# Patient Record
Sex: FEMALE | Race: WHITE | NOT HISPANIC OR LATINO | URBAN - METROPOLITAN AREA
[De-identification: names, ages, dates, MRNs, and addresses within clinical notes are randomized per-mention and may not be internally consistent; named-entity substitution may affect disease eponyms.]

---

## 2018-03-23 ENCOUNTER — INPATIENT (INPATIENT)
Facility: HOSPITAL | Age: 78
LOS: 1 days | Discharge: LEFT BEFORE DISCHARGE | End: 2018-03-25
Attending: STUDENT IN AN ORGANIZED HEALTH CARE EDUCATION/TRAINING PROGRAM | Admitting: STUDENT IN AN ORGANIZED HEALTH CARE EDUCATION/TRAINING PROGRAM
Payer: MEDICARE

## 2018-03-23 VITALS
HEART RATE: 86 BPM | RESPIRATION RATE: 16 BRPM | TEMPERATURE: 100 F | DIASTOLIC BLOOD PRESSURE: 60 MMHG | OXYGEN SATURATION: 98 % | SYSTOLIC BLOOD PRESSURE: 136 MMHG

## 2018-03-23 LAB
ALBUMIN SERPL ELPH-MCNC: 3.7 G/DL — SIGNIFICANT CHANGE UP (ref 3.3–5)
ALP SERPL-CCNC: 76 U/L — SIGNIFICANT CHANGE UP (ref 40–120)
ALT FLD-CCNC: 21 U/L — SIGNIFICANT CHANGE UP (ref 4–33)
ANISOCYTOSIS BLD QL: SLIGHT — SIGNIFICANT CHANGE UP
APPEARANCE UR: SIGNIFICANT CHANGE UP
APTT BLD: 29.2 SEC — SIGNIFICANT CHANGE UP (ref 27.5–37.4)
AST SERPL-CCNC: 19 U/L — SIGNIFICANT CHANGE UP (ref 4–32)
B PERT DNA SPEC QL NAA+PROBE: SIGNIFICANT CHANGE UP
BACTERIA # UR AUTO: SIGNIFICANT CHANGE UP
BASE EXCESS BLDV CALC-SCNC: -7.6 MMOL/L — SIGNIFICANT CHANGE UP
BASOPHILS # BLD AUTO: 0.01 K/UL — SIGNIFICANT CHANGE UP (ref 0–0.2)
BASOPHILS NFR BLD AUTO: 0.1 % — SIGNIFICANT CHANGE UP (ref 0–2)
BASOPHILS NFR SPEC: 0 % — SIGNIFICANT CHANGE UP (ref 0–2)
BILIRUB SERPL-MCNC: 0.3 MG/DL — SIGNIFICANT CHANGE UP (ref 0.2–1.2)
BILIRUB UR-MCNC: NEGATIVE — SIGNIFICANT CHANGE UP
BLOOD GAS VENOUS - CREATININE: 1.62 MG/DL — HIGH (ref 0.5–1.3)
BLOOD UR QL VISUAL: HIGH
BUN SERPL-MCNC: 42 MG/DL — HIGH (ref 7–23)
C PNEUM DNA SPEC QL NAA+PROBE: NOT DETECTED — SIGNIFICANT CHANGE UP
CALCIUM SERPL-MCNC: 7.9 MG/DL — LOW (ref 8.4–10.5)
CHLORIDE BLDV-SCNC: 112 MMOL/L — HIGH (ref 96–108)
CHLORIDE SERPL-SCNC: 107 MMOL/L — SIGNIFICANT CHANGE UP (ref 98–107)
CO2 SERPL-SCNC: 17 MMOL/L — LOW (ref 22–31)
COLOR SPEC: SIGNIFICANT CHANGE UP
CREAT SERPL-MCNC: 1.54 MG/DL — HIGH (ref 0.5–1.3)
EOSINOPHIL # BLD AUTO: 0 K/UL — SIGNIFICANT CHANGE UP (ref 0–0.5)
EOSINOPHIL NFR BLD AUTO: 0 % — SIGNIFICANT CHANGE UP (ref 0–6)
EOSINOPHIL NFR FLD: 0 % — SIGNIFICANT CHANGE UP (ref 0–6)
FLUAV H1 2009 PAND RNA SPEC QL NAA+PROBE: NOT DETECTED — SIGNIFICANT CHANGE UP
FLUAV H1 RNA SPEC QL NAA+PROBE: NOT DETECTED — SIGNIFICANT CHANGE UP
FLUAV H3 RNA SPEC QL NAA+PROBE: NOT DETECTED — SIGNIFICANT CHANGE UP
FLUAV SUBTYP SPEC NAA+PROBE: SIGNIFICANT CHANGE UP
FLUBV RNA SPEC QL NAA+PROBE: NOT DETECTED — SIGNIFICANT CHANGE UP
GAS PNL BLDV: 131 MMOL/L — LOW (ref 136–146)
GIANT PLATELETS BLD QL SMEAR: PRESENT — SIGNIFICANT CHANGE UP
GLUCOSE BLDV-MCNC: 74 — SIGNIFICANT CHANGE UP (ref 70–99)
GLUCOSE SERPL-MCNC: 77 MG/DL — SIGNIFICANT CHANGE UP (ref 70–99)
GLUCOSE UR-MCNC: NEGATIVE — SIGNIFICANT CHANGE UP
HADV DNA SPEC QL NAA+PROBE: NOT DETECTED — SIGNIFICANT CHANGE UP
HCO3 BLDV-SCNC: 17 MMOL/L — LOW (ref 20–27)
HCOV 229E RNA SPEC QL NAA+PROBE: NOT DETECTED — SIGNIFICANT CHANGE UP
HCOV HKU1 RNA SPEC QL NAA+PROBE: NOT DETECTED — SIGNIFICANT CHANGE UP
HCOV NL63 RNA SPEC QL NAA+PROBE: NOT DETECTED — SIGNIFICANT CHANGE UP
HCOV OC43 RNA SPEC QL NAA+PROBE: NOT DETECTED — SIGNIFICANT CHANGE UP
HCT VFR BLD CALC: 35.2 % — SIGNIFICANT CHANGE UP (ref 34.5–45)
HCT VFR BLDV CALC: 32.6 % — LOW (ref 34.5–45)
HGB BLD-MCNC: 10.9 G/DL — LOW (ref 11.5–15.5)
HGB BLDV-MCNC: 10.5 G/DL — LOW (ref 11.5–15.5)
HMPV RNA SPEC QL NAA+PROBE: NOT DETECTED — SIGNIFICANT CHANGE UP
HPIV1 RNA SPEC QL NAA+PROBE: NOT DETECTED — SIGNIFICANT CHANGE UP
HPIV2 RNA SPEC QL NAA+PROBE: NOT DETECTED — SIGNIFICANT CHANGE UP
HPIV3 RNA SPEC QL NAA+PROBE: NOT DETECTED — SIGNIFICANT CHANGE UP
HPIV4 RNA SPEC QL NAA+PROBE: NOT DETECTED — SIGNIFICANT CHANGE UP
HYPOCHROMIA BLD QL: SLIGHT — SIGNIFICANT CHANGE UP
IMM GRANULOCYTES # BLD AUTO: 0.06 # — SIGNIFICANT CHANGE UP
IMM GRANULOCYTES NFR BLD AUTO: 0.5 % — SIGNIFICANT CHANGE UP (ref 0–1.5)
INR BLD: 0.97 — SIGNIFICANT CHANGE UP (ref 0.88–1.17)
KETONES UR-MCNC: NEGATIVE — SIGNIFICANT CHANGE UP
LACTATE BLDV-MCNC: 1.4 MMOL/L — SIGNIFICANT CHANGE UP (ref 0.5–2)
LEUKOCYTE ESTERASE UR-ACNC: HIGH
LYMPHOCYTES # BLD AUTO: 0.46 K/UL — LOW (ref 1–3.3)
LYMPHOCYTES # BLD AUTO: 4.2 % — LOW (ref 13–44)
LYMPHOCYTES NFR SPEC AUTO: 1.7 % — LOW (ref 13–44)
M PNEUMO DNA SPEC QL NAA+PROBE: NOT DETECTED — SIGNIFICANT CHANGE UP
MCHC RBC-ENTMCNC: 30.8 PG — SIGNIFICANT CHANGE UP (ref 27–34)
MCHC RBC-ENTMCNC: 31 % — LOW (ref 32–36)
MCV RBC AUTO: 99.4 FL — SIGNIFICANT CHANGE UP (ref 80–100)
MONOCYTES # BLD AUTO: 0.18 K/UL — SIGNIFICANT CHANGE UP (ref 0–0.9)
MONOCYTES NFR BLD AUTO: 1.6 % — LOW (ref 2–14)
MONOCYTES NFR BLD: 0 % — LOW (ref 2–9)
MUCOUS THREADS # UR AUTO: SIGNIFICANT CHANGE UP
NEUTROPHIL AB SER-ACNC: 95.6 % — HIGH (ref 43–77)
NEUTROPHILS # BLD AUTO: 10.34 K/UL — HIGH (ref 1.8–7.4)
NEUTROPHILS NFR BLD AUTO: 93.6 % — HIGH (ref 43–77)
NEUTS BAND # BLD: 1.8 % — SIGNIFICANT CHANGE UP (ref 0–6)
NITRITE UR-MCNC: POSITIVE — HIGH
NON-SQ EPI CELLS # UR AUTO: <1 — SIGNIFICANT CHANGE UP
NRBC # FLD: 0 — SIGNIFICANT CHANGE UP
PCO2 BLDV: 42 MMHG — SIGNIFICANT CHANGE UP (ref 41–51)
PH BLDV: 7.26 PH — LOW (ref 7.32–7.43)
PH UR: 7.5 — SIGNIFICANT CHANGE UP (ref 4.6–8)
PLATELET # BLD AUTO: 101 K/UL — LOW (ref 150–400)
PLATELET COUNT - ESTIMATE: SIGNIFICANT CHANGE UP
PMV BLD: 10.6 FL — SIGNIFICANT CHANGE UP (ref 7–13)
PO2 BLDV: 31 MMHG — LOW (ref 35–40)
POLYCHROMASIA BLD QL SMEAR: SLIGHT — SIGNIFICANT CHANGE UP
POTASSIUM BLDV-SCNC: 6.1 MMOL/L — HIGH (ref 3.4–4.5)
POTASSIUM SERPL-MCNC: 6.1 MMOL/L — HIGH (ref 3.5–5.3)
POTASSIUM SERPL-SCNC: 6.1 MMOL/L — HIGH (ref 3.5–5.3)
PROT SERPL-MCNC: 6 G/DL — SIGNIFICANT CHANGE UP (ref 6–8.3)
PROT UR-MCNC: 20 MG/DL — SIGNIFICANT CHANGE UP
PROTHROM AB SERPL-ACNC: 11.2 SEC — SIGNIFICANT CHANGE UP (ref 9.8–13.1)
RBC # BLD: 3.54 M/UL — LOW (ref 3.8–5.2)
RBC # FLD: 21.4 % — HIGH (ref 10.3–14.5)
RBC CASTS # UR COMP ASSIST: SIGNIFICANT CHANGE UP (ref 0–?)
RSV RNA SPEC QL NAA+PROBE: NOT DETECTED — SIGNIFICANT CHANGE UP
RV+EV RNA SPEC QL NAA+PROBE: NOT DETECTED — SIGNIFICANT CHANGE UP
SAO2 % BLDV: 48.9 % — LOW (ref 60–85)
SODIUM SERPL-SCNC: 137 MMOL/L — SIGNIFICANT CHANGE UP (ref 135–145)
SP GR SPEC: 1.01 — SIGNIFICANT CHANGE UP (ref 1–1.04)
SQUAMOUS # UR AUTO: SIGNIFICANT CHANGE UP
UROBILINOGEN FLD QL: NORMAL MG/DL — SIGNIFICANT CHANGE UP
VARIANT LYMPHS # BLD: 0.9 % — SIGNIFICANT CHANGE UP
WBC # BLD: 11.05 K/UL — HIGH (ref 3.8–10.5)
WBC # FLD AUTO: 11.05 K/UL — HIGH (ref 3.8–10.5)
WBC CLUMPS #/AREA URNS HPF: PRESENT — HIGH (ref 0–?)
WBC UR QL: SIGNIFICANT CHANGE UP (ref 0–?)

## 2018-03-23 PROCEDURE — 71045 X-RAY EXAM CHEST 1 VIEW: CPT | Mod: 26

## 2018-03-23 RX ORDER — ASPIRIN/CALCIUM CARB/MAGNESIUM 324 MG
324 TABLET ORAL ONCE
Qty: 0 | Refills: 0 | Status: DISCONTINUED | OUTPATIENT
Start: 2018-03-23 | End: 2018-03-23

## 2018-03-23 RX ORDER — MORPHINE SULFATE 50 MG/1
4 CAPSULE, EXTENDED RELEASE ORAL ONCE
Qty: 0 | Refills: 0 | Status: DISCONTINUED | OUTPATIENT
Start: 2018-03-23 | End: 2018-03-23

## 2018-03-23 RX ORDER — SODIUM CHLORIDE 9 MG/ML
1000 INJECTION INTRAMUSCULAR; INTRAVENOUS; SUBCUTANEOUS ONCE
Qty: 0 | Refills: 0 | Status: COMPLETED | OUTPATIENT
Start: 2018-03-23 | End: 2018-03-23

## 2018-03-23 RX ORDER — DEXTROSE 50 % IN WATER 50 %
50 SYRINGE (ML) INTRAVENOUS ONCE
Qty: 0 | Refills: 0 | Status: COMPLETED | OUTPATIENT
Start: 2018-03-23 | End: 2018-03-23

## 2018-03-23 RX ORDER — CALCIUM GLUCONATE 100 MG/ML
1 VIAL (ML) INTRAVENOUS ONCE
Qty: 0 | Refills: 0 | Status: COMPLETED | OUTPATIENT
Start: 2018-03-23 | End: 2018-03-23

## 2018-03-23 RX ORDER — INSULIN HUMAN 100 [IU]/ML
10 INJECTION, SOLUTION SUBCUTANEOUS ONCE
Qty: 0 | Refills: 0 | Status: COMPLETED | OUTPATIENT
Start: 2018-03-23 | End: 2018-03-23

## 2018-03-23 RX ORDER — ACETAMINOPHEN 500 MG
1000 TABLET ORAL ONCE
Qty: 0 | Refills: 0 | Status: COMPLETED | OUTPATIENT
Start: 2018-03-23 | End: 2018-03-23

## 2018-03-23 RX ORDER — CEFEPIME 1 G/1
2000 INJECTION, POWDER, FOR SOLUTION INTRAMUSCULAR; INTRAVENOUS ONCE
Qty: 0 | Refills: 0 | Status: COMPLETED | OUTPATIENT
Start: 2018-03-23 | End: 2018-03-23

## 2018-03-23 RX ORDER — SODIUM BICARBONATE 1 MEQ/ML
50 SYRINGE (ML) INTRAVENOUS ONCE
Qty: 0 | Refills: 0 | Status: COMPLETED | OUTPATIENT
Start: 2018-03-23 | End: 2018-03-23

## 2018-03-23 RX ORDER — VANCOMYCIN HCL 1 G
1000 VIAL (EA) INTRAVENOUS ONCE
Qty: 0 | Refills: 0 | Status: COMPLETED | OUTPATIENT
Start: 2018-03-23 | End: 2018-03-23

## 2018-03-23 RX ADMIN — Medication 50 MILLILITER(S): at 22:56

## 2018-03-23 RX ADMIN — MORPHINE SULFATE 4 MILLIGRAM(S): 50 CAPSULE, EXTENDED RELEASE ORAL at 21:42

## 2018-03-23 RX ADMIN — CEFEPIME 100 MILLIGRAM(S): 1 INJECTION, POWDER, FOR SOLUTION INTRAMUSCULAR; INTRAVENOUS at 22:58

## 2018-03-23 RX ADMIN — SODIUM CHLORIDE 1000 MILLILITER(S): 9 INJECTION INTRAMUSCULAR; INTRAVENOUS; SUBCUTANEOUS at 21:42

## 2018-03-23 RX ADMIN — Medication 250 MILLIGRAM(S): at 22:56

## 2018-03-23 RX ADMIN — MORPHINE SULFATE 4 MILLIGRAM(S): 50 CAPSULE, EXTENDED RELEASE ORAL at 21:57

## 2018-03-23 RX ADMIN — INSULIN HUMAN 10 UNIT(S): 100 INJECTION, SOLUTION SUBCUTANEOUS at 22:57

## 2018-03-23 RX ADMIN — Medication 400 MILLIGRAM(S): at 21:42

## 2018-03-23 NOTE — ED PROVIDER NOTE - PSH
EPS study  with no intervention 6/2010  History of Arthroscopy  1997 Right  Left 2000  History of D&C  1968  Nasal Polyp removal  1960  S/P total knee arthroplasty, left

## 2018-03-23 NOTE — ED PROVIDER NOTE - MEDICAL DECISION MAKING DETAILS
76 yo F w/ pmhx of bladder Cancer, s/p resection, has urostomy bag in place, last chemo was yesterday c/o fever, lower abdominal pain. concern for neutropenic fever, or abdominal source of infection  - labs, CT, abx, Tylenol

## 2018-03-23 NOTE — ED PROVIDER NOTE - ATTENDING CONTRIBUTION TO CARE
Attending Statement: I have personally seen and examined this patient. I have fully participated in the care of this patient. I have reviewed all pertinent clinical information, including history physical exam, plan and the Resident's note and agree except as noted  76yo F hx of bladder ca sp resection has a urostomy bag, HTN, HLD, pw  fever today, temp max 100.5  +myalgia, fever and abdominal pain. +nausea. no vomit. no diarrhea. no cloudy urine. no cough. no headache. no rash. no sick contact. Last chemo a day ago.   Vital signs noted. looks uncomfortable. aox3 supple neck. normal S1-S2 good air entry  nl. soft diffusely tender abdomen w urostomy in place. no rash. no pedal edema. no calf tenderness. normal pulses bilateral feet.  plan sepsis workup, ct abdomen, IVf, IV abx, admit

## 2018-03-23 NOTE — ED PROVIDER NOTE - PROGRESS NOTE DETAILS
pt no distress pending ct to be done and admit to tele for CP. pt now sleeping no distress. Endorsed to Dr Sarmiento Morad: ct neg for acute pathology, hyper K improving, pt accepted for admission by hospitalist

## 2018-03-23 NOTE — ED PROVIDER NOTE - FAMILY HISTORY
Family history of prostate cancer     Family history of hyperlipidemia     Father  Still living? Unknown  Family history of dementia, Age at diagnosis: Age Unknown

## 2018-03-23 NOTE — ED PROVIDER NOTE - OBJECTIVE STATEMENT
76 yo F w/ pmhx of bladder Cancer, s/p resection, has urostomy bag in place, last chemo was yesterday c/o fever since this afternoon, T.max 100.5, and lower abdominal pain. reports nausea, but no vomiting, diarrhea, constipation or any other symptoms.

## 2018-03-23 NOTE — ED ADULT TRIAGE NOTE - CHIEF COMPLAINT QUOTE
Pt comes in for c/o abd pain that began this afternoon and also spiking fever of 100.5 at home. Pt w/hx of bladder CA reports last chemo yesterday and abd pain mild but still present. Pt in no acute distress, vs as noted

## 2018-03-23 NOTE — ED PROVIDER NOTE - PMH
Acid Reflux    Arthritis    Asthmatic Bronchitis    Calcified Thoracic Aorta    Fibromyalgia    Gastric Ulcer    HTN (Hypertension)    Hypertrophic Cardiomyopathy    Migraine, Ophthalmoplegic    Nasal Polyp  1960  Obese    Paroxysmal Ventricular Tachycardia  6/2010  Radial fracture  left  Uterine Polyp

## 2018-03-24 DIAGNOSIS — M79.7 FIBROMYALGIA: ICD-10-CM

## 2018-03-24 DIAGNOSIS — D69.6 THROMBOCYTOPENIA, UNSPECIFIED: ICD-10-CM

## 2018-03-24 DIAGNOSIS — R94.31 ABNORMAL ELECTROCARDIOGRAM [ECG] [EKG]: ICD-10-CM

## 2018-03-24 DIAGNOSIS — C67.9 MALIGNANT NEOPLASM OF BLADDER, UNSPECIFIED: ICD-10-CM

## 2018-03-24 DIAGNOSIS — Z98.890 OTHER SPECIFIED POSTPROCEDURAL STATES: Chronic | ICD-10-CM

## 2018-03-24 DIAGNOSIS — A41.9 SEPSIS, UNSPECIFIED ORGANISM: ICD-10-CM

## 2018-03-24 DIAGNOSIS — N39.0 URINARY TRACT INFECTION, SITE NOT SPECIFIED: ICD-10-CM

## 2018-03-24 DIAGNOSIS — N17.9 ACUTE KIDNEY FAILURE, UNSPECIFIED: ICD-10-CM

## 2018-03-24 DIAGNOSIS — E87.5 HYPERKALEMIA: ICD-10-CM

## 2018-03-24 DIAGNOSIS — D64.9 ANEMIA, UNSPECIFIED: ICD-10-CM

## 2018-03-24 DIAGNOSIS — N20.0 CALCULUS OF KIDNEY: ICD-10-CM

## 2018-03-24 DIAGNOSIS — I42.2 OTHER HYPERTROPHIC CARDIOMYOPATHY: ICD-10-CM

## 2018-03-24 LAB
ALBUMIN SERPL ELPH-MCNC: 2.9 G/DL — LOW (ref 3.3–5)
ALP SERPL-CCNC: 71 U/L — SIGNIFICANT CHANGE UP (ref 40–120)
ALT FLD-CCNC: 25 U/L — SIGNIFICANT CHANGE UP (ref 4–33)
AST SERPL-CCNC: 26 U/L — SIGNIFICANT CHANGE UP (ref 4–32)
BASOPHILS # BLD AUTO: 0.09 K/UL — SIGNIFICANT CHANGE UP (ref 0–0.2)
BASOPHILS NFR BLD AUTO: 0.2 % — SIGNIFICANT CHANGE UP (ref 0–2)
BASOPHILS NFR SPEC: 0 % — SIGNIFICANT CHANGE UP (ref 0–2)
BILIRUB SERPL-MCNC: 0.4 MG/DL — SIGNIFICANT CHANGE UP (ref 0.2–1.2)
BLASTS # FLD: 0 % — SIGNIFICANT CHANGE UP (ref 0–0)
BUN SERPL-MCNC: 37 MG/DL — HIGH (ref 7–23)
BUN SERPL-MCNC: 41 MG/DL — HIGH (ref 7–23)
BUN SERPL-MCNC: 43 MG/DL — HIGH (ref 7–23)
BURR CELLS BLD QL SMEAR: PRESENT — SIGNIFICANT CHANGE UP
CALCIUM SERPL-MCNC: 6.5 MG/DL — CRITICAL LOW (ref 8.4–10.5)
CALCIUM SERPL-MCNC: 7.4 MG/DL — LOW (ref 8.4–10.5)
CALCIUM SERPL-MCNC: 7.6 MG/DL — LOW (ref 8.4–10.5)
CHLORIDE SERPL-SCNC: 108 MMOL/L — HIGH (ref 98–107)
CHLORIDE SERPL-SCNC: 110 MMOL/L — HIGH (ref 98–107)
CHLORIDE SERPL-SCNC: 112 MMOL/L — HIGH (ref 98–107)
CK MB BLD-MCNC: 1.29 NG/ML — SIGNIFICANT CHANGE UP (ref 1–4.7)
CK MB BLD-MCNC: 1.49 NG/ML — SIGNIFICANT CHANGE UP (ref 1–4.7)
CK MB BLD-MCNC: SIGNIFICANT CHANGE UP (ref 0–2.5)
CK SERPL-CCNC: 15 U/L — LOW (ref 25–170)
CK SERPL-CCNC: 17 U/L — LOW (ref 25–170)
CO2 SERPL-SCNC: 14 MMOL/L — LOW (ref 22–31)
CO2 SERPL-SCNC: 16 MMOL/L — LOW (ref 22–31)
CO2 SERPL-SCNC: 17 MMOL/L — LOW (ref 22–31)
CREAT SERPL-MCNC: 1.43 MG/DL — HIGH (ref 0.5–1.3)
CREAT SERPL-MCNC: 1.49 MG/DL — HIGH (ref 0.5–1.3)
CREAT SERPL-MCNC: 1.76 MG/DL — HIGH (ref 0.5–1.3)
DACRYOCYTES BLD QL SMEAR: SIGNIFICANT CHANGE UP
EOSINOPHIL # BLD AUTO: 0 K/UL — SIGNIFICANT CHANGE UP (ref 0–0.5)
EOSINOPHIL NFR BLD AUTO: 0 % — SIGNIFICANT CHANGE UP (ref 0–6)
EOSINOPHIL NFR FLD: 0 % — SIGNIFICANT CHANGE UP (ref 0–6)
FERRITIN SERPL-MCNC: 417.1 NG/ML — HIGH (ref 15–150)
GIANT PLATELETS BLD QL SMEAR: PRESENT — SIGNIFICANT CHANGE UP
GLUCOSE SERPL-MCNC: 130 MG/DL — HIGH (ref 70–99)
GLUCOSE SERPL-MCNC: 72 MG/DL — SIGNIFICANT CHANGE UP (ref 70–99)
GLUCOSE SERPL-MCNC: 90 MG/DL — SIGNIFICANT CHANGE UP (ref 70–99)
HAPTOGLOB SERPL-MCNC: 114 MG/DL — SIGNIFICANT CHANGE UP (ref 34–200)
HCT VFR BLD CALC: 29.7 % — LOW (ref 34.5–45)
HGB BLD-MCNC: 9.4 G/DL — LOW (ref 11.5–15.5)
IMM GRANULOCYTES # BLD AUTO: 2 # — SIGNIFICANT CHANGE UP
IMM GRANULOCYTES NFR BLD AUTO: 5.1 % — HIGH (ref 0–1.5)
IRON SATN MFR SERPL: 176 UG/DL — SIGNIFICANT CHANGE UP (ref 140–530)
IRON SATN MFR SERPL: 84 UG/DL — SIGNIFICANT CHANGE UP (ref 30–160)
LDH SERPL L TO P-CCNC: 172 U/L — SIGNIFICANT CHANGE UP (ref 135–225)
LIDOCAIN IGE QN: 38.7 U/L — SIGNIFICANT CHANGE UP (ref 7–60)
LYMPHOCYTES # BLD AUTO: 0.51 K/UL — LOW (ref 1–3.3)
LYMPHOCYTES # BLD AUTO: 1.3 % — LOW (ref 13–44)
LYMPHOCYTES NFR SPEC AUTO: 3.5 % — LOW (ref 13–44)
MAGNESIUM SERPL-MCNC: 1.4 MG/DL — LOW (ref 1.6–2.6)
MAGNESIUM SERPL-MCNC: 2.3 MG/DL — SIGNIFICANT CHANGE UP (ref 1.6–2.6)
MCHC RBC-ENTMCNC: 31 PG — SIGNIFICANT CHANGE UP (ref 27–34)
MCHC RBC-ENTMCNC: 31.6 % — LOW (ref 32–36)
MCV RBC AUTO: 98 FL — SIGNIFICANT CHANGE UP (ref 80–100)
METAMYELOCYTES # FLD: 0 % — SIGNIFICANT CHANGE UP (ref 0–1)
MONOCYTES # BLD AUTO: 0.2 K/UL — SIGNIFICANT CHANGE UP (ref 0–0.9)
MONOCYTES NFR BLD AUTO: 0.5 % — LOW (ref 2–14)
MONOCYTES NFR BLD: 0.9 % — LOW (ref 2–9)
MYELOCYTES NFR BLD: 0 % — SIGNIFICANT CHANGE UP (ref 0–0)
NEUTROPHIL AB SER-ACNC: 92.1 % — HIGH (ref 43–77)
NEUTROPHILS # BLD AUTO: 36.72 K/UL — HIGH (ref 1.8–7.4)
NEUTROPHILS NFR BLD AUTO: 92.9 % — HIGH (ref 43–77)
NEUTS BAND # BLD: 2.6 % — SIGNIFICANT CHANGE UP (ref 0–6)
NRBC # FLD: 0 — SIGNIFICANT CHANGE UP
OTHER - HEMATOLOGY %: 0 — SIGNIFICANT CHANGE UP
PHOSPHATE SERPL-MCNC: 2.8 MG/DL — SIGNIFICANT CHANGE UP (ref 2.5–4.5)
PHOSPHATE SERPL-MCNC: 3.9 MG/DL — SIGNIFICANT CHANGE UP (ref 2.5–4.5)
PLATELET # BLD AUTO: 97 K/UL — LOW (ref 150–400)
PLATELET COUNT - ESTIMATE: SIGNIFICANT CHANGE UP
PMV BLD: 11.8 FL — SIGNIFICANT CHANGE UP (ref 7–13)
POIKILOCYTOSIS BLD QL AUTO: SIGNIFICANT CHANGE UP
POTASSIUM SERPL-MCNC: 4.5 MMOL/L — SIGNIFICANT CHANGE UP (ref 3.5–5.3)
POTASSIUM SERPL-MCNC: 4.7 MMOL/L — SIGNIFICANT CHANGE UP (ref 3.5–5.3)
POTASSIUM SERPL-MCNC: 5.7 MMOL/L — HIGH (ref 3.5–5.3)
POTASSIUM SERPL-SCNC: 4.5 MMOL/L — SIGNIFICANT CHANGE UP (ref 3.5–5.3)
POTASSIUM SERPL-SCNC: 4.7 MMOL/L — SIGNIFICANT CHANGE UP (ref 3.5–5.3)
POTASSIUM SERPL-SCNC: 5.7 MMOL/L — HIGH (ref 3.5–5.3)
PROMYELOCYTES # FLD: 0 % — SIGNIFICANT CHANGE UP (ref 0–0)
PROT SERPL-MCNC: 4.7 G/DL — LOW (ref 6–8.3)
RBC # BLD: 3.03 M/UL — LOW (ref 3.8–5.2)
RBC # FLD: 21.2 % — HIGH (ref 10.3–14.5)
RETICS #: 70 K/UL — SIGNIFICANT CHANGE UP (ref 25–125)
RETICS/RBC NFR: 2.3 % — SIGNIFICANT CHANGE UP (ref 0.5–2.5)
REVIEW TO FOLLOW: YES — SIGNIFICANT CHANGE UP
SCHISTOCYTES BLD QL AUTO: SLIGHT — SIGNIFICANT CHANGE UP
SODIUM SERPL-SCNC: 136 MMOL/L — SIGNIFICANT CHANGE UP (ref 135–145)
SODIUM SERPL-SCNC: 138 MMOL/L — SIGNIFICANT CHANGE UP (ref 135–145)
SODIUM SERPL-SCNC: 139 MMOL/L — SIGNIFICANT CHANGE UP (ref 135–145)
SPECIMEN SOURCE: SIGNIFICANT CHANGE UP
TROPONIN T SERPL-MCNC: < 0.06 NG/ML — SIGNIFICANT CHANGE UP (ref 0–0.06)
TROPONIN T SERPL-MCNC: < 0.06 NG/ML — SIGNIFICANT CHANGE UP (ref 0–0.06)
UIBC SERPL-MCNC: 92 UG/DL — LOW (ref 110–370)
URATE SERPL-MCNC: 6.7 MG/DL — SIGNIFICANT CHANGE UP (ref 2.5–7)
VARIANT LYMPHS # BLD: 0.9 % — SIGNIFICANT CHANGE UP
WBC # BLD: 39.52 K/UL — HIGH (ref 3.8–10.5)
WBC # FLD AUTO: 39.52 K/UL — HIGH (ref 3.8–10.5)

## 2018-03-24 PROCEDURE — 99223 1ST HOSP IP/OBS HIGH 75: CPT | Mod: GC

## 2018-03-24 PROCEDURE — 74177 CT ABD & PELVIS W/CONTRAST: CPT | Mod: 26

## 2018-03-24 PROCEDURE — 12345: CPT | Mod: GC,NC

## 2018-03-24 RX ORDER — DOCUSATE SODIUM 100 MG
1 CAPSULE ORAL
Qty: 0 | Refills: 0 | COMMUNITY

## 2018-03-24 RX ORDER — CALCIUM CARBONATE 500(1250)
1 TABLET ORAL EVERY 6 HOURS
Qty: 0 | Refills: 0 | Status: DISCONTINUED | OUTPATIENT
Start: 2018-03-24 | End: 2018-03-25

## 2018-03-24 RX ORDER — MORPHINE SULFATE 50 MG/1
4 CAPSULE, EXTENDED RELEASE ORAL ONCE
Qty: 0 | Refills: 0 | Status: DISCONTINUED | OUTPATIENT
Start: 2018-03-24 | End: 2018-03-24

## 2018-03-24 RX ORDER — WHEAT DEXTRIN 3 G/4 G
1 POWDER IN PACKET (EA) ORAL
Qty: 0 | Refills: 0 | COMMUNITY

## 2018-03-24 RX ORDER — ALBUTEROL 90 UG/1
2.5 AEROSOL, METERED ORAL ONCE
Qty: 0 | Refills: 0 | Status: COMPLETED | OUTPATIENT
Start: 2018-03-24 | End: 2018-03-24

## 2018-03-24 RX ORDER — PANTOPRAZOLE SODIUM 20 MG/1
40 TABLET, DELAYED RELEASE ORAL
Qty: 0 | Refills: 0 | Status: DISCONTINUED | OUTPATIENT
Start: 2018-03-24 | End: 2018-03-25

## 2018-03-24 RX ORDER — DOCUSATE SODIUM 100 MG
100 CAPSULE ORAL
Qty: 0 | Refills: 0 | Status: DISCONTINUED | OUTPATIENT
Start: 2018-03-24 | End: 2018-03-25

## 2018-03-24 RX ORDER — METOPROLOL TARTRATE 50 MG
50 TABLET ORAL ONCE
Qty: 0 | Refills: 0 | Status: COMPLETED | OUTPATIENT
Start: 2018-03-24 | End: 2018-03-24

## 2018-03-24 RX ORDER — HEPARIN SODIUM 5000 [USP'U]/ML
5000 INJECTION INTRAVENOUS; SUBCUTANEOUS EVERY 8 HOURS
Qty: 0 | Refills: 0 | Status: DISCONTINUED | OUTPATIENT
Start: 2018-03-24 | End: 2018-03-25

## 2018-03-24 RX ORDER — MAGNESIUM SULFATE 500 MG/ML
2 VIAL (ML) INJECTION ONCE
Qty: 0 | Refills: 0 | Status: COMPLETED | OUTPATIENT
Start: 2018-03-24 | End: 2018-03-24

## 2018-03-24 RX ORDER — CEFEPIME 1 G/1
INJECTION, POWDER, FOR SOLUTION INTRAMUSCULAR; INTRAVENOUS
Qty: 0 | Refills: 0 | Status: DISCONTINUED | OUTPATIENT
Start: 2018-03-24 | End: 2018-03-25

## 2018-03-24 RX ORDER — CALCIUM CARBONATE 500(1250)
200 TABLET ORAL EVERY 6 HOURS
Qty: 0 | Refills: 0 | Status: DISCONTINUED | OUTPATIENT
Start: 2018-03-24 | End: 2018-03-24

## 2018-03-24 RX ORDER — ACETAMINOPHEN 500 MG
975 TABLET ORAL ONCE
Qty: 0 | Refills: 0 | Status: COMPLETED | OUTPATIENT
Start: 2018-03-24 | End: 2018-03-24

## 2018-03-24 RX ORDER — SODIUM CHLORIDE 9 MG/ML
1000 INJECTION INTRAMUSCULAR; INTRAVENOUS; SUBCUTANEOUS
Qty: 0 | Refills: 0 | Status: DISCONTINUED | OUTPATIENT
Start: 2018-03-24 | End: 2018-03-25

## 2018-03-24 RX ORDER — SIMETHICONE 80 MG/1
80 TABLET, CHEWABLE ORAL EVERY 8 HOURS
Qty: 0 | Refills: 0 | Status: DISCONTINUED | OUTPATIENT
Start: 2018-03-24 | End: 2018-03-24

## 2018-03-24 RX ORDER — CEFEPIME 1 G/1
2000 INJECTION, POWDER, FOR SOLUTION INTRAMUSCULAR; INTRAVENOUS EVERY 8 HOURS
Qty: 0 | Refills: 0 | Status: DISCONTINUED | OUTPATIENT
Start: 2018-03-24 | End: 2018-03-25

## 2018-03-24 RX ORDER — CEFEPIME 1 G/1
2000 INJECTION, POWDER, FOR SOLUTION INTRAMUSCULAR; INTRAVENOUS ONCE
Qty: 0 | Refills: 0 | Status: COMPLETED | OUTPATIENT
Start: 2018-03-24 | End: 2018-03-24

## 2018-03-24 RX ORDER — MONTELUKAST 4 MG/1
10 TABLET, CHEWABLE ORAL DAILY
Qty: 0 | Refills: 0 | Status: DISCONTINUED | OUTPATIENT
Start: 2018-03-24 | End: 2018-03-25

## 2018-03-24 RX ORDER — SIMETHICONE 80 MG/1
80 TABLET, CHEWABLE ORAL EVERY 6 HOURS
Qty: 0 | Refills: 0 | Status: DISCONTINUED | OUTPATIENT
Start: 2018-03-24 | End: 2018-03-25

## 2018-03-24 RX ORDER — DULOXETINE HYDROCHLORIDE 30 MG/1
60 CAPSULE, DELAYED RELEASE ORAL DAILY
Qty: 0 | Refills: 0 | Status: DISCONTINUED | OUTPATIENT
Start: 2018-03-24 | End: 2018-03-25

## 2018-03-24 RX ORDER — IPRATROPIUM/ALBUTEROL SULFATE 18-103MCG
3 AEROSOL WITH ADAPTER (GRAM) INHALATION EVERY 6 HOURS
Qty: 0 | Refills: 0 | Status: DISCONTINUED | OUTPATIENT
Start: 2018-03-24 | End: 2018-03-25

## 2018-03-24 RX ORDER — ONDANSETRON 8 MG/1
4 TABLET, FILM COATED ORAL EVERY 8 HOURS
Qty: 0 | Refills: 0 | Status: COMPLETED | OUTPATIENT
Start: 2018-03-24 | End: 2018-03-24

## 2018-03-24 RX ORDER — TRAZODONE HCL 50 MG
100 TABLET ORAL AT BEDTIME
Qty: 0 | Refills: 0 | Status: DISCONTINUED | OUTPATIENT
Start: 2018-03-24 | End: 2018-03-25

## 2018-03-24 RX ORDER — METOPROLOL TARTRATE 50 MG
25 TABLET ORAL
Qty: 0 | Refills: 0 | Status: DISCONTINUED | OUTPATIENT
Start: 2018-03-24 | End: 2018-03-24

## 2018-03-24 RX ORDER — SODIUM POLYSTYRENE SULFONATE 4.1 MEQ/G
30 POWDER, FOR SUSPENSION ORAL ONCE
Qty: 0 | Refills: 0 | Status: COMPLETED | OUTPATIENT
Start: 2018-03-24 | End: 2018-03-24

## 2018-03-24 RX ADMIN — DULOXETINE HYDROCHLORIDE 60 MILLIGRAM(S): 30 CAPSULE, DELAYED RELEASE ORAL at 12:12

## 2018-03-24 RX ADMIN — Medication 1 TABLET(S): at 23:49

## 2018-03-24 RX ADMIN — SODIUM CHLORIDE 100 MILLILITER(S): 9 INJECTION INTRAMUSCULAR; INTRAVENOUS; SUBCUTANEOUS at 08:37

## 2018-03-24 RX ADMIN — Medication 975 MILLIGRAM(S): at 08:25

## 2018-03-24 RX ADMIN — Medication 100 MILLIGRAM(S): at 22:14

## 2018-03-24 RX ADMIN — MORPHINE SULFATE 4 MILLIGRAM(S): 50 CAPSULE, EXTENDED RELEASE ORAL at 05:38

## 2018-03-24 RX ADMIN — Medication 975 MILLIGRAM(S): at 10:32

## 2018-03-24 RX ADMIN — ALBUTEROL 2.5 MILLIGRAM(S): 90 AEROSOL, METERED ORAL at 03:20

## 2018-03-24 RX ADMIN — Medication 75 MILLIGRAM(S): at 22:14

## 2018-03-24 RX ADMIN — SODIUM POLYSTYRENE SULFONATE 30 GRAM(S): 4.1 POWDER, FOR SUSPENSION ORAL at 04:00

## 2018-03-24 RX ADMIN — CEFEPIME 100 MILLIGRAM(S): 1 INJECTION, POWDER, FOR SOLUTION INTRAMUSCULAR; INTRAVENOUS at 14:27

## 2018-03-24 RX ADMIN — SIMETHICONE 80 MILLIGRAM(S): 80 TABLET, CHEWABLE ORAL at 23:48

## 2018-03-24 RX ADMIN — CEFEPIME 100 MILLIGRAM(S): 1 INJECTION, POWDER, FOR SOLUTION INTRAMUSCULAR; INTRAVENOUS at 22:14

## 2018-03-24 RX ADMIN — CEFEPIME 100 MILLIGRAM(S): 1 INJECTION, POWDER, FOR SOLUTION INTRAMUSCULAR; INTRAVENOUS at 07:45

## 2018-03-24 RX ADMIN — Medication 1 TABLET(S): at 17:46

## 2018-03-24 RX ADMIN — HEPARIN SODIUM 5000 UNIT(S): 5000 INJECTION INTRAVENOUS; SUBCUTANEOUS at 15:02

## 2018-03-24 RX ADMIN — Medication 50 MILLIGRAM(S): at 14:34

## 2018-03-24 RX ADMIN — Medication 75 MILLIGRAM(S): at 14:43

## 2018-03-24 RX ADMIN — Medication 50 GRAM(S): at 12:13

## 2018-03-24 RX ADMIN — MORPHINE SULFATE 4 MILLIGRAM(S): 50 CAPSULE, EXTENDED RELEASE ORAL at 06:29

## 2018-03-24 RX ADMIN — MONTELUKAST 10 MILLIGRAM(S): 4 TABLET, CHEWABLE ORAL at 12:12

## 2018-03-24 RX ADMIN — HEPARIN SODIUM 5000 UNIT(S): 5000 INJECTION INTRAVENOUS; SUBCUTANEOUS at 22:16

## 2018-03-24 RX ADMIN — SIMETHICONE 80 MILLIGRAM(S): 80 TABLET, CHEWABLE ORAL at 17:38

## 2018-03-24 RX ADMIN — Medication 200 GRAM(S): at 00:07

## 2018-03-24 RX ADMIN — PANTOPRAZOLE SODIUM 40 MILLIGRAM(S): 20 TABLET, DELAYED RELEASE ORAL at 08:25

## 2018-03-24 NOTE — H&P ADULT - PSH
EPS study  with no intervention 6/2010  History of Arthroscopy  1997 Right  Left 2000  History of D&C  1968  History of ileal conduit    Nasal Polyp removal  1960  S/P total knee arthroplasty, left

## 2018-03-24 NOTE — H&P ADULT - PROBLEM SELECTOR PLAN 2
UA with 10-25 WBCs, nitrites, and LE but UA often positive with ileal conduit. Pt cannot experience dysuria due to ileal conduit, no flank pain.  - Coverage for UTI with cefepime  - Follow up urine culture

## 2018-03-24 NOTE — H&P ADULT - ASSESSMENT
77F with HTN, hypertrophic cardiomyopathy, former smoker with 50 py history, bladder cancer dx 9/2017 s/p ileal conduit on chemotherapy presents with fever and rigors x1 day following chemotherapy the day prior admitted for sepsis, source possibly , possibly chemoport if bacteremic.

## 2018-03-24 NOTE — H&P ADULT - PMH
Acid Reflux    Arthritis    Asthmatic Bronchitis    Bladder cancer metastasized to bone    Calcified Thoracic Aorta    Fibromyalgia    Gastric Ulcer    HTN (Hypertension)    Hypertrophic Cardiomyopathy    Migraine, Ophthalmoplegic    Nasal Polyp  1960  Obese    Paroxysmal Ventricular Tachycardia  6/2010  Radial fracture  left  Uterine Polyp

## 2018-03-24 NOTE — ED ADULT NURSE REASSESSMENT NOTE - NS ED NURSE REASSESS COMMENT FT1
Pt vitals as noted.  Pt complaining of elevated abd pain.  MAR called and made aware of pain and elevated BP.  Pt medicated as per EMAR.

## 2018-03-24 NOTE — PROGRESS NOTE ADULT - PROBLEM SELECTOR PLAN 6
New ST depression in V3-V6. CE negative x1.  - Trend CE x3, pt could have demand ischemia in setting of sepsis  - Repeat EKG if patient with worsening symptoms New ST depression in V3-V6. CE negative x1.  - Cardiac enzymes negative x 2   - Will Repeat EKG as above.

## 2018-03-24 NOTE — PROGRESS NOTE ADULT - PROBLEM SELECTOR PLAN 1
In setting of recent chemotherapy. Source could be UTI vs. Cdiff vs. port.   - Cefepime and vancomycin, dose based on weight as soon as can be obtained. S/p initial doses in the ED. Pt had rash with penicillin, tolerated cefepime  - Given 1L bolus in ED, c/w NS at 100 cc/hr x 10 hours  - f/u Blood Cx & Urine Cx

## 2018-03-24 NOTE — H&P ADULT - PROBLEM SELECTOR PLAN 5
K now 5.7 s/p Ca gluconate, dextrose, insulin, albuterol, kayexelate. Repeat BMP and treat accordingly.

## 2018-03-24 NOTE — ED ADULT NURSE NOTE - OBJECTIVE STATEMENT
Patient presents today with complaints of fever that started today, history of bladder cancer with ileal conduit and PICC on right chest. Patient is alert and oriented x 4, respirations are even and unlabored, normal sinus rhythm on cardiac monitor , abdomen is war to touch , soft and nontender, 20 gauge saline lock placed on right AC, 20 gauge saline lock placed on left metacarpal. Sepsis workup done. Will monitor

## 2018-03-24 NOTE — H&P ADULT - PROBLEM SELECTOR PLAN 1
In setting of recent chemotherapy. Source could be UTI, possibly port.  - Cefepime and vancomycin, dose based on weight as soon as can be obtained. S/p initial doses in the ED. Pt had rash with penicillin, tolerated cefepime  - Given 1L bolus in ED, started in NS at 100 cc/hr x 10 hours  - Follow up blood and urine cultures  - May eventually need ID consult

## 2018-03-24 NOTE — H&P ADULT - NSHPPHYSICALEXAM_GEN_ALL_CORE
VS now T 98.7, HR 85, /57, RR 16, 97% on RA  General: NAD, no rigors at present time  Skin: Warm and dry  Neuro: AAOx3, nonfocal  HEENT: PERRL, EOMI, no oral lesions  Neck: Full range of motion, no JVD  Chest/Lungs: R chemoport nontender and nonerythematous, clear to ascultation bilaterally, no wheezes, rales, or rhonchi   Heart: Regular rate and rhythm, +systolic murmur at sternal margin worse with valsalva  Abdomen: +Ileal conduit with slightly cloudy yellow urine, +mild suprapubic tenderness, normoactive bowl sounds, soft,  nondistended, no guarding or rebound  Extremities: No lower extremity tenderness, erythema, or edema   Vascular: 2+ radial and pedal pulses  Lymph: no cervical lymphadenopathy  Psych: +depressed mood and affect

## 2018-03-24 NOTE — H&P ADULT - PROBLEM SELECTOR PLAN 6
New ST depression in V3-V6. CE negative x1.  - Trend CE x3, pt could have demand ischemia in setting of sepsis  - Repeat EKG if patient with worsening symptoms

## 2018-03-24 NOTE — H&P ADULT - FAMILY HISTORY
Family history of hyperlipidemia     Family history of dementia     Father  Still living? Unknown  Family history of prostate cancer, Age at diagnosis: Age Unknown

## 2018-03-24 NOTE — H&P ADULT - ATTENDING COMMENTS
Patient was seen and evaluated, agree with above with the following additions    77 y.o. woman with multiple medical co-morbidities now with sepsis likely secondary to UTI   - broad spectrum abx coverage with vanc/cefepime   - F/u blood c/s   - Urine c/s   - Hold all antihypertensive treatments at this time   - Continue with IVF     # abnormal ECG   - Serial ECG and cardiac enzymes   - Continue with telemetry monitoring.     # Bladder Ca   - Oncology follow up   - Liver mass maybe new metastatic lesion    # DVT prophylaxis   - Heparin s/c

## 2018-03-24 NOTE — PATIENT PROFILE ADULT. - TRANSFUSION PREMEDICATION REQUIRED
The Medication Reconciliation has been completed per patient  Allergies have been reviewed  Antibiotic use in 30 days - NONE    Pharmacy:  [facility] [address]         before PRBC/diphenhydramine/none

## 2018-03-24 NOTE — PROGRESS NOTE ADULT - PROBLEM SELECTOR PLAN 3
Cr 1.49, (baseline 0.9-1.0) possible prerenal secondary to sepsis   - Trend SCr  - Avoid nephrotoxic agents and dose meds based on clearance

## 2018-03-24 NOTE — H&P ADULT - NSHPLABSRESULTS_GEN_ALL_CORE
Labs notable for WBC 11.05, Hb 10.4, Plt 101, K 6.1, Cr 1.49. UA positive for 10-25 WBC, nitrites, and LE, RVP negative, CE negative x1.    CXR: no consolidations    EKG:       EXAM:  CT ABDOMEN AND PELVIS IC      PROCEDURE DATE:  Mar 24 2018     INTERPRETATION:  CLINICAL INFORMATION: Lower abdominal pain and fever,   history of bladder cancer status post resection and urostomy.    COMPARISON: CT chest 4/1/2015.    PROCEDURE:   CT of the Abdomen and Pelvis was performed with intravenous contrast.   Intravenous contrast: 90 ml Omnipaque 350. 10 ml discarded.  Oral contrast: None.  Sagittal and coronal reformats were performed.    FINDINGS:    LOWER CHEST: Bilateral pleural nodularities. Trace left pleural effusion.   Left atrial enlargement and mitral annular calcifications.    LIVER: A 1.4 by 1.3 cm indeterminate enhancing lesion in the right liver   (2:59). Enlarged, measuring 21.1 cm in span.  BILE DUCTS: Normal caliber.  GALLBLADDER: Within normal limits.  SPLEEN: Enlarged, measuring 15 x 9.4 cm  PANCREAS: Within normal limits.  ADRENALS: Within normal limits.  KIDNEYS/URETERS: No hydronephrosis. A 1.0 cm nonobstructing right renal   calculus.    BLADDER: Partial cystectomy with a right lower quadrant ileal conduit.  REPRODUCTIVE ORGANS: Hysterectomy.    BOWEL: Small bowel anastomotic sutures are noted in the mid abdomen. No   bowel obstruction.  The appendix is unremarkable.  PERITONEUM: Trace pelvic free fluid. No fluid collections.  VESSELS:  Atheromatous disease of the aorta.  RETROPERITONEUM: Multiple surgical clips in the retropharyngeal space No   lymphadenopathy.    ABDOMINAL WALL: Small fat-containing supraumbilical hernia. Small   umbilical hernia containing nonobstructed loops of small bowel. Small   fat-containing right spigelian hernia.  BONES: Degenerative changes of the spine. There is severe disc space   narrowing between L3/L4. There is posterior fixation hardware at L4 and   L5, where there is mild grade 1 anterolisthesis.    IMPRESSION:    No bowel obstruction. No abdominal fluid collections.  Indeterminate 1.4 cm lesion in the right liver.  Considerations include   vascular shunt and hemangioma. This may be further characterized with MRI. Labs notable for WBC 11.05, Hb 10.4, Plt 101, K 6.1, Cr 1.49. UA positive for 10-25 WBC, nitrites, and LE, RVP negative, CE negative x1.    CXR: no consolidations    EKG: Sinus rhythm, HR 90, QTc 445 with otherwise normal interval, R axis, RBBB, ST depressions V3-V6    EXAM:  CT ABDOMEN AND PELVIS IC      PROCEDURE DATE:  Mar 24 2018     INTERPRETATION:  CLINICAL INFORMATION: Lower abdominal pain and fever,   history of bladder cancer status post resection and urostomy.    COMPARISON: CT chest 4/1/2015.    PROCEDURE:   CT of the Abdomen and Pelvis was performed with intravenous contrast.   Intravenous contrast: 90 ml Omnipaque 350. 10 ml discarded.  Oral contrast: None.  Sagittal and coronal reformats were performed.    FINDINGS:    LOWER CHEST: Bilateral pleural nodularities. Trace left pleural effusion.   Left atrial enlargement and mitral annular calcifications.    LIVER: A 1.4 by 1.3 cm indeterminate enhancing lesion in the right liver   (2:59). Enlarged, measuring 21.1 cm in span.  BILE DUCTS: Normal caliber.  GALLBLADDER: Within normal limits.  SPLEEN: Enlarged, measuring 15 x 9.4 cm  PANCREAS: Within normal limits.  ADRENALS: Within normal limits.  KIDNEYS/URETERS: No hydronephrosis. A 1.0 cm nonobstructing right renal   calculus.    BLADDER: Partial cystectomy with a right lower quadrant ileal conduit.  REPRODUCTIVE ORGANS: Hysterectomy.    BOWEL: Small bowel anastomotic sutures are noted in the mid abdomen. No   bowel obstruction.  The appendix is unremarkable.  PERITONEUM: Trace pelvic free fluid. No fluid collections.  VESSELS:  Atheromatous disease of the aorta.  RETROPERITONEUM: Multiple surgical clips in the retropharyngeal space No   lymphadenopathy.    ABDOMINAL WALL: Small fat-containing supraumbilical hernia. Small   umbilical hernia containing nonobstructed loops of small bowel. Small   fat-containing right spigelian hernia.  BONES: Degenerative changes of the spine. There is severe disc space   narrowing between L3/L4. There is posterior fixation hardware at L4 and   L5, where there is mild grade 1 anterolisthesis.    IMPRESSION:    No bowel obstruction. No abdominal fluid collections.  Indeterminate 1.4 cm lesion in the right liver.  Considerations include   vascular shunt and hemangioma. This may be further characterized with MRI.

## 2018-03-24 NOTE — H&P ADULT - HISTORY OF PRESENT ILLNESS
77F with HTN, hypertrophic cardiomyopathy, former smoker with 50 py history, bladder cancer dx 9/2017 s/p ileal conduit on chemotherapy presents with fever and rigors x1 day following chemotherapy the day prior. Pt was in her usual state of health visiting a friend and was about to drive home when she began feeling ill with fever and rigors, temperature at home was 104. Pt had nausea but no vomiting, had been experiencing episodes of 3/10 lower abdominal bloating pain on a daily basis last minutes but no other symptoms. Her urine from the ileal conduit reportedly looked usual, no hematuria or flank pain. No pain at her chemo port site. No diarrhea. Pt experiences dyspnea on exertion unchanged from baseline but no chest pain or palpitations. No cough or URI symptoms. Pt has received several cycles of chemotherapy and has always tolerated it well without fevers. 77F with HTN, hypertrophic cardiomyopathy, former smoker with 50 py history, bladder cancer dx 9/2017 s/p ileal conduit on chemotherapy presents with fever and rigors x1 day following chemotherapy the day prior. Pt was in her usual state of health visiting a friend and was about to drive home when she began feeling ill with fever and rigors, temperature at home was 104. Pt had nausea but no vomiting, had been experiencing episodes of 3/10 lower abdominal bloating pain on a daily basis last minutes but no other symptoms. Her urine from the ileal conduit reportedly looked usual, no hematuria or flank pain. No pain at her chemo port site. No diarrhea. Pt experiences dyspnea on exertion unchanged from baseline but no chest pain or palpitations. No cough or URI symptoms. Pt has received several cycles of chemotherapy and has always tolerated it well without fevers.     In the ED, pt was febrile to 103.7, hypertensive to 173/62, HR max 95 but on BB, never tachypneic or hypoxic. VS now T 98.7, HR 85, /57, RR 16, 97% on RA. Labs notable for WBC 11.05, Hb 10.4, Plt 101, K 6.1, Cr 1.49. UA positive for 10-25 WBC, nitrites, and LE, RVP negative. Pt received Ca gluconate, dextrose, IV insulin and albuterol with improvement of K to 5.7, then given kayexelate, no BM yet. 77F with HTN, hypertrophic cardiomyopathy, former smoker with 50 py history, bladder cancer dx 9/2017 s/p ileal conduit on chemotherapy presents with fever and rigors x1 day following chemotherapy the day prior. Pt was in her usual state of health visiting a friend and was about to drive home when she began feeling ill with fever and rigors, temperature at home was 104. Pt had nausea but no vomiting, had been experiencing episodes of 3/10 lower abdominal bloating pain on a daily basis last minutes but no other symptoms. Her urine from the ileal conduit reportedly looked usual, no hematuria or flank pain. No pain at her chemo port site. No diarrhea. Pt experiences dyspnea on exertion unchanged from baseline but no chest pain or palpitations. No cough or URI symptoms. Pt has received several cycles of chemotherapy and has always tolerated it well without fevers.     In the ED, pt was febrile to 103.7, hypertensive to 173/62, HR max 95 but on BB, never tachypneic or hypoxic. VS now T 98.7, HR 85, /57, RR 16, 97% on RA. Labs notable for WBC 11.05, Hb 10.4, Plt 101, K 6.1, Cr 1.49. UA positive for 10-25 WBC, nitrites, and LE, RVP negative, CE negative x1. Pt received Ca gluconate, dextrose, IV insulin and albuterol with improvement of K to 5.7, then given kayexelate, no BM yet.

## 2018-03-24 NOTE — H&P ADULT - PROBLEM SELECTOR PLAN 8
EF previously normal in 2014, moderate LV hypertrophy noted. Pt on toprol  mg daily and diltiazem 180 mg.   - Staring metoprolol succinate 25 mg BID in setting up sepsis, titrate dose back upward as tolerated  - Hold diltiazem in setting of sepsis, restart as tolerated

## 2018-03-25 ENCOUNTER — TRANSCRIPTION ENCOUNTER (OUTPATIENT)
Age: 78
End: 2018-03-25

## 2018-03-25 VITALS — WEIGHT: 162.26 LBS

## 2018-03-25 DIAGNOSIS — Z00.00 ENCOUNTER FOR GENERAL ADULT MEDICAL EXAMINATION WITHOUT ABNORMAL FINDINGS: ICD-10-CM

## 2018-03-25 DIAGNOSIS — D72.829 ELEVATED WHITE BLOOD CELL COUNT, UNSPECIFIED: ICD-10-CM

## 2018-03-25 DIAGNOSIS — R19.7 DIARRHEA, UNSPECIFIED: ICD-10-CM

## 2018-03-25 LAB
BASE EXCESS BLDV CALC-SCNC: -7.6 MMOL/L — SIGNIFICANT CHANGE UP
BASOPHILS # BLD AUTO: 0.11 K/UL — SIGNIFICANT CHANGE UP (ref 0–0.2)
BASOPHILS NFR BLD AUTO: 0.4 % — SIGNIFICANT CHANGE UP (ref 0–2)
BLOOD GAS VENOUS - CREATININE: 1.67 MG/DL — HIGH (ref 0.5–1.3)
BUN SERPL-MCNC: 37 MG/DL — HIGH (ref 7–23)
C DIFF TOX GENS STL QL NAA+PROBE: SIGNIFICANT CHANGE UP
CALCIUM SERPL-MCNC: 7.6 MG/DL — LOW (ref 8.4–10.5)
CHLORIDE BLDV-SCNC: 115 MMOL/L — HIGH (ref 96–108)
CHLORIDE SERPL-SCNC: 109 MMOL/L — HIGH (ref 98–107)
CO2 SERPL-SCNC: 18 MMOL/L — LOW (ref 22–31)
CREAT SERPL-MCNC: 1.59 MG/DL — HIGH (ref 0.5–1.3)
EOSINOPHIL # BLD AUTO: 0.04 K/UL — SIGNIFICANT CHANGE UP (ref 0–0.5)
EOSINOPHIL NFR BLD AUTO: 0.1 % — SIGNIFICANT CHANGE UP (ref 0–6)
GAS PNL BLDV: 137 MMOL/L — SIGNIFICANT CHANGE UP (ref 136–146)
GI PCR PANEL, STOOL: SIGNIFICANT CHANGE UP
GLUCOSE BLDV-MCNC: 85 — SIGNIFICANT CHANGE UP (ref 70–99)
GLUCOSE SERPL-MCNC: 85 MG/DL — SIGNIFICANT CHANGE UP (ref 70–99)
HCO3 BLDV-SCNC: 18 MMOL/L — LOW (ref 20–27)
HCT VFR BLD CALC: 27.1 % — LOW (ref 34.5–45)
HCT VFR BLDV CALC: 28.1 % — LOW (ref 34.5–45)
HGB BLD-MCNC: 8.5 G/DL — LOW (ref 11.5–15.5)
HGB BLDV-MCNC: 9.1 G/DL — LOW (ref 11.5–15.5)
IMM GRANULOCYTES # BLD AUTO: 3 # — SIGNIFICANT CHANGE UP
IMM GRANULOCYTES NFR BLD AUTO: 9.7 % — HIGH (ref 0–1.5)
LACTATE BLDV-MCNC: 0.8 MMOL/L — SIGNIFICANT CHANGE UP (ref 0.5–2)
LYMPHOCYTES # BLD AUTO: 1.09 K/UL — SIGNIFICANT CHANGE UP (ref 1–3.3)
LYMPHOCYTES # BLD AUTO: 3.5 % — LOW (ref 13–44)
MAGNESIUM SERPL-MCNC: 2.2 MG/DL — SIGNIFICANT CHANGE UP (ref 1.6–2.6)
MANUAL SMEAR VERIFICATION: SIGNIFICANT CHANGE UP
MCHC RBC-ENTMCNC: 31 PG — SIGNIFICANT CHANGE UP (ref 27–34)
MCHC RBC-ENTMCNC: 31.4 % — LOW (ref 32–36)
MCV RBC AUTO: 98.9 FL — SIGNIFICANT CHANGE UP (ref 80–100)
MONOCYTES # BLD AUTO: 0.27 K/UL — SIGNIFICANT CHANGE UP (ref 0–0.9)
MONOCYTES NFR BLD AUTO: 0.9 % — LOW (ref 2–14)
NEUTROPHILS # BLD AUTO: 26.39 K/UL — HIGH (ref 1.8–7.4)
NEUTROPHILS NFR BLD AUTO: 85.4 % — HIGH (ref 43–77)
NRBC # FLD: 0 — SIGNIFICANT CHANGE UP
PCO2 BLDV: 46 MMHG — SIGNIFICANT CHANGE UP (ref 41–51)
PH BLDV: 7.24 PH — LOW (ref 7.32–7.43)
PHOSPHATE SERPL-MCNC: 3.8 MG/DL — SIGNIFICANT CHANGE UP (ref 2.5–4.5)
PLATELET # BLD AUTO: 89 K/UL — LOW (ref 150–400)
PMV BLD: 11.2 FL — SIGNIFICANT CHANGE UP (ref 7–13)
PO2 BLDV: 38 MMHG — SIGNIFICANT CHANGE UP (ref 35–40)
POTASSIUM BLDV-SCNC: 4.5 MMOL/L — SIGNIFICANT CHANGE UP (ref 3.4–4.5)
POTASSIUM SERPL-MCNC: 4.6 MMOL/L — SIGNIFICANT CHANGE UP (ref 3.5–5.3)
POTASSIUM SERPL-SCNC: 4.6 MMOL/L — SIGNIFICANT CHANGE UP (ref 3.5–5.3)
RBC # BLD: 2.74 M/UL — LOW (ref 3.8–5.2)
RBC # FLD: 21 % — HIGH (ref 10.3–14.5)
SAO2 % BLDV: 62.8 % — SIGNIFICANT CHANGE UP (ref 60–85)
SODIUM SERPL-SCNC: 139 MMOL/L — SIGNIFICANT CHANGE UP (ref 135–145)
SPECIMEN SOURCE: SIGNIFICANT CHANGE UP
WBC # BLD: 30.9 K/UL — HIGH (ref 3.8–10.5)
WBC # FLD AUTO: 30.9 K/UL — HIGH (ref 3.8–10.5)

## 2018-03-25 PROCEDURE — 99239 HOSP IP/OBS DSCHRG MGMT >30: CPT

## 2018-03-25 RX ORDER — PROCHLORPERAZINE MALEATE 5 MG
1 TABLET ORAL
Qty: 0 | Refills: 0 | COMMUNITY

## 2018-03-25 RX ORDER — SIMETHICONE 80 MG/1
1 TABLET, CHEWABLE ORAL
Qty: 0 | Refills: 0 | COMMUNITY
Start: 2018-03-25

## 2018-03-25 RX ORDER — SODIUM CHLORIDE 9 MG/ML
1000 INJECTION INTRAMUSCULAR; INTRAVENOUS; SUBCUTANEOUS
Qty: 0 | Refills: 0 | Status: DISCONTINUED | OUTPATIENT
Start: 2018-03-25 | End: 2018-03-25

## 2018-03-25 RX ORDER — METOPROLOL TARTRATE 50 MG
50 TABLET ORAL DAILY
Qty: 0 | Refills: 0 | Status: DISCONTINUED | OUTPATIENT
Start: 2018-03-25 | End: 2018-03-25

## 2018-03-25 RX ORDER — DILTIAZEM HCL 120 MG
180 CAPSULE, EXT RELEASE 24 HR ORAL DAILY
Qty: 0 | Refills: 0 | Status: DISCONTINUED | OUTPATIENT
Start: 2018-03-25 | End: 2018-03-25

## 2018-03-25 RX ORDER — CALCIUM CARBONATE 500(1250)
1 TABLET ORAL
Qty: 0 | Refills: 0 | COMMUNITY
Start: 2018-03-25

## 2018-03-25 RX ORDER — POLYETHYLENE GLYCOL 3350 17 G/17G
17 POWDER, FOR SOLUTION ORAL DAILY
Qty: 0 | Refills: 0 | Status: DISCONTINUED | OUTPATIENT
Start: 2018-03-25 | End: 2018-03-25

## 2018-03-25 RX ADMIN — PANTOPRAZOLE SODIUM 40 MILLIGRAM(S): 20 TABLET, DELAYED RELEASE ORAL at 06:27

## 2018-03-25 RX ADMIN — HEPARIN SODIUM 5000 UNIT(S): 5000 INJECTION INTRAVENOUS; SUBCUTANEOUS at 06:28

## 2018-03-25 RX ADMIN — Medication 75 MILLIGRAM(S): at 06:27

## 2018-03-25 RX ADMIN — DULOXETINE HYDROCHLORIDE 60 MILLIGRAM(S): 30 CAPSULE, DELAYED RELEASE ORAL at 09:13

## 2018-03-25 RX ADMIN — SODIUM CHLORIDE 100 MILLILITER(S): 9 INJECTION INTRAMUSCULAR; INTRAVENOUS; SUBCUTANEOUS at 08:48

## 2018-03-25 RX ADMIN — Medication 50 MILLIGRAM(S): at 09:13

## 2018-03-25 RX ADMIN — CEFEPIME 100 MILLIGRAM(S): 1 INJECTION, POWDER, FOR SOLUTION INTRAMUSCULAR; INTRAVENOUS at 06:28

## 2018-03-25 RX ADMIN — MONTELUKAST 10 MILLIGRAM(S): 4 TABLET, CHEWABLE ORAL at 09:13

## 2018-03-25 RX ADMIN — Medication 180 MILLIGRAM(S): at 09:13

## 2018-03-25 NOTE — DISCHARGE NOTE ADULT - CARE PROVIDER_API CALL
Tulio Guerra), Urology  [address]  [address]  [place] [address]  Phone: [phone]  Fax: [phone]    Manjinder Garcia), Hematology; Internal Medicine; Medical Oncology  [address]  [address]  [place] [address]  Phone: [phone]  Fax: [phone]

## 2018-03-25 NOTE — PROGRESS NOTE ADULT - PROBLEM SELECTOR PLAN 2
-concern for C. diff vs. infectious diarrhea vs. side effect of chemotherapy  -resolving  -f/u GI PCR, Stool Cx & C diff PCR
UA with 10-25 WBCs, nitrites, and LE but UA often positive with ileal conduit. Pt cannot experience dysuria due to ileal conduit, no flank pain.  - c/w Cefepime   - Follow up urine culture

## 2018-03-25 NOTE — PROGRESS NOTE ADULT - PROBLEM SELECTOR PLAN 9
Hgb 10.9, decreased from 14 in 2016.  - Iron studies c/w AOCD; no s/s of active blood loss   - Trend CBC daily
-H/H stable no signs of active bleeding  -Iron studies c/w AOCD possibly secondary to myelosuppression from chemotherapy.

## 2018-03-25 NOTE — PROGRESS NOTE ADULT - PROBLEM SELECTOR PLAN 5
-K now 5.7 s/p Ca gluconate, dextrose, insulin, albuterol, kayexelate.   -repeat BMP, potassium wnl; c/w IVF   -repeat EKG as widening of QRS to 142 ms on initial.
-resolved potassium wnl; c/w IVF   -repeat EKG

## 2018-03-25 NOTE — DISCHARGE NOTE ADULT - HOSPITAL COURSE
77F with HTN, hypertrophic cardiomyopathy, former smoker with 50 py history, bladder cancer dx 9/2017 s/p ileal conduit on chemotherapy presents with fever and rigors x1 day following chemotherapy the day prior. Pt was in her usual state of health visiting a friend and was about to drive home when she began feeling ill with fever and rigors, temperature at home was 104.    In the ED, pt was febrile to 103.7, hypertensive to 173/62, HR max 95 but on BB, never tachypneic or hypoxic. VS now T 98.7, HR 85, /57, RR 16, 97% on RA. Labs notable for WBC 11.05, Hb 10.4, Plt 101, K 6.1, Cr 1.49. UA positive for 10-25 WBC, nitrites, and LE, RVP negative, CE negative x1. Pt received Ca gluconate, dextrose, IV insulin and albuterol with improvement of K to 5.7, then given kayexelate.     Patient was started on cefepime with no further fevers. Having diarrhea for which stool studies & C diff studies were sent. Patient noted to have a dramatic increase in WBC count though to be secondary to Neulasta vs. C diff. Her blood & port cultures resulted negative. 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     Patient underwent CT a/p in ED showing 1.0 non-obstructing UPJ stone. SCr increased to 1.5-1.7 from baseline two years prior. Patient started on IVF with little improvement. Outpatient urologist contacted & unable to provide SCr, but agreed to follow up with patient upon discharge.     Patient wishing to leave AMA. Discharged AMA with return precautions & urology/oncology follow up.

## 2018-03-25 NOTE — DISCHARGE NOTE ADULT - CARE PROVIDERS DIRECT ADDRESSES
,[email or web address]

## 2018-03-25 NOTE — PROGRESS NOTE ADULT - PROBLEM SELECTOR PLAN 3
Cr 1.49, (baseline 0.9-1.0 in 2016; unknown since then) possible prerenal secondary to sepsis vs. CKD due to UPJ stone vs. carboplatin therapy.   - Trend SCr  - Of note, patient receiving carboplatin chemotherapy   - Avoid nephrotoxic agents and dose meds based on clearance Cr 1.49, (baseline 0.9-1.0 in 2016; unknown since then) possible prerenal secondary to diarrhea vs. CKD due to UPJ stone vs. carboplatin therapy.   - Trend SCr  - Of note, patient receiving carboplatin chemotherapy   - Avoid nephrotoxic agents and dose meds based on clearance

## 2018-03-25 NOTE — DISCHARGE NOTE ADULT - PATIENT PORTAL LINK FT
You can access the [facility] Patient Portal, offered by [facility], by registering with the following website: [email or web address]

## 2018-03-25 NOTE — DISCHARGE NOTE ADULT - PLAN OF CARE
Close Follow up. You came to the emergency department with fevers that have since resolved but your white blood cell count has remained elevated. This may be due to your Neulasta injection or a diarrheal infection known as C difficile. Please follow up with Dr. Cruz within 1 week to assure that you are having no further signs of infection. Your blood cultures are negative so far, but there is bacteria growing in the ileal conduit which can be normal colonization of your bowel milton. Please return to the emergency room for any high fevers, chills or rigors. Close Follow up You have had some episodes of diarrhea for which stool studies were sent. You will be notified if any of these cultures result positive. Please notify your doctor if the episodes of diarrhea persist. Close Follow up with Dr. Sierra. You have a 1 cm renal stone on the right. This may account for your decreased renal function. Please see Dr. Guerra tomorrow or Wednesday 03/28 for follow up. He has agreed to see you to check that your kidney function remains stable. Your kidney function is slightly decreased from two years ago. This may be the result of chemotherapy or a kidney stone on the right. Please follow up with Dr. Guerra in 1-3 to establish your baseline kidney function and assess the need for any interventions.

## 2018-03-25 NOTE — PROGRESS NOTE ADULT - PROBLEM SELECTOR PLAN 7
Metastatic to bone S/P ileal conduit.  - Obtain collateral from pt's oncologist
EF previously normal in 2014, moderate LV hypertrophy noted w/CARMEN. Pt on toprol XL 50 mg daily and diltiazem 180 mg.   -Restart Diltiazem & metoprolol. Patient states her systolic BP usually run low-normal and tolerates these medications well.

## 2018-03-25 NOTE — CHART NOTE - NSCHARTNOTEFT_GEN_A_CORE
P  atient wishes to leave AMA. Patient seen at bedside to further discuss plan of care. Discussed risks of leaving against medical advice, which includes worsening of current medical condition, up to and including death. Patient understands risks and still wishes to leave. AMA paperwork signed and placed in chart. Dr. Thompson made aware. Patient has capacity to leave AMA.     Have arranged for follow up with week with Urologist, Dr. Mari Reis D.O.  PGY-1 EM/IM  Pager #[phone] Patient wishes to leave AMA. Patient seen at bedside to further discuss plan of care. Discussed risks of leaving against medical advice, which includes worsening of current medical condition, up to and including death. Patient understands risks and still wishes to leave. AMA paperwork signed and placed in chart. Dr. Thompson made aware. Patient has capacity to leave AMA.     Have arranged for follow up with week with Urologist, Dr. Mari Reis D.O.  PGY-1 EM/IM  Pager #[phone]

## 2018-03-25 NOTE — PROGRESS NOTE ADULT - PROBLEM SELECTOR PLAN 8
EF previously normal in 2014, moderate LV hypertrophy noted w/CARMEN. Pt on toprol  mg daily and diltiazem 180 mg.   -Hold Diltiazem & metoprolol in the setting of sepsis; will restart as tolerated.
Plt 101. Possibly in setting of chemotherapy.   - Trend CBC

## 2018-03-25 NOTE — PROGRESS NOTE ADULT - ATTENDING COMMENTS
Pt seen and examined.  D/w Dr. Reis and agree with above.  Pt seen by my colleague this morning.  A/w sepsis 2/2  vs. GI (having diarrhea this morning) vs. port.  Leukocytosis significantly elevated today so concern for C.diff, but pt did also receive neulasta recently.  F/u cultures, including c.diff pcr.  C/w IV abx.  Also with decreasing bicarb, possibly due to GI losses.  Repeat labs with [facility].  Will consider bicarb tabs if needed.
Pt seen and examined by me this morning.  Case D/w Dr. Reis and agree with above.      77F with HTN, hypertrophic cardiomyopathy, former smoker with 50 py history, bladder cancer dx 9/2017 s/p ileal conduit on chemotherapy A/w fever, rigors, leukocytosis, possilby sepsis 2/2  vs. GI (having diarrhea this morning) vs. port infection.  -preliminary blood culture negative and patient wishes to be discharged, despite medical advise to await cultures  -diarrhea improving  -Leukocytosis elevation possibly 2/2 neulasta   -c.diff negative  -patient was discharged against medical advise this morning  -advised patient to f/u with PCP within 1 week  -time spent 35 mins

## 2018-03-25 NOTE — DISCHARGE NOTE ADULT - MEDICATION SUMMARY - MEDICATIONS TO STOP TAKING
I will STOP taking the medications listed below when I get home from the hospital:    Compazine 10 mg oral tablet  -- 1 tab(s) by mouth 4 times a day, As Needed

## 2018-03-25 NOTE — PROGRESS NOTE ADULT - PROBLEM SELECTOR PLAN 4
Pt with 1 cm non-obstructing R renal stone, no CVA tenderness  - Will notify patient's urologist Dr. Guerra  - Coverage of urinary tract infection as above
Pt with 1 cm non-obstructing R renal stone, no CVA tenderness  - Discussed with Dr. Guerra, patient's urologist. Not aware off-hand of any nephrolithiasis or patient's baseline SCr. States that he will be able to see patient tomorrow or Wednesday 03/28 for follow up if patient decides to sign out AMA.

## 2018-03-25 NOTE — PROGRESS NOTE ADULT - PROBLEM SELECTOR PLAN 10
Plt 101. Possibly in setting of chemotherapy.   - Trend CBC daily    DVT PPx: HSQ    Diet: DASH/Low Potassium    PT evaluation     Eddie Reis D.O.  PGY-1 EM/IM  Pager #[phone]
DVT ppx: HSQ    Diet: Regular    Disposition: Patient wishes to leave AMA. Strongly urged patient to stay for further IVF, to obtain further collateral information regarding baseline SCr, to follow up official culture results and to obtain C. diff studies, however, refused.    Eddie Reis D.O.  PGY-1 EM/IM  Pager #[phone]

## 2018-03-25 NOTE — PROGRESS NOTE ADULT - ASSESSMENT
77F with HTN, hypertrophic cardiomyopathy, former smoker with 50 py history, bladder cancer dx 9/2017 s/p ileal conduit on chemotherapy presents with fever and rigors x1 day following chemotherapy the day prior admitted for sepsis, source possibly  vs. Cdiff vs. bacteremia from chemoport.
77F with HTN, hypertrophic cardiomyopathy, former smoker with 50 py history, bladder cancer dx 9/2017 s/p ileal conduit on chemotherapy presents with fever and rigors x1 day following chemotherapy the day prior admitted for sepsis, source possibly  vs. Cdiff vs. bacteremia from chemoport.

## 2018-03-25 NOTE — PROGRESS NOTE ADULT - SUBJECTIVE AND OBJECTIVE BOX
Patient is a 77y old  Female who presents with a chief complaint of Fever and rigors (25 Mar 2018 09:38)      SUBJECTIVE / OVERNIGHT EVENTS: No acute overnight events. Patient afebrile; no diarrhea since yesterday AM. Patient upset as she wants to leave since her blood cultures has resulted negative.     MEDICATIONS  (STANDING):  cefepime  IVPB      cefepime  IVPB 2000 milliGRAM(s) IV Intermittent every 8 hours  diltiazem    milliGRAM(s) Oral daily  DULoxetine 60 milliGRAM(s) Oral daily  heparin  Injectable 5000 Unit(s) SubCutaneous every 8 hours  metoprolol succinate ER 50 milliGRAM(s) Oral daily  montelukast 10 milliGRAM(s) Oral daily  pantoprazole    Tablet 40 milliGRAM(s) Oral before breakfast  pregabalin 75 milliGRAM(s) Oral two times a day  sodium chloride 0.9%. 1000 milliLiter(s) (100 mL/Hr) IV Continuous <Continuous>  traZODone 100 milliGRAM(s) Oral at bedtime    MEDICATIONS  (PRN):  calcium carbonate 500 mG (Tums) Chewable 1 Tablet(s) Chew every 6 hours PRN Heartburn  simethicone 80 milliGRAM(s) Chew every 6 hours PRN Gas        CAPILLARY BLOOD GLUCOSE        I&O's Summary    24 Mar 2018 07:  -  25 Mar 2018 07:00  --------------------------------------------------------  IN: 250 mL / OUT: 1650 mL / NET: -1400 mL    25 Mar 2018 07:01  -  25 Mar 2018 10:09  --------------------------------------------------------  IN: 0 mL / OUT: 200 mL / NET: -200 mL        PHYSICAL EXAM:  GENERAL: no acute distress; non-toxic appearing   HEAD:  Atraumatic, Normocephalic +alopecia   EYES: EOMI, PERRLA, conjunctiva and sclera clear  NECK: Supple, No JVD  CHEST/LUNG: Clear to auscultation bilaterally; No wheeze  HEART: Regular rate and rhythm; No murmurs, rubs, or gallops  ABDOMEN: Soft, Nontender, Nondistended; Bowel sounds present  EXTREMITIES:  2+ Peripheral Pulses, No clubbing, cyanosis, or edema  PSYCH: AAOx3  NEUROLOGY: non-focal  SKIN: No rashes or lesions    LABS:  ( @ 07:30)                        8.5  30.90 )-----------( 89                 27.1    Neutrophils = 26.39 (85.4%)  Lymphocytes = 1.09 (3.5%)  Eosinophils = 0.04 (0.1%)  Basophils = 0.11 (0.4%)  Monocytes = 0.27 (0.9%)  Bands = --%    WBC Trend: 30.90<--, 39.52<--, 11.05<--  Hb Trend: 8.5<--, 9.4<--, 10.9<--  Plt Trend: 89<--, 97<--, 101<--      139  |  109<H>  |  37<H>  ----------------------------<  85  4.6   |  18<L>  |  1.59<H>    Ca    7.6<L>      25 Mar 2018 07:30  Phos  3.8     25  Mg     2.2     25    TPro  4.7<L>  /  Alb  2.9<L>  /  TBili  0.4  /  DBili  x   /  AST  26  /  ALT  25  /  AlkPhos  71  24    Creatinine Trend: 1.59<--, 1.76<--, 1.43<--, 1.49<--, 1.54<--  ( 23 Mar 2018 21:10 )   PT: 11.2 SEC;   INR: 0.97 ;       PTT:29.2 SEC  CARDIAC MARKERS ( 24 Mar 2018 09:12 )  Trop < 0.06 ng/mL / CK 15 u/L<L> / CKMB 1.49 ng/mL   CARDIAC MARKERS ( 23 Mar 2018 22:23 )  Trop < 0.06 ng/mL / CK 17 u/L<L> / CKMB 1.29 ng/mL       Urinalysis Basic - ( 23 Mar 2018 21:10 )    Color: PLYEL / Appearance: HAZY / S.010 / pH: 7.5  Gluc: NEGATIVE / Ketone: NEGATIVE  / Bili: NEGATIVE / Urobili: NORMAL mg/dL   Blood: TRACE / Protein: 20 mg/dL / Nitrite: POSITIVE   Leuk Esterase: LARGE / RBC: 10-25 / WBC 25-50   Sq Epi: OCC / Non Sq Epi: x / Bacteria: FEW        Microbiology:  Port Cx: NGTD  Blood Cx: NGTD   Ileal Conduit: +GNR
Patient is a 77y old  Female who presents with a chief complaint of Fever and rigors (24 Mar 2018 07:45)      SUBJECTIVE / OVERNIGHT EVENTS: Patient seen and examined at bedside. Started to have multiple episodes of diarrhea this AM that she occasionally has after receiving chemotherapy but noticed this AM.     MEDICATIONS  (STANDING):  cefepime  IVPB      cefepime  IVPB 2000 milliGRAM(s) IV Intermittent every 8 hours  docusate sodium 100 milliGRAM(s) Oral two times a day  DULoxetine 60 milliGRAM(s) Oral daily  heparin  Injectable 5000 Unit(s) SubCutaneous every 8 hours  montelukast 10 milliGRAM(s) Oral daily  pantoprazole    Tablet 40 milliGRAM(s) Oral before breakfast  sodium chloride 0.9%. 1000 milliLiter(s) (100 mL/Hr) IV Continuous <Continuous>  traZODone 100 milliGRAM(s) Oral at bedtime    MEDICATIONS  (PRN):  ALBUTerol/ipratropium for Nebulization 3 milliLiter(s) Nebulizer every 6 hours PRN Shortness of Breath and/or Wheezing  ondansetron Injectable 4 milliGRAM(s) IV Push every 8 hours PRN Nausea and/or Vomiting  simethicone 80 milliGRAM(s) Chew every 8 hours PRN Gas        CAPILLARY BLOOD GLUCOSE      POCT Blood Glucose.: 109 mg/dL (24 Mar 2018 06:56)    I&O's Summary      PHYSICAL EXAM:  GENERAL: non-toxic  HEAD:  Atraumatic, Normocephalic +alopecia   EYES: EOMI, PERRLA, conjunctiva and sclera clear  NECK: Supple, No JVD  CHEST/LUNG: Clear to auscultation bilaterally; No wheeze  HEART: Regular rate and rhythm; No murmurs, rubs, or gallops  ABDOMEN: Soft, Nontender, Nondistended; Bowel sounds present. Urostomy in place. Surrounding area no erythema, fluctuance or edema.   EXTREMITIES:  2+ Peripheral Pulses, No clubbing, cyanosis, or edema  PSYCH: AAOx3  NEUROLOGY: non-focal  SKIN: No erythema or fluctuance surrounding chemoport.     LABS:  ( @ 09:12)                        9.4  39.52 )-----------( 97                 29.7    Neutrophils = 36.72 (92.9%)  Lymphocytes = 0.51 (1.3%)  Eosinophils = 0.00 (0.0%)  Basophils = 0.09 (0.2%)  Monocytes = 0.20 (0.5%)  Bands = 2.6%    WBC Trend: 39.52<--, 11.05<--  Hb Trend: 9.4<--, 10.9<--  Plt Trend: 97<--, 101<--  03-24    139  |  112<H>  |  37<H>  ----------------------------<  90  4.5   |  14<L>  |  1.43<H>    Ca    6.5<LL>      24 Mar 2018 09:12  Phos  2.8     -24  Mg     1.4     -24    TPro  4.7<L>  /  Alb  2.9<L>  /  TBili  0.4  /  DBili  x   /  AST  26  /  ALT  25  /  AlkPhos  71  03-24    Creatinine Trend: 1.43<--, 1.49<--, 1.54<--  ( 23 Mar 2018 21:10 )   PT: 11.2 SEC;   INR: 0.97 ;       PTT:29.2 SEC  CARDIAC MARKERS ( 24 Mar 2018 09:12 )  Trop < 0.06 ng/mL / CK 15 u/L<L> / CKMB 1.49 ng/mL   CARDIAC MARKERS ( 23 Mar 2018 22:23 )  Trop < 0.06 ng/mL / CK 17 u/L<L> / CKMB 1.29 ng/mL       Urinalysis Basic - ( 23 Mar 2018 21:10 )    Color: PLYEL / Appearance: HAZY / S.010 / pH: 7.5  Gluc: NEGATIVE / Ketone: NEGATIVE  / Bili: NEGATIVE / Urobili: NORMAL mg/dL   Blood: TRACE / Protein: 20 mg/dL / Nitrite: POSITIVE   Leuk Esterase: LARGE / RBC: 10-25 / WBC 25-50   Sq Epi: OCC / Non Sq Epi: x / Bacteria: FEW        Microbiology:  Urine Cx: pending   Blood Cx: pending     RADIOLOGY & ADDITIONAL TESTS:    CT:    IMPRESSION:    No bowel obstruction. No abdominal fluid collections.  Indeterminate 1.4 cm lesion in the right liver.  Considerations include   vascular shunt and hemangioma. This may be further characterized with MRI.

## 2018-03-25 NOTE — DISCHARGE NOTE ADULT - CARE PLAN
Principal Discharge DX:	Leukocytosis  Goal:	Close Follow up.  Assessment and plan of treatment:	You came to the emergency department with fevers that have since resolved but your white blood cell count has remained elevated. This may be due to your Neulasta injection or a diarrheal infection known as C difficile. Please follow up with Dr. Cruz within 1 week to assure that you are having no further signs of infection. Your blood cultures are negative so far, but there is bacteria growing in the ileal conduit which can be normal colonization of your bowel milton. Please return to the emergency room for any high fevers, chills or rigors.  Secondary Diagnosis:	Diarrhea  Goal:	Close Follow up  Assessment and plan of treatment:	You have had some episodes of diarrhea for which stool studies were sent. You will be notified if any of these cultures result positive. Please notify your doctor if the episodes of diarrhea persist.  Secondary Diagnosis:	Nephrolithiasis  Goal:	Close Follow up with Dr. Sierra.  Assessment and plan of treatment:	You have a 1 cm renal stone on the right. This may account for your decreased renal function. Please see Dr. Guerra tomorrow or Wednesday 03/28 for follow up. He has agreed to see you to check that your kidney function remains stable.  Secondary Diagnosis:	DEVON (acute kidney injury)  Goal:	Close Follow up  Assessment and plan of treatment:	Your kidney function is slightly decreased from two years ago. This may be the result of chemotherapy or a kidney stone on the right. Please follow up with Dr. Guerra in 1-3 to establish your baseline kidney function and assess the need for any interventions.

## 2018-03-25 NOTE — DISCHARGE NOTE ADULT - MEDICATION SUMMARY - MEDICATIONS TO TAKE
I will START or STAY ON the medications listed below when I get home from the hospital:    calcium carbonate 500 mg (200 mg elemental calcium) oral tablet, chewable  -- 1 tab(s) by mouth every 6 hours, As needed, Heartburn  -- Indication: For Acid Reflux     DilTIAZem Hydrochloride  mg/24 hours oral capsule, extended release  -- 1 cap(s) by mouth once a day  -- Indication: For Hypertrophic cardiomyopathy    pregabalin 75 mg oral capsule  -- 1 cap(s) by mouth 2 times a day  -- Indication: For Pain     traZODone 100 mg oral tablet  --  by mouth once a day (at bedtime)  -- Indication: For Sleep Aid     DULoxetine 60 mg oral delayed release capsule  -- 1 cap(s) by mouth once a day  -- Indication: For Pain     metoprolol succinate 50 mg oral tablet, extended release  -- 1 tab(s) by mouth once a day  -- Indication: For Hypertrophic cardiomyopathy    Advair Diskus 250 mcg-50 mcg inhalation powder  -- 1 puff(s) inhaled 2 times a day  -- Indication: For Asthmatic Bronchitis     Xopenex HFA 45 mcg/inh inhalation aerosol  -- 2 puff(s) inhaled every 4 hours, As Needed  -- Indication: For Asthmatic Bronchitis     docusate sodium 100 mg oral tablet  -- 1 tab(s) by mouth once a day  -- Indication: For Bowel Regimen     Benefiber 1 g oral tablet, chewable  -- 2 tab(s) by mouth once a day  -- Indication: For Bowel Regimen     Singulair 10 mg oral tablet  -- 1 tab(s) by mouth once a day  -- Indication: For Astmatic Bronchitis     simethicone 80 mg oral tablet, chewable  -- 1 tab(s) by mouth every 6 hours, As needed, Gas  -- Indication: For Dyspepsia     Flonase 50 mcg/inh nasal spray  -- 1 spray(s) into nose once a day  -- Indication: For Allergic Rhinits     omeprazole 40 mg oral delayed release capsule  -- 1 cap(s) by mouth once a day  -- Indication: For GERD

## 2018-03-25 NOTE — PROGRESS NOTE ADULT - PROBLEM SELECTOR PLAN 1
-Patient presented with SIRS criteria with concern for sepsis. Given no source (+ileal conduit for GNR but likely endemic bowel milton) unlikely sepsis picture;   -has remained afebrile, non-toxic appearing  -possibly secondary to Neulasta patient received before most recent chemotherapy session  -C diff concern given diarrhea yesterday; however patient has had no further episodes since yesterday morning and thus unable to give another stool sample. -Patient presented with SIRS criteria with concern for sepsis. Given no source (+ileal conduit for GNR but likely endemic bowel milton) unlikely sepsis picture;   -has remained afebrile, non-toxic appearing  -possibly secondary to Neulasta patient received before most recent chemotherapy session  -C diff concern given diarrhea yesterday; repeat sample sent this AM (no episodes of diarrhea between)

## 2018-03-26 LAB
-  AMIKACIN: SIGNIFICANT CHANGE UP
-  AMPICILLIN/SULBACTAM: SIGNIFICANT CHANGE UP
-  AMPICILLIN: SIGNIFICANT CHANGE UP
-  AZTREONAM: SIGNIFICANT CHANGE UP
-  CEFAZOLIN: SIGNIFICANT CHANGE UP
-  CEFEPIME: SIGNIFICANT CHANGE UP
-  CEFOXITIN: SIGNIFICANT CHANGE UP
-  CEFTAZIDIME: SIGNIFICANT CHANGE UP
-  CEFTRIAXONE: SIGNIFICANT CHANGE UP
-  CIPROFLOXACIN: SIGNIFICANT CHANGE UP
-  ERTAPENEM: SIGNIFICANT CHANGE UP
-  GENTAMICIN: SIGNIFICANT CHANGE UP
-  IMIPENEM: SIGNIFICANT CHANGE UP
-  LEVOFLOXACIN: SIGNIFICANT CHANGE UP
-  MEROPENEM: SIGNIFICANT CHANGE UP
-  PIPERACILLIN/TAZOBACTAM: SIGNIFICANT CHANGE UP
-  TIGECYCLINE: SIGNIFICANT CHANGE UP
-  TOBRAMYCIN: SIGNIFICANT CHANGE UP
-  TRIMETHOPRIM/SULFAMETHOXAZOLE: SIGNIFICANT CHANGE UP
BACTERIA STL CULT: SIGNIFICANT CHANGE UP
BACTERIA UR CULT: SIGNIFICANT CHANGE UP
METHOD TYPE: SIGNIFICANT CHANGE UP
ORGANISM # SPEC MICROSCOPIC CNT: SIGNIFICANT CHANGE UP
ORGANISM # SPEC MICROSCOPIC CNT: SIGNIFICANT CHANGE UP

## 2018-03-27 LAB
METHOD TYPE: SIGNIFICANT CHANGE UP
ORGANISM # SPEC MICROSCOPIC CNT: SIGNIFICANT CHANGE UP
ORGANISM # SPEC MICROSCOPIC CNT: SIGNIFICANT CHANGE UP

## 2018-03-28 LAB
BACTERIA BLD CULT: SIGNIFICANT CHANGE UP
BACTERIA BLD CULT: SIGNIFICANT CHANGE UP
ORGANISM # SPEC MICROSCOPIC CNT: SIGNIFICANT CHANGE UP

## 2018-03-29 LAB
-  AMIKACIN: SIGNIFICANT CHANGE UP
-  AMPICILLIN/SULBACTAM: SIGNIFICANT CHANGE UP
-  AMPICILLIN: SIGNIFICANT CHANGE UP
-  AZTREONAM: SIGNIFICANT CHANGE UP
-  CANDIDA ALBICANS: SIGNIFICANT CHANGE UP
-  CANDIDA GLABRATA: SIGNIFICANT CHANGE UP
-  CANDIDA KRUSEI: SIGNIFICANT CHANGE UP
-  CANDIDA PARAPSILOSIS: SIGNIFICANT CHANGE UP
-  CANDIDA TROPICALIS: SIGNIFICANT CHANGE UP
-  CEFAZOLIN: SIGNIFICANT CHANGE UP
-  CEFEPIME: SIGNIFICANT CHANGE UP
-  CEFOXITIN: SIGNIFICANT CHANGE UP
-  CEFTAZIDIME: SIGNIFICANT CHANGE UP
-  CEFTRIAXONE: SIGNIFICANT CHANGE UP
-  CIPROFLOXACIN: SIGNIFICANT CHANGE UP
-  COAGULASE NEGATIVE STAPHYLOCOCCUS: SIGNIFICANT CHANGE UP
-  ERTAPENEM: SIGNIFICANT CHANGE UP
-  GENTAMICIN: SIGNIFICANT CHANGE UP
-  IMIPENEM: SIGNIFICANT CHANGE UP
-  K. PNEUMONIAE GROUP: SIGNIFICANT CHANGE UP
-  KPC RESISTANCE GENE: SIGNIFICANT CHANGE UP
-  LEVOFLOXACIN: SIGNIFICANT CHANGE UP
-  MEROPENEM: SIGNIFICANT CHANGE UP
-  PIPERACILLIN/TAZOBACTAM: SIGNIFICANT CHANGE UP
-  STREPTOCOCCUS SP. (NOT GRP A, B OR S PNEUMONIAE): SIGNIFICANT CHANGE UP
-  TIGECYCLINE: SIGNIFICANT CHANGE UP
-  TOBRAMYCIN: SIGNIFICANT CHANGE UP
-  TRIMETHOPRIM/SULFAMETHOXAZOLE: SIGNIFICANT CHANGE UP
A BAUMANNII DNA SPEC QL NAA+PROBE: SIGNIFICANT CHANGE UP
BACTERIA BLD CULT: SIGNIFICANT CHANGE UP
E CLOAC COMP DNA BLD POS QL NAA+PROBE: SIGNIFICANT CHANGE UP
E COLI DNA BLD POS QL NAA+NON-PROBE: SIGNIFICANT CHANGE UP
ENTEROCOC DNA BLD POS QL NAA+NON-PROBE: SIGNIFICANT CHANGE UP
ENTEROCOC DNA BLD POS QL NAA+NON-PROBE: SIGNIFICANT CHANGE UP
GP B STREP DNA BLD POS QL NAA+NON-PROBE: SIGNIFICANT CHANGE UP
HAEM INFLU DNA BLD POS QL NAA+NON-PROBE: SIGNIFICANT CHANGE UP
K OXYTOCA DNA BLD POS QL NAA+NON-PROBE: SIGNIFICANT CHANGE UP
L MONOCYTOG DNA BLD POS QL NAA+NON-PROBE: SIGNIFICANT CHANGE UP
METHOD TYPE: SIGNIFICANT CHANGE UP
MRSA SPEC QL CULT: SIGNIFICANT CHANGE UP
MSSA DNA SPEC QL NAA+PROBE: SIGNIFICANT CHANGE UP
N MEN ISLT CULT: SIGNIFICANT CHANGE UP
P AERUGINOSA DNA BLD POS NAA+NON-PROBE: SIGNIFICANT CHANGE UP
S MARCESCENS DNA BLD POS NAA+NON-PROBE: SIGNIFICANT CHANGE UP
S PNEUM DNA BLD POS QL NAA+NON-PROBE: SIGNIFICANT CHANGE UP
S PYO DNA BLD POS QL NAA+NON-PROBE: SIGNIFICANT CHANGE UP

## 2018-04-14 ENCOUNTER — INPATIENT (INPATIENT)
Facility: HOSPITAL | Age: 78
LOS: 3 days | Discharge: HOME | DRG: 698 | End: 2018-04-18
Attending: STUDENT IN AN ORGANIZED HEALTH CARE EDUCATION/TRAINING PROGRAM | Admitting: STUDENT IN AN ORGANIZED HEALTH CARE EDUCATION/TRAINING PROGRAM
Payer: MEDICARE

## 2018-04-14 VITALS
RESPIRATION RATE: 18 BRPM | WEIGHT: 160.06 LBS | OXYGEN SATURATION: 96 % | HEART RATE: 96 BPM | SYSTOLIC BLOOD PRESSURE: 112 MMHG | TEMPERATURE: 100 F | DIASTOLIC BLOOD PRESSURE: 63 MMHG

## 2018-04-14 DIAGNOSIS — R50.9 FEVER, UNSPECIFIED: ICD-10-CM

## 2018-04-14 DIAGNOSIS — Z98.890 OTHER SPECIFIED POSTPROCEDURAL STATES: Chronic | ICD-10-CM

## 2018-04-14 LAB
ALBUMIN SERPL ELPH-MCNC: 3.5 G/DL — SIGNIFICANT CHANGE UP (ref 3.3–5)
ALP SERPL-CCNC: 89 U/L — SIGNIFICANT CHANGE UP (ref 40–120)
ALT FLD-CCNC: 49 U/L RC — HIGH (ref 10–45)
ANION GAP SERPL CALC-SCNC: 14 MMOL/L — SIGNIFICANT CHANGE UP (ref 5–17)
APPEARANCE UR: ABNORMAL
AST SERPL-CCNC: 65 U/L — HIGH (ref 10–40)
BACTERIA # UR AUTO: ABNORMAL /HPF
BASOPHILS # BLD AUTO: 0 K/UL — SIGNIFICANT CHANGE UP (ref 0–0.2)
BILIRUB SERPL-MCNC: 0.3 MG/DL — SIGNIFICANT CHANGE UP (ref 0.2–1.2)
BILIRUB UR-MCNC: NEGATIVE — SIGNIFICANT CHANGE UP
BUN SERPL-MCNC: 40 MG/DL — HIGH (ref 7–23)
CALCIUM SERPL-MCNC: 8.2 MG/DL — LOW (ref 8.4–10.5)
CHLORIDE SERPL-SCNC: 109 MMOL/L — HIGH (ref 96–108)
CO2 SERPL-SCNC: 14 MMOL/L — LOW (ref 22–31)
COLOR SPEC: YELLOW — SIGNIFICANT CHANGE UP
COMMENT - URINE: SIGNIFICANT CHANGE UP
CREAT SERPL-MCNC: 1.59 MG/DL — HIGH (ref 0.5–1.3)
DIFF PNL FLD: ABNORMAL
EOSINOPHIL # BLD AUTO: 0.1 K/UL — SIGNIFICANT CHANGE UP (ref 0–0.5)
GAS PNL BLDV: SIGNIFICANT CHANGE UP
GLUCOSE SERPL-MCNC: 126 MG/DL — HIGH (ref 70–99)
GLUCOSE UR QL: NEGATIVE — SIGNIFICANT CHANGE UP
HCT VFR BLD CALC: 32 % — LOW (ref 34.5–45)
HGB BLD-MCNC: 10.5 G/DL — LOW (ref 11.5–15.5)
KETONES UR-MCNC: NEGATIVE — SIGNIFICANT CHANGE UP
LEUKOCYTE ESTERASE UR-ACNC: ABNORMAL
LYMPHOCYTES # BLD AUTO: 0.5 K/UL — LOW (ref 1–3.3)
LYMPHOCYTES # BLD AUTO: 2 % — LOW (ref 13–44)
MCHC RBC-ENTMCNC: 32.8 GM/DL — SIGNIFICANT CHANGE UP (ref 32–36)
MCHC RBC-ENTMCNC: 33.2 PG — SIGNIFICANT CHANGE UP (ref 27–34)
MCV RBC AUTO: 101 FL — HIGH (ref 80–100)
MONOCYTES # BLD AUTO: 0.1 K/UL — SIGNIFICANT CHANGE UP (ref 0–0.9)
MONOCYTES NFR BLD AUTO: 1 % — LOW (ref 2–14)
NEUTROPHILS # BLD AUTO: 30.7 K/UL — HIGH (ref 1.8–7.4)
NEUTROPHILS NFR BLD AUTO: 95 % — HIGH (ref 43–77)
NITRITE UR-MCNC: POSITIVE
PH UR: 7.5 — SIGNIFICANT CHANGE UP (ref 5–8)
PLATELET # BLD AUTO: 169 K/UL — SIGNIFICANT CHANGE UP (ref 150–400)
POTASSIUM SERPL-MCNC: 5.2 MMOL/L — SIGNIFICANT CHANGE UP (ref 3.5–5.3)
POTASSIUM SERPL-SCNC: 5.2 MMOL/L — SIGNIFICANT CHANGE UP (ref 3.5–5.3)
PROT SERPL-MCNC: 6.3 G/DL — SIGNIFICANT CHANGE UP (ref 6–8.3)
PROT UR-MCNC: 30 MG/DL
RAPID RVP RESULT: SIGNIFICANT CHANGE UP
RBC # BLD: 3.16 M/UL — LOW (ref 3.8–5.2)
RBC # FLD: 17.5 % — HIGH (ref 10.3–14.5)
RBC CASTS # UR COMP ASSIST: SIGNIFICANT CHANGE UP /HPF (ref 0–2)
SODIUM SERPL-SCNC: 137 MMOL/L — SIGNIFICANT CHANGE UP (ref 135–145)
SP GR SPEC: 1.01 — SIGNIFICANT CHANGE UP (ref 1.01–1.02)
UROBILINOGEN FLD QL: NEGATIVE — SIGNIFICANT CHANGE UP
WBC # BLD: 31.4 K/UL — HIGH (ref 3.8–10.5)
WBC # FLD AUTO: 31.4 K/UL — HIGH (ref 3.8–10.5)
WBC UR QL: >50 /HPF (ref 0–5)

## 2018-04-14 PROCEDURE — 71046 X-RAY EXAM CHEST 2 VIEWS: CPT | Mod: 26

## 2018-04-14 PROCEDURE — 99285 EMERGENCY DEPT VISIT HI MDM: CPT | Mod: 25,GC

## 2018-04-14 PROCEDURE — 93010 ELECTROCARDIOGRAM REPORT: CPT

## 2018-04-14 PROCEDURE — 99223 1ST HOSP IP/OBS HIGH 75: CPT | Mod: AI

## 2018-04-14 RX ORDER — CEFEPIME 1 G/1
2000 INJECTION, POWDER, FOR SOLUTION INTRAMUSCULAR; INTRAVENOUS ONCE
Qty: 0 | Refills: 0 | Status: COMPLETED | OUTPATIENT
Start: 2018-04-14 | End: 2018-04-14

## 2018-04-14 RX ORDER — CALCIUM CARBONATE 500(1250)
2 TABLET ORAL ONCE
Qty: 0 | Refills: 0 | Status: COMPLETED | OUTPATIENT
Start: 2018-04-14 | End: 2018-04-15

## 2018-04-14 RX ORDER — CEFEPIME 1 G/1
2000 INJECTION, POWDER, FOR SOLUTION INTRAMUSCULAR; INTRAVENOUS ONCE
Qty: 0 | Refills: 0 | Status: DISCONTINUED | OUTPATIENT
Start: 2018-04-14 | End: 2018-04-14

## 2018-04-14 RX ORDER — HEPARIN SODIUM 5000 [USP'U]/ML
5000 INJECTION INTRAVENOUS; SUBCUTANEOUS EVERY 8 HOURS
Qty: 0 | Refills: 0 | Status: DISCONTINUED | OUTPATIENT
Start: 2018-04-14 | End: 2018-04-18

## 2018-04-14 RX ORDER — SODIUM CHLORIDE 9 MG/ML
1000 INJECTION INTRAMUSCULAR; INTRAVENOUS; SUBCUTANEOUS ONCE
Qty: 0 | Refills: 0 | Status: COMPLETED | OUTPATIENT
Start: 2018-04-14 | End: 2018-04-14

## 2018-04-14 RX ORDER — FLUTICASONE PROPIONATE AND SALMETEROL 50; 250 UG/1; UG/1
1 POWDER ORAL; RESPIRATORY (INHALATION)
Qty: 0 | Refills: 0 | COMMUNITY

## 2018-04-14 RX ORDER — ACETAMINOPHEN 500 MG
650 TABLET ORAL ONCE
Qty: 0 | Refills: 0 | Status: COMPLETED | OUTPATIENT
Start: 2018-04-14 | End: 2018-04-14

## 2018-04-14 RX ORDER — DOCUSATE SODIUM 100 MG
1 CAPSULE ORAL
Qty: 0 | Refills: 0 | COMMUNITY

## 2018-04-14 RX ORDER — CALCIUM CARBONATE 500(1250)
1 TABLET ORAL ONCE
Qty: 0 | Refills: 0 | Status: DISCONTINUED | OUTPATIENT
Start: 2018-04-14 | End: 2018-04-14

## 2018-04-14 RX ADMIN — HEPARIN SODIUM 5000 UNIT(S): 5000 INJECTION INTRAVENOUS; SUBCUTANEOUS at 22:43

## 2018-04-14 RX ADMIN — SODIUM CHLORIDE 1000 MILLILITER(S): 9 INJECTION INTRAMUSCULAR; INTRAVENOUS; SUBCUTANEOUS at 21:36

## 2018-04-14 RX ADMIN — SODIUM CHLORIDE 2000 MILLILITER(S): 9 INJECTION INTRAMUSCULAR; INTRAVENOUS; SUBCUTANEOUS at 18:47

## 2018-04-14 RX ADMIN — Medication 650 MILLIGRAM(S): at 18:46

## 2018-04-14 RX ADMIN — CEFEPIME 100 MILLIGRAM(S): 1 INJECTION, POWDER, FOR SOLUTION INTRAMUSCULAR; INTRAVENOUS at 18:47

## 2018-04-14 NOTE — H&P ADULT - FAMILY HISTORY
Father  Still living? Unknown  Family history of prostate cancer, Age at diagnosis: Age Unknown  Family history of hyperlipidemia, Age at diagnosis: Age Unknown  Family history of dementia, Age at diagnosis: Age Unknown

## 2018-04-14 NOTE — H&P ADULT - PROBLEM SELECTOR PLAN 1
Patient presents for evaluation of fever after received chemotherapy two days ago and Neulasta one day ago, but is otherwise asymptomatic with stable hemodynamics. Presentation is similar to her prior admission where she was treate with Vanc/Cefepime before leaving [place]. During that time, UA and UCx were positive. BCx 1/2 positive for enterobacter thought to be contaminant and not treated.     Unclear source of fever, though likely related to therapeutic regimen given timing and similarity to prior presentation. Enterobacter likely contaminant given >72 hours to growth and only in 1/2 cultures, however not the most common contaminant and warrants repeat cultures. Comfortable monitoring off antibiotics for the time being unless source of infection presents itself.    - Hold further antibiotics, discontinue Cefepime  - Follow up UCx, BCx, Procalcitonin  - Restart broad spectrum if hemodynamically unstable  - Culture if spikes  - Hold antipyretics if possible  - CXR, RVP negative

## 2018-04-14 NOTE — ED PROVIDER NOTE - ATTENDING CONTRIBUTION TO CARE
78 yo F presents with fever x 1 day, tmax 102-103. She has no other associated symptoms. she has a h/o metastatic bladder CA. on chemo, last session on thursday. She was admitted with fever few weeks ago, and signed out AMA. she received neulasta treatement yesterday. she has a cystostomy bag, draining clear urine. she reports no change in appearance/odor  PE normal. lungs CTA, abd soft and nontender 78 yo F presents with fever x 1 day, tmax 102-103. She has no other associated symptoms. she has a h/o metastatic bladder CA. on chemo, last session on thursday. She was admitted with fever few weeks ago, and signed out AMA. she received neulasta treatement yesterday. she has a cystostomy bag, draining clear urine. she reports no change in appearance/odor  PE normal. lungs CTA, abd soft and nontender.   ed workup shows CXR with no infection. UA appears positive for infection with nitrites and WBCs. leukocytosis 31.4. anemia 10 and 32. plt 169. DEVON with BUN 40 and Cr 1.59. lactate normal.   patient treated with IVFs and empiric abx with cefepime. remained HD stable in the Er.     Based on patient's HPI as well as the results of today's workup, I felt that patient warranted admission to the hospital for further workup/evaluation and continued management. I discussed the findings of today's workup with the patient and addressed the patient's questions and concerns. The patient was agreeable with admission. I spoke with the hospitalist who accepted the patient for admission and subsequently took over the patient's care.

## 2018-04-14 NOTE — H&P ADULT - NSHPLABSRESULTS_GEN_ALL_CORE
10.5   31.4  )-----------( 169      ( 2018 17:35 )             32.0       -    137  |  109<H>  |  40<H>  ----------------------------<  126<H>  5.2   |  14<L>  |  1.59<H>    Ca    8.2<L>      2018 17:35    TPro  6.3  /  Alb  3.5  /  TBili  0.3  /  DBili  x   /  AST  65<H>  /  ALT  49<H>  /  AlkPhos  89                Urinalysis Basic - ( 2018 19:03 )    Color: Yellow / Appearance: SL Turbid / S.011 / pH: x  Gluc: x / Ketone: Negative  / Bili: Negative / Urobili: Negative   Blood: x / Protein: 30 mg/dL / Nitrite: Positive   Leuk Esterase: Large / RBC: 3-5 /HPF / WBC >50 /HPF   Sq Epi: x / Non Sq Epi: x / Bacteria: Few /HPF      I personally interpreted this patient's labs. They were notable for chronic leukocytosis to 31.4, Hgb 10.5, and platelets normal at 169. Metabolic panel overall unremarkable and unchanged from last admission with Cr 1.59. UA with > 50 WBCs, few bacteria, Large LE, + nitrites (similar to prior admission). RVP negative. CXR negative for infiltrate.   I personally interpreted this patient's imaging. CXR negative for infiltrate.   I personally interpreted this patient's ECG. It showed

## 2018-04-14 NOTE — ED ADULT NURSE NOTE - OBJECTIVE STATEMENT
Pt is a 76 yo female with the co fevers at home, as high as 103 as per pt since last night. 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 Her last chemo tx was thursday, she does three days of chemo and then takes a two week break. Pt denies any CP or SOB no N/V/D no cough. Pt has an ileal conduit. Pt reports her urine draining with no issues, no change in color or odor. Pt has pmh of Bladder Ca, HTN,, asthma and gastric ulcers.

## 2018-04-14 NOTE — ED PROVIDER NOTE - CARE PLAN
Principal Discharge DX:	Fever, unspecified fever cause  Secondary Diagnosis:	Bladder cancer metastasized to bone
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
## 2018-04-14 NOTE — H&P ADULT - ASSESSMENT
Tamia Moore is a 77 year old female with a history of hypertension, hypertrophic cardiomyopathy, former smoker with 50 py history, bladder cancer dx 9/2017 s/p ileal conduit on chemotherapy presents with fevers at home after receiving chemotherapy.

## 2018-04-14 NOTE — ED PROVIDER NOTE - CONSTITUTIONAL APPEARANCE HYGIENE, MLM
Radial Band Removal Note - Radial Band Removed at 5PM  VSS. Pt Asymptomatic.   Pre-Radial Band Removal: R Radial pulse 1+. No hematoma. No bleeding.  Hemostasis achieved with manual release of radial band.  Post Radial Band Removal: R Radial pulse 2+. No hematoma. No bleeding.  Cont. to monitor.  Care instructions given.
ILL APPEARING

## 2018-04-14 NOTE — ED PROVIDER NOTE - OBJECTIVE STATEMENT
78yo F with HTN, hypertrophic cardiomyopathy, former smoker, bladder cancer dxed 9/2017 s/p ileal conduit on chemotherapy (last Tues-Thur this week) presenting with fever. Pt completed her chemo session on Thursday and was in her usual state of health until yesterday evening when she developed fevers and had a temp to 103. She took Tylenol for the fever but her temp fluctuated between 100 and 103 today. Denies nausea, vomiting, abdominal pain. Had 1 loose stool in triage. Has chronic nasal congestion. Has an ileal conduit, urine in bag with no changes. 78yo F with HTN, hypertrophic cardiomyopathy, former smoker, bladder cancer dxed 9/2017 s/p ileal conduit on chemotherapy (last Tues-Thur this week) presenting with fever. Pt completed her chemo session on Thursday, received Neulasta yesterday and was in her usual state of health until yesterday evening when she developed fevers and had a temp to 103. She took Tylenol for the fever but her temp fluctuated between 100 and 103 today. Denies nausea, vomiting, abdominal pain. Had 1 loose stool in triage. Has chronic nasal congestion. Has an ileal conduit, urine in bag with no changes.

## 2018-04-14 NOTE — H&P ADULT - HISTORY OF PRESENT ILLNESS
Tamia Moore is a 77 year old female with a history of hypertension, hypertrophic cardiomyopathy, former smoker with 50 py history, bladder cancer dx 9/2017 s/p ileal conduit on chemotherapy presents with fevers at home after receiving chemotherapy.    Patient reports that she received chemotherapy on Thursday and Neulasta on Friday. On Friday evening around midnight, took her temperature as she was feeling tired and warm and was elevated to 103. Called an ambulance at that time, but by the time EMS arrived temperature was normal at 98.6. This morning, she again took her temperature and noted to it be between 101-103. She otherwise feels well and denies any headache, chest pain, shortness of breath, abdominal pain, nausea, vomiting, or urinary symptoms. She had one loose bowel movement this morning. At this time, she reports feeling well.    The patient was recently admitted for fevers after chemotherapy at the end of March and left AMA after blood cultures were negative x 48 hours. She was on Vanc/Cefepime at the time due to concern for sepsis from her initial presentation. After discharge, 1/2 cultures returned positive for Enterobacter Aerogenes. She reports being contacted as an outpatient and being told that culture was a contaminant and did not require treatment. Additionally, urine culture positive but though to be secondary to normal bowel milton given Has been feeling well since her last visit and was afebrile until last night.     In the ED, Tmax 100.4, HR 96, /63, O2 96%. Labs showed chronic leukocytosis to 31.4, Hgb 10.5, and platelets normal at 169. Metabolic panel overall unremarkable and unchanged from last admission with Cr 1.59. UA with > 50 WBCs, few bacteria, Large LE, + nitrites (similar to prior admission). RVP negative. CXR negative for infiltrate. Patient was given Cefepime. Tylenol, and 2L NS. Admitted for further workup of fever.

## 2018-04-14 NOTE — H&P ADULT - PROBLEM SELECTOR PLAN 2
- Currently receiving chemotherapy with Carboplatin/Etoposide  - Heme-onc to be made aware of admission in AM

## 2018-04-14 NOTE — H&P ADULT - NSHPREVIEWOFSYSTEMS_GEN_ALL_CORE
REVIEW OF SYSTEMS:    CONSTITUTIONAL: No weakness, +fevers, no chills  EYES/ENT: No visual changes;  No vertigo or throat pain   NECK: No pain or stiffness  RESPIRATORY: No cough, wheezing, hemoptysis; No shortness of breath  CARDIOVASCULAR: No chest pain or palpitations  GASTROINTESTINAL: No abdominal or epigastric pain. No nausea, vomiting, or hematemesis; No diarrhea or constipation. No melena or hematochezia.  GENITOURINARY: No dysuria, frequency or hematuria  NEUROLOGICAL: No numbness or weakness  SKIN: No itching, burning, rashes, or lesions   PSYCH: No depression or anxiety  HEME: No swollen lymph nodes

## 2018-04-14 NOTE — ED PROVIDER NOTE - MEDICAL DECISION MAKING DETAILS
76yo F with bladder cancer, on chemotherapy, with fevers starting yesterday. Infectious workup sent, covered with antibiotics, RVP negative, HD stable. No clear source of infection at this time.

## 2018-04-15 DIAGNOSIS — Z75.8 OTHER PROBLEMS RELATED TO MEDICAL FACILITIES AND OTHER HEALTH CARE: ICD-10-CM

## 2018-04-15 DIAGNOSIS — K21.9 GASTRO-ESOPHAGEAL REFLUX DISEASE WITHOUT ESOPHAGITIS: ICD-10-CM

## 2018-04-15 DIAGNOSIS — C67.9 MALIGNANT NEOPLASM OF BLADDER, UNSPECIFIED: ICD-10-CM

## 2018-04-15 DIAGNOSIS — N18.9 CHRONIC KIDNEY DISEASE, UNSPECIFIED: ICD-10-CM

## 2018-04-15 DIAGNOSIS — I10 ESSENTIAL (PRIMARY) HYPERTENSION: ICD-10-CM

## 2018-04-15 DIAGNOSIS — R50.9 FEVER, UNSPECIFIED: ICD-10-CM

## 2018-04-15 LAB
ALBUMIN SERPL ELPH-MCNC: 2.9 G/DL — LOW (ref 3.3–5)
ALP SERPL-CCNC: 80 U/L — SIGNIFICANT CHANGE UP (ref 40–120)
ALT FLD-CCNC: 31 U/L RC — SIGNIFICANT CHANGE UP (ref 10–45)
ANION GAP SERPL CALC-SCNC: 10 MMOL/L — SIGNIFICANT CHANGE UP (ref 5–17)
APTT BLD: 28.3 SEC — SIGNIFICANT CHANGE UP (ref 27.5–37.4)
AST SERPL-CCNC: 14 U/L — SIGNIFICANT CHANGE UP (ref 10–40)
BILIRUB SERPL-MCNC: 0.3 MG/DL — SIGNIFICANT CHANGE UP (ref 0.2–1.2)
BUN SERPL-MCNC: 36 MG/DL — HIGH (ref 7–23)
CALCIUM SERPL-MCNC: 7.5 MG/DL — LOW (ref 8.4–10.5)
CHLORIDE SERPL-SCNC: 113 MMOL/L — HIGH (ref 96–108)
CO2 SERPL-SCNC: 15 MMOL/L — LOW (ref 22–31)
CREAT SERPL-MCNC: 1.61 MG/DL — HIGH (ref 0.5–1.3)
CULTURE RESULTS: SIGNIFICANT CHANGE UP
E COLI DNA BLD POS QL NAA+NON-PROBE: SIGNIFICANT CHANGE UP
GLUCOSE SERPL-MCNC: 78 MG/DL — SIGNIFICANT CHANGE UP (ref 70–99)
GRAM STN FLD: SIGNIFICANT CHANGE UP
HCT VFR BLD CALC: 26.9 % — LOW (ref 34.5–45)
HGB BLD-MCNC: 8.7 G/DL — LOW (ref 11.5–15.5)
INR BLD: 1.07 RATIO — SIGNIFICANT CHANGE UP (ref 0.88–1.16)
MAGNESIUM SERPL-MCNC: 1.8 MG/DL — SIGNIFICANT CHANGE UP (ref 1.6–2.6)
MCHC RBC-ENTMCNC: 32.2 GM/DL — SIGNIFICANT CHANGE UP (ref 32–36)
MCHC RBC-ENTMCNC: 33.4 PG — SIGNIFICANT CHANGE UP (ref 27–34)
MCV RBC AUTO: 104 FL — HIGH (ref 80–100)
METHOD TYPE: SIGNIFICANT CHANGE UP
PLATELET # BLD AUTO: 148 K/UL — LOW (ref 150–400)
POTASSIUM SERPL-MCNC: 4.9 MMOL/L — SIGNIFICANT CHANGE UP (ref 3.5–5.3)
POTASSIUM SERPL-SCNC: 4.9 MMOL/L — SIGNIFICANT CHANGE UP (ref 3.5–5.3)
PROCALCITONIN SERPL-MCNC: 0.94 NG/ML — HIGH (ref 0–0.04)
PROT SERPL-MCNC: 4.9 G/DL — LOW (ref 6–8.3)
PROTHROM AB SERPL-ACNC: 11.7 SEC — SIGNIFICANT CHANGE UP (ref 9.8–12.7)
RBC # BLD: 2.6 M/UL — LOW (ref 3.8–5.2)
RBC # FLD: 17.4 % — HIGH (ref 10.3–14.5)
SODIUM SERPL-SCNC: 138 MMOL/L — SIGNIFICANT CHANGE UP (ref 135–145)
SPECIMEN SOURCE: SIGNIFICANT CHANGE UP
WBC # BLD: 23.2 K/UL — HIGH (ref 3.8–10.5)
WBC # FLD AUTO: 23.2 K/UL — HIGH (ref 3.8–10.5)

## 2018-04-15 RX ORDER — DULOXETINE HYDROCHLORIDE 30 MG/1
60 CAPSULE, DELAYED RELEASE ORAL DAILY
Qty: 0 | Refills: 0 | Status: DISCONTINUED | OUTPATIENT
Start: 2018-04-15 | End: 2018-04-18

## 2018-04-15 RX ORDER — RALOXIFENE HYDROCHLORIDE 60 MG/1
60 TABLET, COATED ORAL DAILY
Qty: 0 | Refills: 0 | Status: DISCONTINUED | OUTPATIENT
Start: 2018-04-15 | End: 2018-04-18

## 2018-04-15 RX ORDER — SIMETHICONE 80 MG/1
80 TABLET, CHEWABLE ORAL
Qty: 0 | Refills: 0 | Status: DISCONTINUED | OUTPATIENT
Start: 2018-04-15 | End: 2018-04-18

## 2018-04-15 RX ORDER — PANTOPRAZOLE SODIUM 20 MG/1
40 TABLET, DELAYED RELEASE ORAL
Qty: 0 | Refills: 0 | Status: DISCONTINUED | OUTPATIENT
Start: 2018-04-15 | End: 2018-04-18

## 2018-04-15 RX ORDER — CALCIUM CARBONATE 500(1250)
1 TABLET ORAL
Qty: 0 | Refills: 0 | Status: DISCONTINUED | OUTPATIENT
Start: 2018-04-15 | End: 2018-04-18

## 2018-04-15 RX ORDER — ACETAMINOPHEN 500 MG
650 TABLET ORAL ONCE
Qty: 0 | Refills: 0 | Status: COMPLETED | OUTPATIENT
Start: 2018-04-15 | End: 2018-04-15

## 2018-04-15 RX ORDER — MONTELUKAST 4 MG/1
10 TABLET, CHEWABLE ORAL DAILY
Qty: 0 | Refills: 0 | Status: DISCONTINUED | OUTPATIENT
Start: 2018-04-15 | End: 2018-04-18

## 2018-04-15 RX ORDER — FLUTICASONE PROPIONATE 50 MCG
1 SPRAY, SUSPENSION NASAL DAILY
Qty: 0 | Refills: 0 | Status: DISCONTINUED | OUTPATIENT
Start: 2018-04-15 | End: 2018-04-18

## 2018-04-15 RX ORDER — ACETAMINOPHEN 500 MG
650 TABLET ORAL EVERY 6 HOURS
Qty: 0 | Refills: 0 | Status: DISCONTINUED | OUTPATIENT
Start: 2018-04-15 | End: 2018-04-18

## 2018-04-15 RX ORDER — METOPROLOL TARTRATE 50 MG
50 TABLET ORAL DAILY
Qty: 0 | Refills: 0 | Status: DISCONTINUED | OUTPATIENT
Start: 2018-04-15 | End: 2018-04-18

## 2018-04-15 RX ORDER — LACTOBACILLUS ACIDOPHILUS 100MM CELL
1 CAPSULE ORAL
Qty: 0 | Refills: 0 | Status: DISCONTINUED | OUTPATIENT
Start: 2018-04-15 | End: 2018-04-18

## 2018-04-15 RX ORDER — DILTIAZEM HCL 120 MG
180 CAPSULE, EXT RELEASE 24 HR ORAL DAILY
Qty: 0 | Refills: 0 | Status: DISCONTINUED | OUTPATIENT
Start: 2018-04-15 | End: 2018-04-18

## 2018-04-15 RX ORDER — TRAZODONE HCL 50 MG
100 TABLET ORAL AT BEDTIME
Qty: 0 | Refills: 0 | Status: DISCONTINUED | OUTPATIENT
Start: 2018-04-15 | End: 2018-04-18

## 2018-04-15 RX ORDER — CEFEPIME 1 G/1
2000 INJECTION, POWDER, FOR SOLUTION INTRAMUSCULAR; INTRAVENOUS DAILY
Qty: 0 | Refills: 0 | Status: DISCONTINUED | OUTPATIENT
Start: 2018-04-15 | End: 2018-04-17

## 2018-04-15 RX ADMIN — HEPARIN SODIUM 5000 UNIT(S): 5000 INJECTION INTRAVENOUS; SUBCUTANEOUS at 23:26

## 2018-04-15 RX ADMIN — Medication 75 MILLIGRAM(S): at 11:50

## 2018-04-15 RX ADMIN — HEPARIN SODIUM 5000 UNIT(S): 5000 INJECTION INTRAVENOUS; SUBCUTANEOUS at 13:34

## 2018-04-15 RX ADMIN — Medication 180 MILLIGRAM(S): at 06:37

## 2018-04-15 RX ADMIN — Medication 650 MILLIGRAM(S): at 10:26

## 2018-04-15 RX ADMIN — PANTOPRAZOLE SODIUM 40 MILLIGRAM(S): 20 TABLET, DELAYED RELEASE ORAL at 06:37

## 2018-04-15 RX ADMIN — Medication 650 MILLIGRAM(S): at 23:56

## 2018-04-15 RX ADMIN — RALOXIFENE HYDROCHLORIDE 60 MILLIGRAM(S): 60 TABLET, COATED ORAL at 11:50

## 2018-04-15 RX ADMIN — Medication 100 MILLIGRAM(S): at 01:39

## 2018-04-15 RX ADMIN — HEPARIN SODIUM 5000 UNIT(S): 5000 INJECTION INTRAVENOUS; SUBCUTANEOUS at 06:37

## 2018-04-15 RX ADMIN — CEFEPIME 100 MILLIGRAM(S): 1 INJECTION, POWDER, FOR SOLUTION INTRAMUSCULAR; INTRAVENOUS at 19:42

## 2018-04-15 RX ADMIN — Medication 650 MILLIGRAM(S): at 09:38

## 2018-04-15 RX ADMIN — Medication 2 TABLET(S): at 01:39

## 2018-04-15 RX ADMIN — DULOXETINE HYDROCHLORIDE 60 MILLIGRAM(S): 30 CAPSULE, DELAYED RELEASE ORAL at 13:32

## 2018-04-15 RX ADMIN — Medication 650 MILLIGRAM(S): at 02:10

## 2018-04-15 RX ADMIN — Medication 1 TABLET(S): at 11:50

## 2018-04-15 RX ADMIN — Medication 75 MILLIGRAM(S): at 01:39

## 2018-04-15 RX ADMIN — Medication 50 MILLIGRAM(S): at 06:37

## 2018-04-15 RX ADMIN — Medication 1 TABLET(S): at 18:29

## 2018-04-15 RX ADMIN — MONTELUKAST 10 MILLIGRAM(S): 4 TABLET, CHEWABLE ORAL at 11:50

## 2018-04-15 RX ADMIN — Medication 75 MILLIGRAM(S): at 23:26

## 2018-04-15 RX ADMIN — Medication 650 MILLIGRAM(S): at 01:40

## 2018-04-15 RX ADMIN — Medication 100 MILLIGRAM(S): at 23:26

## 2018-04-15 RX ADMIN — Medication 650 MILLIGRAM(S): at 23:26

## 2018-04-15 RX ADMIN — Medication 1 SPRAY(S): at 13:31

## 2018-04-15 NOTE — PROGRESS NOTE ADULT - SUBJECTIVE AND OBJECTIVE BOX
Patient is a 77y old  Female who presents with a chief complaint of fever (14 Apr 2018 23:05)  pt seen and examined at bedside   SUBJECTIVE/OVERNIGHT events pt feels fine, wants to go home, denies any abd pain,n/v   has 3 loose bm this am    Vital Signs Last 24 Hrs  T(C): 37.6 (15 Apr 2018 13:59), Max: 37.6 (15 Apr 2018 01:10)  T(F): 99.6 (15 Apr 2018 13:59), Max: 99.6 (15 Apr 2018 01:10)  HR: 81 (15 Apr 2018 13:59) (69 - 88)  BP: 107/61 (15 Apr 2018 13:59) (91/50 - 136/73)  BP(mean): --  RR: 17 (15 Apr 2018 13:59) (16 - 18)  SpO2: 98% (15 Apr 2018 13:59) (97% - 99%)  CAPILLARY BLOOD GLUCOSE        MEDICATIONS  (STANDING):  cefepime  IVPB 2000 milliGRAM(s) IV Intermittent daily  diltiazem    milliGRAM(s) Oral daily  DULoxetine 60 milliGRAM(s) Oral daily  fluticasone propionate 50 MICROgram(s)/spray Nasal Spray 1 Spray(s) Both Nostrils daily  heparin  Injectable 5000 Unit(s) SubCutaneous every 8 hours  lactobacillus acidophilus 1 Tablet(s) Oral three times a day with meals  metoprolol succinate ER 50 milliGRAM(s) Oral daily  montelukast 10 milliGRAM(s) Oral daily  multivitamin 1 Tablet(s) Oral daily  pantoprazole    Tablet 40 milliGRAM(s) Oral before breakfast  pregabalin 75 milliGRAM(s) Oral two times a day  raloxifene 60 milliGRAM(s) Oral daily    MEDICATIONS  (PRN):  acetaminophen   Tablet. 650 milliGRAM(s) Oral every 6 hours PRN Moderate Pain (4 - 6)  calcium carbonate 500 mG (Tums) Chewable 1 Tablet(s) Chew four times a day PRN Upset Stomach  simethicone 80 milliGRAM(s) Chew four times a day PRN Dyspepsia  traZODone 100 milliGRAM(s) Oral at bedtime PRN insomnia    PHYSICAL EXAM  General : comfortable, not in any acute distress  Neck : supple, no LAD, no JVD  Eyes : EOMI, PERRLA, conjunctiva : no icterus, no pallor  MM moist, no pharyngeal erythema/exudates  Pulmonary: clear to auscultation bilateral lung fields, no crackles/rhonchi/wheezing,   CVS : S1 S2,rate normal-,rhythm-regular, no murmurs, no abnormal sounds  Gastrointestinal: soft, non tender, non distended, no organomegaly , bowel sounds audible  Extremities: no edema  Neuro: AAOx3, no focal deficits  Musculoskeletal:  FROM of all ext  Skin: no rash  LABS                        8.7    23.2  )-----------( 148      ( 15 Apr 2018 07:13 )             26.9     04-15    138  |  113<H>  |  36<H>  ----------------------------<  78  4.9   |  15<L>  |  1.61<H>    Ca    7.5<L>      15 Apr 2018 07:11  Mg     1.8     04-15    TPro  4.9<L>  /  Alb  2.9<L>  /  TBili  0.3  /  DBili  x   /  AST  14  /  ALT  31  /  AlkPhos  80  04-15      Culture - Blood (collected 14 Apr 2018 20:35)  Source: .Blood Blood-Peripheral  Gram Stain (15 Apr 2018 11:27):    Growth in aerobic bottle: Gram Negative Rods  Preliminary Report (15 Apr 2018 11:27):    Growth in aerobic bottle: Gram Negative Rods    "Due to technical problems, Proteus sp. will Not be reported as part of    the BCID panel until further notice"    ***Blood Panel PCR results on this specimen are available    approximately 3 hours after the Gram stain result.***    Gram stain, PCR, and/or culture results may not always    correspond due to difference in methodologies.    ************************************************************    This PCR assay was performed using [employer].    The following targets are tested for: Enterococcus,    vancomycin resistant enterococci, Listeria monocytogenes,    coagulase negative staphylococci, S. aureus,    methicillin resistant S. aureus, Streptococcus agalactiae    (Group B), S. pneumoniae, S. pyogenes (Group A),    Acinetobacter baumannii, Enterobacter cloacae, E. coli,    Klebsiella oxytoca, K. pneumoniae, Proteus sp.,    Serratia marcescens, Haemophilus influenzae,    Neisseria meningitidis, Pseudomonas aeruginosa, Candida    albicans, C. glabrata, C krusei, C parapsilosis,    C. tropicalis and the KPC resistance gene.  Organism: Blood Culture PCR (15 Apr 2018 13:41)  Organism: Blood Culture PCR (15 Apr 2018 13:41)      RADIOLOGY and IMAGING reviewed: yes  Consultant notes reviewed : yes  Care discussed with Consultants/other providers :

## 2018-04-16 DIAGNOSIS — D72.829 ELEVATED WHITE BLOOD CELL COUNT, UNSPECIFIED: ICD-10-CM

## 2018-04-16 DIAGNOSIS — R78.81 BACTEREMIA: ICD-10-CM

## 2018-04-16 LAB
ANION GAP SERPL CALC-SCNC: 12 MMOL/L — SIGNIFICANT CHANGE UP (ref 5–17)
BUN SERPL-MCNC: 28 MG/DL — HIGH (ref 7–23)
CALCIUM SERPL-MCNC: 7.5 MG/DL — LOW (ref 8.4–10.5)
CHLORIDE SERPL-SCNC: 114 MMOL/L — HIGH (ref 96–108)
CO2 SERPL-SCNC: 13 MMOL/L — LOW (ref 22–31)
CREAT SERPL-MCNC: 1.52 MG/DL — HIGH (ref 0.5–1.3)
GLUCOSE SERPL-MCNC: 83 MG/DL — SIGNIFICANT CHANGE UP (ref 70–99)
GRAM STN FLD: SIGNIFICANT CHANGE UP
HCT VFR BLD CALC: 26 % — LOW (ref 34.5–45)
HGB BLD-MCNC: 8.6 G/DL — LOW (ref 11.5–15.5)
MCHC RBC-ENTMCNC: 33.2 GM/DL — SIGNIFICANT CHANGE UP (ref 32–36)
MCHC RBC-ENTMCNC: 34 PG — SIGNIFICANT CHANGE UP (ref 27–34)
MCV RBC AUTO: 102 FL — HIGH (ref 80–100)
PLATELET # BLD AUTO: 127 K/UL — LOW (ref 150–400)
POTASSIUM SERPL-MCNC: 4.8 MMOL/L — SIGNIFICANT CHANGE UP (ref 3.5–5.3)
POTASSIUM SERPL-SCNC: 4.8 MMOL/L — SIGNIFICANT CHANGE UP (ref 3.5–5.3)
RBC # BLD: 2.54 M/UL — LOW (ref 3.8–5.2)
RBC # FLD: 17 % — HIGH (ref 10.3–14.5)
SODIUM SERPL-SCNC: 139 MMOL/L — SIGNIFICANT CHANGE UP (ref 135–145)
WBC # BLD: 10.8 K/UL — HIGH (ref 3.8–10.5)
WBC # FLD AUTO: 10.8 K/UL — HIGH (ref 3.8–10.5)

## 2018-04-16 RX ADMIN — Medication 50 MILLIGRAM(S): at 05:22

## 2018-04-16 RX ADMIN — Medication 100 MILLIGRAM(S): at 23:10

## 2018-04-16 RX ADMIN — Medication 1 TABLET(S): at 11:31

## 2018-04-16 RX ADMIN — Medication 1 TABLET(S): at 11:24

## 2018-04-16 RX ADMIN — Medication 1 TABLET(S): at 08:18

## 2018-04-16 RX ADMIN — DULOXETINE HYDROCHLORIDE 60 MILLIGRAM(S): 30 CAPSULE, DELAYED RELEASE ORAL at 11:23

## 2018-04-16 RX ADMIN — Medication 650 MILLIGRAM(S): at 06:01

## 2018-04-16 RX ADMIN — Medication 1 SPRAY(S): at 11:24

## 2018-04-16 RX ADMIN — Medication 75 MILLIGRAM(S): at 23:10

## 2018-04-16 RX ADMIN — Medication 180 MILLIGRAM(S): at 05:22

## 2018-04-16 RX ADMIN — MONTELUKAST 10 MILLIGRAM(S): 4 TABLET, CHEWABLE ORAL at 11:25

## 2018-04-16 RX ADMIN — Medication 650 MILLIGRAM(S): at 23:40

## 2018-04-16 RX ADMIN — HEPARIN SODIUM 5000 UNIT(S): 5000 INJECTION INTRAVENOUS; SUBCUTANEOUS at 13:43

## 2018-04-16 RX ADMIN — PANTOPRAZOLE SODIUM 40 MILLIGRAM(S): 20 TABLET, DELAYED RELEASE ORAL at 05:22

## 2018-04-16 RX ADMIN — CEFEPIME 100 MILLIGRAM(S): 1 INJECTION, POWDER, FOR SOLUTION INTRAMUSCULAR; INTRAVENOUS at 17:19

## 2018-04-16 RX ADMIN — RALOXIFENE HYDROCHLORIDE 60 MILLIGRAM(S): 60 TABLET, COATED ORAL at 11:25

## 2018-04-16 RX ADMIN — Medication 1 TABLET(S): at 17:18

## 2018-04-16 RX ADMIN — HEPARIN SODIUM 5000 UNIT(S): 5000 INJECTION INTRAVENOUS; SUBCUTANEOUS at 05:22

## 2018-04-16 RX ADMIN — Medication 650 MILLIGRAM(S): at 23:10

## 2018-04-16 RX ADMIN — Medication 650 MILLIGRAM(S): at 06:31

## 2018-04-16 RX ADMIN — Medication 75 MILLIGRAM(S): at 11:30

## 2018-04-16 RX ADMIN — HEPARIN SODIUM 5000 UNIT(S): 5000 INJECTION INTRAVENOUS; SUBCUTANEOUS at 23:10

## 2018-04-16 NOTE — CONSULT NOTE ADULT - SUBJECTIVE AND OBJECTIVE BOX
[facility] Hematology/Oncology - Obey Cruz / Onofre / Francis - [phone]  Primary Outpatient Hematologist/Oncologist:  Dr. Manjinder Cruz     Chief Complaint:  Fever    HPI:  Patient is a 77 year old woman with a history of hypertension, hypertrophic cardiomyopathy, former smoker with 50 py history, bladder cancer dx 9/2017 s/p ileal conduit on chemotherapy presents with fevers at home after receiving chemotherapy.    Patient reports that she received chemotherapy on Thursday and Neulasta on Friday. On Friday evening around midnight, took her temperature as she was feeling tired and warm and was elevated to 103. Called an ambulance at that time, but by the time EMS arrived temperature was normal at 98.6. This morning, she again took her temperature and noted to it be between 101-103. No headaches. No blurry vision. No dysuria No diarrhea.     The patient was recently admitted for fevers after chemotherapy at the end of March and left AMA after blood cultures were negative x 48 hours. She was on Vanc/Cefepime at the time due to concern for sepsis from her initial presentation. After discharge, 1/2 cultures returned positive for Enterobacter Aerogenes. She reports being contacted as an outpatient and being told that culture was a contaminant and did not require treatment.      ROS:  General:  (-)Pain, (-)Decrease appeite, (+)Fevers, (-)Chills, (-)Weight Loss  Eyes: (-)Blurry Vision, (-)Double Vision, (-)Vision Loss  ENT: (-)Sinus Congestion, (-)Decreased Hearing, (-)Nosebleeds, (-)Sore Throat  Cardiac: (-)Chest Pain, (-)Palpitations, (-)Shortness of breathe on exertion  Respiratory: (-)Cough, (-)hemoptysis, (-)Shortness of breathe  GI: (-)Nausea, (-)Vomiting, (-)Diarrhea, (-)Constipation, (-)Melena, (-)BRBPR  : (-)Hematuria, (-)Dysuria, (-)Polyuia  MSK: (-)Back pain, (-)Joint pain, (-)Stiffness  Dermatology: (-)Rash, (-)Itching  Neurology:  (-)Numbness, (-)Tingling, (-)Difficulty Walking, (-)Tremors, (-)Weakness  Psych: (-)Anxiety, (-)Depression, (-)Memory Loss  Hematology:  (-)Easy Bruising, (-)Easy Bleeding, (-)Night Sweats.     PAST MEDICAL & SURGICAL HISTORY:  Bladder cancer metastasized to bone  Radial fracture: left  Uterine Polyp  Gastric Ulcer  Migraine, Ophthalmoplegic  Fibromyalgia  Obese  Calcified Thoracic Aorta  Hypertrophic Cardiomyopathy  Paroxysmal Ventricular Tachycardia: 6/2010  Nasal Polyp: 1960  Arthritis  HTN (Hypertension)  Asthmatic Bronchitis  Acid Reflux  History of ileal conduit  S/P total knee arthroplasty, left  EPS study: with no intervention 6/2010  Nasal Polyp removal: 1960  History of Arthroscopy: 1997 Right  Left 2000  History of D&C: 1968    Social History  Tobacco: Denies current use  Alcohol: Denies current use  Drugs:  Denies current use    FAMILY HISTORY:  Family history of dementia (Father)  Family history of hyperlipidemia (Father)  Family history of prostate cancer (Father)      MEDICATIONS  (STANDING):  cefepime  IVPB 2000 milliGRAM(s) IV Intermittent daily  diltiazem    milliGRAM(s) Oral daily  DULoxetine 60 milliGRAM(s) Oral daily  fluticasone propionate 50 MICROgram(s)/spray Nasal Spray 1 Spray(s) Both Nostrils daily  heparin  Injectable 5000 Unit(s) SubCutaneous every 8 hours  lactobacillus acidophilus 1 Tablet(s) Oral three times a day with meals  metoprolol succinate ER 50 milliGRAM(s) Oral daily  montelukast 10 milliGRAM(s) Oral daily  multivitamin 1 Tablet(s) Oral daily  pantoprazole    Tablet 40 milliGRAM(s) Oral before breakfast  pregabalin 75 milliGRAM(s) Oral two times a day  raloxifene 60 milliGRAM(s) Oral daily    MEDICATIONS  (PRN):  acetaminophen   Tablet. 650 milliGRAM(s) Oral every 6 hours PRN Moderate Pain (4 - 6)  calcium carbonate 500 mG (Tums) Chewable 1 Tablet(s) Chew four times a day PRN Upset Stomach  simethicone 80 milliGRAM(s) Chew four times a day PRN Dyspepsia  traZODone 100 milliGRAM(s) Oral at bedtime PRN insomnia    Allergies  penicillin (Rash)    Vital Signs Last 24 Hrs  T(C): 36.8 (16 Apr 2018 05:19), Max: 37.6 (15 Apr 2018 13:59)  T(F): 98.3 (16 Apr 2018 05:19), Max: 99.6 (15 Apr 2018 13:59)  HR: 95 (16 Apr 2018 05:19) (70 - 95)  BP: 105/58 (16 Apr 2018 05:19) (94/58 - 116/65)  RR: 17 (16 Apr 2018 05:19) (17 - 17)  SpO2: 97% (16 Apr 2018 05:19) (96% - 98%)    Physical Exam:  General: AAO x 3. NAD. Lying in bed comfortably.  HEENT: clear oropharynx, anicteric sclera, pink conjunctivae, EOMi. PERRLA  Cardiac: S1, S2 present. No audible murmurs. RRR  Lungs: CTA B/L.   Abdomen: Soft, Non-Tender, Non-Distended. No palpable hepatosplenomegaly  Extremities: 2+ pulses. No edema  Skin: No Rashes. No petechiae  Neuro: No focal motor or sensory deficits.     Labs:  CBC Full  -  ( 16 Apr 2018 06:46 )  WBC Count : 10.8 K/uL  Hemoglobin : 8.6 g/dL  Hematocrit : 26.0 %  Platelet Count - Automated : 127 K/uL  Mean Cell Volume : 102.0 fl  Mean Cell Hemoglobin : 34.0 pg  Mean Cell Hemoglobin Concentration : 33.2 gm/dL  Auto Neutrophil # : x  Auto Lymphocyte # : x  Auto Monocyte # : x  Auto Eosinophil # : x  Auto Basophil # : x  Auto Neutrophil % : x  Auto Lymphocyte % : x  Auto Monocyte % : x  Auto Eosinophil % : x  Auto Basophil % : x    04-16    139  |  114<H>  |  28<H>  ----------------------------<  83  4.8   |  13<L>  |  1.52<H>    Ca    7.5<L>      16 Apr 2018 06:45  Mg     1.8     04-15  TPro  4.9<L>  /  Alb  2.9<L>  /  TBili  0.3  /  DBili  x   /  AST  14  /  ALT  31  /  AlkPhos  80  04-15    PT/INR - ( 15 Apr 2018 07:13 )   PT: 11.7 sec;   INR: 1.07 ratio    PTT - ( 15 Apr 2018 07:13 )  PTT:28.3 sec    Culture - Blood (04.14.18 @ 20:35)    Gram Stain:   Growth in aerobic bottle: Gram Negative Rods    Specimen Source: .Blood Blood-Venous    Culture Results:   Growth in aerobic bottle: Gram Negative Rods

## 2018-04-16 NOTE — CONSULT NOTE ADULT - ASSESSMENT
78 yo female on chemo for metastatic bladder CA with urostomy, LLQ abd pain, on chemo, adm with E coli bacteremia and recent hospitalization with patient leaving AMA

## 2018-04-16 NOTE — PROGRESS NOTE ADULT - SUBJECTIVE AND OBJECTIVE BOX
Patient is a 77y old  Female who presents with a chief complaint of fever (14 Apr 2018 23:05)  pt seen and examined at bedside   SUBJECTIVE/OVERNIGHT events noted, feels well  no more diarrhea, illeal conduit draining clear urine    Vital Signs Last 24 Hrs  T(C): 36.7 (16 Apr 2018 09:24), Max: 37.6 (15 Apr 2018 13:59)  T(F): 98.1 (16 Apr 2018 09:24), Max: 99.6 (15 Apr 2018 13:59)  HR: 81 (16 Apr 2018 09:24) (70 - 95)  BP: 100/58 (16 Apr 2018 09:24) (94/58 - 116/65)  BP(mean): --  RR: 18 (16 Apr 2018 09:24) (17 - 18)  SpO2: 98% (16 Apr 2018 09:24) (96% - 98%)  CAPILLARY BLOOD GLUCOSE        MEDICATIONS  (STANDING):  cefepime  IVPB 2000 milliGRAM(s) IV Intermittent daily  diltiazem    milliGRAM(s) Oral daily  DULoxetine 60 milliGRAM(s) Oral daily  fluticasone propionate 50 MICROgram(s)/spray Nasal Spray 1 Spray(s) Both Nostrils daily  heparin  Injectable 5000 Unit(s) SubCutaneous every 8 hours  lactobacillus acidophilus 1 Tablet(s) Oral three times a day with meals  metoprolol succinate ER 50 milliGRAM(s) Oral daily  montelukast 10 milliGRAM(s) Oral daily  multivitamin 1 Tablet(s) Oral daily  pantoprazole    Tablet 40 milliGRAM(s) Oral before breakfast  pregabalin 75 milliGRAM(s) Oral two times a day  raloxifene 60 milliGRAM(s) Oral daily    MEDICATIONS  (PRN):  acetaminophen   Tablet. 650 milliGRAM(s) Oral every 6 hours PRN Moderate Pain (4 - 6)  calcium carbonate 500 mG (Tums) Chewable 1 Tablet(s) Chew four times a day PRN Upset Stomach  simethicone 80 milliGRAM(s) Chew four times a day PRN Dyspepsia  traZODone 100 milliGRAM(s) Oral at bedtime PRN insomnia    PHYSICAL EXAM  General : comfortable, not in any acute distress  Neck : supple, no LAD, no JVD  Eyes : EOMI, PERRLA, conjunctiva : no icterus, no pallor  MM moist, no pharyngeal erythema/exudates  Pulmonary: clear to auscultation bilateral lung fields, no crackles/rhonchi/wheezing,   CVS : S1 S2,rate normal-,rhythm-regular, no murmurs, no abnormal sounds  Gastrointestinal: soft, non tender, non distended, illeal conduit clear urine  Extremities: no edema  Neuro: AAOx3, no focal deficits  Musculoskeletal:  FROM of all ext  Skin: no rash  LABS                        8.6    10.8  )-----------( 127      ( 16 Apr 2018 06:46 )             26.0     04-16    139  |  114<H>  |  28<H>  ----------------------------<  83  4.8   |  13<L>  |  1.52<H>    Ca    7.5<L>      16 Apr 2018 06:45  Mg     1.8     04-15    TPro  4.9<L>  /  Alb  2.9<L>  /  TBili  0.3  /  DBili  x   /  AST  14  /  ALT  31  /  AlkPhos  80  04-15      Culture - Blood (collected 14 Apr 2018 20:35)  Source: .Blood Blood-Venous  Gram Stain (16 Apr 2018 08:54):    Growth in aerobic bottle: Gram Negative Rods  Preliminary Report (16 Apr 2018 08:54):    Growth in aerobic bottle: Gram Negative Rods    Culture - Blood (collected 14 Apr 2018 20:35)  Source: .Blood Blood-Peripheral  Gram Stain (15 Apr 2018 11:27):    Growth in aerobic bottle: Gram Negative Rods  Preliminary Report (16 Apr 2018 08:19):    Growth in aerobic bottle: Escherichia coli Susceptibility to follow.    "Due to technical problems, Proteus sp. will Not be reported as part of    the BCID panel until further notice"    ***Blood Panel PCR results on this specimen are available    approximately 3 hours after the Gram stain result.***    Gram stain, PCR, and/or culture results may not always    correspond due to difference in methodologies.    ************************************************************    This PCR assay was performed using [employer].    The following targets are tested for: Enterococcus,    vancomycin resistant enterococci, Listeria monocytogenes,    coagulase negative staphylococci, S. aureus,    methicillin resistant S. aureus, Streptococcus agalactiae    (Group B), S. pneumoniae, S. pyogenes (Group A),    Acinetobacter baumannii, Enterobacter cloacae, E. coli,    Klebsiella oxytoca, K. pneumoniae, Proteus sp.,    Serratia marcescens, Haemophilus influenzae,    Neisseria meningitidis, Pseudomonas aeruginosa, Candida    albicans,C. glabrata, C krusei, C parapsilosis,    C. tropicalis and the KPC resistance gene.  Organism: Blood Culture PCR (15 Apr 2018 13:41)  Organism: Blood Culture PCR (15 Apr 2018 13:41)    Culture - Urine (collected 14 Apr 2018 20:29)  Source: .Urine Catheterized  Final Report (15 Apr 2018 23:25):    Culture grew 3 or more types of organisms which indicate    collection contamination; consider recollection only if clinically    indicated.      RADIOLOGY and IMAGING reviewed: yes  Consultant notes reviewed : yes  Care discussed with Consultants/other providers :

## 2018-04-16 NOTE — CONSULT NOTE ADULT - PROBLEM SELECTOR RECOMMENDATION 4
improving.    Thank you for consulting us and involving us in the management of this most interesting and challenging case.     We will follow along in the care of this patient.    On Tuesday Dr. Vikram Means will be covering for our group and assuming care of this patient going forward. If you have any questions please reach out to him at [phone].

## 2018-04-16 NOTE — CONSULT NOTE ADULT - SUBJECTIVE AND OBJECTIVE BOX
HPI:  Tamia Moore is a 77 year old female with a history of hypertension, hypertrophic cardiomyopathy, former smoker with 50 py history, bladder cancer dx 2017 s/p ileal conduit on chemotherapy presents with fevers at home after receiving chemotherapy.    Patient reports that she received chemotherapy on Thursday and Neulasta on Friday. On Friday evening around midnight, took her temperature as she was feeling tired and warm and was elevated to 103. Called an ambulance at that time, but by the time EMS arrived temperature was normal at 98.6. The morning of admission, she again took her temperature and noted to it be between 101-103. She otherwise feels well and denies any headache, chest pain, shortness of breath, abdominal pain, nausea, vomiting, or urinary symptoms. She had one loose bowel movement the morning of admission but reports less than her usual post chemo diarrhea.     The patient was recently admitted for fevers after chemotherapy at the end of March and left AMA after blood cultures were negative x 48 hours. She was on Vanc/Cefepime at the time due to concern for sepsis from her initial presentation.     In the ED, Tmax 100.4, HR 96, /63, O2 96%. Labs showed chronic leukocytosis to 31.4, Hgb 10.5, and platelets normal at 169. Metabolic panel overall unremarkable and unchanged from last admission with Cr 1.59. UA with > 50 WBCs, few bacteria, Large LE, + nitrites (similar to prior admission). RVP negative. CXR negative for infiltrate. Patient was given Cefepime. Tylenol, and 2L NS. Admitted for further workup of fever. (2018 23:05)      PAST MEDICAL & SURGICAL HISTORY:  Bladder cancer metastasized to bone  Radial fracture: left  Uterine Polyp  Gastric Ulcer  Migraine, Ophthalmoplegic  Fibromyalgia  Obese  Calcified Thoracic Aorta  Hypertrophic Cardiomyopathy  Paroxysmal Ventricular Tachycardia: 2010  Nasal Polyp:   Arthritis  HTN (Hypertension)  Asthmatic Bronchitis  Acid Reflux  History of ileal conduit  S/P total knee arthroplasty, left  EPS study: with no intervention 2010  Nasal Polyp removal:   History of Arthroscopy:  Right  Left   History of D&C: 1968      Antimicrobials  cefepime  IVPB 2000 milliGRAM(s) IV Intermittent daily      Immunological      Other  acetaminophen   Tablet. 650 milliGRAM(s) Oral every 6 hours PRN  calcium carbonate 500 mG (Tums) Chewable 1 Tablet(s) Chew four times a day PRN  diltiazem    milliGRAM(s) Oral daily  DULoxetine 60 milliGRAM(s) Oral daily  fluticasone propionate 50 MICROgram(s)/spray Nasal Spray 1 Spray(s) Both Nostrils daily  heparin  Injectable 5000 Unit(s) SubCutaneous every 8 hours  lactobacillus acidophilus 1 Tablet(s) Oral three times a day with meals  metoprolol succinate ER 50 milliGRAM(s) Oral daily  montelukast 10 milliGRAM(s) Oral daily  multivitamin 1 Tablet(s) Oral daily  pantoprazole    Tablet 40 milliGRAM(s) Oral before breakfast  pregabalin 75 milliGRAM(s) Oral two times a day  raloxifene 60 milliGRAM(s) Oral daily  simethicone 80 milliGRAM(s) Chew four times a day PRN  traZODone 100 milliGRAM(s) Oral at bedtime PRN      Allergies    penicillin (Rash), but tolerates cephalosporins    Intolerances        SOCIAL HISTORY: prior tobacco    FAMILY HISTORY:  Family history of dementia (Father)  Family history of hyperlipidemia (Father)  Family history of prostate cancer (Father)      ROS:    EYES:  Negative  blurry vision or double vision  GASTROINTESTINAL:  Negative for nausea, vomiting, no cough, no dyspnea  -otherwise negative except for subjective    Vital Signs Last 24 Hrs  T(C): 36.8 (2018 05:19), Max: 37.6 (15 Apr 2018 13:59)  T(F): 98.3 (2018 05:19), Max: 99.6 (15 Apr 2018 13:59)  HR: 95 (2018 05:19) (70 - 95)  BP: 105/58 (2018 05:19) (94/58 - 116/65)  BP(mean): --  RR: 17 (2018 05:19) (17 - 17)  SpO2: 97% (2018 05:19) (96% - 98%)    PE:  WDWN in no distress  HEENT:  NC, PERRL, sclerae anicteric, conjunctivae clear, EOMI.  Sinuses nontender, no nasal exudate.  No buccal or pharyngeal lesions, erythema or exudate  Neck:  Supple, no adenopathy  Lungs:  No accessory muscle use, bilaterally clear to auscultation  Cor:  RRR, S1, S2, no murmur appreciated  Abd:  Symmetric, normoactive BS.  Soft, tender in LLQ, ostomy right central abd with bag, no masses, guarding or rebound.  Liver and spleen not enlarged  Extrem:  No cyanosis or edema  Skin:  No rashes.  Neuro: grossly intact  Musc: moving all limbs freely, no focal deficits    LABS:                        8.6    10.8  )-----------( 127      ( 2018 06:46 )             26.0       WBC Count: 10.8 K/uL (18 @ 06:46)  WBC Count: 23.2 K/uL (04-15-18 @ 07:13)  WBC Count: 31.4 K/uL (18 @ 17:35)          139  |  114<H>  |  28<H>  ----------------------------<  83  4.8   |  13<L>  |  1.52<H>    Ca    7.5<L>      2018 06:45  Mg     1.8     04-15    TPro  4.9<L>  /  Alb  2.9<L>  /  TBili  0.3  /  DBili  x   /  AST  14  /  ALT  31  /  AlkPhos  80  04-15      Creatinine, Serum: 1.52 mg/dL (18 @ 06:45)  Creatinine, Serum: 1.61 mg/dL (04-15-18 @ 07:11)  Creatinine, Serum: 1.59 mg/dL (18 @ 17:35)      Urinalysis Basic - ( 2018 19:03 )    Color: Yellow / Appearance: SL Turbid / S.011 / pH: x  Gluc: x / Ketone: Negative  / Bili: Negative / Urobili: Negative   Blood: x / Protein: 30 mg/dL / Nitrite: Positive   Leuk Esterase: Large / RBC: 3-5 /HPF / WBC >50 /HPF   Sq Epi: x / Non Sq Epi: x / Bacteria: Few /HPF    MICROBIOLOGY:    Culture - Blood (18 @ 20:35)    Gram Stain:   Growth in aerobic bottle: Gram Negative Rods    -  Escherichia coli: Detec    Specimen Source: .Blood Blood-Peripheral    Organism: Blood Culture PCR    Culture Results:   Growth in aerobic bottle: Escherichia coli Susceptibility to follow.        RADIOLOGY & ADDITIONAL STUDIES:    --< from: Xray Chest 2 Views PA/Lat (18 @ 17:58) >    EXAM:  XR CHEST PA LAT 2V                            PROCEDURE DATE:  2018            INTERPRETATION:    CLINICAL INFORMATION: Fever.    EXAM: PA and lateral views of the chest.    COMPARISON: Chest radiograph from 3/23/2018.    FINDINGS:  Mediport on the right chest wall with its tip overlying the SVC.  The lungs are clear.  There are no pleural effusions or pneumothorax.  The cardiomediastinal silhouette is within normal limits.  Degenerative changes of spine.    IMPRESSION:   Clear lungs.

## 2018-04-16 NOTE — CONSULT NOTE ADULT - PROBLEM SELECTOR RECOMMENDATION 9
Would not consider this a contaminant in this context with fever, leukocytosis, and risk factors. Unclear source as both urinary and GI would be possible. Patient already asking about discharge today so some concerns on hwo this will impact management. Recommendations:  will order repeat urine culture but appreciate this may be an issue with ostomy bag and possibility of again getting growth of multiple organisms, recommend abd CT with oral and IV contrast as this may help to better determine source. Repeat blood cultures ordered and in this context would recommend documentation of clearance of bacteremia with unclear source. Selection and duration of abx based on presumed source and sensitivities but considering that we may be dealing with an abrupt departure with patient's prior AMA discharge recommend a minimum of 7 days of effective therapy and potentially up to 14 days post documentation of clearance of blood cultures. Duarte lunas to follow.

## 2018-04-16 NOTE — CONSULT NOTE ADULT - PROBLEM SELECTOR RECOMMENDATION 9
Fever. + Blood cultures  - Awaiting susceptibilities.   - Follow up with ID Recommends.   - Patient threatening to sign out AMA as she feels well and does not want to wait for results.  - Advised her to stay in the hospital until complete plan for treatment and complete work up has been completed.

## 2018-04-16 NOTE — CONSULT NOTE ADULT - ASSESSMENT
77 year old woman with metastatic bladder cancer. Small cell bladder Ca. Treated with carboplatin/etoposide. S/p 5 cycles. Admitted with fever. Blood cultures growing E. Coli

## 2018-04-17 LAB
-  AMIKACIN: SIGNIFICANT CHANGE UP
-  AMPICILLIN/SULBACTAM: SIGNIFICANT CHANGE UP
-  AMPICILLIN: SIGNIFICANT CHANGE UP
-  AZTREONAM: SIGNIFICANT CHANGE UP
-  CEFAZOLIN: SIGNIFICANT CHANGE UP
-  CEFEPIME: SIGNIFICANT CHANGE UP
-  CEFOXITIN: SIGNIFICANT CHANGE UP
-  CEFTRIAXONE: SIGNIFICANT CHANGE UP
-  CIPROFLOXACIN: SIGNIFICANT CHANGE UP
-  ERTAPENEM: SIGNIFICANT CHANGE UP
-  GENTAMICIN: SIGNIFICANT CHANGE UP
-  IMIPENEM: SIGNIFICANT CHANGE UP
-  LEVOFLOXACIN: SIGNIFICANT CHANGE UP
-  MEROPENEM: SIGNIFICANT CHANGE UP
-  PIPERACILLIN/TAZOBACTAM: SIGNIFICANT CHANGE UP
-  TOBRAMYCIN: SIGNIFICANT CHANGE UP
-  TRIMETHOPRIM/SULFAMETHOXAZOLE: SIGNIFICANT CHANGE UP
ANION GAP SERPL CALC-SCNC: 11 MMOL/L — SIGNIFICANT CHANGE UP (ref 5–17)
ANISOCYTOSIS BLD QL: SLIGHT — SIGNIFICANT CHANGE UP
BASOPHILS # BLD AUTO: 0 K/UL — SIGNIFICANT CHANGE UP (ref 0–0.2)
BASOPHILS NFR BLD AUTO: 0.4 % — SIGNIFICANT CHANGE UP (ref 0–2)
BUN SERPL-MCNC: 26 MG/DL — HIGH (ref 7–23)
CALCIUM SERPL-MCNC: 7.8 MG/DL — LOW (ref 8.4–10.5)
CHLORIDE SERPL-SCNC: 114 MMOL/L — HIGH (ref 96–108)
CO2 SERPL-SCNC: 12 MMOL/L — LOW (ref 22–31)
CREAT SERPL-MCNC: 1.45 MG/DL — HIGH (ref 0.5–1.3)
CULTURE RESULTS: SIGNIFICANT CHANGE UP
CULTURE RESULTS: SIGNIFICANT CHANGE UP
DACRYOCYTES BLD QL SMEAR: SLIGHT — SIGNIFICANT CHANGE UP
EOSINOPHIL # BLD AUTO: 0 K/UL — SIGNIFICANT CHANGE UP (ref 0–0.5)
EOSINOPHIL NFR BLD AUTO: 0.5 % — SIGNIFICANT CHANGE UP (ref 0–6)
GLUCOSE SERPL-MCNC: 110 MG/DL — HIGH (ref 70–99)
HCT VFR BLD CALC: 29.5 % — LOW (ref 34.5–45)
HGB BLD-MCNC: 9.8 G/DL — LOW (ref 11.5–15.5)
HYPOCHROMIA BLD QL: SLIGHT — SIGNIFICANT CHANGE UP
LYMPHOCYTES # BLD AUTO: 0.8 K/UL — LOW (ref 1–3.3)
LYMPHOCYTES # BLD AUTO: 9.3 % — LOW (ref 13–44)
MACROCYTES BLD QL: SLIGHT — SIGNIFICANT CHANGE UP
MCHC RBC-ENTMCNC: 33.2 GM/DL — SIGNIFICANT CHANGE UP (ref 32–36)
MCHC RBC-ENTMCNC: 34.5 PG — HIGH (ref 27–34)
MCV RBC AUTO: 104 FL — HIGH (ref 80–100)
METHOD TYPE: SIGNIFICANT CHANGE UP
MICROCYTES BLD QL: SLIGHT — SIGNIFICANT CHANGE UP
MONOCYTES # BLD AUTO: 0.5 K/UL — SIGNIFICANT CHANGE UP (ref 0–0.9)
MONOCYTES NFR BLD AUTO: 5.8 % — SIGNIFICANT CHANGE UP (ref 2–14)
NEUTROPHILS # BLD AUTO: 7.5 K/UL — HIGH (ref 1.8–7.4)
NEUTROPHILS NFR BLD AUTO: 83.9 % — HIGH (ref 43–77)
ORGANISM # SPEC MICROSCOPIC CNT: SIGNIFICANT CHANGE UP
OVALOCYTES BLD QL SMEAR: SLIGHT — SIGNIFICANT CHANGE UP
PLAT MORPH BLD: NORMAL — SIGNIFICANT CHANGE UP
PLATELET # BLD AUTO: 143 K/UL — LOW (ref 150–400)
POIKILOCYTOSIS BLD QL AUTO: SLIGHT — SIGNIFICANT CHANGE UP
POTASSIUM SERPL-MCNC: 5 MMOL/L — SIGNIFICANT CHANGE UP (ref 3.5–5.3)
POTASSIUM SERPL-SCNC: 5 MMOL/L — SIGNIFICANT CHANGE UP (ref 3.5–5.3)
RBC # BLD: 2.84 M/UL — LOW (ref 3.8–5.2)
RBC # FLD: 17 % — HIGH (ref 10.3–14.5)
RBC BLD AUTO: ABNORMAL
SODIUM SERPL-SCNC: 137 MMOL/L — SIGNIFICANT CHANGE UP (ref 135–145)
SPECIMEN SOURCE: SIGNIFICANT CHANGE UP
SPECIMEN SOURCE: SIGNIFICANT CHANGE UP
WBC # BLD: 9 K/UL — SIGNIFICANT CHANGE UP (ref 3.8–10.5)
WBC # FLD AUTO: 9 K/UL — SIGNIFICANT CHANGE UP (ref 3.8–10.5)

## 2018-04-17 PROCEDURE — 74176 CT ABD & PELVIS W/O CONTRAST: CPT | Mod: 26

## 2018-04-17 RX ORDER — CEFTRIAXONE 500 MG/1
1 INJECTION, POWDER, FOR SOLUTION INTRAMUSCULAR; INTRAVENOUS EVERY 24 HOURS
Qty: 0 | Refills: 0 | Status: DISCONTINUED | OUTPATIENT
Start: 2018-04-18 | End: 2018-04-18

## 2018-04-17 RX ORDER — CEFTRIAXONE 500 MG/1
1 INJECTION, POWDER, FOR SOLUTION INTRAMUSCULAR; INTRAVENOUS ONCE
Qty: 0 | Refills: 0 | Status: COMPLETED | OUTPATIENT
Start: 2018-04-17 | End: 2018-04-17

## 2018-04-17 RX ORDER — CEFTRIAXONE 500 MG/1
INJECTION, POWDER, FOR SOLUTION INTRAMUSCULAR; INTRAVENOUS
Qty: 0 | Refills: 0 | Status: DISCONTINUED | OUTPATIENT
Start: 2018-04-17 | End: 2018-04-18

## 2018-04-17 RX ADMIN — CEFTRIAXONE 100 GRAM(S): 500 INJECTION, POWDER, FOR SOLUTION INTRAMUSCULAR; INTRAVENOUS at 16:18

## 2018-04-17 RX ADMIN — Medication 180 MILLIGRAM(S): at 05:30

## 2018-04-17 RX ADMIN — Medication 100 MILLIGRAM(S): at 23:01

## 2018-04-17 RX ADMIN — Medication 1 TABLET(S): at 08:44

## 2018-04-17 RX ADMIN — DULOXETINE HYDROCHLORIDE 60 MILLIGRAM(S): 30 CAPSULE, DELAYED RELEASE ORAL at 11:30

## 2018-04-17 RX ADMIN — Medication 1 SPRAY(S): at 11:31

## 2018-04-17 RX ADMIN — HEPARIN SODIUM 5000 UNIT(S): 5000 INJECTION INTRAVENOUS; SUBCUTANEOUS at 05:30

## 2018-04-17 RX ADMIN — Medication 75 MILLIGRAM(S): at 11:30

## 2018-04-17 RX ADMIN — Medication 1 TABLET(S): at 17:51

## 2018-04-17 RX ADMIN — Medication 650 MILLIGRAM(S): at 23:31

## 2018-04-17 RX ADMIN — MONTELUKAST 10 MILLIGRAM(S): 4 TABLET, CHEWABLE ORAL at 11:30

## 2018-04-17 RX ADMIN — Medication 50 MILLIGRAM(S): at 05:30

## 2018-04-17 RX ADMIN — Medication 1 TABLET(S): at 11:30

## 2018-04-17 RX ADMIN — PANTOPRAZOLE SODIUM 40 MILLIGRAM(S): 20 TABLET, DELAYED RELEASE ORAL at 05:30

## 2018-04-17 RX ADMIN — HEPARIN SODIUM 5000 UNIT(S): 5000 INJECTION INTRAVENOUS; SUBCUTANEOUS at 22:59

## 2018-04-17 RX ADMIN — HEPARIN SODIUM 5000 UNIT(S): 5000 INJECTION INTRAVENOUS; SUBCUTANEOUS at 14:44

## 2018-04-17 RX ADMIN — RALOXIFENE HYDROCHLORIDE 60 MILLIGRAM(S): 60 TABLET, COATED ORAL at 11:30

## 2018-04-17 RX ADMIN — Medication 75 MILLIGRAM(S): at 22:59

## 2018-04-17 RX ADMIN — Medication 650 MILLIGRAM(S): at 23:01

## 2018-04-17 NOTE — PROGRESS NOTE ADULT - PROBLEM SELECTOR PLAN 1
F/U repeat Cx  CT A/P  Will recheck creatinine to see if safe to use IV contrast, suspect not  Await sensi of isolate  Hope to de-escalate antibiotcs today
Awaiting sensitivities  - Follow up with ID  - Continue Abx for now.

## 2018-04-17 NOTE — PROGRESS NOTE ADULT - SUBJECTIVE AND OBJECTIVE BOX
[facility] Hematology/Oncology - Obey Cruz / Onofre / Francis - [phone]  Primary Outpatient Hematologist/Oncologist:  Dr. Manjinder Cruz     Subjective  Seen this morning. Doing well. No fevers or chills overnight. Reports loose bowel movement in the morning. Frequency of bowel movements unchanged. No other complaints.     HPI:  Patient is a 77 year old woman with a history of hypertension, hypertrophic cardiomyopathy, former smoker with 50 py history, bladder cancer dx 9/2017 s/p ileal conduit on chemotherapy presents with fevers at home after receiving chemotherapy.    Patient reports that she received chemotherapy on Thursday and Neulasta on Friday. On Friday evening around midnight, took her temperature as she was feeling tired and warm and was elevated to 103. Called an ambulance at that time, but by the time EMS arrived temperature was normal at 98.6. This morning, she again took her temperature and noted to it be between 101-103. No headaches. No blurry vision. No dysuria No diarrhea.     The patient was recently admitted for fevers after chemotherapy at the end of March and left AMA after blood cultures were negative x 48 hours. She was on Vanc/Cefepime at the time due to concern for sepsis from her initial presentation. After discharge, 1/2 cultures returned positive for Enterobacter Aerogenes. She reports being contacted as an outpatient and being told that culture was a contaminant and did not require treatment.      ROS:  General:  (-)Pain, (-)Decrease appetite, (+)Fevers, (-)Chills, (-)Weight Loss  Eyes: (-)Blurry Vision, (-)Double Vision, (-)Vision Loss  ENT: (-)Sinus Congestion, (-)Decreased Hearing, (-)Nosebleeds, (-)Sore Throat  Cardiac: (-)Chest Pain, (-)Palpitations, (-)Shortness of breathe on exertion  Respiratory: (-)Cough, (-)hemoptysis, (-)Shortness of breathe  GI: (-)Nausea, (-)Vomiting, (-)Diarrhea, (-)Constipation, (-)Melena, (-)BRBPR  : (-)Hematuria, (-)Dysuria, (-)Polyuia  MSK: (-)Back pain, (-)Joint pain, (-)Stiffness  Dermatology: (-)Rash, (-)Itching  Neurology:  (-)Numbness, (-)Tingling, (-)Difficulty Walking, (-)Tremors, (-)Weakness  Psych: (-)Anxiety, (-)Depression, (-)Memory Loss  Hematology:  (-)Easy Bruising, (-)Easy Bleeding, (-)Night Sweats.     MEDICATIONS  (STANDING):  cefepime  IVPB 2000 milliGRAM(s) IV Intermittent daily  diltiazem    milliGRAM(s) Oral daily  DULoxetine 60 milliGRAM(s) Oral daily  fluticasone propionate 50 MICROgram(s)/spray Nasal Spray 1 Spray(s) Both Nostrils daily  heparin  Injectable 5000 Unit(s) SubCutaneous every 8 hours  lactobacillus acidophilus 1 Tablet(s) Oral three times a day with meals  metoprolol succinate ER 50 milliGRAM(s) Oral daily  montelukast 10 milliGRAM(s) Oral daily  multivitamin 1 Tablet(s) Oral daily  pantoprazole    Tablet 40 milliGRAM(s) Oral before breakfast  pregabalin 75 milliGRAM(s) Oral two times a day  raloxifene 60 milliGRAM(s) Oral daily    MEDICATIONS  (PRN):  acetaminophen   Tablet. 650 milliGRAM(s) Oral every 6 hours PRN Moderate Pain (4 - 6)  calcium carbonate 500 mG (Tums) Chewable 1 Tablet(s) Chew four times a day PRN Upset Stomach  simethicone 80 milliGRAM(s) Chew four times a day PRN Dyspepsia  traZODone 100 milliGRAM(s) Oral at bedtime PRN insomnia    Allergies  penicillin (Rash)    Vital Signs Last 24 Hrs  T(C): 36.9 (17 Apr 2018 05:24), Max: 37.1 (16 Apr 2018 13:42)  T(F): 98.5 (17 Apr 2018 05:24), Max: 98.8 (16 Apr 2018 13:42)  HR: 82 (17 Apr 2018 05:24) (75 - 86)  BP: 104/63 (17 Apr 2018 05:24) (100/58 - 109/56)  RR: 18 (17 Apr 2018 05:24) (17 - 18)  SpO2: 97% (17 Apr 2018 05:24) (97% - 98%)    Physical Exam:  General: AAO x 3. NAD. Lying in bed comfortably.  HEENT: clear oropharynx, anicteric sclera, pink conjunctivae, EOMi. PERRLA  Cardiac: S1, S2 present. No audible murmurs. RRR  Lungs: CTA B/L.   Abdomen: Soft, Non-Tender, Non-Distended. No palpable hepatosplenomegaly  Extremities: 2+ pulses. No edema  Skin: No Rashes. No petechiae  Neuro: No focal motor or sensory deficits.     Labs:                        8.6    10.8  )-----------( 127      ( 16 Apr 2018 06:46 )             26.0     04-16    139  |  114<H>  |  28<H>  ----------------------------<  83  4.8   |  13<L>  |  1.52<H>    Ca    7.5<L>      16 Apr 2018 06:45    Culture - Blood (04.14.18 @ 20:35)    Gram Stain:   Growth in aerobic bottle: Gram Negative Rods    Specimen Source: .Blood Blood-Venous    Culture Results:   Growth in aerobic bottle: Gram Negative Rods

## 2018-04-17 NOTE — PROGRESS NOTE ADULT - SUBJECTIVE AND OBJECTIVE BOX
Patient is a 77y old  Female who presents with a chief complaint of fever (14 Apr 2018 23:05)  pt seen and examined at bedside   SUBJECTIVE/OVERNIGHT events noted, feels well    Vital Signs Last 24 Hrs  T(C): 36.7 (17 Apr 2018 09:03), Max: 37.1 (16 Apr 2018 13:42)  T(F): 98.1 (17 Apr 2018 09:03), Max: 98.8 (16 Apr 2018 13:42)  HR: 79 (17 Apr 2018 09:03) (75 - 86)  BP: 94/57 (17 Apr 2018 09:03) (94/57 - 109/56)  BP(mean): --  RR: 18 (17 Apr 2018 09:03) (17 - 18)  SpO2: 99% (17 Apr 2018 09:03) (97% - 99%)  CAPILLARY BLOOD GLUCOSE        MEDICATIONS  (STANDING):  cefepime  IVPB 2000 milliGRAM(s) IV Intermittent daily  diltiazem    milliGRAM(s) Oral daily  DULoxetine 60 milliGRAM(s) Oral daily  fluticasone propionate 50 MICROgram(s)/spray Nasal Spray 1 Spray(s) Both Nostrils daily  heparin  Injectable 5000 Unit(s) SubCutaneous every 8 hours  lactobacillus acidophilus 1 Tablet(s) Oral three times a day with meals  metoprolol succinate ER 50 milliGRAM(s) Oral daily  montelukast 10 milliGRAM(s) Oral daily  multivitamin 1 Tablet(s) Oral daily  pantoprazole    Tablet 40 milliGRAM(s) Oral before breakfast  pregabalin 75 milliGRAM(s) Oral two times a day  raloxifene 60 milliGRAM(s) Oral daily    MEDICATIONS  (PRN):  acetaminophen   Tablet. 650 milliGRAM(s) Oral every 6 hours PRN Moderate Pain (4 - 6)  calcium carbonate 500 mG (Tums) Chewable 1 Tablet(s) Chew four times a day PRN Upset Stomach  simethicone 80 milliGRAM(s) Chew four times a day PRN Dyspepsia  traZODone 100 milliGRAM(s) Oral at bedtime PRN insomnia    PHYSICAL EXAM  General : comfortable, not in any acute distress  Neck : supple, no LAD, no JVD  Eyes : EOMI, PERRLA, conjunctiva : no icterus, no pallor  MM moist, no pharyngeal erythema/exudates  Pulmonary: clear to auscultation bilateral lung fields, no crackles/rhonchi/wheezing,   CVS : S1 S2,rate normal-,rhythm-regular, no murmurs, no abnormal sounds  Gastrointestinal: soft, non tender, non distended, no organomegaly , bowel sounds audible  Extremities: no edema  Neuro: AAOx3, no focal deficits  Musculoskeletal:  FROM of all ext  Skin: no rash  LABS                        8.6    10.8  )-----------( 127      ( 16 Apr 2018 06:46 )             26.0     04-16    139  |  114<H>  |  28<H>  ----------------------------<  83  4.8   |  13<L>  |  1.52<H>    Ca    7.5<L>      16 Apr 2018 06:45        Culture - Urine (collected 16 Apr 2018 13:56)  Source: .Urine Catheterized  Preliminary Report (17 Apr 2018 10:58):    >100,000 CFU/ml Enterococcus faecium    Culture - Blood (collected 14 Apr 2018 20:35)  Source: .Blood Blood-Venous  Gram Stain (16 Apr 2018 08:54):    Growth in aerobic bottle: Gram Negative Rods  Preliminary Report (16 Apr 2018 08:54):    Growth in aerobic bottle: Gram Negative Rods    Culture - Blood (collected 14 Apr 2018 20:35)  Source: .Blood Blood-Peripheral  Gram Stain (15 Apr 2018 11:27):    Growth in aerobic bottle: Gram Negative Rods  Preliminary Report (16 Apr 2018 08:19):    Growth in aerobic bottle: Escherichia coli Susceptibility to follow.    "Due to technical problems, Proteus sp. will Not be reported as part of    the BCID panel until further notice"    ***Blood Panel PCR results on this specimen are available    approximately 3 hours after the Gram stain result.***    Gram stain, PCR, and/or culture results may not always    correspond due to difference in methodologies.    ************************************************************    This PCR assay was performed using [employer].    The following targets are tested for: Enterococcus,    vancomycin resistant enterococci, Listeria monocytogenes,    coagulase negative staphylococci, S. aureus,    methicillin resistant S. aureus, Streptococcus agalactiae    (Group B), S. pneumoniae, S. pyogenes (Group A),    Acinetobacter baumannii, Enterobacter cloacae, E. coli,    Klebsiella oxytoca, K. pneumoniae, Proteus sp.,    Serratia marcescens, Haemophilus influenzae,    Neisseria meningitidis, Pseudomonas aeruginosa, Candida    albicans,C. glabrata, C krusei, C parapsilosis,    C. tropicalis and the KPC resistance gene.  Organism: Blood Culture PCR (15 Apr 2018 13:41)  Organism: Blood Culture PCR (15 Apr 2018 13:41)    Culture - Urine (collected 14 Apr 2018 20:29)  Source: .Urine Catheterized  Final Report (15 Apr 2018 23:25):    Culture grew 3 or more types of organisms which indicate    collection contamination; consider recollection only if clinically    indicated.      RADIOLOGY and IMAGING reviewed: yes  Consultant notes reviewed : yes  Care discussed with Consultants/other providers :

## 2018-04-17 NOTE — PROGRESS NOTE ADULT - SUBJECTIVE AND OBJECTIVE BOX
[facility], Division of Infectious Diseases  BERNICE [place] A. Lee  [phone]    Name: AUBREE ALONZO  Age: 77y  Gender: Female  MRN: [id number]    Interval History--  Notes reviewed. Hostile and confrontational. No medical complaints.    Past Medical History--  Bladder cancer metastasized to bone  Radial fracture  Uterine Polyp  Gastric Ulcer  Migraine, Ophthalmoplegic  Fibromyalgia  Obese  Calcified Thoracic Aorta  Hypertrophic Cardiomyopathy  Paroxysmal Ventricular Tachycardia  Sleep Apnea  Nasal Polyp  Arthritis  HTN (Hypertension)  Asthmatic Bronchitis  Acid Reflux  History of ileal conduit  S/P total knee arthroplasty, left  EPS study  Nasal Polyp removal  History of Arthroscopy  History of D&C      For details regarding the patient's social history, family history, and other miscellaneous elements, please refer the initial infectious diseases consultation and/or the admitting history and physical examination for this admission.    Allergies    penicillin (Rash)    Intolerances        Medications--  Antibiotics:  cefepime  IVPB 2000 milliGRAM(s) IV Intermittent daily    Immunologic:    Other:  acetaminophen   Tablet. PRN  calcium carbonate 500 mG (Tums) Chewable PRN  diltiazem   CD  DULoxetine  fluticasone propionate 50 MICROgram(s)/spray Nasal Spray  heparin  Injectable  lactobacillus acidophilus  metoprolol succinate ER  montelukast  multivitamin  pantoprazole    Tablet  pregabalin  raloxifene  simethicone PRN  traZODone PRN      Review of Systems--  A 10-point review of systems was obtained.   Review of systems otherwise negative except as previously noted.    Physical Examination--  Vital Signs: T(F): 98.1 (04-17-18 @ 09:03), Max: 98.8 (04-16-18 @ 13:42)  HR: 79 (04-17-18 @ 09:03)  BP: 94/57 (04-17-18 @ 09:03)  RR: 18 (04-17-18 @ 09:03)  SpO2: 99% (04-17-18 @ 09:03)  Wt(kg): --  General: Nontoxic-appearing Female in no acute distress.  HEENT: Partial alopecia. Anicteric. Conjunctiva pink and moist. Oropharynx clear.   Neck: Not rigid. No sense of mass.  Nodes: None palpable.  Lungs: Clear bilaterally without rales, wheezing or rhonchi  Heart: Regular rate and rhythm. No Murmur. No rub. No gallop. No palpable thrill.  Abdomen: Bowel sounds present and normoactive. Soft. Nondistended. Nontender. Ileal conduit-> clear yellow urine.  Extremities: No cyanosis or clubbing. No edema.   Skin: Warm. Dry. Good turgor. No rash. No vasculitic stigmata.  Psychiatric: Inappropriate demeanor for encounter, but oriented.      Laboratory Studies--  CBC                        8.6    10.8  )-----------( 127      ( 16 Apr 2018 06:46 )             26.0       Chemistries  04-16    139  |  114<H>  |  28<H>  ----------------------------<  83  4.8   |  13<L>  |  1.52<H>    Ca    7.5<L>      16 Apr 2018 06:45        Culture Data    Culture - Blood (collected 14 Apr 2018 20:35)  Source: .Blood Blood-Venous  Gram Stain (16 Apr 2018 08:54):    Growth in aerobic bottle: Gram Negative Rods  Preliminary Report (16 Apr 2018 08:54):    Growth in aerobic bottle: Gram Negative Rods    Culture - Blood (collected 14 Apr 2018 20:35)  Source: .Blood Blood-Peripheral  Gram Stain (15 Apr 2018 11:27):    Growth in aerobic bottle: Gram Negative Rods  Preliminary Report (16 Apr 2018 08:19):    Growth in aerobic bottle: Escherichia coli Susceptibility to follow.    "Due to technical problems, Proteus sp. will Not be reported as part of    the BCID panel until further notice"    ***Blood Panel PCR results on this specimen are available    approximately 3 hours after the Gram stain result.***    Gram stain, PCR, and/or culture results may not always    correspond due to difference in methodologies.    ************************************************************    This PCR assay was performed using [employer].    The following targets are tested for: Enterococcus,    vancomycin resistant enterococci, Listeria monocytogenes,    coagulase negative staphylococci, S. aureus,    methicillin resistant S. aureus, Streptococcus agalactiae    (Group B), S. pneumoniae, S. pyogenes (Group A),    Acinetobacter baumannii, Enterobacter cloacae, E. coli,    Klebsiella oxytoca, K. pneumoniae, Proteus sp.,    Serratia marcescens, Haemophilus influenzae,    Neisseria meningitidis, Pseudomonas aeruginosa, Candida    albicans,C. glabrata, C krusei, C parapsilosis,    C. tropicalis and the KPC resistance gene.  Organism: Blood Culture PCR (15 Apr 2018 13:41)  Organism: Blood Culture PCR (15 Apr 2018 13:41)    Culture - Urine (collected 14 Apr 2018 20:29)  Source: .Urine Catheterized  Final Report (15 Apr 2018 23:25):    Culture grew 3 or more types of organisms which indicate    collection contamination; consider recollection only if clinically    indicated.

## 2018-04-17 NOTE — PROGRESS NOTE ADULT - ATTENDING COMMENTS
Dede Sung MD ( [facility])  [phone]
Dede Sung MD ( [facility])  [phone]
Pt initially wanted to leave AMA, understood the risks and now willing to stay    Dede Sung MD ( [facility])  [phone]
I gave patient a copy of the positive cultures at her request.    Vikram Means MD  [phone]

## 2018-04-18 ENCOUNTER — TRANSCRIPTION ENCOUNTER (OUTPATIENT)
Age: 78
End: 2018-04-18

## 2018-04-18 VITALS
DIASTOLIC BLOOD PRESSURE: 75 MMHG | TEMPERATURE: 98 F | OXYGEN SATURATION: 98 % | SYSTOLIC BLOOD PRESSURE: 133 MMHG | RESPIRATION RATE: 18 BRPM | HEART RATE: 71 BPM

## 2018-04-18 LAB
-  AMPICILLIN: SIGNIFICANT CHANGE UP
-  CIPROFLOXACIN: SIGNIFICANT CHANGE UP
-  NITROFURANTOIN: SIGNIFICANT CHANGE UP
-  TETRACYCLINE: SIGNIFICANT CHANGE UP
-  VANCOMYCIN: SIGNIFICANT CHANGE UP
CULTURE RESULTS: SIGNIFICANT CHANGE UP
METHOD TYPE: SIGNIFICANT CHANGE UP
ORGANISM # SPEC MICROSCOPIC CNT: SIGNIFICANT CHANGE UP
ORGANISM # SPEC MICROSCOPIC CNT: SIGNIFICANT CHANGE UP
SPECIMEN SOURCE: SIGNIFICANT CHANGE UP

## 2018-04-18 PROCEDURE — 87086 URINE CULTURE/COLONY COUNT: CPT

## 2018-04-18 PROCEDURE — 93005 ELECTROCARDIOGRAM TRACING: CPT

## 2018-04-18 PROCEDURE — 81001 URINALYSIS AUTO W/SCOPE: CPT

## 2018-04-18 PROCEDURE — 82435 ASSAY OF BLOOD CHLORIDE: CPT

## 2018-04-18 PROCEDURE — 74176 CT ABD & PELVIS W/O CONTRAST: CPT

## 2018-04-18 PROCEDURE — 85014 HEMATOCRIT: CPT

## 2018-04-18 PROCEDURE — 71046 X-RAY EXAM CHEST 2 VIEWS: CPT

## 2018-04-18 PROCEDURE — 84145 PROCALCITONIN (PCT): CPT

## 2018-04-18 PROCEDURE — 80053 COMPREHEN METABOLIC PANEL: CPT

## 2018-04-18 PROCEDURE — 87581 M.PNEUMON DNA AMP PROBE: CPT

## 2018-04-18 PROCEDURE — 85027 COMPLETE CBC AUTOMATED: CPT

## 2018-04-18 PROCEDURE — 87186 SC STD MICRODIL/AGAR DIL: CPT

## 2018-04-18 PROCEDURE — 94640 AIRWAY INHALATION TREATMENT: CPT

## 2018-04-18 PROCEDURE — 85610 PROTHROMBIN TIME: CPT

## 2018-04-18 PROCEDURE — 82947 ASSAY GLUCOSE BLOOD QUANT: CPT

## 2018-04-18 PROCEDURE — 84295 ASSAY OF SERUM SODIUM: CPT

## 2018-04-18 PROCEDURE — 82330 ASSAY OF CALCIUM: CPT

## 2018-04-18 PROCEDURE — 80048 BASIC METABOLIC PNL TOTAL CA: CPT

## 2018-04-18 PROCEDURE — 83735 ASSAY OF MAGNESIUM: CPT

## 2018-04-18 PROCEDURE — 87040 BLOOD CULTURE FOR BACTERIA: CPT

## 2018-04-18 PROCEDURE — 83605 ASSAY OF LACTIC ACID: CPT

## 2018-04-18 PROCEDURE — 84132 ASSAY OF SERUM POTASSIUM: CPT

## 2018-04-18 PROCEDURE — 96374 THER/PROPH/DIAG INJ IV PUSH: CPT

## 2018-04-18 PROCEDURE — 85730 THROMBOPLASTIN TIME PARTIAL: CPT

## 2018-04-18 PROCEDURE — 87633 RESP VIRUS 12-25 TARGETS: CPT

## 2018-04-18 PROCEDURE — 82803 BLOOD GASES ANY COMBINATION: CPT

## 2018-04-18 PROCEDURE — 99285 EMERGENCY DEPT VISIT HI MDM: CPT | Mod: 25

## 2018-04-18 PROCEDURE — 87798 DETECT AGENT NOS DNA AMP: CPT

## 2018-04-18 PROCEDURE — 87486 CHLMYD PNEUM DNA AMP PROBE: CPT

## 2018-04-18 PROCEDURE — 87150 DNA/RNA AMPLIFIED PROBE: CPT

## 2018-04-18 RX ORDER — CEFUROXIME AXETIL 250 MG
1 TABLET ORAL
Qty: 14 | Refills: 0 | OUTPATIENT
Start: 2018-04-18 | End: 2018-04-24

## 2018-04-18 RX ORDER — LACTOBACILLUS ACIDOPHILUS 100MM CELL
1 CAPSULE ORAL
Qty: 45 | Refills: 0 | OUTPATIENT
Start: 2018-04-18 | End: 2018-05-02

## 2018-04-18 RX ADMIN — Medication 1 TABLET(S): at 12:44

## 2018-04-18 RX ADMIN — RALOXIFENE HYDROCHLORIDE 60 MILLIGRAM(S): 60 TABLET, COATED ORAL at 12:44

## 2018-04-18 RX ADMIN — HEPARIN SODIUM 5000 UNIT(S): 5000 INJECTION INTRAVENOUS; SUBCUTANEOUS at 06:30

## 2018-04-18 RX ADMIN — Medication 75 MILLIGRAM(S): at 12:48

## 2018-04-18 RX ADMIN — MONTELUKAST 10 MILLIGRAM(S): 4 TABLET, CHEWABLE ORAL at 12:44

## 2018-04-18 RX ADMIN — Medication 1 SPRAY(S): at 12:45

## 2018-04-18 RX ADMIN — Medication 1 TABLET(S): at 17:38

## 2018-04-18 RX ADMIN — CEFTRIAXONE 100 GRAM(S): 500 INJECTION, POWDER, FOR SOLUTION INTRAMUSCULAR; INTRAVENOUS at 17:38

## 2018-04-18 RX ADMIN — DULOXETINE HYDROCHLORIDE 60 MILLIGRAM(S): 30 CAPSULE, DELAYED RELEASE ORAL at 12:44

## 2018-04-18 RX ADMIN — PANTOPRAZOLE SODIUM 40 MILLIGRAM(S): 20 TABLET, DELAYED RELEASE ORAL at 06:30

## 2018-04-18 RX ADMIN — Medication 50 MILLIGRAM(S): at 06:30

## 2018-04-18 RX ADMIN — Medication 180 MILLIGRAM(S): at 06:30

## 2018-04-18 NOTE — DISCHARGE NOTE ADULT - HOSPITAL COURSE
77 year old female with a history of hypertension, hypertrophic cardiomyopathy, former smoker with 50 py history, bladder cancer dx 9/2017 s/p ileal conduit on chemotherapy presents with fevers at home after receiving chemotherapy. Found to have ecoli bacteremia likely source UTI /GI. CT of A/P with mildly increased left perinephric stranding, no hydronephrosis. s/p ID eval, Repeat blood culture negative. urine culture grew vanco resistant  E. faecium, which represents the selective pressure of the antecedent antibiotics as per ID- not treat it as a pathogen at this point in time.   Currently receiving chemotherapy for Bladder cancer metastasized to bone with Carboplatin/Etoposide, pt to follow up with Oncology Dr. Cruz next week  Cr stable 1.45 today. No fever, clinically stable for discharge.  Med rec and DCP discussed with Dr. Lopes  pt to follow up with PCP and oncology

## 2018-04-18 NOTE — DISCHARGE NOTE ADULT - ADDITIONAL INSTRUCTIONS
Please complete the course of antibiotic as directed  Follow up with your primary care physician in 5-7 days  Follow up with your oncologist in one week  Make appointments to follow up with your out patient physicians.  Bring all discharge paperwork including discharge medication list to your follow up appointments.

## 2018-04-18 NOTE — PROGRESS NOTE ADULT - SUBJECTIVE AND OBJECTIVE BOX
[facility] Hematology/Oncology - Obey Cruz / Onofre / Francis - [phone]  Primary Outpatient Hematologist/Oncologist:  Dr. Manjinder Cruz     Subjective  Seen this morning. Doing well. No fevers or chills overnight. Reports loose bowel movement in the morning. Frequency of bowel movements unchanged. No other complaints.     HPI:  Patient is a 77 year old woman with a history of hypertension, hypertrophic cardiomyopathy, former smoker with 50 py history, bladder cancer dx 9/2017 s/p ileal conduit on chemotherapy presents with fevers at home after receiving chemotherapy.    Patient reports that she received chemotherapy on Thursday and Neulasta on Friday. On Friday evening around midnight, took her temperature as she was feeling tired and warm and was elevated to 103. Called an ambulance at that time, but by the time EMS arrived temperature was normal at 98.6. This morning, she again took her temperature and noted to it be between 101-103. No headaches. No blurry vision. No dysuria No diarrhea.     The patient was recently admitted for fevers after chemotherapy at the end of March and left AMA after blood cultures were negative x 48 hours. She was on Vanc/Cefepime at the time due to concern for sepsis from her initial presentation. After discharge, 1/2 cultures returned positive for Enterobacter Aerogenes. She reports being contacted as an outpatient and being told that culture was a contaminant and did not require treatment.    4/18 coverage for Dr Cruz pt to go home on oral Cipro and followup next week in oncology office.  ROS:  General:  (-)Pain, (-)Decrease appetite, (+)Fevers, (-)Chills, (-)Weight Loss  Eyes: (-)Blurry Vision, (-)Double Vision, (-)Vision Loss  ENT: (-)Sinus Congestion, (-)Decreased Hearing, (-)Nosebleeds, (-)Sore Throat  Cardiac: (-)Chest Pain, (-)Palpitations, (-)Shortness of breathe on exertion  Respiratory: (-)Cough, (-)hemoptysis, (-)Shortness of breathe  GI: (-)Nausea, (-)Vomiting, (-)Diarrhea, (-)Constipation, (-)Melena, (-)BRBPR  : (-)Hematuria, (-)Dysuria, (-)Polyuia  MSK: (-)Back pain, (-)Joint pain, (-)Stiffness  Dermatology: (-)Rash, (-)Itching  Neurology:  (-)Numbness, (-)Tingling, (-)Difficulty Walking, (-)Tremors, (-)Weakness  Psych: (-)Anxiety, (-)Depression, (-)Memory Loss  Hematology:  (-)Easy Bruising, (-)Easy Bleeding, (-)Night Sweats.   Allergies  penicillin (Rash)  MEDICATIONS  (STANDING):  cefTRIAXone   IVPB      cefTRIAXone   IVPB 1 Gram(s) IV Intermittent every 24 hours  diltiazem    milliGRAM(s) Oral daily  DULoxetine 60 milliGRAM(s) Oral daily  fluticasone propionate 50 MICROgram(s)/spray Nasal Spray 1 Spray(s) Both Nostrils daily  heparin  Injectable 5000 Unit(s) SubCutaneous every 8 hours  lactobacillus acidophilus 1 Tablet(s) Oral three times a day with meals  metoprolol succinate ER 50 milliGRAM(s) Oral daily  montelukast 10 milliGRAM(s) Oral daily  multivitamin 1 Tablet(s) Oral daily  pantoprazole    Tablet 40 milliGRAM(s) Oral before breakfast  pregabalin 75 milliGRAM(s) Oral two times a day  raloxifene 60 milliGRAM(s) Oral daily    MEDICATIONS  (PRN):  acetaminophen   Tablet. 650 milliGRAM(s) Oral every 6 hours PRN Moderate Pain (4 - 6)  calcium carbonate 500 mG (Tums) Chewable 1 Tablet(s) Chew four times a day PRN Upset Stomach  simethicone 80 milliGRAM(s) Chew four times a day PRN Dyspepsia  traZODone 100 milliGRAM(s) Oral at bedtime PRN insomnia  Vital Signs Last 24 Hrs  T(C): 36.6 (18 Apr 2018 11:11), Max: 37.2 (17 Apr 2018 16:53)  T(F): 97.8 (18 Apr 2018 11:11), Max: 99 (17 Apr 2018 16:53)  HR: 67 (18 Apr 2018 11:11) (67 - 80)  BP: 128/58 (18 Apr 2018 11:11) (120/77 - 148/76)  BP(mean): --  RR: 18 (18 Apr 2018 11:11) (18 - 18)  SpO2: 98% (18 Apr 2018 11:11) (96% - 98%)  Physical Exam:  General: AAO x 3. NAD. Lying in bed comfortably.  HEENT: clear oropharynx, anicteric sclera, pink conjunctivae, EOMi. PERRLA  Cardiac: S1, S2 present. No audible murmurs. RRR  Lungs: CTA B/L.   Abdomen: Soft, Non-Tender, Non-Distended. No palpable hepatosplenomegaly  Extremities: 2+ pulses. No edema  Skin: No Rashes. No petechiae  Neuro: No focal motor or sensory deficits.     Labs:                         9.8    9.0   )-----------( 143      ( 17 Apr 2018 12:38 )             29.5   04-16    139  |  114<H>  |  28<H>  ----------------------------<  83  4.8   |  13<L>  |  1.52<H>    Ca    7.5<L>      16 Apr 2018 06:45    Culture - Blood (04.14.18 @ 20:35)    Gram Stain:   Growth in aerobic bottle: Gram Negative Rods    Specimen Source: .Blood Blood-Venous    Culture Results:   Growth in aerobic bottle: Gram Negative Rods

## 2018-04-18 NOTE — PROGRESS NOTE ADULT - ASSESSMENT
77 year old female with a history of hypertension, hypertrophic cardiomyopathy, former smoker with 50 py history, bladder cancer dx 9/2017 s/p ileal conduit on chemotherapy presents with fevers at home after receiving chemotherapy.  Patient reports that she received chemotherapy on Thursday and Neulasta on Friday. also has diarrhea which she attributes to her chemo     # Fever- ecoli bacteremia likely source UTI /GI   pt meets sepsis criteria upon admission- fever, wbc  Cont Cefepime  UA pos, f/u ucx-illeal conduit- contaminated  BCx  positive for Ecoli, f/u cx sensitivities  rpt bld cx to ensure clearence  fever pattern  [facility] ID c/s appreciated  Plan for cta/p if Cr allows, check Bmp today, (ptwith chr ckd baseline 1.5)  ? If not will arrange MRI if necessary to evaluate liver lesion    #Bladder cancer metastasized to bone.    Currently receiving chemotherapy with Carboplatin/Etoposide  onc c/s appreciated    # Chronic kidney disease, unspecified CKD stage.    creatinine 1.59 today, at baseline  - Renally dose medications.     # GERD without esophagitis.    Continue tums prn as per patient preference.     #: Essential hypertension.    Continue metoprolol, dilt.     DVT subq heparin
77 year old female with a history of hypertension, hypertrophic cardiomyopathy, former smoker with 50 py history, bladder cancer dx 9/2017 s/p ileal conduit on chemotherapy presents with fevers at home after receiving chemotherapy.  Patient reports that she received chemotherapy on Thursday and Neulasta on Friday. also has diarrhea which she attributes to her chemo     # Fever- ecoli bacteremia likely source UTI /GI   Cont Cefepime   UA pos, f/u ucx-illeal conduit  BCx  positive for Ecoli, f/u cx sensitivities  stool cx/s, cdiff  fever pattern  [facility] ID c/s f/u    #Bladder cancer metastasized to bone.    Currently receiving chemotherapy with Carboplatin/Etoposide  onc c/s in am    # Chronic kidney disease, unspecified CKD stage.    creatinine 1.59 today, at baseline  - Renally dose medications.     # GERD without esophagitis.    Continue tums prn as per patient preference.     #: Essential hypertension.    Continue metoprolol, dilt.     DVT subq heparin
77 year old female with a history of hypertension, hypertrophic cardiomyopathy, former smoker with 50 py history, bladder cancer dx 9/2017 s/p ileal conduit on chemotherapy presents with fevers at home after receiving chemotherapy.  Patient reports that she received chemotherapy on Thursday and Neulasta on Friday. also has diarrhea which she attributes to her chemo     # Fever- ecoli bacteremia likely source UTI /GI   Cont Cefepime  UA pos, f/u ucx-illeal conduit- contaminated  BCx  positive for Ecoli, f/u cx sensitivities  rpt bld cx to ensure clearence  fever pattern  [facility] ID c/s appreciated    #Bladder cancer metastasized to bone.    Currently receiving chemotherapy with Carboplatin/Etoposide  onc c/s appreciated    # Chronic kidney disease, unspecified CKD stage.    creatinine 1.59 today, at baseline  - Renally dose medications.     # GERD without esophagitis.    Continue tums prn as per patient preference.     #: Essential hypertension.    Continue metoprolol, dilt.     DVT subq heparin
Patient states she is leaving tonight or tomorrow.  Discussed with patient bluntly and in layman's terms. She has had two life-threatening infections--gram negative bacteremia-- in a month.   Patient should have a repeat CT scan to reassess for any occult focus of infection-- with attention to the liver lesion.  She initially refused but now may be amenable.  WBC declined but unclear if related to chemo, antibiotics, or both.
77 year old woman with metastatic bladder cancer. Small cell bladder Ca. Treated with carboplatin/etoposide. S/p 5 cycles. Admitted with fever. Blood cultures growing E. Coli
E. coli septicemia  Probably urinary source  Bladder Ca S/P Chemo  CT scan unrevealing to my eye.  Unremarkable physical exam.  E. faecium in the follow up urine culture represents the selective pressure of the antecedent antibiotics. I would not treat it as a pathogen at this point in time.     Suggestions--  E. coli sepsis  If CT unrevealing for actionable pathology and F/U Cx remain negative no objection to discharge home this evening on  	Ceftin 500mg PO Q12H x 7 days.    Bladder Ca  Stable. Care per primary service.    Fever  Moderated. Monitor.    Leukocytosis  Diminished. Infection +/- Neulasta after chemo.    Left message for Dr. Lopes.    Vikram Means MD  [phone]
77 year old woman with metastatic bladder cancer. Small cell bladder Ca. Treated with carboplatin/etoposide. S/p 5 cycles. Admitted with fever. Blood cultures growing E. Coli  4/18 coverage for Dr Cruz pt to go home on oral Cipro and followup next week in oncology office.  ROS:

## 2018-04-18 NOTE — PROGRESS NOTE ADULT - SUBJECTIVE AND OBJECTIVE BOX
[facility], Division of Infectious Diseases  BERNICE Sheffield A. Lee  [phone]    Name: AUBREE ALONZO  Age: 77y  Gender: Female  MRN: [id number]    Interval History--  Notes reviewed    Past Medical History--  Bladder cancer metastasized to bone  Radial fracture  Uterine Polyp  Gastric Ulcer  Migraine, Ophthalmoplegic  Fibromyalgia  Obese  Calcified Thoracic Aorta  Hypertrophic Cardiomyopathy  Paroxysmal Ventricular Tachycardia  Sleep Apnea  Nasal Polyp  Arthritis  HTN (Hypertension)  Asthmatic Bronchitis  Acid Reflux  History of ileal conduit  S/P total knee arthroplasty, left  EPS study  Nasal Polyp removal  History of Arthroscopy  History of D&C      For details regarding the patient's social history, family history, and other miscellaneous elements, please refer the initial infectious diseases consultation and/or the admitting history and physical examination for this admission.    Allergies    penicillin (Rash)    Intolerances        Medications--  Antibiotics:  cefTRIAXone   IVPB      cefTRIAXone   IVPB 1 Gram(s) IV Intermittent every 24 hours    Immunologic:    Other:  acetaminophen   Tablet. PRN  calcium carbonate 500 mG (Tums) Chewable PRN  diltiazem   CD  DULoxetine  fluticasone propionate 50 MICROgram(s)/spray Nasal Spray  heparin  Injectable  lactobacillus acidophilus  metoprolol succinate ER  montelukast  multivitamin  pantoprazole    Tablet  pregabalin  raloxifene  simethicone PRN  traZODone PRN      Review of Systems--  A 10-point review of systems was obtained.     Pertinent positives and negatives--  Constitutional: No fevers. No Chills. No Rigors.   Cardiovascular: No chest pain. No palpitations.  Respiratory: No shortness of breath. No cough.  Gastrointestinal: No nausea or vomiting. No diarrhea or constipation.   Psychiatric: Pleasant. Appropriate affect.    Review of systems otherwise negative except as previously noted.    Physical Examination--  Vital Signs: T(F): 97.3 (04-18-18 @ 06:28), Max: 99 (04-17-18 @ 16:53)  HR: 80 (04-18-18 @ 06:28)  BP: 146/72 (04-18-18 @ 06:28)  RR: 18 (04-18-18 @ 06:28)  SpO2: 98% (04-18-18 @ 06:28)  Wt(kg): --  General: Nontoxic-appearing Female in no acute distress.  HEENT: AT/NC. PERRL. EOMI. Anicteric. Conjunctiva pink and moist. Oropharynx clear. Dentition fair.  Neck: Not rigid. No sense of mass.  Nodes: None palpable.  Lungs: Clear bilaterally without rales, wheezing or rhonchi  Heart: Regular rate and rhythm. No Murmur. No rub. No gallop. No palpable thrill.  Abdomen: Bowel sounds present and normoactive. Soft. Nondistended. Nontender. No sense of mass. No organomegaly.  Back: No spinal tenderness. No costovertebral angle tenderness.   Extremities: No cyanosis or clubbing. No edema.   Skin: Warm. Dry. Good turgor. No rash. No vasculitic stigmata.  Psychiatric: Appropriate affect and mood for situation.         Laboratory Studies--  CBC                        9.8    9.0   )-----------( 143      ( 17 Apr 2018 12:38 )             29.5       Chemistries  04-17    137  |  114<H>  |  26<H>  ----------------------------<  110<H>  5.0   |  12<L>  |  1.45<H>    Ca    7.8<L>      17 Apr 2018 12:38        Culture Data    Culture - Blood (collected 16 Apr 2018 14:28)  Source: .Blood Blood-Venous  Preliminary Report (17 Apr 2018 15:01):    No growth to date.    Culture - Blood (collected 16 Apr 2018 14:28)  Source: .Blood Blood-Peripheral  Preliminary Report (17 Apr 2018 15:01):    No growth to date.    Culture - Urine (collected 16 Apr 2018 13:56)  Source: .Urine Catheterized  Preliminary Report (17 Apr 2018 10:58):    >100,000 CFU/ml Enterococcus faecium    Culture - Blood (collected 14 Apr 2018 20:35)  Source: .Blood Blood-Venous  Gram Stain (16 Apr 2018 08:54):    Growth in aerobic bottle: Gram Negative Rods  Final Report (17 Apr 2018 12:54):    Growth in aerobic bottle: Escherichia coli    See previous culture [id number]    Culture - Blood (collected 14 Apr 2018 20:35)  Source: .Blood Blood-Peripheral  Gram Stain (15 Apr 2018 11:27):    Growth in aerobic bottle: Gram Negative Rods  Final Report (17 Apr 2018 12:41):    Growth in aerobic bottle: Escherichia coli    "Due to technical problems, Proteus sp. will Not be reported as part of    the BCID panel until further notice"    ***Blood Panel PCR results on this specimen are available    approximately 3 hours after the Gram stain result.***    Gram stain, PCR, and/or culture results may not always    correspond due to difference in methodologies.    ************************************************************    This PCR assay was performed using [employer].    The following targets are tested for: Enterococcus,    vancomycin resistant enterococci, Listeria monocytogenes,    coagulase negative staphylococci, S. aureus,    methicillin resistant S. aureus, Streptococcus agalactiae    (Group B), S. pneumoniae, S. pyogenes (Group A),    Acinetobacter baumannii, Enterobacter cloacae, E. coli,    Klebsiella oxytoca, K. pneumoniae, Proteus sp.,    Serratia marcescens, Haemophilus influenzae,    Neisseria meningitidis, Pseudomonas aeruginosa, Candida    albicans, C. glabrata, C krusei, C parapsilosis,    C. tropicalis and the KPC resistance gene.  Organism: Blood Culture PCR  Escherichia coli (17 Apr 2018 12:41)  Organism: Escherichia coli (17 Apr 2018 12:41)  Organism: Blood Culture PCR (17 Apr 2018 12:41)    Culture - Urine (collected 14 Apr 2018 20:29)  Source: .Urine Catheterized  Final Report (15 Apr 2018 23:25):    Culture grew 3 or more types of organisms which indicate    collection contamination; consider recollection only if clinically    indicated.      No official CT report as of yet. [facility], Division of Infectious Diseases  BERNICE Sheffield A. Lee  [phone]    Name: AUBREE ALONZO  Age: 77y  Gender: Female  MRN: [id number]    Interval History--  Notes reviewed. Feeling OK. S/P CT, no official result. Per my review of the scan some mild bilateral perinephric stranding, minimal fullness L ureter, streak artifact from spinal hardware, but no acute findings. No fevers, chills, or rigors. Remains somewhat confrontational but less hostile.     Past Medical History--  Bladder cancer metastasized to bone  Radial fracture  Uterine Polyp  Gastric Ulcer  Migraine, Ophthalmoplegic  Fibromyalgia  Obese  Calcified Thoracic Aorta  Hypertrophic Cardiomyopathy  Paroxysmal Ventricular Tachycardia  Sleep Apnea  Nasal Polyp  Arthritis  HTN (Hypertension)  Asthmatic Bronchitis  Acid Reflux  History of ileal conduit  S/P total knee arthroplasty, left  EPS study  Nasal Polyp removal  History of Arthroscopy  History of D&C      For details regarding the patient's social history, family history, and other miscellaneous elements, please refer the initial infectious diseases consultation and/or the admitting history and physical examination for this admission.    Allergies    penicillin (Rash)    Intolerances        Medications--  Antibiotics:  cefTRIAXone   IVPB      cefTRIAXone   IVPB 1 Gram(s) IV Intermittent every 24 hours    Immunologic:    Other:  acetaminophen   Tablet. PRN  calcium carbonate 500 mG (Tums) Chewable PRN  diltiazem   CD  DULoxetine  fluticasone propionate 50 MICROgram(s)/spray Nasal Spray  heparin  Injectable  lactobacillus acidophilus  metoprolol succinate ER  montelukast  multivitamin  pantoprazole    Tablet  pregabalin  raloxifene  simethicone PRN  traZODone PRN      Review of Systems--  A 10-point review of systems was obtained.   Review of systems otherwise negative except as previously noted.    Physical Examination--  Vital Signs: T(F): 97.3 (04-18-18 @ 06:28), Max: 99 (04-17-18 @ 16:53)  HR: 80 (04-18-18 @ 06:28)  BP: 146/72 (04-18-18 @ 06:28)  RR: 18 (04-18-18 @ 06:28)  SpO2: 98% (04-18-18 @ 06:28)  Wt(kg): --  General: Nontoxic-appearing Female in no acute distress.  HEENT: Partial alopecia. Anicteric. Conjunctiva pink and moist. Oropharynx clear.   Neck: Not rigid. No sense of mass.  Nodes: None palpable.  Lungs: Clear bilaterally without rales, wheezing or rhonchi  Heart: Regular rate and rhythm. No Murmur. No rub. No gallop. No palpable thrill.  Abdomen: Bowel sounds present and normoactive. Soft. Nondistended. Minimal tenderness abdominal wall. Ileal conduit-> clear yellow urine.  Extremities: No cyanosis or clubbing. No edema.   Skin: Warm. Dry. Good turgor. No rash. No vasculitic stigmata.  Psychiatric: Inappropriate demeanor for encounter, but oriented.  Laboratory Studies--  CBC                        9.8    9.0   )-----------( 143      ( 17 Apr 2018 12:38 )             29.5       Chemistries  04-17    137  |  114<H>  |  26<H>  ----------------------------<  110<H>  5.0   |  12<L>  |  1.45<H>    Ca    7.8<L>      17 Apr 2018 12:38        Culture Data    Culture - Blood (collected 16 Apr 2018 14:28)  Source: .Blood Blood-Venous  Preliminary Report (17 Apr 2018 15:01):    No growth to date.    Culture - Blood (collected 16 Apr 2018 14:28)  Source: .Blood Blood-Peripheral  Preliminary Report (17 Apr 2018 15:01):    No growth to date.    Culture - Urine (collected 16 Apr 2018 13:56)  Source: .Urine Catheterized  Preliminary Report (17 Apr 2018 10:58):    >100,000 CFU/ml Enterococcus faecium    Culture - Blood (collected 14 Apr 2018 20:35)  Source: .Blood Blood-Venous  Gram Stain (16 Apr 2018 08:54):    Growth in aerobic bottle: Gram Negative Rods  Final Report (17 Apr 2018 12:54):    Growth in aerobic bottle: Escherichia coli    See previous culture [id number]    Culture - Blood (collected 14 Apr 2018 20:35)  Source: .Blood Blood-Peripheral  Gram Stain (15 Apr 2018 11:27):    Growth in aerobic bottle: Gram Negative Rods  Final Report (17 Apr 2018 12:41):    Growth in aerobic bottle: Escherichia coli    "Due to technical problems, Proteus sp. will Not be reported as part of    the BCID panel until further notice"    ***Blood Panel PCR results on this specimen are available    approximately 3 hours after the Gram stain result.***    Gram stain, PCR, and/or culture results may not always    correspond due to difference in methodologies.    ************************************************************    This PCR assay was performed using [employer].    The following targets are tested for: Enterococcus,    vancomycin resistant enterococci, Listeria monocytogenes,    coagulase negative staphylococci, S. aureus,    methicillin resistant S. aureus, Streptococcus agalactiae    (Group B), S. pneumoniae, S. pyogenes (Group A),    Acinetobacter baumannii, Enterobacter cloacae, E. coli,    Klebsiella oxytoca, K. pneumoniae, Proteus sp.,    Serratia marcescens, Haemophilus influenzae,    Neisseria meningitidis, Pseudomonas aeruginosa, Candida    albicans, C. glabrata, C krusei, C parapsilosis,    C. tropicalis and the KPC resistance gene.  Organism: Blood Culture PCR  Escherichia coli (17 Apr 2018 12:41)  Organism: Escherichia coli (17 Apr 2018 12:41)  Organism: Blood Culture PCR (17 Apr 2018 12:41)    Culture - Urine (collected 14 Apr 2018 20:29)  Source: .Urine Catheterized  Final Report (15 Apr 2018 23:25):    Culture grew 3 or more types of organisms which indicate    collection contamination; consider recollection only if clinically    indicated.      No official CT report as of yet.

## 2018-04-18 NOTE — PROGRESS NOTE ADULT - PROBLEM SELECTOR PROBLEM 2
Bladder cancer metastasized to bone

## 2018-04-18 NOTE — DISCHARGE NOTE ADULT - PATIENT PORTAL LINK FT
You can access the [facility], offered by [facility], by registering with the following website: [email or web address]

## 2018-04-18 NOTE — DISCHARGE NOTE ADULT - OTHER SIGNIFICANT FINDINGS
Attending DC note:  77 year old female with a history of hypertension, hypertrophic cardiomyopathy, former smoker with 50 py history, bladder cancer dx 9/2017 s/p ileal conduit on chemotherapy presents with fevers at home after receiving chemotherapy. admitted for ecoli bacteremia 2/2 UTI. Urine cx likely contaminated by ileal conduit. BCx  positive for Ecoli, repeat BC ntd. to complete ceftin 500mg BID x 7 days. CT showed liver lesion 1.5cm likely hemangioma. outpt fu with oncology.

## 2018-04-18 NOTE — PROGRESS NOTE ADULT - PROVIDER SPECIALTY LIST ADULT
Infectious Disease
Infectious Disease
Internal Medicine
Internal Medicine
Heme/Onc
Heme/Onc
Internal Medicine

## 2018-04-18 NOTE — DISCHARGE NOTE ADULT - SECONDARY DIAGNOSIS.
Bladder cancer metastasized to bone Chronic kidney disease, unspecified CKD stage GERD without esophagitis Essential hypertension

## 2018-04-18 NOTE — DISCHARGE NOTE ADULT - MEDICATION SUMMARY - MEDICATIONS TO STOP TAKING
I will STOP taking the medications listed below when I get home from the hospital:    chemo meds  -- decadron  carboplatin  Etoposide

## 2018-04-18 NOTE — PROGRESS NOTE ADULT - PROBLEM SELECTOR PLAN 2
Small Cell bladder cancer. On chemotherapy  - s/p 5th cycle  - Resume therapy after bacteremia is completely treated.   - Will follow    [facility] - [facility]   [address] [place] [id number]  (T) [phone]  (F) [phone]
Small Cell bladder cancer. On chemotherapy  - s/p 5th cycle  - Resume therapy after bacteremia is completely treated.   - Will follow    [facility]   [address] [place] [id number]  (T) [phone]  (F) [phone]
Stable. Care per primary service.

## 2018-04-18 NOTE — DISCHARGE NOTE ADULT - PLAN OF CARE
Resolution You were admitted with fever found to have Bacteremia  Please complete the course of antibiotic as directed  Follow up with your primary care physician Follow up with your oncologist renal function to be monitored  Please follow up with your primary care physcian Tums as needed continue with Cardizem and Metoprolol s directed

## 2018-04-18 NOTE — DISCHARGE NOTE ADULT - CARE PROVIDER_API CALL
Manjinder Garcia), Hematology; Internal Medicine; Medical Oncology  [address] [facility]  [address]  [place] [address]  Phone: [phone]  Fax: [phone]    Adelso Bernabe), Internal Medicine  [address]  [place] [address]  Phone: [phone]  Fax: [phone]

## 2018-04-18 NOTE — DISCHARGE NOTE ADULT - CARE PLAN
Principal Discharge DX:	Bacteremia due to Escherichia coli  Goal:	Resolution  Assessment and plan of treatment:	You were admitted with fever found to have Bacteremia  Please complete the course of antibiotic as directed  Follow up with your primary care physician  Secondary Diagnosis:	Bladder cancer metastasized to bone  Secondary Diagnosis:	Chronic kidney disease, unspecified CKD stage  Secondary Diagnosis:	GERD without esophagitis Principal Discharge DX:	Bacteremia due to Escherichia coli  Goal:	Resolution  Assessment and plan of treatment:	You were admitted with fever found to have Bacteremia  Please complete the course of antibiotic as directed  Follow up with your primary care physician  Secondary Diagnosis:	Bladder cancer metastasized to bone  Assessment and plan of treatment:	Follow up with your oncologist  Secondary Diagnosis:	Chronic kidney disease, unspecified CKD stage  Assessment and plan of treatment:	renal function to be monitored  Please follow up with your primary care physcian  Secondary Diagnosis:	GERD without esophagitis  Assessment and plan of treatment:	Tums as needed  Secondary Diagnosis:	Essential hypertension  Assessment and plan of treatment:	continue with Cardizem and Metoprolol s directed

## 2018-04-18 NOTE — PROGRESS NOTE ADULT - PROBLEM SELECTOR PROBLEM 1
Bacteremia due to Escherichia coli

## 2018-04-18 NOTE — DISCHARGE NOTE ADULT - MEDICATION SUMMARY - MEDICATIONS TO TAKE
I will START or STAY ON the medications listed below when I get home from the hospital:    Tylenol 500 mg oral tablet  -- 2 tab(s) by mouth 1 to 4 times a day, As Needed  -- Indication: For pain     calcium (as carbonate) 600 mg oral tablet  -- 1 tab(s) by mouth once a day  -- Indication: For GERD without esophagitis    DilTIAZem Hydrochloride  mg/24 hours oral capsule, extended release  -- 1 cap(s) by mouth once a day  -- Indication: For Blood pressure and heart rate control    pregabalin 75 mg oral capsule  -- 1 cap(s) by mouth 2 times a day  -- Indication: For neuropathy    traZODone 100 mg oral tablet  -- 1 tab(s) by mouth once a day (at bedtime)  -- Indication: For Depression     DULoxetine 60 mg oral delayed release capsule  -- 1 cap(s) by mouth once a day  -- Indication: For Depression     Evista 60 mg oral tablet  -- 1 tab(s) by mouth once a day  -- Indication: For Osteoporosis prevention     metoprolol succinate 50 mg oral tablet, extended release  -- 1 tab(s) by mouth once a day  -- Indication: For Heart rate and blood pressure control    Xopenex HFA 45 mcg/inh inhalation aerosol  -- 2 puff(s) inhaled every 4 hours, As Needed  -- Indication: For Shortness of breath/ wheeze    cefuroxime 500 mg oral tablet  -- 1 tab(s) by mouth every 12 hours X7 days   -- Finish all this medication unless otherwise directed by prescriber.  Medication should be taken with plenty of water.  Take with food or milk.    -- Indication: For antibiotic for bacteremia    Benefiber 1 g oral tablet, chewable  -- 2 tab(s) by mouth once a day, As Needed  -- Indication: For supplement     Singulair 10 mg oral tablet  -- 1 tab(s) by mouth once a day  -- Indication: For Asthma    Flonase 50 mcg/inh nasal spray  -- 1 spray(s) into nose once a day, As Needed  -- Indication: For nasal congestion     lactobacillus acidophilus oral capsule  -- 1 cap(s) by mouth 3 times a day   -- Indication: For probiotic     omeprazole 40 mg oral delayed release capsule  -- 1 cap(s) by mouth once a day  -- Indication: For GI protection     Multiple Vitamins oral tablet  -- 1 tab(s) by mouth once a day  -- Indication: For Supplement    Vitamin D3  -- 1200 unit(s) by mouth once a day  -- Indication: For Supplement

## 2018-04-21 LAB
CULTURE RESULTS: SIGNIFICANT CHANGE UP
CULTURE RESULTS: SIGNIFICANT CHANGE UP
SPECIMEN SOURCE: SIGNIFICANT CHANGE UP
SPECIMEN SOURCE: SIGNIFICANT CHANGE UP

## 2018-04-23 ENCOUNTER — TRANSCRIPTION ENCOUNTER (OUTPATIENT)
Age: 78
End: 2018-04-23

## 2018-05-08 ENCOUNTER — INPATIENT (INPATIENT)
Facility: HOSPITAL | Age: 78
LOS: 1 days | Discharge: HOME | DRG: 949 | End: 2018-05-10
Attending: INTERNAL MEDICINE | Admitting: INTERNAL MEDICINE
Payer: MEDICARE

## 2018-05-08 VITALS — OXYGEN SATURATION: 98 % | TEMPERATURE: 100 F

## 2018-05-08 DIAGNOSIS — E87.5 HYPERKALEMIA: ICD-10-CM

## 2018-05-08 DIAGNOSIS — Z98.890 OTHER SPECIFIED POSTPROCEDURAL STATES: Chronic | ICD-10-CM

## 2018-05-08 DIAGNOSIS — C67.9 MALIGNANT NEOPLASM OF BLADDER, UNSPECIFIED: ICD-10-CM

## 2018-05-08 DIAGNOSIS — G89.29 OTHER CHRONIC PAIN: ICD-10-CM

## 2018-05-08 DIAGNOSIS — R50.9 FEVER, UNSPECIFIED: ICD-10-CM

## 2018-05-08 DIAGNOSIS — Z29.9 ENCOUNTER FOR PROPHYLACTIC MEASURES, UNSPECIFIED: ICD-10-CM

## 2018-05-08 DIAGNOSIS — R63.8 OTHER SYMPTOMS AND SIGNS CONCERNING FOOD AND FLUID INTAKE: ICD-10-CM

## 2018-05-08 DIAGNOSIS — T83.511D INFECTION AND INFLAMMATORY REACTION DUE TO INDWELLING URETHRAL CATHETER, SUBSEQUENT ENCOUNTER: ICD-10-CM

## 2018-05-08 LAB
ALBUMIN SERPL ELPH-MCNC: 3.7 G/DL — SIGNIFICANT CHANGE UP (ref 3.3–5)
ALP SERPL-CCNC: 141 U/L — HIGH (ref 40–120)
ALT FLD-CCNC: 14 U/L — SIGNIFICANT CHANGE UP (ref 10–45)
ANION GAP SERPL CALC-SCNC: 12 MMOL/L — SIGNIFICANT CHANGE UP (ref 5–17)
APPEARANCE UR: ABNORMAL
AST SERPL-CCNC: 12 U/L — SIGNIFICANT CHANGE UP (ref 10–40)
BASOPHILS # BLD AUTO: 0 K/UL — SIGNIFICANT CHANGE UP (ref 0–0.2)
BASOPHILS NFR BLD AUTO: 0 % — SIGNIFICANT CHANGE UP (ref 0–2)
BILIRUB SERPL-MCNC: 0.3 MG/DL — SIGNIFICANT CHANGE UP (ref 0.2–1.2)
BILIRUB UR-MCNC: NEGATIVE — SIGNIFICANT CHANGE UP
BUN SERPL-MCNC: 25 MG/DL — HIGH (ref 7–23)
CALCIUM SERPL-MCNC: 8.5 MG/DL — SIGNIFICANT CHANGE UP (ref 8.4–10.5)
CHLORIDE SERPL-SCNC: 108 MMOL/L — SIGNIFICANT CHANGE UP (ref 96–108)
CO2 SERPL-SCNC: 19 MMOL/L — LOW (ref 22–31)
COLOR SPEC: SIGNIFICANT CHANGE UP
CREAT SERPL-MCNC: 1.42 MG/DL — HIGH (ref 0.5–1.3)
DIFF PNL FLD: ABNORMAL
EOSINOPHIL # BLD AUTO: 0 K/UL — SIGNIFICANT CHANGE UP (ref 0–0.5)
EOSINOPHIL NFR BLD AUTO: 0.3 % — SIGNIFICANT CHANGE UP (ref 0–6)
GAS PNL BLDV: SIGNIFICANT CHANGE UP
GLUCOSE SERPL-MCNC: 92 MG/DL — SIGNIFICANT CHANGE UP (ref 70–99)
GLUCOSE UR QL: NEGATIVE — SIGNIFICANT CHANGE UP
HCT VFR BLD CALC: 25.4 % — LOW (ref 34.5–45)
HGB BLD-MCNC: 8.5 G/DL — LOW (ref 11.5–15.5)
KETONES UR-MCNC: NEGATIVE — SIGNIFICANT CHANGE UP
LEUKOCYTE ESTERASE UR-ACNC: ABNORMAL
LYMPHOCYTES # BLD AUTO: 0.9 K/UL — LOW (ref 1–3.3)
LYMPHOCYTES # BLD AUTO: 7.7 % — LOW (ref 13–44)
MCHC RBC-ENTMCNC: 33.4 GM/DL — SIGNIFICANT CHANGE UP (ref 32–36)
MCHC RBC-ENTMCNC: 34.3 PG — HIGH (ref 27–34)
MCV RBC AUTO: 103 FL — HIGH (ref 80–100)
MONOCYTES # BLD AUTO: 0.8 K/UL — SIGNIFICANT CHANGE UP (ref 0–0.9)
MONOCYTES NFR BLD AUTO: 7.2 % — SIGNIFICANT CHANGE UP (ref 2–14)
NEUTROPHILS # BLD AUTO: 9.4 K/UL — HIGH (ref 1.8–7.4)
NEUTROPHILS NFR BLD AUTO: 84.8 % — HIGH (ref 43–77)
NITRITE UR-MCNC: NEGATIVE — SIGNIFICANT CHANGE UP
PH UR: 6.5 — SIGNIFICANT CHANGE UP (ref 5–8)
PLATELET # BLD AUTO: 119 K/UL — LOW (ref 150–400)
POTASSIUM SERPL-MCNC: 5.7 MMOL/L — HIGH (ref 3.5–5.3)
POTASSIUM SERPL-SCNC: 5.7 MMOL/L — HIGH (ref 3.5–5.3)
PROT SERPL-MCNC: 6 G/DL — SIGNIFICANT CHANGE UP (ref 6–8.3)
PROT UR-MCNC: SIGNIFICANT CHANGE UP
RAPID RVP RESULT: SIGNIFICANT CHANGE UP
RBC # BLD: 2.48 M/UL — LOW (ref 3.8–5.2)
RBC # FLD: 14.9 % — HIGH (ref 10.3–14.5)
SODIUM SERPL-SCNC: 139 MMOL/L — SIGNIFICANT CHANGE UP (ref 135–145)
SP GR SPEC: 1.01 — SIGNIFICANT CHANGE UP (ref 1.01–1.02)
UROBILINOGEN FLD QL: NEGATIVE — SIGNIFICANT CHANGE UP
WBC # BLD: 11.1 K/UL — HIGH (ref 3.8–10.5)
WBC # FLD AUTO: 11.1 K/UL — HIGH (ref 3.8–10.5)

## 2018-05-08 PROCEDURE — 93010 ELECTROCARDIOGRAM REPORT: CPT

## 2018-05-08 PROCEDURE — 71045 X-RAY EXAM CHEST 1 VIEW: CPT | Mod: 26

## 2018-05-08 PROCEDURE — 99223 1ST HOSP IP/OBS HIGH 75: CPT

## 2018-05-08 PROCEDURE — 99285 EMERGENCY DEPT VISIT HI MDM: CPT | Mod: 25

## 2018-05-08 RX ORDER — SODIUM CHLORIDE 9 MG/ML
1000 INJECTION INTRAMUSCULAR; INTRAVENOUS; SUBCUTANEOUS
Qty: 0 | Refills: 0 | Status: DISCONTINUED | OUTPATIENT
Start: 2018-05-08 | End: 2018-05-10

## 2018-05-08 RX ORDER — SODIUM CHLORIDE 9 MG/ML
500 INJECTION INTRAMUSCULAR; INTRAVENOUS; SUBCUTANEOUS
Qty: 0 | Refills: 0 | Status: COMPLETED | OUTPATIENT
Start: 2018-05-08 | End: 2018-05-08

## 2018-05-08 RX ORDER — SODIUM BICARBONATE 1 MEQ/ML
650 SYRINGE (ML) INTRAVENOUS
Qty: 0 | Refills: 0 | Status: DISCONTINUED | OUTPATIENT
Start: 2018-05-08 | End: 2018-05-10

## 2018-05-08 RX ORDER — PANTOPRAZOLE SODIUM 20 MG/1
40 TABLET, DELAYED RELEASE ORAL
Qty: 0 | Refills: 0 | Status: DISCONTINUED | OUTPATIENT
Start: 2018-05-08 | End: 2018-05-10

## 2018-05-08 RX ORDER — ACETAMINOPHEN 500 MG
1000 TABLET ORAL EVERY 8 HOURS
Qty: 0 | Refills: 0 | Status: DISCONTINUED | OUTPATIENT
Start: 2018-05-08 | End: 2018-05-10

## 2018-05-08 RX ORDER — TRAZODONE HCL 50 MG
100 TABLET ORAL AT BEDTIME
Qty: 0 | Refills: 0 | Status: DISCONTINUED | OUTPATIENT
Start: 2018-05-08 | End: 2018-05-10

## 2018-05-08 RX ORDER — MONTELUKAST 4 MG/1
10 TABLET, CHEWABLE ORAL DAILY
Qty: 0 | Refills: 0 | Status: DISCONTINUED | OUTPATIENT
Start: 2018-05-08 | End: 2018-05-10

## 2018-05-08 RX ORDER — LEVALBUTEROL 1.25 MG/.5ML
2 SOLUTION, CONCENTRATE RESPIRATORY (INHALATION)
Qty: 0 | Refills: 0 | COMMUNITY

## 2018-05-08 RX ORDER — DILTIAZEM HCL 120 MG
180 CAPSULE, EXT RELEASE 24 HR ORAL DAILY
Qty: 0 | Refills: 0 | Status: DISCONTINUED | OUTPATIENT
Start: 2018-05-08 | End: 2018-05-10

## 2018-05-08 RX ORDER — DULOXETINE HYDROCHLORIDE 30 MG/1
60 CAPSULE, DELAYED RELEASE ORAL DAILY
Qty: 0 | Refills: 0 | Status: DISCONTINUED | OUTPATIENT
Start: 2018-05-08 | End: 2018-05-10

## 2018-05-08 RX ORDER — ENOXAPARIN SODIUM 100 MG/ML
30 INJECTION SUBCUTANEOUS EVERY 24 HOURS
Qty: 0 | Refills: 0 | Status: DISCONTINUED | OUTPATIENT
Start: 2018-05-08 | End: 2018-05-10

## 2018-05-08 RX ORDER — CEFEPIME 1 G/1
2000 INJECTION, POWDER, FOR SOLUTION INTRAMUSCULAR; INTRAVENOUS EVERY 24 HOURS
Qty: 0 | Refills: 0 | Status: DISCONTINUED | OUTPATIENT
Start: 2018-05-08 | End: 2018-05-09

## 2018-05-08 RX ORDER — FLUTICASONE PROPIONATE 50 MCG
1 SPRAY, SUSPENSION NASAL DAILY
Qty: 0 | Refills: 0 | Status: DISCONTINUED | OUTPATIENT
Start: 2018-05-08 | End: 2018-05-10

## 2018-05-08 RX ORDER — DOCUSATE SODIUM 100 MG
100 CAPSULE ORAL THREE TIMES A DAY
Qty: 0 | Refills: 0 | Status: DISCONTINUED | OUTPATIENT
Start: 2018-05-08 | End: 2018-05-10

## 2018-05-08 RX ORDER — SENNA PLUS 8.6 MG/1
2 TABLET ORAL AT BEDTIME
Qty: 0 | Refills: 0 | Status: DISCONTINUED | OUTPATIENT
Start: 2018-05-08 | End: 2018-05-10

## 2018-05-08 RX ORDER — METOPROLOL TARTRATE 50 MG
50 TABLET ORAL DAILY
Qty: 0 | Refills: 0 | Status: DISCONTINUED | OUTPATIENT
Start: 2018-05-08 | End: 2018-05-10

## 2018-05-08 RX ORDER — SODIUM CHLORIDE 9 MG/ML
3 INJECTION INTRAMUSCULAR; INTRAVENOUS; SUBCUTANEOUS ONCE
Qty: 0 | Refills: 0 | Status: COMPLETED | OUTPATIENT
Start: 2018-05-08 | End: 2018-05-08

## 2018-05-08 RX ORDER — CEFEPIME 1 G/1
2000 INJECTION, POWDER, FOR SOLUTION INTRAMUSCULAR; INTRAVENOUS ONCE
Qty: 0 | Refills: 0 | Status: COMPLETED | OUTPATIENT
Start: 2018-05-08 | End: 2018-05-08

## 2018-05-08 RX ORDER — RALOXIFENE HYDROCHLORIDE 60 MG/1
60 TABLET, COATED ORAL DAILY
Qty: 0 | Refills: 0 | Status: DISCONTINUED | OUTPATIENT
Start: 2018-05-08 | End: 2018-05-10

## 2018-05-08 RX ORDER — SODIUM CHLORIDE 9 MG/ML
500 INJECTION INTRAMUSCULAR; INTRAVENOUS; SUBCUTANEOUS ONCE
Qty: 0 | Refills: 0 | Status: COMPLETED | OUTPATIENT
Start: 2018-05-08 | End: 2018-05-08

## 2018-05-08 RX ORDER — CEFEPIME 1 G/1
2000 INJECTION, POWDER, FOR SOLUTION INTRAMUSCULAR; INTRAVENOUS ONCE
Qty: 0 | Refills: 0 | Status: DISCONTINUED | OUTPATIENT
Start: 2018-05-08 | End: 2018-05-08

## 2018-05-08 RX ADMIN — SODIUM CHLORIDE 2000 MILLILITER(S): 9 INJECTION INTRAMUSCULAR; INTRAVENOUS; SUBCUTANEOUS at 20:22

## 2018-05-08 RX ADMIN — SODIUM CHLORIDE 2000 MILLILITER(S): 9 INJECTION INTRAMUSCULAR; INTRAVENOUS; SUBCUTANEOUS at 20:23

## 2018-05-08 RX ADMIN — SODIUM CHLORIDE 3 MILLILITER(S): 9 INJECTION INTRAMUSCULAR; INTRAVENOUS; SUBCUTANEOUS at 20:23

## 2018-05-08 RX ADMIN — CEFEPIME 100 MILLIGRAM(S): 1 INJECTION, POWDER, FOR SOLUTION INTRAMUSCULAR; INTRAVENOUS at 20:22

## 2018-05-08 NOTE — ED ADULT NURSE NOTE - OBJECTIVE STATEMENT
78 yo F arrived to the ed c/o fever; hx of bladder CA, last chemo 5 days ago; chemo q 3 weeks; has urostomy in place, draining clear yellow urine; reports 102 fever @ home, no meds taken PTA; on assessment 99.2, EKG completed on arrival, vitals stable, C.M placed; IV placed; pt A&Ox3 ambulatory; reports generalized body aches @ baseline, new abd pain x "few days"; md @ bedside

## 2018-05-08 NOTE — ED PROVIDER NOTE - PROGRESS NOTE DETAILS
will cover for uti, reviewed recent id note, will not cover for vre in this context despite urine cx given id reasoning and my suspicion for severe infection less likely given clinical picture (pls see that note) will admit to medicine. dr mejia to get in touch with dr rose

## 2018-05-08 NOTE — ED PROVIDER NOTE - CARE PLAN
Principal Discharge DX:	Urinary tract infection associated with indwelling urethral catheter, subsequent encounter  Goal:	possible indwelling catheter infection. sepsis

## 2018-05-08 NOTE — H&P ADULT - PROBLEM SELECTOR PLAN 1
--continue cefepime for now, patient non-toxic appearing  --consider repeat imaging, though will defer to ID regarding this decision given CKD  --f/u urine and blood cultures, narrow as appropriate  --f/u cultures

## 2018-05-08 NOTE — H&P ADULT - PROBLEM SELECTOR PLAN 2
--seems to be chronic, may be related to chemotherapy and tissue breakdown  --patient s/p 2.5L NS, repeat BMP pending  --pending this, will give kayexalate, consider K cocktail  --EKG without peaked T waves or other concerning changes

## 2018-05-08 NOTE — H&P ADULT - HISTORY OF PRESENT ILLNESS
This is a 77 year old female with a history of hypertension, hypertrophic cardiomyopathy, former smoker with 50 py history, bladder cancer metastatic to bone dx 9/2017 s/p ileal conduit on Carboplatin/Etoposide with two recent admissions for fever, most recently 4/14-4/18 when she was found to have E. Coli bacteremia thought to be of urinary source now presenting with fever.      Regarding recent admissions, patient was initially admitted 3/24 with fever, also found to have hyperkalmeia which was treated with cocktail, and ileal conduit found positive for GNR thought to be colonization, also foyund to have non-obstructing kidney stone on CT. She ultimately left AMA. She then presented again 4/14-4/18 for fever. She was found to have E. Coli bacteremia for which she was treated with cefepime and then transitioned to cefuroxime PO. Urine grew vanc resistant  E. faecium, thought to be unlikelyu pathogen and not treated.     In ED, Tmax 100.3, HR 89, /60, satting well on RA.  Labs notable for WBC 11.1, Hgb 8.5 (stable from prior BL), Plt 119 (lower than previous), K 5.7, Cr 1.42 (BL), LFT with , UA shows largeg LE, negative nitrite, 25-50 WBC PHPF, few bacteria, RVP negative.  CXR clear.  Received cefepime, 1L NS. This is a 77 year old female with a history of hypertension, hypertrophic cardiomyopathy, former smoker with 50 py history, bladder cancer metastatic to bone dx 9/2017 s/p ileal conduit on Carboplatin/Etoposide with two recent admissions for fever, most recently 4/14-4/18 when she was found to have E. Coli bacteremia thought to be of urinary source now presenting with fever.  Patient underwent most recent chemotherapy with neulasta 5/3. She notes diffuse pain the following day which resolved 5/6. Day of admission, patient had subjective fevers, temp at the time was 102. She denies nausea, vomiting, sore throat, diarrhea, constipation, rash. Notes increased urinary output from stoma, denies flank pain.    Regarding recent admissions, patient was initially admitted 3/24 with fever, also found to have hyperkalmeia which was treated with cocktail, and ileal conduit found positive for GNR thought to be colonization, also foyund to have non-obstructing kidney stone on CT. She ultimately left AMA. She then presented again 4/14-4/18 for fever. She was found to have E. Coli bacteremia for which she was treated with cefepime and then transitioned to cefuroxime PO. Urine grew vanc resistant  E. faecium, thought to be unlikelyu pathogen and not treated.     In ED, Tmax 100.3, HR 89, /60, satting well on RA.  Labs notable for WBC 11.1, Hgb 8.5 (stable from prior BL), Plt 119 (lower than previous), K 5.7, Cr 1.42 (BL), LFT with , UA shows largeg LE, negative nitrite, 25-50 WBC PHPF, few bacteria, RVP negative.  CXR clear.  Received cefepime, 1L NS. This is a 77 year old female with a history of hypertension, hypertrophic cardiomyopathy, former smoker with 50 py history, bladder cancer metastatic to bone dx 9/2017 s/p ileal conduit on Carboplatin/Etoposide with two recent admissions for fever, most recently 4/14-4/18 when she was found to have E. Coli bacteremia thought to be of urinary source now presenting with fever.  Patient underwent most recent chemotherapy with neulasta 5/3. She notes diffuse pain the following day which resolved 5/6. Day of admission, patient had subjective fevers, temp at the time was 102. She denies nausea, vomiting, sore throat, diarrhea, constipation, rash. Notes increased urinary output from stoma, denies flank pain.    Regarding recent admissions, patient was initially admitted 3/24 with fever, also found to have hyperkalmeia which was treated with cocktail, and ileal conduit found positive for E. Coli thought to be colonization, and blood culture positive for enterobacter in 1 bottle, thought to also be contaminant also found to have non-obstructing kidney stone on CT. She ultimately left AMA. She then presented again 4/14-4/18 for fever. She was found to have E. Coli bacteremia in 2 bottles for which she was treated with cefepime and then transitioned to cefuroxime PO. Urine grew vanc resistant E. faecium, thought to be unlikely pathogen and not treated.     In ED, Tmax 100.3, HR 89, /60, satting well on RA.  Labs notable for WBC 11.1, Hgb 8.5 (stable from prior BL), Plt 119 (lower than previous), K 5.7, Cr 1.42 (BL), LFT with , UA shows largeg LE, negative nitrite, 25-50 WBC PHPF, few bacteria, RVP negative.  CXR clear.  Received cefepime, 1L NS.

## 2018-05-08 NOTE — ED PROVIDER NOTE - OBJECTIVE STATEMENT
77 year old F with PMHx of Hernia, PSHx of Bladder and urethra removal, and hysterectomy She presents today with complaints of fever,  F at home per patient.  Per patient had two infections in the past and E.coli 3 weeks ago. Notes chronic ABD pain and distension. Pt has a pouch and stoma S/P multiple surgeries in the past. Denies flu like symptoms or diarrhea. Currently on chemotherapy txt. oncologist Dr. Goldman

## 2018-05-08 NOTE — ED PROVIDER NOTE - MEDICAL DECISION MAKING DETAILS
76 y/o F patient PMHx of Bladder cancer metastasized to bone, presents to the ED with fever TM 102F per patient. Will search for infection/signs of sepsis. Review outpatient charts. Plan for urinalysis, chest xr, blood culture, possible IV antibiotics.

## 2018-05-09 DIAGNOSIS — N39.0 URINARY TRACT INFECTION, SITE NOT SPECIFIED: ICD-10-CM

## 2018-05-09 LAB
ANION GAP SERPL CALC-SCNC: 13 MMOL/L — SIGNIFICANT CHANGE UP (ref 5–17)
ANION GAP SERPL CALC-SCNC: 13 MMOL/L — SIGNIFICANT CHANGE UP (ref 5–17)
BASOPHILS # BLD AUTO: 0.03 K/UL — SIGNIFICANT CHANGE UP (ref 0–0.2)
BASOPHILS NFR BLD AUTO: 0.3 % — SIGNIFICANT CHANGE UP (ref 0–2)
BLD GP AB SCN SERPL QL: NEGATIVE — SIGNIFICANT CHANGE UP
BUN SERPL-MCNC: 21 MG/DL — SIGNIFICANT CHANGE UP (ref 7–23)
BUN SERPL-MCNC: 23 MG/DL — SIGNIFICANT CHANGE UP (ref 7–23)
CALCIUM SERPL-MCNC: 7.7 MG/DL — LOW (ref 8.4–10.5)
CALCIUM SERPL-MCNC: 7.9 MG/DL — LOW (ref 8.4–10.5)
CHLORIDE SERPL-SCNC: 110 MMOL/L — HIGH (ref 96–108)
CHLORIDE SERPL-SCNC: 112 MMOL/L — HIGH (ref 96–108)
CO2 SERPL-SCNC: 16 MMOL/L — LOW (ref 22–31)
CO2 SERPL-SCNC: 17 MMOL/L — LOW (ref 22–31)
CREAT SERPL-MCNC: 1.33 MG/DL — HIGH (ref 0.5–1.3)
CREAT SERPL-MCNC: 1.33 MG/DL — HIGH (ref 0.5–1.3)
EOSINOPHIL # BLD AUTO: 0.03 K/UL — SIGNIFICANT CHANGE UP (ref 0–0.5)
EOSINOPHIL NFR BLD AUTO: 0.3 % — SIGNIFICANT CHANGE UP (ref 0–6)
GLUCOSE SERPL-MCNC: 165 MG/DL — HIGH (ref 70–99)
GLUCOSE SERPL-MCNC: 83 MG/DL — SIGNIFICANT CHANGE UP (ref 70–99)
HCT VFR BLD CALC: 22.6 % — LOW (ref 34.5–45)
HGB BLD-MCNC: 7.2 G/DL — LOW (ref 11.5–15.5)
IMM GRANULOCYTES NFR BLD AUTO: 2.1 % — HIGH (ref 0–1.5)
LYMPHOCYTES # BLD AUTO: 0.92 K/UL — LOW (ref 1–3.3)
LYMPHOCYTES # BLD AUTO: 9.5 % — LOW (ref 13–44)
MCHC RBC-ENTMCNC: 31.9 GM/DL — LOW (ref 32–36)
MCHC RBC-ENTMCNC: 32 PG — SIGNIFICANT CHANGE UP (ref 27–34)
MCV RBC AUTO: 100.4 FL — HIGH (ref 80–100)
MONOCYTES # BLD AUTO: 1.1 K/UL — HIGH (ref 0–0.9)
MONOCYTES NFR BLD AUTO: 11.3 % — SIGNIFICANT CHANGE UP (ref 2–14)
NEUTROPHILS # BLD AUTO: 7.45 K/UL — HIGH (ref 1.8–7.4)
NEUTROPHILS NFR BLD AUTO: 76.5 % — SIGNIFICANT CHANGE UP (ref 43–77)
PLATELET # BLD AUTO: 107 K/UL — LOW (ref 150–400)
POTASSIUM SERPL-MCNC: 4.8 MMOL/L — SIGNIFICANT CHANGE UP (ref 3.5–5.3)
POTASSIUM SERPL-MCNC: 4.9 MMOL/L — SIGNIFICANT CHANGE UP (ref 3.5–5.3)
POTASSIUM SERPL-SCNC: 4.8 MMOL/L — SIGNIFICANT CHANGE UP (ref 3.5–5.3)
POTASSIUM SERPL-SCNC: 4.9 MMOL/L — SIGNIFICANT CHANGE UP (ref 3.5–5.3)
RBC # BLD: 2.25 M/UL — LOW (ref 3.8–5.2)
RBC # FLD: 16.1 % — HIGH (ref 10.3–14.5)
RH IG SCN BLD-IMP: POSITIVE — SIGNIFICANT CHANGE UP
SODIUM SERPL-SCNC: 140 MMOL/L — SIGNIFICANT CHANGE UP (ref 135–145)
SODIUM SERPL-SCNC: 141 MMOL/L — SIGNIFICANT CHANGE UP (ref 135–145)
WBC # BLD: 9.73 K/UL — SIGNIFICANT CHANGE UP (ref 3.8–10.5)
WBC # FLD AUTO: 9.73 K/UL — SIGNIFICANT CHANGE UP (ref 3.8–10.5)

## 2018-05-09 PROCEDURE — 99231 SBSQ HOSP IP/OBS SF/LOW 25: CPT

## 2018-05-09 RX ORDER — WHEAT DEXTRIN 3 G/4 G
0 POWDER IN PACKET (EA) ORAL
Qty: 0 | Refills: 0 | COMMUNITY

## 2018-05-09 RX ORDER — CEFEPIME 1 G/1
2000 INJECTION, POWDER, FOR SOLUTION INTRAMUSCULAR; INTRAVENOUS DAILY
Qty: 0 | Refills: 0 | Status: DISCONTINUED | OUTPATIENT
Start: 2018-05-09 | End: 2018-05-09

## 2018-05-09 RX ORDER — CEFEPIME 1 G/1
2000 INJECTION, POWDER, FOR SOLUTION INTRAMUSCULAR; INTRAVENOUS EVERY 12 HOURS
Qty: 0 | Refills: 0 | Status: DISCONTINUED | OUTPATIENT
Start: 2018-05-09 | End: 2018-05-10

## 2018-05-09 RX ORDER — WHEAT DEXTRIN 3 G/4 G
2 POWDER IN PACKET (EA) ORAL
Qty: 0 | Refills: 0 | COMMUNITY

## 2018-05-09 RX ADMIN — Medication 1000 MILLIGRAM(S): at 21:16

## 2018-05-09 RX ADMIN — RALOXIFENE HYDROCHLORIDE 60 MILLIGRAM(S): 60 TABLET, COATED ORAL at 11:33

## 2018-05-09 RX ADMIN — Medication 100 MILLIGRAM(S): at 21:03

## 2018-05-09 RX ADMIN — Medication 50 MILLIGRAM(S): at 05:13

## 2018-05-09 RX ADMIN — SENNA PLUS 2 TABLET(S): 8.6 TABLET ORAL at 00:38

## 2018-05-09 RX ADMIN — DULOXETINE HYDROCHLORIDE 60 MILLIGRAM(S): 30 CAPSULE, DELAYED RELEASE ORAL at 11:33

## 2018-05-09 RX ADMIN — CEFEPIME 100 MILLIGRAM(S): 1 INJECTION, POWDER, FOR SOLUTION INTRAMUSCULAR; INTRAVENOUS at 17:48

## 2018-05-09 RX ADMIN — Medication 75 MILLIGRAM(S): at 17:48

## 2018-05-09 RX ADMIN — Medication 1 SPRAY(S): at 17:55

## 2018-05-09 RX ADMIN — RALOXIFENE HYDROCHLORIDE 60 MILLIGRAM(S): 60 TABLET, COATED ORAL at 00:17

## 2018-05-09 RX ADMIN — SODIUM CHLORIDE 75 MILLILITER(S): 9 INJECTION INTRAMUSCULAR; INTRAVENOUS; SUBCUTANEOUS at 17:48

## 2018-05-09 RX ADMIN — Medication 75 MILLIGRAM(S): at 00:17

## 2018-05-09 RX ADMIN — SODIUM CHLORIDE 75 MILLILITER(S): 9 INJECTION INTRAMUSCULAR; INTRAVENOUS; SUBCUTANEOUS at 03:43

## 2018-05-09 RX ADMIN — Medication 1000 MILLIGRAM(S): at 22:16

## 2018-05-09 RX ADMIN — Medication 1 TABLET(S): at 11:33

## 2018-05-09 RX ADMIN — Medication 1000 MILLIGRAM(S): at 10:06

## 2018-05-09 RX ADMIN — PANTOPRAZOLE SODIUM 40 MILLIGRAM(S): 20 TABLET, DELAYED RELEASE ORAL at 05:14

## 2018-05-09 RX ADMIN — Medication 1000 MILLIGRAM(S): at 07:30

## 2018-05-09 RX ADMIN — Medication 180 MILLIGRAM(S): at 05:13

## 2018-05-09 RX ADMIN — Medication 75 MILLIGRAM(S): at 05:45

## 2018-05-09 RX ADMIN — Medication 650 MILLIGRAM(S): at 05:13

## 2018-05-09 RX ADMIN — MONTELUKAST 10 MILLIGRAM(S): 4 TABLET, CHEWABLE ORAL at 11:33

## 2018-05-09 RX ADMIN — Medication 650 MILLIGRAM(S): at 17:49

## 2018-05-09 RX ADMIN — ENOXAPARIN SODIUM 30 MILLIGRAM(S): 100 INJECTION SUBCUTANEOUS at 05:14

## 2018-05-09 RX ADMIN — Medication 1000 MILLIGRAM(S): at 00:17

## 2018-05-09 NOTE — PROGRESS NOTE ADULT - PROBLEM SELECTOR PLAN 2
--seems to be chronic, may be related to chemotherapy and tissue breakdown  --patient s/p 2.5L NS, repeat BMP s/p Kayexylate,, K is normalized.   --EKG without peaked T waves or other concerning changes

## 2018-05-09 NOTE — CONSULT NOTE ADULT - SUBJECTIVE AND OBJECTIVE BOX
Consult to follow.   Vikram Means MD  [phone] Consult to follow.   Vikram Means MD  [phone]  ------------------------------------  [facility], Division of Infectious Diseases  Obey Means, JT Baptiste    CLINTON, AUBREE  77y, Female  [phone]    HPI-  77F with metastatic bladder cancer s/p chemotherapy most recently 6 days ago, ileal condiut s/p cystectomy, who developed fever to 102F ("lasted about 10 minutes) with mild chills last night and presented to ER per the standing instructions of her oncologist. Patient now feels fine and wants to go home. Low grade fever to 100.3 in ER. Given cefepime vs concern of UTI. Patient previously had E. coli septicemia, presumptive urinary origin, treated with IV and then transitioned to PO antibiotics. F/U cx cleared, abdominal imaging at that time without acute findings save for some perinephric stranding.     Patient denies headache, visual changes, sinus congestion, ear pain, sore throat, dysphagia/odynophagia. No neck pain. No unusualy SOB or CP (patient has some LOPES, long-standing and unchanged). No cough. Mediport functioning normally. Minimal intermittent abdominal pain, again chronic and unchanged. No N/V/D. Output from ileal conduit unchanged. No myalgias/arthralgias. No rash. Appetite good and weight stable. No night sweats. No sick contacts.    PMH/PSH--  Bladder cancer metastasized to bone  Radial fracture  Uterine Polyp  Gastric Ulcer  Migraine, Ophthalmoplegic  Fibromyalgia  Obese  Calcified Thoracic Aorta  Hypertrophic Cardiomyopathy  Paroxysmal Ventricular Tachycardia  Sleep Apnea  Nasal Polyp  Arthritis  HTN (Hypertension)  Asthmatic Bronchitis  Acid Reflux  History of ileal conduit  S/P total knee arthroplasty, left  EPS study  Nasal Polyp removal  History of Arthroscopy  History of D&C      Allergies--  PCN, ge cephs.    Medications--  Antibiotics: cefepime   IVPB 2000 milliGRAM(s) IV Intermittent daily    Immunologic:   Other: acetaminophen   Tablet. PRN  diltiazem   CD  docusate sodium  DULoxetine  enoxaparin Injectable  fluticasone propionate 50 MICROgram(s)/spray Nasal Spray PRN  metoprolol succinate ER  montelukast  multivitamin  pantoprazole    Tablet  pregabalin  raloxifene  senna PRN  sodium bicarbonate  sodium chloride 0.9%.  traZODone      Social History--  EtOH: denies active  Tobacco: denies active, distant prior use  Drug Use: denies active    Family/Marital History--  Family history of dementia (Father)  Family history of hyperlipidemia (Father)  Family history of prostate cancer (Father)    Remainder not relevant to clinical concern.    Review of Systems:  A >=10-point review of systems was obtained.   Review of systems otherwise negative except as previously noted.    Physical Exam--  Vital Signs: T(F): 98.4 (05-09-18 @ 07:21), Max: 100.3 (05-08-18 @ 19:25)  HR: 86 (05-09-18 @ 07:21)  BP: 120/67 (05-09-18 @ 07:21)  RR: 17 (05-09-18 @ 07:21)  SpO2: 94% (05-09-18 @ 07:21)  Wt(kg): --  General: Nontoxic-appearing Female in no acute distress.  HEENT: Partial alopecia. Anicteric. Conjunctiva pink and moist. PERRL. EOMI. Oropharynx clear.   Neck: Not rigid. No sense of mass.  Nodes: None palpable.  Lungs: Clear bilaterally without rales, wheezing or rhonchi  Heart: Regular rate and rhythm. No Murmur. No rub. No gallop. No palpable thrill.  Chest: Mediport without No inflammatory signs.  Abdomen: Bowel sounds present and normoactive. Soft. Nondistended. Minimal tenderness abdominal wall. Ileal conduit yellow urine.  Back: No ST/CVAT.  Extremities: No cyanosis or clubbing. No edema.   Skin: Warm. Dry. Good turgor. No vasculitic stigmata. Traumatic/purpuric changes.  Psychiatric: More pleasant than prior interactions, appropriate, A&O x4.    Laboratory & Imaging Data--  CBC                        7.2    9.73  )-----------( 107      ( 09 May 2018 07:45 )             22.6   WBC Count: 11.1 K/uL (05.08.18 @ 20:14)        Chemistries  05-09    141  |  112<H>  |  21  ----------------------------<  83  4.9   |  16<L>  |  1.33<H>  Creatinine, Serum: 1.33 mg/dL (05.08.18 @ 23:46)      Ca    7.9<L>      09 May 2018 04:59    TPro  6.0  /  Alb  3.7  /  TBili  0.3  /  DBili  x   /  AST  12  /  ALT  14  /  AlkPhos  141<H>  05-08    Urinalysis (05.08.18 @ 20:14)    pH Urine: 6.5    Glucose Qualitative, Urine: Negative    Blood, Urine: Trace    Color: PL Yellow    Urine Appearance: SL Turbid    Bilirubin: Negative    Ketone - Urine: Negative    Specific Gravity: 1.010    Protein, Urine: Trace    Urobilinogen: Negative    Nitrite: Negative    Leukocyte Esterase Concentration: Large  Urine Microscopic-Add On (NC) (05.08.18 @ 20:14)    White Blood Cell - Urine: 25-50 /HPF    Red Blood Cell - Urine: 5-10 /HPF    Epithelial Cells: Occasional /HPF    Bacteria: Few /HPF    Culture Data  None as of yet    CXR--  < from: Xray Chest 1 View AP/PA (05.08.18 @ 20:41) >  EXAM:  XR CHEST AP OR PA 1V                        PROCEDURE DATE:  05/08/2018    INTERPRETATION:  CLINICAL INFORMATION: Sepsis.    TECHNIQUE: AP view of the chest.    COMPARISON: Chest x-ray 4/14/2018.    FINDINGS:     Right chest wallMediport distal tip is in the SVC.    No focal consolidations, pleural effusions, or pneumothorax.  Left basilar subsegmental atelectasis with mild elevation of left   hemidiaphragm.  The heart is normal in size.  Degenerative changes of the thoracicspine and bilateral shoulders.    IMPRESSION:   Clear lungs.    < end of copied text >

## 2018-05-09 NOTE — CONSULT NOTE ADULT - PROBLEM SELECTOR RECOMMENDATION 2
F/U blood and urine cx data  If stronger suspicion for infection does not emerge would hope to limit antibiotic use.  Increase Cefepime to Q12H  Role of Abx TBD

## 2018-05-09 NOTE — PROGRESS NOTE ADULT - ASSESSMENT
77 year old female with a history of hypertension, hypertrophic cardiomyopathy, former smoker with 50 py history, bladder cancer metastatic to bone dx 9/2017 s/p ileal conduit on Carboplatin/Etoposide with two recent admissions for fever, most recently 4/14-4/18 when she was found to have E. Coli bacteremia thought to be of urinary source now presenting with fever. Again, suspect urinary source. Ultimately, concern that patient may have recurrent translocation from ileal conduit causing transient bacteremia and fevers.

## 2018-05-09 NOTE — PROGRESS NOTE ADULT - SUBJECTIVE AND OBJECTIVE BOX
Patient is a 77y old  Female who presents with a chief complaint of fever (08 May 2018 22:33)      SUBJECTIVE / OVERNIGHT EVENTS:  feels well.  "when am I going home?"  no cp, no sob, no n/v/d. no abdominal pain.  no headache, no dizziness.       Vital Signs Last 24 Hrs  T(C): 37 (09 May 2018 15:20), Max: 37.9 (08 May 2018 19:25)  T(F): 98.6 (09 May 2018 15:20), Max: 100.3 (08 May 2018 19:25)  HR: 82 (09 May 2018 15:20) (80 - 94)  BP: 122/48 (09 May 2018 15:20) (105/77 - 127/60)  BP(mean): --  RR: 16 (09 May 2018 15:20) (16 - 17)  SpO2: 100% (09 May 2018 15:20) (94% - 100%)  I&O's Summary    09 May 2018 07:01  -  09 May 2018 17:08  --------------------------------------------------------  IN: 0 mL / OUT: 650 mL / NET: -650 mL        PHYSICAL EXAM:  GENERAL: NAD, Comfortable  HEAD:  Atraumatic, Normocephalic  EYES: EOMI, PERRLA, conjunctiva and sclera clear  NECK: Supple, No JVD  CHEST/LUNG: Clear to auscultation bilaterally; No wheeze  HEART: Regular rate and rhythm; No murmurs, rubs, or gallops  ABDOMEN: Soft, Nontender, Nondistended; Bowel sounds present, +ileal conduit, clear urine.   Neuro: AAO x 3, no focal deficit, 5/5 b/l extremities  EXTREMITIES:  2+ Peripheral Pulses, No clubbing, cyanosis, or edema  SKIN: No rashes or lesions    LABS:                        7.2    9.73  )-----------( 107      ( 09 May 2018 07:45 )             22.6     05-09    141  |  112<H>  |  21  ----------------------------<  83  4.9   |  16<L>  |  1.33<H>    Ca    7.9<L>      09 May 2018 04:59    TPro  6.0  /  Alb  3.7  /  TBili  0.3  /  DBili  x   /  AST  12  /  ALT  14  /  AlkPhos  141<H>  05-08      CAPILLARY BLOOD GLUCOSE            Urinalysis Basic - ( 08 May 2018 20:14 )    Color: PL Yellow / Appearance: SL Turbid / S.010 / pH: x  Gluc: x / Ketone: Negative  / Bili: Negative / Urobili: Negative   Blood: x / Protein: Trace / Nitrite: Negative   Leuk Esterase: Large / RBC: 5-10 /HPF / WBC 25-50 /HPF   Sq Epi: x / Non Sq Epi: Occasional /HPF / Bacteria: Few /HPF        RADIOLOGY & ADDITIONAL TESTS:    Imaging Personally Reviewed:  [x] YES  [ ] NO    Consultant(s) Notes Reviewed:  [x] YES  [ ] NO      MEDICATIONS  (STANDING):  cefepime   IVPB 2000 milliGRAM(s) IV Intermittent every 12 hours  diltiazem    milliGRAM(s) Oral daily  docusate sodium 100 milliGRAM(s) Oral three times a day  DULoxetine 60 milliGRAM(s) Oral daily  enoxaparin Injectable 30 milliGRAM(s) SubCutaneous every 24 hours  metoprolol succinate ER 50 milliGRAM(s) Oral daily  montelukast 10 milliGRAM(s) Oral daily  multivitamin 1 Tablet(s) Oral daily  pantoprazole    Tablet 40 milliGRAM(s) Oral before breakfast  pregabalin 75 milliGRAM(s) Oral two times a day  raloxifene 60 milliGRAM(s) Oral daily  sodium bicarbonate 650 milliGRAM(s) Oral two times a day  sodium chloride 0.9%. 1000 milliLiter(s) (75 mL/Hr) IV Continuous <Continuous>  traZODone 100 milliGRAM(s) Oral at bedtime    MEDICATIONS  (PRN):  acetaminophen   Tablet. 1000 milliGRAM(s) Oral every 8 hours PRN Moderate Pain (4 - 6)  fluticasone propionate 50 MICROgram(s)/spray Nasal Spray 1 Spray(s) Both Nostrils daily PRN congestion  senna 2 Tablet(s) Oral at bedtime PRN Constipation      Care Discussed with Consultants/Other Providers [x] YES  [ ] NO    HEALTH ISSUES - PROBLEM Dx:  UTI (urinary tract infection): UTI (urinary tract infection)  Hyperkalemia: Hyperkalemia  Nutrition, metabolism, and development symptoms: Nutrition, metabolism, and development symptoms  Prophylactic measure: Prophylactic measure  Chronic pain: Chronic pain  Bladder cancer metastasized to bone: Bladder cancer metastasized to bone  Fever: Fever

## 2018-05-09 NOTE — CONSULT NOTE ADULT - ASSESSMENT
I spoke with Sunshine regarding onset report.  It is essentially normal.  There is a defect in lower uterine segment.  No fibroids polyps adenomyosis.  Her biggest problem is bleeding smoothly for about 3 weeks in a row.  I discussed options including doing nothing besides iron pills to prevent anemia.  Mentioned an IUD.  Apparently her sister had some problems with an IUD and she does want full of that.  Also mentioned uterine/endometrial ablation to decrease or hopefully stop her bleeding.  She would need to use some birth control other than that.  She is not interested in any further childbearing discussed that she should not intend to get pregnant after an ablation.  She would like to use condoms and go and get this ordered.  Also mentioned hysterectomy but that's a little more aggressive and recovery is a little longer.   Fever after chemotherapy for metastatic bladder cancer  Not systemically ill appearing  Unrevealing history for focus of infection  U/A and urine cx little value in setting of ileal conduit as system by definition nonsterile and what would be an abnormal U/A from a native bladder does not translate to an ileal conduit.  Port always a possibility, but no local inflammatory signs or report of dysfunction to increase suspicion for such  No concern of abdominal process  No concern of SSTI  No concern of primary pulmonary infection  No concern of active CNS process causing fever  Not neutropenic. WBC rapidly normalized and lower than last presentation.

## 2018-05-09 NOTE — PROGRESS NOTE ADULT - PROBLEM SELECTOR PLAN 1
--continue cefepime for now, patient non-toxic appearing  -- ID [facility], lenin appreciated.   --f/u urine and blood cultures, will narrow abx as appropriate

## 2018-05-09 NOTE — CONSULT NOTE ADULT - PROBLEM SELECTOR RECOMMENDATION 3
Unconvinced  Has Hx of VRE in urine. Would not treat at this time.   If patient has further fever would add coverage vs. this organism with Daptomycin 6mg/kg IVPB Q24H (reluctant to use linezolid with concomitant SSRI use)

## 2018-05-10 ENCOUNTER — TRANSCRIPTION ENCOUNTER (OUTPATIENT)
Age: 78
End: 2018-05-10

## 2018-05-10 VITALS
OXYGEN SATURATION: 96 % | HEART RATE: 75 BPM | DIASTOLIC BLOOD PRESSURE: 63 MMHG | RESPIRATION RATE: 17 BRPM | SYSTOLIC BLOOD PRESSURE: 102 MMHG | TEMPERATURE: 99 F

## 2018-05-10 LAB
-  AMIKACIN: SIGNIFICANT CHANGE UP
-  AMOXICILLIN/CLAVULANIC ACID: SIGNIFICANT CHANGE UP
-  AMPICILLIN/SULBACTAM: SIGNIFICANT CHANGE UP
-  AMPICILLIN: SIGNIFICANT CHANGE UP
-  AZTREONAM: SIGNIFICANT CHANGE UP
-  CEFAZOLIN: SIGNIFICANT CHANGE UP
-  CEFEPIME: SIGNIFICANT CHANGE UP
-  CEFOXITIN: SIGNIFICANT CHANGE UP
-  CEFTRIAXONE: SIGNIFICANT CHANGE UP
-  CIPROFLOXACIN: SIGNIFICANT CHANGE UP
-  ERTAPENEM: SIGNIFICANT CHANGE UP
-  GENTAMICIN: SIGNIFICANT CHANGE UP
-  IMIPENEM: SIGNIFICANT CHANGE UP
-  LEVOFLOXACIN: SIGNIFICANT CHANGE UP
-  MEROPENEM: SIGNIFICANT CHANGE UP
-  NITROFURANTOIN: SIGNIFICANT CHANGE UP
-  PIPERACILLIN/TAZOBACTAM: SIGNIFICANT CHANGE UP
-  TIGECYCLINE: SIGNIFICANT CHANGE UP
-  TOBRAMYCIN: SIGNIFICANT CHANGE UP
-  TRIMETHOPRIM/SULFAMETHOXAZOLE: SIGNIFICANT CHANGE UP
ANION GAP SERPL CALC-SCNC: 11 MMOL/L — SIGNIFICANT CHANGE UP (ref 5–17)
BLD GP AB SCN SERPL QL: NEGATIVE — SIGNIFICANT CHANGE UP
BUN SERPL-MCNC: 17 MG/DL — SIGNIFICANT CHANGE UP (ref 7–23)
CALCIUM SERPL-MCNC: 7.5 MG/DL — LOW (ref 8.4–10.5)
CHLORIDE SERPL-SCNC: 112 MMOL/L — HIGH (ref 96–108)
CO2 SERPL-SCNC: 18 MMOL/L — LOW (ref 22–31)
CREAT SERPL-MCNC: 1.47 MG/DL — HIGH (ref 0.5–1.3)
CULTURE RESULTS: SIGNIFICANT CHANGE UP
GLUCOSE SERPL-MCNC: 96 MG/DL — SIGNIFICANT CHANGE UP (ref 70–99)
HCT VFR BLD CALC: 20 % — CRITICAL LOW (ref 34.5–45)
HCT VFR BLD CALC: 24.4 % — LOW (ref 34.5–45)
HGB BLD-MCNC: 6.4 G/DL — CRITICAL LOW (ref 11.5–15.5)
HGB BLD-MCNC: 8.1 G/DL — LOW (ref 11.5–15.5)
MCHC RBC-ENTMCNC: 32 GM/DL — SIGNIFICANT CHANGE UP (ref 32–36)
MCHC RBC-ENTMCNC: 32 PG — SIGNIFICANT CHANGE UP (ref 27–34)
MCHC RBC-ENTMCNC: 33.2 GM/DL — SIGNIFICANT CHANGE UP (ref 32–36)
MCHC RBC-ENTMCNC: 33.3 PG — SIGNIFICANT CHANGE UP (ref 27–34)
MCV RBC AUTO: 100 FL — SIGNIFICANT CHANGE UP (ref 80–100)
MCV RBC AUTO: 100 FL — SIGNIFICANT CHANGE UP (ref 80–100)
METHOD TYPE: SIGNIFICANT CHANGE UP
ORGANISM # SPEC MICROSCOPIC CNT: SIGNIFICANT CHANGE UP
ORGANISM # SPEC MICROSCOPIC CNT: SIGNIFICANT CHANGE UP
PLATELET # BLD AUTO: 79 K/UL — LOW (ref 150–400)
PLATELET # BLD AUTO: 90 K/UL — LOW (ref 150–400)
POTASSIUM SERPL-MCNC: 4.8 MMOL/L — SIGNIFICANT CHANGE UP (ref 3.5–5.3)
POTASSIUM SERPL-SCNC: 4.8 MMOL/L — SIGNIFICANT CHANGE UP (ref 3.5–5.3)
RBC # BLD: 2 M/UL — LOW (ref 3.8–5.2)
RBC # BLD: 2.43 M/UL — LOW (ref 3.8–5.2)
RBC # FLD: 15.3 % — HIGH (ref 10.3–14.5)
RBC # FLD: 16 % — HIGH (ref 10.3–14.5)
RH IG SCN BLD-IMP: POSITIVE — SIGNIFICANT CHANGE UP
SODIUM SERPL-SCNC: 141 MMOL/L — SIGNIFICANT CHANGE UP (ref 135–145)
SPECIMEN SOURCE: SIGNIFICANT CHANGE UP
WBC # BLD: 6.1 K/UL — SIGNIFICANT CHANGE UP (ref 3.8–10.5)
WBC # BLD: 6.43 K/UL — SIGNIFICANT CHANGE UP (ref 3.8–10.5)
WBC # FLD AUTO: 6.1 K/UL — SIGNIFICANT CHANGE UP (ref 3.8–10.5)
WBC # FLD AUTO: 6.43 K/UL — SIGNIFICANT CHANGE UP (ref 3.8–10.5)

## 2018-05-10 RX ORDER — DIPHENHYDRAMINE HCL 50 MG
25 CAPSULE ORAL ONCE
Qty: 0 | Refills: 0 | Status: COMPLETED | OUTPATIENT
Start: 2018-05-10 | End: 2018-05-10

## 2018-05-10 RX ORDER — CEFUROXIME AXETIL 250 MG
500 TABLET ORAL EVERY 12 HOURS
Qty: 0 | Refills: 0 | Status: DISCONTINUED | OUTPATIENT
Start: 2018-05-10 | End: 2018-05-10

## 2018-05-10 RX ORDER — CEFUROXIME AXETIL 250 MG
1 TABLET ORAL
Qty: 6 | Refills: 0 | OUTPATIENT
Start: 2018-05-10 | End: 2018-05-12

## 2018-05-10 RX ADMIN — CEFEPIME 100 MILLIGRAM(S): 1 INJECTION, POWDER, FOR SOLUTION INTRAMUSCULAR; INTRAVENOUS at 06:16

## 2018-05-10 RX ADMIN — Medication 650 MILLIGRAM(S): at 06:16

## 2018-05-10 RX ADMIN — ENOXAPARIN SODIUM 30 MILLIGRAM(S): 100 INJECTION SUBCUTANEOUS at 06:19

## 2018-05-10 RX ADMIN — RALOXIFENE HYDROCHLORIDE 60 MILLIGRAM(S): 60 TABLET, COATED ORAL at 11:48

## 2018-05-10 RX ADMIN — Medication 1 SPRAY(S): at 00:00

## 2018-05-10 RX ADMIN — Medication 50 MILLIGRAM(S): at 06:16

## 2018-05-10 RX ADMIN — Medication 25 MILLIGRAM(S): at 01:24

## 2018-05-10 RX ADMIN — Medication 1 TABLET(S): at 11:48

## 2018-05-10 RX ADMIN — MONTELUKAST 10 MILLIGRAM(S): 4 TABLET, CHEWABLE ORAL at 11:48

## 2018-05-10 RX ADMIN — Medication 180 MILLIGRAM(S): at 06:16

## 2018-05-10 RX ADMIN — Medication 75 MILLIGRAM(S): at 06:16

## 2018-05-10 RX ADMIN — PANTOPRAZOLE SODIUM 40 MILLIGRAM(S): 20 TABLET, DELAYED RELEASE ORAL at 06:16

## 2018-05-10 RX ADMIN — DULOXETINE HYDROCHLORIDE 60 MILLIGRAM(S): 30 CAPSULE, DELAYED RELEASE ORAL at 11:48

## 2018-05-10 NOTE — PROGRESS NOTE ADULT - ASSESSMENT
77 year old female with a history of hypertrophic cardiomyopathy, former smoker with 50 py history, bladder cancer metastatic to bone dx 9/2017 s/p ileal conduit on Carboplatin/Etoposide with two recent admissions for fever, most recently 4/14-4/18 when she was found to have E. Coli bacteremia thought to be of urinary source now presenting with fever. Again, suspect urinary source. Ultimately, concern that patient may have recurrent translocation from ileal conduit causing transient bacteremia and fevers.

## 2018-05-10 NOTE — PROGRESS NOTE ADULT - SUBJECTIVE AND OBJECTIVE BOX
Patient is a 77y old  Female who presents with a chief complaint of fever (08 May 2018 22:33)      SUBJECTIVE / OVERNIGHT EVENTS:  feel well.   wants to go home.  no cp, no sob, no n/v/d. no abdominal pain.  no headache, no dizziness.   afebrile.  received 1unit PRBC overnight.       Vital Signs Last 24 Hrs  T(C): 37 (10 May 2018 06:00), Max: 37.3 (09 May 2018 17:18)  T(F): 98.6 (10 May 2018 06:00), Max: 99.1 (09 May 2018 17:18)  HR: 80 (10 May 2018 06:00) (79 - 98)  BP: 128/58 (10 May 2018 06:00) (95/53 - 136/67)  BP(mean): --  RR: 18 (10 May 2018 06:00) (16 - 18)  SpO2: 100% (10 May 2018 06:00) (97% - 100%)  I&O's Summary    09 May 2018 07:01  -  10 May 2018 07:00  --------------------------------------------------------  IN: 1660 mL / OUT: 3050 mL / NET: -1390 mL    10 May 2018 07:01  -  10 May 2018 13:01  --------------------------------------------------------  IN: 620 mL / OUT: 600 mL / NET: 20 mL        PHYSICAL EXAM:  GENERAL: NAD, Comfortable  HEAD:  Atraumatic, Normocephalic  EYES: EOMI, PERRLA, conjunctiva and sclera clear  NECK: Supple, No JVD  CHEST/LUNG: Clear to auscultation bilaterally; No wheeze  HEART: Regular rate and rhythm; No murmurs, rubs, or gallops  ABDOMEN: Soft, Nontender, Nondistended; Bowel sounds present, +ileal conduit, clear urine.   Neuro: AAO x 3, no focal deficit, 5/5 b/l extremities  EXTREMITIES:  2+ Peripheral Pulses, No clubbing, cyanosis, or edema  SKIN: No rashes or lesions      LABS:                        8.1    6.1   )-----------( 79       ( 10 May 2018 08:09 )             24.4     05-10    141  |  112<H>  |  17  ----------------------------<  96  4.8   |  18<L>  |  1.47<H>    Ca    7.5<L>      10 May 2018 08:09    TPro  6.0  /  Alb  3.7  /  TBili  0.3  /  DBili  x   /  AST  12  /  ALT  14  /  AlkPhos  141<H>  05-08      CAPILLARY BLOOD GLUCOSE            Urinalysis Basic - ( 08 May 2018 20:14 )    Color: PL Yellow / Appearance: SL Turbid / S.010 / pH: x  Gluc: x / Ketone: Negative  / Bili: Negative / Urobili: Negative   Blood: x / Protein: Trace / Nitrite: Negative   Leuk Esterase: Large / RBC: 5-10 /HPF / WBC 25-50 /HPF   Sq Epi: x / Non Sq Epi: Occasional /HPF / Bacteria: Few /HPF        RADIOLOGY & ADDITIONAL TESTS:    Imaging Personally Reviewed:  [x] YES  [ ] NO    Consultant(s) Notes Reviewed:  [x] YES  [ ] NO      MEDICATIONS  (STANDING):  cefuroxime   Tablet 500 milliGRAM(s) Oral every 12 hours  diltiazem    milliGRAM(s) Oral daily  docusate sodium 100 milliGRAM(s) Oral three times a day  DULoxetine 60 milliGRAM(s) Oral daily  enoxaparin Injectable 30 milliGRAM(s) SubCutaneous every 24 hours  metoprolol succinate ER 50 milliGRAM(s) Oral daily  montelukast 10 milliGRAM(s) Oral daily  multivitamin 1 Tablet(s) Oral daily  pantoprazole    Tablet 40 milliGRAM(s) Oral before breakfast  pregabalin 75 milliGRAM(s) Oral two times a day  raloxifene 60 milliGRAM(s) Oral daily  sodium bicarbonate 650 milliGRAM(s) Oral two times a day  traZODone 100 milliGRAM(s) Oral at bedtime    MEDICATIONS  (PRN):  acetaminophen   Tablet. 1000 milliGRAM(s) Oral every 8 hours PRN Moderate Pain (4 - 6)  fluticasone propionate 50 MICROgram(s)/spray Nasal Spray 1 Spray(s) Both Nostrils daily PRN congestion  senna 2 Tablet(s) Oral at bedtime PRN Constipation      Care Discussed with Consultants/Other Providers [x] YES  [ ] NO    HEALTH ISSUES - PROBLEM Dx:  UTI (urinary tract infection): UTI (urinary tract infection)  Hyperkalemia: Hyperkalemia  Nutrition, metabolism, and development symptoms: Nutrition, metabolism, and development symptoms  Prophylactic measure: Prophylactic measure  Chronic pain: Chronic pain  Bladder cancer metastasized to bone: Bladder cancer metastasized to bone  Fever: Fever

## 2018-05-10 NOTE — DISCHARGE NOTE ADULT - PATIENT PORTAL LINK FT
You can access the [facility] Patient Portal, offered by [facility], by registering with the following website: [email or web address]

## 2018-05-10 NOTE — DISCHARGE NOTE ADULT - HOSPITAL COURSE
77 year old female with a history of hypertrophic cardiomyopathy, former smoker with 50 py history, bladder cancer metastatic to bone dx 9/2017 s/p ileal conduit on Carboplatin/Etoposide with two recent admissions for fever, most recently 4/14-4/18 when she was found to have E. Coli bacteremia thought to be of urinary source now presenting with fever. Urinary cx grow klebsiella pneumoniae, blood cx neg ---ID consulted, pt started on iv cefepime -- per ID, d/c planning on cefuroxime 500mg PO BID to complete abx til 5/13.   #Bladder cancer metastasized to bone.  Plan: --continue Evista.   #Anemia - likely chemo induced.   no melena, no hematochezia.   hgb 6.4 is likely erroneous.   hgb 7.2 --> 8.2 after 1 unit.   d/c planning home with onc (Dr. Cruz) follow up this Tuesday (she already has an appointment).  She was getting Aranesp shots there.

## 2018-05-10 NOTE — PROGRESS NOTE ADULT - SUBJECTIVE AND OBJECTIVE BOX
infectious diseases progress note:    AUBREE ALONZO is a 77y y. o. Female patient    Patient reports: anxious to go home, feeling fine, just a few loose stools    ROS:    EYES:  Negative  blurry vision or double vision  GASTROINTESTINAL:  Negative for nausea, vomiting, diarrhea  -otherwise negative except for subjective    Allergies    penicillin (Rash)    Intolerances        ANTIBIOTICS/RELEVANT:  antimicrobials  cefepime   IVPB 2000 milliGRAM(s) IV Intermittent every 12 hours    immunologic:    OTHER:  acetaminophen   Tablet. 1000 milliGRAM(s) Oral every 8 hours PRN  diltiazem    milliGRAM(s) Oral daily  docusate sodium 100 milliGRAM(s) Oral three times a day  DULoxetine 60 milliGRAM(s) Oral daily  enoxaparin Injectable 30 milliGRAM(s) SubCutaneous every 24 hours  fluticasone propionate 50 MICROgram(s)/spray Nasal Spray 1 Spray(s) Both Nostrils daily PRN  metoprolol succinate ER 50 milliGRAM(s) Oral daily  montelukast 10 milliGRAM(s) Oral daily  multivitamin 1 Tablet(s) Oral daily  pantoprazole    Tablet 40 milliGRAM(s) Oral before breakfast  pregabalin 75 milliGRAM(s) Oral two times a day  raloxifene 60 milliGRAM(s) Oral daily  senna 2 Tablet(s) Oral at bedtime PRN  sodium bicarbonate 650 milliGRAM(s) Oral two times a day  sodium chloride 0.9%. 1000 milliLiter(s) IV Continuous <Continuous>  traZODone 100 milliGRAM(s) Oral at bedtime      Objective:  Last 24-Vital Signs Last 24 Hrs  T(C): 37 (10 May 2018 06:00), Max: 37.3 (09 May 2018 17:18)  T(F): 98.6 (10 May 2018 06:00), Max: 99.1 (09 May 2018 17:18)  HR: 80 (10 May 2018 06:00) (79 - 98)  BP: 128/58 (10 May 2018 06:00) (95/53 - 136/67)  BP(mean): --  RR: 18 (10 May 2018 06:00) (16 - 18)  SpO2: 100% (10 May 2018 06:00) (97% - 100%)    T(C): 37 (05-10-18 @ 06:00), Max: 37.9 (18 19:25)  T(F): 98.6 (05-10-18 @ 06:00), Max: 100.3 (18 19:25)  T(C): 37 (05-10-18 @ 06:00), Max: 37.9 (18 19:25)  T(F): 98.6 (05-10-18 @ 06:00), Max: 100.3 (18 19:25)  T(C): 37 (05-10-18 @ 06:00), Max: 37.9 (18 19:25)  T(F): 98.6 (05-10-18 @ 06:00), Max: 100.3 (18:25)    PHYSICAL EXAM:  Constitutional: Well-developed, well nourished  Eyes: PERRLA, EOMI  Ear/Nose/Throat: oropharynx normal	  Neck: no JVD, no lymphadenopathy, supple  Respiratory: no accessory muscle use, lung fields bilaterally clear  Cardiovascular: RRR, normal S1, S2 no m/r/g  Gastrointestinal: soft, NT, no HSM, BS-normal  Extremities: no clubbing, no cyanosis, edema absent  Neuro: patient alert, oriented and appropriate  Skin: no sig lesions      LABS:                        8.1    6.1   )-----------( 79       ( 10 May 2018 08:09 )             24.4       WBC 6.1  05-10 @ 08:09  WBC 6.43   @ 20:49  WBC 9.73   @ 07:45  WBC 11.1   @ 20:14      05-10    141  |  112<H>  |  17  ----------------------------<  96  4.8   |  18<L>  |  1.47<H>    Ca    7.5<L>      10 May 2018 08:09    TPro  6.0  /  Alb  3.7  /  TBili  0.3  /  DBili  x   /  AST  12  /  ALT  14  /  AlkPhos  141<H>        Creatinine, Serum: 1.47 mg/dL (05-10-18 @ 08:09)  Creatinine, Serum: 1.33 mg/dL (18 @ 04:59)  Creatinine, Serum: 1.33 mg/dL (18 @ 23:46)  Creatinine, Serum: 1.42 mg/dL (18 @ 20:14)        Urinalysis Basic - ( 08 May 2018 20:14 )    Color: PL Yellow / Appearance: SL Turbid / S.010 / pH: x  Gluc: x / Ketone: Negative  / Bili: Negative / Urobili: Negative   Blood: x / Protein: Trace / Nitrite: Negative   Leuk Esterase: Large / RBC: 5-10 /HPF / WBC 25-50 /HPF   Sq Epi: x / Non Sq Epi: Occasional /HPF / Bacteria: Few /HPF            MICROBIOLOGY:      Culture - Urine (collected 09 May 2018 00:18)  Source: .Urine Clean Catch (Midstream)  Preliminary Report (09 May 2018 19:38):    >100,000 CFU/ml Klebsiella pneumoniae    Culture - Blood (collected 08 May 2018 23:32)  Source: .Blood Blood-Peripheral  Preliminary Report (10 May 2018 01:02):    No growth to date.    Culture - Blood (collected 08 May 2018 23:32)  Source: .Blood Blood-Peripheral  Preliminary Report (10 May 2018 01:02):    No growth to date.          RADIOLOGY & ADDITIONAL STUDIES:

## 2018-05-10 NOTE — CHART NOTE - NSCHARTNOTEFT_GEN_A_CORE
MEDICINE AUBREE GIFFORD  Patient is a 77 year old female who presents with a chief complaint of fever. (08 May 2018 22:33)       Event Summary:  Called by RN to report patient with hemoglobin of 6.4 and hematocrit of 20.0.  Patient seen at the bedside.  She is alert.  Patient does not endorse any complaints at this time.  Denies active bleeding, dizziness, chest pain, palpitations, shortness of breath, abdominal pain,                             6.4    6.43  )-----------( 90       ( 09 May 2018 20:49 )             20.0         Plan:              Shelley Woods PA-C  Medicine Department MEDICINE AUBREE GIFFORD  Patient is a 77 year old female who presents with a chief complaint of fever. (08 May 2018 22:33)       Event Summary:  Called by RN to report patient with repeat hemoglobin of 6.4 and hematocrit of 20.0.  Patient seen at the bedside.  She is alert.  Patient does not endorse any complaints at this time.  Denies active bleeding, dizziness, chest pain, palpitations, shortness of breath, abdominal pain, nausea, vomiting and diarrhea.      CBC:                      6.4    6.43  )-----------( 90       ( 09 May 2018 20:49 )             20.0       HPI:  Patient is a 77 year old female with a PMHx of hypertension, hypertrophic cardiomyopathy, former smoker (50 pack year history), bladder cancer with mets to bone (diagnosed 9/2017), S/P ileal conduit on Carboplatin/Etoposide and two recent admissions for fever (most recently 4/14-4/18) complicated by E. Coli bacteremia thought to be of urinary source who presented with fever. (08 May 2018 22:33)  Now with anemia.      PLAN: Anemia  - Plan to give 1 unit of packed red blood cells  - Discussed with Dr. Manning overnight - agrees with above plan  - Discussed plan with patient and she is agreeable to blood transfusion at this time  - Consent for transfusion obtained at the bedside with risks vs. benefits explained and patient endorsed her full understanding  - Follow up with post transfusion CBC  - Will continue to monitor closely  - Primary team to follow up in AM      #[id number]  Shelley Woods PA-C  Medicine Department MEDICINE AUBREE GIFFORD  Patient is a 77 year old female who presents with a chief complaint of fever. (08 May 2018 22:33)       Event Summary:  Called by RN to report patient with repeat hemoglobin of 6.4 and hematocrit of 20.0.  Patient seen at the bedside.  She is alert.  Patient does not endorse any complaints at this time.  Denies active bleeding, dizziness, chest pain, palpitations, shortness of breath, abdominal pain, nausea, vomiting and diarrhea.      CBC:                      6.4    6.43  )-----------( 90       ( 09 May 2018 20:49 )             20.0       HPI:  Patient is a 77 year old female with a PMHx of hypertension, hypertrophic cardiomyopathy, former smoker (50 pack year history), bladder cancer with mets to bone (diagnosed 9/2017), S/P ileal conduit on Carboplatin/Etoposide and two recent admissions for fever (most recently 4/14-4/18) complicated by E. Coli bacteremia thought to be of urinary source who presented with fever. (08 May 2018 22:33)  Now with anemia.      PLAN: Anemia  - Plan to give 1 unit of packed red blood cells  - Discussed with Dr. Manning overnight - agrees with above plan  - Discussed plan with patient and she is agreeable to blood transfusion at this time.  As per patient, she states that when she has gotten blood transfusions in the past, she gets pre-medicated with PO Benadryl  - Consent for transfusion obtained at the bedside with risks vs. benefits explained and patient endorsed her full understanding  - Follow up with post transfusion CBC  - Will continue to monitor closely  - Primary team to follow up in AM      #[id number]  Shelley Woods PA-C  Medicine Department MEDICINE AUBREE GIFFORD  Patient is a 77 year old female who presents with a chief complaint of fever. (08 May 2018 22:33)       Event Summary:  Called by RN to report patient with repeat hemoglobin of 6.4 and hematocrit of 20.0.  Patient seen at the bedside.  She is alert.  Patient does not endorse any complaints at this time.  Denies active bleeding, dizziness, chest pain, palpitations, shortness of breath, abdominal pain, nausea, vomiting and diarrhea.      CBC:                      6.4    6.43  )-----------( 90       ( 09 May 2018 20:49 )             20.0       HPI:  Patient is a 77 year old female with a PMHx of hypertension, hypertrophic cardiomyopathy, former smoker (50 pack year history), bladder cancer with mets to bone (diagnosed 9/2017), S/P ileal conduit on Carboplatin/Etoposide and two recent admissions for fever (most recently 4/14-4/18) complicated by E. Coli bacteremia thought to be of urinary source who presented with fever. (08 May 2018 22:33)  Now with anemia.      PLAN: Anemia  - Plan to give 1 unit of packed red blood cells  - Discussed with Dr. Manning overnight - agrees with above plan  - Discussed plan with patient and she is agreeable to blood transfusion at this time.  As per patient, she states that when she has received blood transfusions in the past, she gets pre-medicated with PO Benadryl  - Consent for transfusion obtained at the bedside with risks vs. benefits explained and patient endorsed her full understanding  - Follow up with post transfusion CBC  - Will continue to monitor closely  - Primary team to follow up in AM      #[id number]  Shelley Woods PA-C  Medicine Department

## 2018-05-10 NOTE — PROGRESS NOTE ADULT - ASSESSMENT
76 yo female admitted with fever after chemotherapy for metastatic bladder cancer  Not systemically ill appearing  Unrevealing history for focus of infection  U/A and urine cx little value in setting of ileal conduit as system by definition nonsterile and what would be an abnormal U/A from a native bladder does not translate to an ileal conduit.  Port always a possibility, but no local inflammatory signs or report of dysfunction to increase suspicion for such  No concern of abdominal process  No concern of SSTI  No concern of primary pulmonary infection  No concern of active CNS process causing fever  Not neutropenic. WBC rapidly normalized and lower than last presentation.

## 2018-05-10 NOTE — DISCHARGE NOTE ADULT - CARE PLAN
Principal Discharge DX:	Urinary tract infection associated with indwelling urethral catheter, subsequent encounter  Goal:	symptoms resolved  Assessment and plan of treatment:	Continue antibiotic as prescribed   Follow-p with your PCP next week---call for appointment  Secondary Diagnosis:	Bladder cancer metastasized to bone  Assessment and plan of treatment:	Follow-up with your Oncologist  as scheduled 5/15/18  Secondary Diagnosis:	Anemia  Assessment and plan of treatment:	s/p PRBC   follow-up with Heme/onc scheduled 5/15/18  for further monitoring and treatment

## 2018-05-10 NOTE — PROGRESS NOTE ADULT - PROBLEM SELECTOR PLAN 3
as noted U/A and urine cx little value in setting of ileal conduit as system nonsterile so at this point will change patient to PO abx cefuroxime 500mg PO BID to finish short course (5 days so until 5/13) with inability to rule out urinary source of fevers.  Decision on duration of hospitalization per hospitalist but from an ID standpoint no further requirement for inpatient status for the management of ID issues. Fine with discharge from ID standpoint when other medical issues no longer require inpatient care and social issues allow for a safe discharge plan. as noted U/A and urine cx little value in setting of ileal conduit as system nonsterile so at this point will change patient to PO abx cefuroxime 500mg PO BID to finish short course (5 days so until 5/13) with inability to rule out urinary source of fevers.  Decision on duration of hospitalization per hospitalist but from an ID standpoint no further requirement for inpatient status for the management of ID issues. Fine with discharge from ID standpoint when other medical issues no longer require inpatient care and social issues allow for a safe discharge plan.    Thank you for consulting us and involving us in the management of this most interesting and challenging case.     Please Call with any further questions

## 2018-05-10 NOTE — DISCHARGE NOTE ADULT - MEDICATION SUMMARY - MEDICATIONS TO TAKE
I will START or STAY ON the medications listed below when I get home from the hospital:    Tylenol 500 mg oral tablet  -- 2 tab(s) by mouth 1 to 4 times a day, As Needed  -- Indication: For Pain     calcium (as carbonate) 600 mg oral tablet  -- 1 tab(s) by mouth once a day  -- Indication: For supplement     sodium bicarbonate 650 mg oral tablet  -- 1 tab(s) by mouth 2 times a day  -- Indication: For Bladder cancer metastasized to bone    DilTIAZem Hydrochloride  mg/24 hours oral capsule, extended release  -- 1 cap(s) by mouth once a day  -- Indication: For Htn    pregabalin 75 mg oral capsule  -- 1 cap(s) by mouth 2 times a day  -- Indication: For Nerve pain     traZODone 100 mg oral tablet  -- 1 tab(s) by mouth once a day (at bedtime)  -- Indication: For depression/sleep aid     DULoxetine 60 mg oral delayed release capsule  -- 1 cap(s) by mouth once a day  -- Indication: For depression     Evista 60 mg oral tablet  -- 1 tab(s) by mouth once a day  -- Indication: For Hormone replacement     metoprolol succinate 50 mg oral tablet, extended release  -- 1 tab(s) by mouth once a day  -- Indication: For Htn    Xopenex HFA 45 mcg/inh inhalation aerosol  -- 2 puff(s) inhaled , As Needed  -- Indication: For sob/wheezing     cefuroxime 500 mg oral tablet  -- 1 tab(s) by mouth every 12 hours  -- Indication: For UTI (urinary tract infection)    Benefiber oral powder for reconstitution  -- 1 packet(s) by mouth 2 times a day  -- Indication: For Constipation     Singulair 10 mg oral tablet  -- 1 tab(s) by mouth once a day  -- Indication: For allergy     Flonase 50 mcg/inh nasal spray  -- 1 spray(s) into nose once a day, As Needed  -- Indication: For Allergy     omeprazole 40 mg oral delayed release capsule  -- 1 cap(s) by mouth once a day  -- Indication: For gerd    Multiple Vitamins oral tablet  -- 1 tab(s) by mouth once a day  -- Indication: For depression     Vitamin D3  -- 1200 unit(s) by mouth once a day  -- Indication: For supplement

## 2018-05-10 NOTE — DISCHARGE NOTE ADULT - PLAN OF CARE
symptoms resolved Continue antibiotic as prescribed   Follow-p with your PCP next week---call for appointment Follow-up with your Oncologist  as scheduled 5/15/18 s/p PRBC   follow-up with Heme/onc scheduled 5/15/18  for further monitoring and treatment

## 2018-05-10 NOTE — PROGRESS NOTE ADULT - ATTENDING COMMENTS
# Anemia - likely chemo induced.   no melena, no hematochezia.   hgb 6.4 is likely erroneous.   hgb 7.2 --> 8.2 after 1 unit.   d/c planning home with onc (Dr. Cruz) follow up this Tuesday (she already has an appointment).  She was getting Aranesp shots there.     - Dr. EFRA Manning ([facility])  - [phone]

## 2018-05-10 NOTE — PROGRESS NOTE ADULT - PROBLEM SELECTOR PLAN 1
-- unclear if it is urinary source.   -- s/p cefepime IV, patient non-toxic appearing  -- ID [facility], lenin appreciated.   -- neg blood cultures. Urine culture with Kleb, but urine culture little use due to being from conduit.  -- per ID, d/c planning on cefuroxime 500mg PO BID to complete abx til 5/13.

## 2018-05-10 NOTE — DISCHARGE NOTE ADULT - CARE PROVIDER_API CALL
Manjinder Garcia), Hematology; Internal Medicine; Medical Oncology  [address]  [address]  [place] [address]  Phone: [phone]  Fax: [phone]

## 2018-05-20 ENCOUNTER — INPATIENT (INPATIENT)
Facility: HOSPITAL | Age: 78
LOS: 7 days | Discharge: HOME | DRG: 698 | End: 2018-05-28
Attending: INTERNAL MEDICINE | Admitting: INTERNAL MEDICINE
Payer: MEDICARE

## 2018-05-20 VITALS — DIASTOLIC BLOOD PRESSURE: 70 MMHG | RESPIRATION RATE: 16 BRPM | HEART RATE: 56 BPM | SYSTOLIC BLOOD PRESSURE: 119 MMHG

## 2018-05-20 DIAGNOSIS — Z98.890 OTHER SPECIFIED POSTPROCEDURAL STATES: Chronic | ICD-10-CM

## 2018-05-20 DIAGNOSIS — R10.9 UNSPECIFIED ABDOMINAL PAIN: ICD-10-CM

## 2018-05-20 DIAGNOSIS — N13.39 OTHER HYDRONEPHROSIS: ICD-10-CM

## 2018-05-20 DIAGNOSIS — M79.7 FIBROMYALGIA: ICD-10-CM

## 2018-05-20 DIAGNOSIS — N17.9 ACUTE KIDNEY FAILURE, UNSPECIFIED: ICD-10-CM

## 2018-05-20 DIAGNOSIS — R00.0 TACHYCARDIA, UNSPECIFIED: ICD-10-CM

## 2018-05-20 DIAGNOSIS — A41.9 SEPSIS, UNSPECIFIED ORGANISM: ICD-10-CM

## 2018-05-20 LAB
-  K. PNEUMONIAE GROUP: SIGNIFICANT CHANGE UP
ALBUMIN SERPL ELPH-MCNC: 4.2 G/DL — SIGNIFICANT CHANGE UP (ref 3.3–5)
ALP SERPL-CCNC: 78 U/L — SIGNIFICANT CHANGE UP (ref 40–120)
ALT FLD-CCNC: 18 U/L — SIGNIFICANT CHANGE UP (ref 10–45)
ANION GAP SERPL CALC-SCNC: 12 MMOL/L — SIGNIFICANT CHANGE UP (ref 5–17)
ANION GAP SERPL CALC-SCNC: 13 MMOL/L — SIGNIFICANT CHANGE UP (ref 5–17)
ANION GAP SERPL CALC-SCNC: 14 MMOL/L — SIGNIFICANT CHANGE UP (ref 5–17)
ANION GAP SERPL CALC-SCNC: 14 MMOL/L — SIGNIFICANT CHANGE UP (ref 5–17)
ANISOCYTOSIS BLD QL: SLIGHT — SIGNIFICANT CHANGE UP
APPEARANCE UR: ABNORMAL
APTT BLD: 27.3 SEC — LOW (ref 27.5–37.4)
AST SERPL-CCNC: 123 U/L — HIGH (ref 10–40)
BASE EXCESS BLDV CALC-SCNC: -8.7 MMOL/L — LOW (ref -2–2)
BASOPHILS # BLD AUTO: 0 K/UL — SIGNIFICANT CHANGE UP (ref 0–0.2)
BILIRUB SERPL-MCNC: 0.4 MG/DL — SIGNIFICANT CHANGE UP (ref 0.2–1.2)
BILIRUB UR-MCNC: NEGATIVE — SIGNIFICANT CHANGE UP
BUN SERPL-MCNC: 28 MG/DL — HIGH (ref 7–23)
BUN SERPL-MCNC: 30 MG/DL — HIGH (ref 7–23)
BUN SERPL-MCNC: 32 MG/DL — HIGH (ref 7–23)
BUN SERPL-MCNC: 34 MG/DL — HIGH (ref 7–23)
CA-I SERPL-SCNC: 1.31 MMOL/L — HIGH (ref 1.12–1.3)
CALCIUM SERPL-MCNC: 8.1 MG/DL — LOW (ref 8.4–10.5)
CALCIUM SERPL-MCNC: 8.6 MG/DL — SIGNIFICANT CHANGE UP (ref 8.4–10.5)
CALCIUM SERPL-MCNC: 8.8 MG/DL — SIGNIFICANT CHANGE UP (ref 8.4–10.5)
CALCIUM SERPL-MCNC: 9.6 MG/DL — SIGNIFICANT CHANGE UP (ref 8.4–10.5)
CHLORIDE BLDV-SCNC: 108 MMOL/L — SIGNIFICANT CHANGE UP (ref 96–108)
CHLORIDE SERPL-SCNC: 103 MMOL/L — SIGNIFICANT CHANGE UP (ref 96–108)
CHLORIDE SERPL-SCNC: 104 MMOL/L — SIGNIFICANT CHANGE UP (ref 96–108)
CHLORIDE SERPL-SCNC: 105 MMOL/L — SIGNIFICANT CHANGE UP (ref 96–108)
CHLORIDE SERPL-SCNC: 106 MMOL/L — SIGNIFICANT CHANGE UP (ref 96–108)
CK SERPL-CCNC: 196 U/L — HIGH (ref 25–170)
CO2 BLDV-SCNC: 21 MMOL/L — LOW (ref 22–30)
CO2 SERPL-SCNC: 15 MMOL/L — LOW (ref 22–31)
CO2 SERPL-SCNC: 16 MMOL/L — LOW (ref 22–31)
CO2 SERPL-SCNC: 17 MMOL/L — LOW (ref 22–31)
CO2 SERPL-SCNC: 18 MMOL/L — LOW (ref 22–31)
COLOR SPEC: SIGNIFICANT CHANGE UP
COMMENT - URINE: SIGNIFICANT CHANGE UP
CREAT SERPL-MCNC: 2.16 MG/DL — HIGH (ref 0.5–1.3)
CREAT SERPL-MCNC: 2.34 MG/DL — HIGH (ref 0.5–1.3)
CREAT SERPL-MCNC: 2.8 MG/DL — HIGH (ref 0.5–1.3)
CREAT SERPL-MCNC: 2.85 MG/DL — HIGH (ref 0.5–1.3)
DACRYOCYTES BLD QL SMEAR: SLIGHT — SIGNIFICANT CHANGE UP
DIFF PNL FLD: NEGATIVE — SIGNIFICANT CHANGE UP
ELLIPTOCYTES BLD QL SMEAR: SLIGHT — SIGNIFICANT CHANGE UP
ENTEROCOC DNA BLD POS QL NAA+NON-PROBE: SIGNIFICANT CHANGE UP
EOSINOPHIL # BLD AUTO: 0 K/UL — SIGNIFICANT CHANGE UP (ref 0–0.5)
EPI CELLS # UR: SIGNIFICANT CHANGE UP /HPF
GAS PNL BLDV: 134 MMOL/L — LOW (ref 136–145)
GAS PNL BLDV: SIGNIFICANT CHANGE UP
GLUCOSE BLDV-MCNC: 146 MG/DL — HIGH (ref 70–99)
GLUCOSE SERPL-MCNC: 104 MG/DL — HIGH (ref 70–99)
GLUCOSE SERPL-MCNC: 105 MG/DL — HIGH (ref 70–99)
GLUCOSE SERPL-MCNC: 122 MG/DL — HIGH (ref 70–99)
GLUCOSE SERPL-MCNC: 129 MG/DL — HIGH (ref 70–99)
GLUCOSE UR QL: NEGATIVE — SIGNIFICANT CHANGE UP
GRAM STN FLD: SIGNIFICANT CHANGE UP
HCO3 BLDV-SCNC: 20 MMOL/L — LOW (ref 21–29)
HCT VFR BLD CALC: 36.9 % — SIGNIFICANT CHANGE UP (ref 34.5–45)
HCT VFR BLDA CALC: 36 % — LOW (ref 39–50)
HGB BLD CALC-MCNC: 11.8 G/DL — SIGNIFICANT CHANGE UP (ref 11.5–15.5)
HGB BLD-MCNC: 12.3 G/DL — SIGNIFICANT CHANGE UP (ref 11.5–15.5)
INR BLD: 0.95 RATIO — SIGNIFICANT CHANGE UP (ref 0.88–1.16)
KETONES UR-MCNC: NEGATIVE — SIGNIFICANT CHANGE UP
LACTATE BLDV-MCNC: 2.7 MMOL/L — HIGH (ref 0.7–2)
LACTATE SERPL-SCNC: 0.7 MMOL/L — SIGNIFICANT CHANGE UP (ref 0.7–2)
LACTATE SERPL-SCNC: 1.5 MMOL/L — SIGNIFICANT CHANGE UP (ref 0.7–2)
LEUKOCYTE ESTERASE UR-ACNC: ABNORMAL
LIDOCAIN IGE QN: 22 U/L — SIGNIFICANT CHANGE UP (ref 7–60)
LYMPHOCYTES # BLD AUTO: 0.7 K/UL — LOW (ref 1–3.3)
LYMPHOCYTES # BLD AUTO: 5 % — LOW (ref 13–44)
MACROCYTES BLD QL: SLIGHT — SIGNIFICANT CHANGE UP
MAGNESIUM SERPL-MCNC: 1 MG/DL — CRITICAL LOW (ref 1.6–2.6)
MAGNESIUM SERPL-MCNC: 2 MG/DL — SIGNIFICANT CHANGE UP (ref 1.6–2.6)
MCHC RBC-ENTMCNC: 33.3 GM/DL — SIGNIFICANT CHANGE UP (ref 32–36)
MCHC RBC-ENTMCNC: 33.4 PG — SIGNIFICANT CHANGE UP (ref 27–34)
MCV RBC AUTO: 100 FL — SIGNIFICANT CHANGE UP (ref 80–100)
METAMYELOCYTES # FLD: 1 % — HIGH (ref 0–0)
METHOD TYPE: SIGNIFICANT CHANGE UP
MONOCYTES # BLD AUTO: 0.1 K/UL — SIGNIFICANT CHANGE UP (ref 0–0.9)
MONOCYTES NFR BLD AUTO: 1 % — LOW (ref 2–14)
MYELOCYTES NFR BLD: 1 % — HIGH (ref 0–0)
NEUTROPHILS # BLD AUTO: 12.2 K/UL — HIGH (ref 1.8–7.4)
NEUTROPHILS NFR BLD AUTO: 92 % — HIGH (ref 43–77)
NITRITE UR-MCNC: NEGATIVE — SIGNIFICANT CHANGE UP
OTHER CELLS CSF MANUAL: 3 ML/DL — LOW (ref 18–22)
OVALOCYTES BLD QL SMEAR: SLIGHT — SIGNIFICANT CHANGE UP
PCO2 BLDV: 56 MMHG — HIGH (ref 35–50)
PH BLDV: 7.18 — CRITICAL LOW (ref 7.35–7.45)
PH UR: 6.5 — SIGNIFICANT CHANGE UP (ref 5–8)
PHOSPHATE SERPL-MCNC: 3.2 MG/DL — SIGNIFICANT CHANGE UP (ref 2.5–4.5)
PLAT MORPH BLD: NORMAL — SIGNIFICANT CHANGE UP
PLATELET # BLD AUTO: 112 K/UL — LOW (ref 150–400)
PO2 BLDV: 19 MMHG — LOW (ref 25–45)
POIKILOCYTOSIS BLD QL AUTO: SLIGHT — SIGNIFICANT CHANGE UP
POLYCHROMASIA BLD QL SMEAR: SLIGHT — SIGNIFICANT CHANGE UP
POTASSIUM BLDV-SCNC: 6 MMOL/L — HIGH (ref 3.5–5)
POTASSIUM SERPL-MCNC: 5.3 MMOL/L — SIGNIFICANT CHANGE UP (ref 3.5–5.3)
POTASSIUM SERPL-MCNC: 5.6 MMOL/L — HIGH (ref 3.5–5.3)
POTASSIUM SERPL-MCNC: 5.7 MMOL/L — HIGH (ref 3.5–5.3)
POTASSIUM SERPL-MCNC: 8.7 MMOL/L — CRITICAL HIGH (ref 3.5–5.3)
POTASSIUM SERPL-SCNC: 5.3 MMOL/L — SIGNIFICANT CHANGE UP (ref 3.5–5.3)
POTASSIUM SERPL-SCNC: 5.6 MMOL/L — HIGH (ref 3.5–5.3)
POTASSIUM SERPL-SCNC: 5.7 MMOL/L — HIGH (ref 3.5–5.3)
POTASSIUM SERPL-SCNC: 8.7 MMOL/L — CRITICAL HIGH (ref 3.5–5.3)
PROT SERPL-MCNC: 7.2 G/DL — SIGNIFICANT CHANGE UP (ref 6–8.3)
PROT UR-MCNC: SIGNIFICANT CHANGE UP
PROTHROM AB SERPL-ACNC: 10.2 SEC — SIGNIFICANT CHANGE UP (ref 9.8–12.7)
RBC # BLD: 3.68 M/UL — LOW (ref 3.8–5.2)
RBC # FLD: 16.9 % — HIGH (ref 10.3–14.5)
RBC BLD AUTO: SIGNIFICANT CHANGE UP
RBC CASTS # UR COMP ASSIST: ABNORMAL /HPF (ref 0–2)
SAO2 % BLDV: 18 % — LOW (ref 67–88)
SODIUM SERPL-SCNC: 132 MMOL/L — LOW (ref 135–145)
SODIUM SERPL-SCNC: 134 MMOL/L — LOW (ref 135–145)
SODIUM SERPL-SCNC: 135 MMOL/L — SIGNIFICANT CHANGE UP (ref 135–145)
SODIUM SERPL-SCNC: 136 MMOL/L — SIGNIFICANT CHANGE UP (ref 135–145)
SP GR SPEC: 1.01 — LOW (ref 1.01–1.02)
SPECIMEN SOURCE: SIGNIFICANT CHANGE UP
SPECIMEN SOURCE: SIGNIFICANT CHANGE UP
UROBILINOGEN FLD QL: NEGATIVE — SIGNIFICANT CHANGE UP
WBC # BLD: 13 K/UL — HIGH (ref 3.8–10.5)
WBC # FLD AUTO: 13 K/UL — HIGH (ref 3.8–10.5)
WBC UR QL: SIGNIFICANT CHANGE UP /HPF (ref 0–5)

## 2018-05-20 PROCEDURE — 93010 ELECTROCARDIOGRAM REPORT: CPT

## 2018-05-20 PROCEDURE — 71045 X-RAY EXAM CHEST 1 VIEW: CPT | Mod: 26

## 2018-05-20 PROCEDURE — 74176 CT ABD & PELVIS W/O CONTRAST: CPT | Mod: 26

## 2018-05-20 PROCEDURE — 99291 CRITICAL CARE FIRST HOUR: CPT

## 2018-05-20 PROCEDURE — 50432 PLMT NEPHROSTOMY CATHETER: CPT | Mod: 50

## 2018-05-20 PROCEDURE — 99223 1ST HOSP IP/OBS HIGH 75: CPT

## 2018-05-20 RX ORDER — ONDANSETRON 8 MG/1
4 TABLET, FILM COATED ORAL ONCE
Qty: 0 | Refills: 0 | Status: COMPLETED | OUTPATIENT
Start: 2018-05-20 | End: 2018-05-20

## 2018-05-20 RX ORDER — PSYLLIUM SEED (WITH DEXTROSE)
1 POWDER (GRAM) ORAL
Qty: 0 | Refills: 0 | Status: DISCONTINUED | OUTPATIENT
Start: 2018-05-20 | End: 2018-05-28

## 2018-05-20 RX ORDER — CEFTRIAXONE 500 MG/1
1 INJECTION, POWDER, FOR SOLUTION INTRAMUSCULAR; INTRAVENOUS ONCE
Qty: 0 | Refills: 0 | Status: COMPLETED | OUTPATIENT
Start: 2018-05-20 | End: 2018-05-20

## 2018-05-20 RX ORDER — ACETAMINOPHEN 500 MG
650 TABLET ORAL EVERY 6 HOURS
Qty: 0 | Refills: 0 | Status: DISCONTINUED | OUTPATIENT
Start: 2018-05-20 | End: 2018-05-28

## 2018-05-20 RX ORDER — CIPROFLOXACIN LACTATE 400MG/40ML
400 VIAL (ML) INTRAVENOUS ONCE
Qty: 0 | Refills: 0 | Status: COMPLETED | OUTPATIENT
Start: 2018-05-20 | End: 2018-05-20

## 2018-05-20 RX ORDER — SODIUM CHLORIDE 9 MG/ML
1000 INJECTION, SOLUTION INTRAVENOUS
Qty: 0 | Refills: 0 | Status: DISCONTINUED | OUTPATIENT
Start: 2018-05-20 | End: 2018-05-20

## 2018-05-20 RX ORDER — SODIUM BICARBONATE 1 MEQ/ML
1300 SYRINGE (ML) INTRAVENOUS
Qty: 0 | Refills: 0 | Status: DISCONTINUED | OUTPATIENT
Start: 2018-05-20 | End: 2018-05-27

## 2018-05-20 RX ORDER — MAGNESIUM SULFATE 500 MG/ML
2 VIAL (ML) INJECTION ONCE
Qty: 0 | Refills: 0 | Status: COMPLETED | OUTPATIENT
Start: 2018-05-20 | End: 2018-05-20

## 2018-05-20 RX ORDER — DILTIAZEM HCL 120 MG
180 CAPSULE, EXT RELEASE 24 HR ORAL DAILY
Qty: 0 | Refills: 0 | Status: DISCONTINUED | OUTPATIENT
Start: 2018-05-20 | End: 2018-05-28

## 2018-05-20 RX ORDER — DULOXETINE HYDROCHLORIDE 30 MG/1
60 CAPSULE, DELAYED RELEASE ORAL DAILY
Qty: 0 | Refills: 0 | Status: DISCONTINUED | OUTPATIENT
Start: 2018-05-20 | End: 2018-05-28

## 2018-05-20 RX ORDER — RALOXIFENE HYDROCHLORIDE 60 MG/1
60 TABLET, COATED ORAL DAILY
Qty: 0 | Refills: 0 | Status: DISCONTINUED | OUTPATIENT
Start: 2018-05-20 | End: 2018-05-28

## 2018-05-20 RX ORDER — VANCOMYCIN HCL 1 G
1000 VIAL (EA) INTRAVENOUS ONCE
Qty: 0 | Refills: 0 | Status: COMPLETED | OUTPATIENT
Start: 2018-05-20 | End: 2018-05-20

## 2018-05-20 RX ORDER — SODIUM POLYSTYRENE SULFONATE 4.1 MEQ/G
30 POWDER, FOR SUSPENSION ORAL ONCE
Qty: 0 | Refills: 0 | Status: COMPLETED | OUTPATIENT
Start: 2018-05-20 | End: 2018-05-20

## 2018-05-20 RX ORDER — TRAZODONE HCL 50 MG
100 TABLET ORAL AT BEDTIME
Qty: 0 | Refills: 0 | Status: DISCONTINUED | OUTPATIENT
Start: 2018-05-20 | End: 2018-05-28

## 2018-05-20 RX ORDER — METOPROLOL TARTRATE 50 MG
50 TABLET ORAL DAILY
Qty: 0 | Refills: 0 | Status: DISCONTINUED | OUTPATIENT
Start: 2018-05-20 | End: 2018-05-28

## 2018-05-20 RX ORDER — SODIUM CHLORIDE 9 MG/ML
1000 INJECTION, SOLUTION INTRAVENOUS ONCE
Qty: 0 | Refills: 0 | Status: COMPLETED | OUTPATIENT
Start: 2018-05-20 | End: 2018-05-20

## 2018-05-20 RX ORDER — DIPHENHYDRAMINE HCL 50 MG
25 CAPSULE ORAL ONCE
Qty: 0 | Refills: 0 | Status: COMPLETED | OUTPATIENT
Start: 2018-05-20 | End: 2018-05-20

## 2018-05-20 RX ORDER — SODIUM CHLORIDE 9 MG/ML
1000 INJECTION INTRAMUSCULAR; INTRAVENOUS; SUBCUTANEOUS
Qty: 0 | Refills: 0 | Status: DISCONTINUED | OUTPATIENT
Start: 2018-05-20 | End: 2018-05-20

## 2018-05-20 RX ORDER — MONTELUKAST 4 MG/1
10 TABLET, CHEWABLE ORAL DAILY
Qty: 0 | Refills: 0 | Status: DISCONTINUED | OUTPATIENT
Start: 2018-05-20 | End: 2018-05-28

## 2018-05-20 RX ORDER — SODIUM CHLORIDE 9 MG/ML
1000 INJECTION INTRAMUSCULAR; INTRAVENOUS; SUBCUTANEOUS
Qty: 0 | Refills: 0 | Status: DISCONTINUED | OUTPATIENT
Start: 2018-05-20 | End: 2018-05-23

## 2018-05-20 RX ORDER — CEFTRIAXONE 500 MG/1
1 INJECTION, POWDER, FOR SOLUTION INTRAMUSCULAR; INTRAVENOUS EVERY 24 HOURS
Qty: 0 | Refills: 0 | Status: DISCONTINUED | OUTPATIENT
Start: 2018-05-21 | End: 2018-05-21

## 2018-05-20 RX ORDER — METRONIDAZOLE 500 MG
500 TABLET ORAL ONCE
Qty: 0 | Refills: 0 | Status: COMPLETED | OUTPATIENT
Start: 2018-05-20 | End: 2018-05-20

## 2018-05-20 RX ORDER — CALCIUM CARBONATE 500(1250)
1 TABLET ORAL DAILY
Qty: 0 | Refills: 0 | Status: DISCONTINUED | OUTPATIENT
Start: 2018-05-20 | End: 2018-05-28

## 2018-05-20 RX ORDER — CEFTRIAXONE 500 MG/1
1 INJECTION, POWDER, FOR SOLUTION INTRAMUSCULAR; INTRAVENOUS ONCE
Qty: 0 | Refills: 0 | Status: DISCONTINUED | OUTPATIENT
Start: 2018-05-20 | End: 2018-05-20

## 2018-05-20 RX ORDER — FLUTICASONE PROPIONATE 50 MCG
1 SPRAY, SUSPENSION NASAL
Qty: 0 | Refills: 0 | COMMUNITY

## 2018-05-20 RX ORDER — PANTOPRAZOLE SODIUM 20 MG/1
40 TABLET, DELAYED RELEASE ORAL
Qty: 0 | Refills: 0 | Status: DISCONTINUED | OUTPATIENT
Start: 2018-05-20 | End: 2018-05-28

## 2018-05-20 RX ORDER — ACETAMINOPHEN 500 MG
1000 TABLET ORAL ONCE
Qty: 0 | Refills: 0 | Status: COMPLETED | OUTPATIENT
Start: 2018-05-20 | End: 2018-05-20

## 2018-05-20 RX ADMIN — Medication 250 MILLIGRAM(S): at 17:47

## 2018-05-20 RX ADMIN — SODIUM CHLORIDE 2000 MILLILITER(S): 9 INJECTION, SOLUTION INTRAVENOUS at 03:41

## 2018-05-20 RX ADMIN — SODIUM CHLORIDE 100 MILLILITER(S): 9 INJECTION INTRAMUSCULAR; INTRAVENOUS; SUBCUTANEOUS at 18:32

## 2018-05-20 RX ADMIN — Medication 50 GRAM(S): at 14:15

## 2018-05-20 RX ADMIN — CEFTRIAXONE 100 GRAM(S): 500 INJECTION, POWDER, FOR SOLUTION INTRAMUSCULAR; INTRAVENOUS at 09:12

## 2018-05-20 RX ADMIN — Medication 400 MILLIGRAM(S): at 07:27

## 2018-05-20 RX ADMIN — SODIUM POLYSTYRENE SULFONATE 30 GRAM(S): 4.1 POWDER, FOR SUSPENSION ORAL at 16:59

## 2018-05-20 RX ADMIN — PANTOPRAZOLE SODIUM 40 MILLIGRAM(S): 20 TABLET, DELAYED RELEASE ORAL at 18:24

## 2018-05-20 RX ADMIN — DULOXETINE HYDROCHLORIDE 60 MILLIGRAM(S): 30 CAPSULE, DELAYED RELEASE ORAL at 18:24

## 2018-05-20 RX ADMIN — Medication 1300 MILLIGRAM(S): at 18:26

## 2018-05-20 RX ADMIN — Medication 100 MILLIGRAM(S): at 04:13

## 2018-05-20 RX ADMIN — Medication 180 MILLIGRAM(S): at 18:25

## 2018-05-20 RX ADMIN — Medication 200 MILLIGRAM(S): at 05:40

## 2018-05-20 RX ADMIN — SODIUM CHLORIDE 150 MILLILITER(S): 9 INJECTION, SOLUTION INTRAVENOUS at 03:41

## 2018-05-20 RX ADMIN — Medication 75 MILLIGRAM(S): at 22:24

## 2018-05-20 RX ADMIN — Medication 25 MILLIGRAM(S): at 20:03

## 2018-05-20 RX ADMIN — ONDANSETRON 4 MILLIGRAM(S): 8 TABLET, FILM COATED ORAL at 03:40

## 2018-05-20 RX ADMIN — Medication 50 MILLIGRAM(S): at 18:26

## 2018-05-20 NOTE — H&P ADULT - CONSTITUTIONAL DETAILS
well-nourished/no distress/s/p sedation during the procedure, easily arousable and answers all questions appropriately

## 2018-05-20 NOTE — ED ADULT NURSE NOTE - OBJECTIVE STATEMENT
77y female arrived to ED complaining of ab pain x1 day +nausea and dry heaving. Patient reports that her pain is generalized from lower ab  up to her chest and is severe. Patient had similar episode 1 weeks ago. Patient present with nausea, rigors. Patient is hypertensive and tachycardic, but afebrile. Patient denies CP, SOB, diarrhea, fever, weakness. PMHx bladder CA (last chemo 5/3/18), HTN. Patient has urostomy.

## 2018-05-20 NOTE — H&P ADULT - PROBLEM SELECTOR PLAN 1
(fever and tachycardia present on admission) due to bilateral pyelonephritis from an ileal conduit obstruction.  F/u cultures obtained in the ED  Continue ceftriaxone IV  Consider ID consult (fever and tachycardia present on admission) due to bilateral pyelonephritis from an ileal conduit obstruction.  F/u cultures obtained in the ED  Continue ceftriaxone IV  IV hydration (see below)  Check BMP later today along with lactate (trend to normal)  Consider ID consult

## 2018-05-20 NOTE — CHART NOTE - NSCHARTNOTEFT_GEN_A_CORE
AUBREE ALONZO  77y Female  Patient is a 77y old  Female who presents with a chief complaint of abdominal pain (20 May 2018 13:53)     Event Summary:  Patient admitted for b/l hydronephrosis, pyelonephritis, meeting sepsis criteria, who underwent emergent b/l nephrostomy tubes insertion in IR today, found with prelim BC results with gram positive cocci in pairs and chains in both aerobic and anerobic bottles. Pt ordered for Vancomycin 1gm IV x 1. ID-Dr. Uche Benjamin ([facility]) consulted, agrees with above management, and will provide further recommendations after evaluation.       Nathaniel Saintus [facility]  #[phone]

## 2018-05-20 NOTE — CHART NOTE - NSCHARTNOTEFT_GEN_A_CORE
Interventional Radiology  Pre-Procedure Note    This is a 77y  Female with bladder cancer s/p cystectomy and ileal conduit creation and attempted retrograde stent placement via the conduit who presents with fever and leukocytosis and found to have bilateral hydronephrosis on CT and concern for pyelonephritis.    PAST MEDICAL & SURGICAL HISTORY:  Bladder cancer metastasized to bone  Radial fracture: left  Uterine Polyp  Gastric Ulcer  Migraine, Ophthalmoplegic  Fibromyalgia  Obese  Calcified Thoracic Aorta  Hypertrophic Cardiomyopathy  Paroxysmal Ventricular Tachycardia: 6/2010  Nasal Polyp: 1960  Arthritis  HTN (Hypertension)  Asthmatic Bronchitis  Acid Reflux  History of ileal conduit  S/P total knee arthroplasty, left  EPS study: with no intervention 6/2010  Nasal Polyp removal: 1960  History of Arthroscopy: 1997 Right  Left 2000  History of D&C: 1968      Social History:     FAMILY HISTORY:  Family history of dementia  Family history of hyperlipidemia  Family history of prostate cancer      Allergies: penicillin (Rash)      Current Medications: lactated ringers. 1000 milliLiter(s) IV Continuous <Continuous>      Labs:                         12.3   13.0  )-----------( 112      ( 20 May 2018 03:33 )             36.9       05-20    134<L>  |  105  |  30<H>  ----------------------------<  104<H>  5.7<H>   |  16<L>  |  2.34<H>    Ca    8.8      20 May 2018 05:57    TPro  7.2  /  Alb  4.2  /  TBili  0.4  /  DBili  x   /  AST  123<H>  /  ALT  18  /  AlkPhos  78  05-20      HCG:       Assessment/Plan:   This is a 77y  Female  presents with bilateral hydrnephrosis, pyelonephritis/urosepsis  Plan is for bilateral nephrostomy tube insertion.  Procedure/ risks/ benefits/ goals/ alternatives were explained. All questions answered. Informed content obtained from patient. Consent placed in chart.

## 2018-05-20 NOTE — ED ADULT NURSE REASSESSMENT NOTE - NS ED NURSE REASSESS COMMENT FT1
Report received from Araceli LOVE. VS recorded in ED flowsheet. Sinus Tachycardic on CM. IV Tylenol hung. Will reassess VS in 1 hour. A&Ox4 gross neuro intact, lungs cta bilaterally, no difficulty speaking in complete sentences, s1s2 heart sounds heard, pulses x 4, jimenez x4, abdomen soft nontender nondistended, skin intact. Safety and comfort measures maintained.

## 2018-05-20 NOTE — H&P ADULT - NSHPLABSRESULTS_GEN_ALL_CORE
Labs reviewed: leukocytosis WBC 13, Hg 12.3 (increased from recent results of 8.2 - implies hemoconcentration), potassium 5.7, bicarb 16 (low), Cr increased to 2.34 from 1.33 baseline on 5/9/2018. Lactate 2.7,  (), lipase 22, UA+    CXR personally reviewed: Clear lung fields bilaterally  CT Abd: Distention of the ileal conduit with mild bilateral hydroureter and increased bilateral perinephric stranding. These findings may represent bilateral pyelonephritis secondary to obstruction.

## 2018-05-20 NOTE — ED PROVIDER NOTE - OBJECTIVE STATEMENT
77yoF hx of bladder ca on chemo, htn, migraine, pw abd pain, lower abd, for 1 day, severe, radiating up to chest, associaed with some nausea and vomtiing x1. had similar episode 1 week ago. now with some shaking chills. denies fevers, chest pain, sob, weakness, numbness, tingling, or other issues.

## 2018-05-20 NOTE — H&P ADULT - PROBLEM SELECTOR PLAN 2
due to obstruction and dehydration (hydronephrosis on imaging, and labs with significant hemoconcentration)  Hyperkalemia K 5.7 - give kayexalate 30g PO x1  Start IVF NS at 100 cc/hr  s/p IR bilateral nephrostomy tubes  Check BMP daily  Increase Sodium bicarb tabs to 1300mg PO BID (acidosis)  avoid nephrotoxic agents

## 2018-05-20 NOTE — H&P ADULT - ASSESSMENT
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   Found to have severe sepsis due to bilateral pyelonephritis from an ileal conduit obstruction, with DEVON and dehydration.

## 2018-05-20 NOTE — CONSULT NOTE ADULT - SUBJECTIVE AND OBJECTIVE BOX
Patient is a 77y old  Female who presents with a chief complaint of urinary retention    HPI: 77 year old female well known to urology service for high grade invasive bladder cancer s/p cystectomy and IC.  Just completed chemotherapy here for abd pain.  CT showed distended ileal conduit to the abdominal muscles with bilateral hydro and perinephric stranding.      PAST MEDICAL & SURGICAL HISTORY:  Bladder cancer metastasized to bone  Radial fracture: left  Uterine Polyp  Gastric Ulcer  Migraine, Ophthalmoplegic  Fibromyalgia  Obese  Calcified Thoracic Aorta  Hypertrophic Cardiomyopathy  Paroxysmal Ventricular Tachycardia: 2010  Nasal Polyp:   Arthritis  HTN (Hypertension)  Asthmatic Bronchitis  Acid Reflux  History of ileal conduit  S/P total knee arthroplasty, left  EPS study: with no intervention 2010  Nasal Polyp removal:   History of Arthroscopy:  Right  Left   History of D&C:       REVIEW OF SYSTEMS:    CONSTITUTIONAL:  fevers or chills  HEENT: No visual changes  ENDO: No sweating  NECK: No pain or stiffness  MUSCULOSKELETAL: No back pain, no joint pain  RESPIRATORY: No shortness of breath  CARDIOVASCULAR: No chest pain  GASTROINTESTINAL: No abdominal or epigastric pain. No nausea, vomiting,  No diarrhea or constipation.   NEUROLOGICAL: No mental status changes  PSYCH: No depression, no mood changes  SKIN: No itching      MEDICATIONS  (STANDING):  lactated ringers. 1000 milliLiter(s) (150 mL/Hr) IV Continuous <Continuous>    MEDICATIONS  (PRN):      Allergies    penicillin (Rash)    Intolerances        SOCIAL HISTORY: No illicit drug use    FAMILY HISTORY:  Family history of dementia (Father)  Family history of hyperlipidemia (Father)  Family history of prostate cancer (Father)      Vital Signs Last 24 Hrs  T(C): 39.7 (20 May 2018 06:42), Max: 39.7 (20 May 2018 06:42)  T(F): 103.4 (20 May 2018 06:42), Max: 103.4 (20 May 2018 06:42)  HR: 109 (20 May 2018 07:40) (56 - 109)  BP: 107/42 (20 May 2018 07:40) (107/42 - 179/113)  BP(mean): --  RR: 20 (20 May 2018 07:40) (16 - 20)  SpO2: 95% (20 May 2018 07:40) (94% - 100%)    PHYSICAL EXAM:    Constitutional: ill appearing, in no acute distress  Back: No CVA tenderness  Respiratory: No accessory respiratory muscle use  Abd: Soft, mildly distended, stoma pink with scant drainage from conduit, unable to pass catheter    I&O's Summary      LABS:                        12.3   13.0  )-----------( 112      ( 20 May 2018 03:33 )             36.9     05-    134<L>  |  105  |  30<H>  ----------------------------<  104<H>  5.7<H>   |  16<L>  |  2.34<H>    Ca    8.8      20 May 2018 05:57    TPro  7.2  /  Alb  4.2  /  TBili  0.4  /  DBili  x   /  AST  123<H>  /  ALT  18  /  AlkPhos  78  05-20    PT/INR - ( 20 May 2018 03:33 )   PT: 10.2 sec;   INR: 0.95 ratio         PTT - ( 20 May 2018 03:33 )  PTT:27.3 sec  Urinalysis Basic - ( 20 May 2018 06:21 ) from ileal conduit    Color: PL Yellow / Appearance: SL Turbid / S.009 / pH: x  Gluc: x / Ketone: Negative  / Bili: Negative / Urobili: Negative   Blood: x / Protein: Trace / Nitrite: Negative   Leuk Esterase: Large / RBC: 5-10 /HPF / WBC 6-10 /HPF   Sq Epi: x / Non Sq Epi: OCC /HPF / Bacteria: x        RADIOLOGY & ADDITIONAL STUDIES:  CT reviewed with findings as stated in H&P Patient is a 77y old  Female who presents with a chief complaint of urinary retention    HPI: 77 year old female well known to urology service for high grade invasive bladder cancer s/p cystectomy and IC.  Just completed chemotherapy here for abd pain.  CT showed distended ileal conduit to the abdominal muscles with bilateral hydro and perinephric stranding.  C/o low back pain and intermittent random abdominal pain unchanged.  Chills in ER this AM.  Spiked fever at that time      PAST MEDICAL & SURGICAL HISTORY:  Bladder cancer metastasized to bone  Radial fracture: left  Uterine Polyp  Gastric Ulcer  Migraine, Ophthalmoplegic  Fibromyalgia  Obese  Calcified Thoracic Aorta  Hypertrophic Cardiomyopathy  Paroxysmal Ventricular Tachycardia: 2010  Nasal Polyp:   Arthritis  HTN (Hypertension)  Asthmatic Bronchitis  Acid Reflux  History of ileal conduit  S/P total knee arthroplasty, left  EPS study: with no intervention 2010  Nasal Polyp removal:   History of Arthroscopy:  Right  Left   History of D&C:       REVIEW OF SYSTEMS:    CONSTITUTIONAL:  fevers or chills  MUSCULOSKELETAL: Chronic low back pain  GASTROINTESTINAL: Abd pain  NEUROLOGICAL: No mental status changes        MEDICATIONS  (STANDING):  lactated ringers. 1000 milliLiter(s) (150 mL/Hr) IV Continuous <Continuous>    MEDICATIONS  (PRN):      Allergies    penicillin (Rash)    Intolerances        SOCIAL HISTORY: No illicit drug use    FAMILY HISTORY:  Family history of dementia (Father)  Family history of hyperlipidemia (Father)  Family history of prostate cancer (Father)      Vital Signs Last 24 Hrs  T(C): 39.7 (20 May 2018 06:42), Max: 39.7 (20 May 2018 06:42)  T(F): 103.4 (20 May 2018 06:42), Max: 103.4 (20 May 2018 06:42)  HR: 109 (20 May 2018 07:40) (56 - 109)  BP: 107/42 (20 May 2018 07:40) (107/42 - 179/113)  BP(mean): --  RR: 20 (20 May 2018 07:40) (16 - 20)  SpO2: 95% (20 May 2018 07:40) (94% - 100%)    PHYSICAL EXAM:    Constitutional: ill appearing, in no acute distress  Back: No CVA tenderness  Respiratory: No accessory respiratory muscle use  Abd: Soft, mildly distended, stoma pink with scant drainage from conduit, unable to pass catheter    I&O's Summary      LABS:                        12.3   13.0  )-----------( 112      ( 20 May 2018 03:33 )             36.9     05-    134<L>  |  105  |  30<H>  ----------------------------<  104<H>  5.7<H>   |  16<L>  |  2.34<H>    Ca    8.8      20 May 2018 05:57    TPro  7.2  /  Alb  4.2  /  TBili  0.4  /  DBili  x   /  AST  123<H>  /  ALT  18  /  AlkPhos  78  05-20    PT/INR - ( 20 May 2018 03:33 )   PT: 10.2 sec;   INR: 0.95 ratio         PTT - ( 20 May 2018 03:33 )  PTT:27.3 sec  Urinalysis Basic - ( 20 May 2018 06:21 ) from ileal conduit    Color: PL Yellow / Appearance: SL Turbid / S.009 / pH: x  Gluc: x / Ketone: Negative  / Bili: Negative / Urobili: Negative   Blood: x / Protein: Trace / Nitrite: Negative   Leuk Esterase: Large / RBC: 5-10 /HPF / WBC 6-10 /HPF   Sq Epi: x / Non Sq Epi: OCC /HPF / Bacteria: x        RADIOLOGY & ADDITIONAL STUDIES:  CT reviewed with findings as stated in H&P      PROCEDURE:  after consent obtained, cystoscopy performed through ileal conduit.  Unable to see an opening into the abdomen.  wire would not pass.  catheter not placed.

## 2018-05-20 NOTE — ED PROVIDER NOTE - ATTENDING CONTRIBUTION TO CARE
------------ATTENDING NOTE------------  pt c/o sudden onset moderate severe constant stabbing mid abdominal pain, feeling distended, nausea, small amts nbnb vomiting, no fevers, complicated by past abdominal surgeries, feeling cold / chills in ED, unwell appearing, clear chest w/o pain/discomfort or sob/dyspnea, equal distal pulses, concerns for SBO, labs/imaging and symptomatic tx on arrival, awaiting results, overall nml mentation / appropriate -->\  - Forest Perez MD   --------------------------------------------- ------------ATTENDING NOTE------------  pt c/o sudden onset moderate severe constant stabbing mid abdominal pain, feeling distended, nausea, small amts nbnb vomiting, no fevers, complicated by past abdominal surgeries, feeling cold / chills in ED, unwell appearing, clear chest w/o pain/discomfort or sob/dyspnea, equal distal pulses, concerns for SBO, labs/imaging and symptomatic tx on arrival, awaiting results, empiric antibiotics given leukocytosis . fever, overall nml mentation / appropriate -->  - Forest Perez MD   --------------------------------------------- ------------ATTENDING NOTE------------  pt c/o sudden onset moderate severe constant stabbing mid abdominal pain, feeling distended, nausea, small amts nbnb vomiting, no fevers, complicated by past abdominal/ surgeries, feeling cold / chills in ED, unwell appearing, clear chest w/o pain/discomfort or sob/dyspnea, equal distal pulses, concerns for SBO, labs/imaging and symptomatic tx on arrival, awaiting results, empiric antibiotics given leukocytosis . fever, overall nml mentation / appropriate --> awaiting Urology evaluation -->  - Forest Perez MD   --------------------------------------------- ------------ATTENDING NOTE------------  pt c/o sudden onset moderate severe constant stabbing mid abdominal pain, feeling distended, nausea, small amts nbnb vomiting, no fevers, complicated by past abdominal/ surgeries, feeling cold / chills in ED, unwell appearing, clear chest w/o pain/discomfort or sob/dyspnea, equal distal pulses, concerns for SBO, labs/imaging and symptomatic tx on arrival, awaiting results, empiric antibiotics given leukocytosis . fever, overall nml mentation / appropriate --> awaiting Urology evaluation --> ED Sign Out 7AM ([facility]) awaiting final Urology recs (attending coming to ED) and close reassessments, needing admission.  - Forest Perez MD   --------------------------------------------- ------------ATTENDING NOTE------------  pt c/o sudden onset moderate severe constant stabbing mid abdominal pain, feeling distended, nausea, small amts nbnb vomiting, no fevers, complicated by past abdominal/ surgeries, feeling cold / chills in ED, unwell appearing, clear chest w/o pain/discomfort or sob/dyspnea, equal distal pulses, concerns for SBO, labs/imaging and symptomatic tx on arrival, awaiting results, empiric antibiotics given leukocytosis . fever, overall nml mentation / appropriate --> awaiting Urology evaluation --> ED Sign Out 7AM ([facility]) awaiting final Urology recs (attending coming to ED) and close reassessments, needing admission.  - Forest Perez MD   ---------------------------------------------    *sepsis picture not clinically present on initial ED course

## 2018-05-20 NOTE — ED PROVIDER NOTE - PROGRESS NOTE DETAILS
jeannie - pt endorsed to me at signout -- pending final urology recs -- -pa discussing case with attending -- will admit for infected  obstructive uropathy  - med vs urology

## 2018-05-20 NOTE — ED ADULT NURSE REASSESSMENT NOTE - NS ED NURSE REASSESS COMMENT FT1
Pt given abx per order. Pt remains febrile to 101.7, temperature down from 103.4. Pt currently admitted to medicine. Pt being seen by urology, plan is for pt to be the next IR case for nephrostomy exchange. Safety and comfort measures maintained. Attempt x 2 to call report to IR ext. [phone]. No answer at this time.

## 2018-05-20 NOTE — CONSULT NOTE ADULT - ASSESSMENT
Patient with bilateral pyelo secondary to dilated ileal conduit  - peg attempt to place catheter cystoscopically  - if failed attempt needs bilateral PCNs  - IV antibiotics  - admit to medicine  - will follow Patient with bilateral pyelo secondary to dilated ileal conduit  - needs bilateral PCNs, urine cultures sent bilat  - IV antibiotics  - admit to medicine  - will follow

## 2018-05-20 NOTE — H&P ADULT - GENITOURINARY COMMENTS
she was emptying a good amount via the ilial conduit - didn't realize that there may have been ongoing obstruction

## 2018-05-20 NOTE — H&P ADULT - NSHPSOCIALHISTORY_GEN_ALL_CORE
Ex-smoker, quit November 2017, 52 year x 1ppd  Denies alcohol and drug use.   Lives alone. She has 2 health care proxies: Sister Megan Mark and Brother Forest oMore

## 2018-05-20 NOTE — ED PROVIDER NOTE - CARE PLAN
Principal Discharge DX:	Abdominal pain  Secondary Diagnosis:	Rigors Principal Discharge DX:	Undifferentiated abdominal pain  Secondary Diagnosis:	Rigors Principal Discharge DX:	Undifferentiated abdominal pain  Secondary Diagnosis:	Rigors  Secondary Diagnosis:	Hydronephrosis

## 2018-05-20 NOTE — ED ADULT NURSE REASSESSMENT NOTE - NS ED NURSE REASSESS COMMENT FT1
Pt to IR. Pt aware. Report given to KATE Figueroa. VSS. Pt stable for transport. Chart given to charge desk. Will cont to monitor.

## 2018-05-21 DIAGNOSIS — R78.81 BACTEREMIA: ICD-10-CM

## 2018-05-21 DIAGNOSIS — N10 ACUTE PYELONEPHRITIS: ICD-10-CM

## 2018-05-21 DIAGNOSIS — C67.9 MALIGNANT NEOPLASM OF BLADDER, UNSPECIFIED: ICD-10-CM

## 2018-05-21 LAB
ALBUMIN SERPL ELPH-MCNC: 3.1 G/DL — LOW (ref 3.3–5)
ALP SERPL-CCNC: 63 U/L — SIGNIFICANT CHANGE UP (ref 40–120)
ALT FLD-CCNC: 11 U/L — SIGNIFICANT CHANGE UP (ref 10–45)
ANION GAP SERPL CALC-SCNC: 16 MMOL/L — SIGNIFICANT CHANGE UP (ref 5–17)
AST SERPL-CCNC: 13 U/L — SIGNIFICANT CHANGE UP (ref 10–40)
BILIRUB SERPL-MCNC: 0.2 MG/DL — SIGNIFICANT CHANGE UP (ref 0.2–1.2)
BUN SERPL-MCNC: 36 MG/DL — HIGH (ref 7–23)
CALCIUM SERPL-MCNC: 8.1 MG/DL — LOW (ref 8.4–10.5)
CHLORIDE SERPL-SCNC: 110 MMOL/L — HIGH (ref 96–108)
CO2 SERPL-SCNC: 16 MMOL/L — LOW (ref 22–31)
CREAT SERPL-MCNC: 2.83 MG/DL — HIGH (ref 0.5–1.3)
GLUCOSE SERPL-MCNC: 143 MG/DL — HIGH (ref 70–99)
HCT VFR BLD CALC: 25.5 % — LOW (ref 34.5–45)
HGB BLD-MCNC: 8.4 G/DL — LOW (ref 11.5–15.5)
LYMPHOCYTES # BLD AUTO: 0.6 K/UL — LOW (ref 1–3.3)
LYMPHOCYTES # BLD AUTO: 7 % — LOW (ref 13–44)
MAGNESIUM SERPL-MCNC: 1.8 MG/DL — SIGNIFICANT CHANGE UP (ref 1.6–2.6)
MCHC RBC-ENTMCNC: 33.1 GM/DL — SIGNIFICANT CHANGE UP (ref 32–36)
MCHC RBC-ENTMCNC: 33.9 PG — SIGNIFICANT CHANGE UP (ref 27–34)
MCV RBC AUTO: 102 FL — HIGH (ref 80–100)
MONOCYTES # BLD AUTO: 0.7 K/UL — SIGNIFICANT CHANGE UP (ref 0–0.9)
MONOCYTES NFR BLD AUTO: 5 % — SIGNIFICANT CHANGE UP (ref 2–14)
MYELOCYTES NFR BLD: 1 % — HIGH (ref 0–0)
NEUTROPHILS # BLD AUTO: 9.2 K/UL — HIGH (ref 1.8–7.4)
NEUTROPHILS NFR BLD AUTO: 85 % — HIGH (ref 43–77)
NEUTS BAND # BLD: 2 % — SIGNIFICANT CHANGE UP (ref 0–8)
PHOSPHATE SERPL-MCNC: 3.2 MG/DL — SIGNIFICANT CHANGE UP (ref 2.5–4.5)
PLAT MORPH BLD: NORMAL — SIGNIFICANT CHANGE UP
PLATELET # BLD AUTO: 72 K/UL — LOW (ref 150–400)
POTASSIUM SERPL-MCNC: 4.5 MMOL/L — SIGNIFICANT CHANGE UP (ref 3.5–5.3)
POTASSIUM SERPL-SCNC: 4.5 MMOL/L — SIGNIFICANT CHANGE UP (ref 3.5–5.3)
PROT SERPL-MCNC: 5.6 G/DL — LOW (ref 6–8.3)
RBC # BLD: 2.49 M/UL — LOW (ref 3.8–5.2)
RBC # FLD: 17.1 % — HIGH (ref 10.3–14.5)
RBC BLD AUTO: SIGNIFICANT CHANGE UP
SODIUM SERPL-SCNC: 142 MMOL/L — SIGNIFICANT CHANGE UP (ref 135–145)
TSH SERPL-MCNC: 0.77 UIU/ML — SIGNIFICANT CHANGE UP (ref 0.27–4.2)
VANCOMYCIN TROUGH SERPL-MCNC: 8.8 UG/ML — LOW (ref 10–20)
WBC # BLD: 10.6 K/UL — HIGH (ref 3.8–10.5)
WBC # FLD AUTO: 10.6 K/UL — HIGH (ref 3.8–10.5)

## 2018-05-21 PROCEDURE — 99231 SBSQ HOSP IP/OBS SF/LOW 25: CPT

## 2018-05-21 RX ORDER — VANCOMYCIN HCL 1 G
VIAL (EA) INTRAVENOUS
Qty: 0 | Refills: 0 | Status: DISCONTINUED | OUTPATIENT
Start: 2018-05-21 | End: 2018-05-22

## 2018-05-21 RX ORDER — VANCOMYCIN HCL 1 G
1250 VIAL (EA) INTRAVENOUS EVERY 24 HOURS
Qty: 0 | Refills: 0 | Status: DISCONTINUED | OUTPATIENT
Start: 2018-05-22 | End: 2018-05-22

## 2018-05-21 RX ORDER — AMPICILLIN SODIUM AND SULBACTAM SODIUM 250; 125 MG/ML; MG/ML
INJECTION, POWDER, FOR SUSPENSION INTRAMUSCULAR; INTRAVENOUS
Qty: 0 | Refills: 0 | Status: DISCONTINUED | OUTPATIENT
Start: 2018-05-21 | End: 2018-05-25

## 2018-05-21 RX ORDER — AMPICILLIN SODIUM AND SULBACTAM SODIUM 250; 125 MG/ML; MG/ML
3 INJECTION, POWDER, FOR SUSPENSION INTRAMUSCULAR; INTRAVENOUS ONCE
Qty: 0 | Refills: 0 | Status: COMPLETED | OUTPATIENT
Start: 2018-05-21 | End: 2018-05-21

## 2018-05-21 RX ORDER — VANCOMYCIN HCL 1 G
1250 VIAL (EA) INTRAVENOUS ONCE
Qty: 0 | Refills: 0 | Status: COMPLETED | OUTPATIENT
Start: 2018-05-21 | End: 2018-05-21

## 2018-05-21 RX ORDER — ACETAMINOPHEN 500 MG
1000 TABLET ORAL ONCE
Qty: 0 | Refills: 0 | Status: COMPLETED | OUTPATIENT
Start: 2018-05-21 | End: 2018-05-21

## 2018-05-21 RX ORDER — SIMETHICONE 80 MG/1
80 TABLET, CHEWABLE ORAL EVERY 8 HOURS
Qty: 0 | Refills: 0 | Status: DISCONTINUED | OUTPATIENT
Start: 2018-05-21 | End: 2018-05-28

## 2018-05-21 RX ORDER — AMPICILLIN SODIUM AND SULBACTAM SODIUM 250; 125 MG/ML; MG/ML
3 INJECTION, POWDER, FOR SUSPENSION INTRAMUSCULAR; INTRAVENOUS EVERY 12 HOURS
Qty: 0 | Refills: 0 | Status: DISCONTINUED | OUTPATIENT
Start: 2018-05-21 | End: 2018-05-25

## 2018-05-21 RX ADMIN — Medication 1300 MILLIGRAM(S): at 05:19

## 2018-05-21 RX ADMIN — AMPICILLIN SODIUM AND SULBACTAM SODIUM 200 GRAM(S): 250; 125 INJECTION, POWDER, FOR SUSPENSION INTRAMUSCULAR; INTRAVENOUS at 22:17

## 2018-05-21 RX ADMIN — Medication 100 MILLIGRAM(S): at 00:05

## 2018-05-21 RX ADMIN — SODIUM CHLORIDE 100 MILLILITER(S): 9 INJECTION INTRAMUSCULAR; INTRAVENOUS; SUBCUTANEOUS at 09:48

## 2018-05-21 RX ADMIN — DULOXETINE HYDROCHLORIDE 60 MILLIGRAM(S): 30 CAPSULE, DELAYED RELEASE ORAL at 12:16

## 2018-05-21 RX ADMIN — Medication 1 TABLET(S): at 12:15

## 2018-05-21 RX ADMIN — SIMETHICONE 80 MILLIGRAM(S): 80 TABLET, CHEWABLE ORAL at 13:46

## 2018-05-21 RX ADMIN — RALOXIFENE HYDROCHLORIDE 60 MILLIGRAM(S): 60 TABLET, COATED ORAL at 12:15

## 2018-05-21 RX ADMIN — CEFTRIAXONE 100 GRAM(S): 500 INJECTION, POWDER, FOR SOLUTION INTRAMUSCULAR; INTRAVENOUS at 00:05

## 2018-05-21 RX ADMIN — AMPICILLIN SODIUM AND SULBACTAM SODIUM 200 GRAM(S): 250; 125 INJECTION, POWDER, FOR SUSPENSION INTRAMUSCULAR; INTRAVENOUS at 12:16

## 2018-05-21 RX ADMIN — Medication 1 PACKET(S): at 18:00

## 2018-05-21 RX ADMIN — Medication 1000 MILLIGRAM(S): at 14:30

## 2018-05-21 RX ADMIN — Medication 1 PACKET(S): at 05:19

## 2018-05-21 RX ADMIN — Medication 100 MILLIGRAM(S): at 22:16

## 2018-05-21 RX ADMIN — Medication 50 MILLIGRAM(S): at 05:19

## 2018-05-21 RX ADMIN — MONTELUKAST 10 MILLIGRAM(S): 4 TABLET, CHEWABLE ORAL at 12:16

## 2018-05-21 RX ADMIN — Medication 180 MILLIGRAM(S): at 05:19

## 2018-05-21 RX ADMIN — PANTOPRAZOLE SODIUM 40 MILLIGRAM(S): 20 TABLET, DELAYED RELEASE ORAL at 05:18

## 2018-05-21 RX ADMIN — Medication 400 MILLIGRAM(S): at 13:47

## 2018-05-21 RX ADMIN — Medication 166.67 MILLIGRAM(S): at 09:47

## 2018-05-21 RX ADMIN — Medication 75 MILLIGRAM(S): at 18:00

## 2018-05-21 RX ADMIN — Medication 75 MILLIGRAM(S): at 05:18

## 2018-05-21 NOTE — CHART NOTE - NSCHARTNOTEFT_GEN_A_CORE
Patient received to floor from PACU post b/l nephrostomy tube insertion in I.R. Patient sleeping, but alert    Vital signs stable: Vital Signs Last 24 Hrs  T(C): 37.2 (21 May 2018 01:48), Max: 39.7 (20 May 2018 06:42)  T(F): 98.9 (21 May 2018 01:48), Max: 103.4 (20 May 2018 06:42)  HR: 88 (21 May 2018 01:48) (83 - 109)  BP: 108/60 (21 May 2018 01:48) (100/52 - 145/64)  BP(mean): 77 (20 May 2018 23:02) (77 - 92)  RR: 16 (21 May 2018 01:48) (12 - 29)  SpO2: 93% (21 May 2018 01:48) (92% - 100%)    Nephrostomy bag noted with clear yellow urine output.    77 y.o. F w/ PMH of obesity, fibromyalgia, HTN, hypertrophic cardiomyopathy, CKD 3, former smoker (quit November 2017), bladder cancer metastatic to bone dx 9/2017 s/p ileal conduit on Carboplatin/Etoposide admitted for UTI/ bacteremia, presenting with abdominal pain. Noted with B/l Hydronephrosis and pyelo likely d/t obstruction as per CT scan and complicated by bacteremia. Pt now received to floor post IR procedure, stable. Post op lab stable, hyperkalemia corrected, but worsening acute on chronic CKD.   - f/u BMP in AM  - continue to monitor nephrostomy output  - continue with Abx for UTI bacteremia .    Robbie Mcpherson NP  [id number]

## 2018-05-21 NOTE — PROGRESS NOTE ADULT - SUBJECTIVE AND OBJECTIVE BOX
Subjective  pt feelinga little better s/p perc     Objective    Vital signs  T(F): , Max: 99.1 (05-20-18 @ 23:45)  HR: 79 (05-21-18 @ 16:24)  BP: 101/61 (05-21-18 @ 16:24)  SpO2: 96% (05-21-18 @ 16:24)  Wt(kg): --    Output     OUT:    Nephrostomy Tube: 635 mL    Nephrostomy Tube: 825 mL  Total OUT: 1460 mL    Total NET: -1460 mL      OUT:    Nephrostomy Tube: 850 mL    Nephrostomy Tube: 650 mL  Total OUT: 1500 mL    Total NET: -1500 mL          Gen nad  Abd - soft, mild distention , viable stoma       Labs      05-21 @ 09:44    WBC 10.6  / Hct 25.5  / SCr 2.83     05-20 @ 23:14    WBC --    / Hct --    / SCr 2.85       Urine Cx: ?  Blood Cx: ?    Imaging- distended ileal conduit Subjective  pt feelinga little better s/p perc     Objective    Vital signs  T(F): , Max: 99.1 (05-20-18 @ 23:45)  HR: 79 (05-21-18 @ 16:24)  BP: 101/61 (05-21-18 @ 16:24)  SpO2: 96% (05-21-18 @ 16:24)  Wt(kg): --    Output     OUT:    Nephrostomy Tube: 635 mL    Nephrostomy Tube: 825 mL  Total OUT: 1460 mL    Total NET: -1460 mL      OUT:    Nephrostomy Tube: 850 mL    Nephrostomy Tube: 650 mL  Total OUT: 1500 mL    Total NET: -1500 mL          Gen nad  Abd - soft, mild distention , viable stoma       Labs cr 2.8       05-21 @ 09:44    WBC 10.6  / Hct 25.5  / SCr 2.83     05-20 @ 23:14    WBC --    / Hct --    / SCr 2.85       Urine Cx: kleb/entero  Blood Cx: kleb / entero     Imaging- distended ileal conduit

## 2018-05-21 NOTE — PROVIDER CONTACT NOTE (OTHER) - BACKGROUND
Pt admitted w/ abd pain had IR procedure b/l nephrostomy placement on 5/20. Pt states she is allergic to penicillin and she gets a rash reaction that develops w/in 2 hrs.

## 2018-05-21 NOTE — CONSULT NOTE ADULT - PROBLEM SELECTOR RECOMMENDATION 3
per medicine.    Thank you for consulting us and involving us in the management of this most interesting and challenging case.     We will follow along in the care of this patient.    On Tuesday Dr. Vikram Means will be assuming care for our group. If you have any questions please reach out to him at [phone].

## 2018-05-21 NOTE — CONSULT NOTE ADULT - PROBLEM SELECTOR RECOMMENDATION 2
Most concerning in this context for enterococcus and on review patient has no significant pcn allergy so will adjust medication to Unasyn (renally dosed) and Vanco (renally dosed pending ID and sensitivities. Duration and choice of Rx based on ID and sensitivities.

## 2018-05-21 NOTE — CONSULT NOTE ADULT - SUBJECTIVE AND OBJECTIVE BOX
HPI:  78 yo Female with PMH of obesity, fibromyalgia, hypertension, hypertrophic cardiomyopathy, CKD 3, former smoker (quit 2017), bladder cancer metastatic to bone dx 2017 s/p ileal conduit on Carboplatin/Etoposide with recent admission for UTI/ bacteremia, presenting with abdominal pain and bilateral back pain worse than chronic. The patient states that over the last couple of days she began to develop lower abdominal pain, slowly progressing to 10/10 severity, not improved by anything, non radiating, and associated with nausea and retching. In the ED, vitals were: T 103.4, , /42, RR 20, 96% RA NC. CT Abd showed bilateral pyelonephritis due to obstruction (ileal conduit distension).  evaluated the patient and contacted IR for urgent bilateral nephrostomy placement. She was given IV fluids LR x1 liter, Cipro IV, Flagyl IV, and Ceftriaxone IV. Admitted to medicine from the PACU after the IR procedure. (20 May 2018 13:53)      PAST MEDICAL & SURGICAL HISTORY:  Bladder cancer metastasized to bone  Radial fracture: left  Uterine Polyp  Gastric Ulcer  Migraine, Ophthalmoplegic  Fibromyalgia  Obese  Calcified Thoracic Aorta  Hypertrophic Cardiomyopathy  Paroxysmal Ventricular Tachycardia: 2010  Nasal Polyp:   Arthritis  HTN (Hypertension)  Asthmatic Bronchitis  Acid Reflux  History of ileal conduit  S/P total knee arthroplasty, left  EPS study: with no intervention 2010  Nasal Polyp removal:   History of Arthroscopy:  Right  Left   History of D&C: 1968      Antimicrobials  cefTRIAXone   IVPB 1 Gram(s) IV Intermittent every 24 hours      Immunological      Other  acetaminophen   Tablet 650 milliGRAM(s) Oral every 6 hours PRN  acetaminophen   Tablet. 650 milliGRAM(s) Oral every 6 hours PRN  calcium carbonate 500 mG (Tums) Chewable 1 Tablet(s) Chew daily  diltiazem    milliGRAM(s) Oral daily  DULoxetine 60 milliGRAM(s) Oral daily  metoprolol succinate ER 50 milliGRAM(s) Oral daily  montelukast 10 milliGRAM(s) Oral daily  pantoprazole    Tablet 40 milliGRAM(s) Oral before breakfast  pregabalin 75 milliGRAM(s) Oral two times a day  psyllium Powder 1 Packet(s) Oral two times a day  raloxifene 60 milliGRAM(s) Oral daily  sodium bicarbonate 1300 milliGRAM(s) Oral two times a day  sodium chloride 0.9%. 1000 milliLiter(s) IV Continuous <Continuous>  traZODone 100 milliGRAM(s) Oral at bedtime      Allergies    non-patient reported a slight rash after prolonged pcn therapy in the past    Intolerances        SOCIAL HISTORY:    FAMILY HISTORY:  Family history of dementia (Father)  Family history of hyperlipidemia (Father)  Family history of prostate cancer (Father)      ROS:    EYES:  Negative  blurry vision or double vision  GASTROINTESTINAL:  Negative for cough, no chest pain  -otherwise negative except for subjective    Vital Signs Last 24 Hrs  T(C): 36.9 (21 May 2018 05:01), Max: 38.7 (20 May 2018 09:20)  T(F): 98.5 (21 May 2018 05:01), Max: 101.7 (20 May 2018 09:20)  HR: 86 (21 May 2018 05:01) (83 - 107)  BP: 110/62 (21 May 2018 05:01) (98/48 - 145/64)  BP(mean): 77 (20 May 2018 23:02) (77 - 92)  RR: 18 (21 May 2018 05:01) (12 - 29)  SpO2: 94% (21 May 2018 05:01) (92% - 100%)    PE:  WDWN in no distress  HEENT:  NC, PERRL, sclerae anicteric, conjunctivae clear, EOMI.  Sinuses nontender, no nasal exudate.  No buccal or pharyngeal lesions, erythema or exudate  Neck:  Supple, no adenopathy  Lungs:  No accessory muscle use, bilaterally clear to auscultation  Cor:  RRR, S1, S2, noted murmur  Abd:  Symmetric, normoactive BS.  Soft, tender right LQ, no masses, guarding or rebound.  Liver and spleen not enlarged, ostomy bag RLQ, bilateral nephrostomy tubes  Extrem:  No cyanosis or edema  Skin:  multiple skin changes, old bruises  Neuro: grossly intact  Musc: moving all limbs freely, no focal deficits    LABS:                        12.3   13.0  )-----------( 112      ( 20 May 2018 03:33 )             36.9       WBC Count: 13.0 K/uL (18 @ 03:33)          136  |  106  |  34<H>  ----------------------------<  105<H>  5.3   |  18<L>  |  2.85<H>    Ca    8.1<L>      20 May 2018 23:14  Phos  3.2       Mg     2.0         TPro  7.2  /  Alb  4.2  /  TBili  0.4  /  DBili  x   /  AST  123<H>  /  ALT  18  /  AlkPhos  78        Creatinine, Serum: 2.85 mg/dL (18 @ 23:14)  Creatinine, Serum: 2.80 mg/dL (18 @ 19:14)  Creatinine, Serum: 2.34 mg/dL (18 @ 05:57)  Creatinine, Serum: 2.16 mg/dL (18 @ 03:33)      Urinalysis Basic - ( 20 May 2018 06:21 )    Color: PL Yellow / Appearance: SL Turbid / S.009 / pH: x  Gluc: x / Ketone: Negative  / Bili: Negative / Urobili: Negative   Blood: x / Protein: Trace / Nitrite: Negative   Leuk Esterase: Large / RBC: 5-10 /HPF / WBC 6-10 /HPF   Sq Epi: x / Non Sq Epi: OCC /HPF / Bacteria: x      MICROBIOLOGY:      Culture - Blood (collected 20 May 2018 08:36)  Source: .Blood Blood-Heart  Gram Stain (20 May 2018 17:40):    Growth in aerobic and anaerobic bottles: Gram Positive Cocci in Pairs and    Chains  Preliminary Report (20 May 2018 17:41):    Growth in aerobic and anaerobic bottles: Gram Positive Cocci in Pairs and    Chains    Culture - Blood (collected 20 May 2018 08:36)  Source: .Blood Blood-Peripheral  Gram Stain (20 May 2018 16:49):    Growth in aerobic bottle: Gram Positive Cocci in Pairs and Chains    Growth in anaerobic bottle: Gram Positive Cocci in Pairs and Chains  Preliminary Report (20 May 2018 16:49):    Growth in aerobic bottle: Gram Positive Cocci in Pairs and Chains    Growth in anaerobic bottle: Gram Positive Cocci in Pairs and Chains    "Due to technical problems, Proteus sp. will Not be reported as part of    the BCID panel until further notice"    ***Blood Panel PCR results on this specimen are available    approximately 3 hours after the Gram stain result.***    Gram stain, PCR, and/or culture results may not always    correspond due to difference in methodologies.    ************************************************************    This PCR assay was performed using [employer].    The following targets are tested for: Enterococcus,    vancomycin resistant enterococci, Listeria monocytogenes,    coagulase negative staphylococci, S. aureus,    methicillin resistant S. aureus, Streptococcus agalactiae    (Group B), S. pneumoniae, S. pyogenes (Group A),    Acinetobacter baumannii, Enterobacter cloacae, E. coli,    Klebsiella oxytoca, K. pneumoniae, Proteus sp.,    Serratia marcescens, Haemophilus influenzae,    Neisseria meningitidis, Pseudomonas aeruginosa, Candida    albicans, C. glabrata, C krusei, C parapsilosis,    C. tropicalis and the KPC resistance gene.  Organism: Blood Culture PCR (20 May 2018 19:10)  Organism: Blood Culture PCR (20 May 2018 19:10)        RADIOLOGY & ADDITIONAL STUDIES:    --< from: CT Abdomen and Pelvis No Cont (18 @ 04:42) >    EXAM:  CT ABDOMEN AND PELVIS                            PROCEDURE DATE:  2018            INTERPRETATION:  CLINICAL INFORMATION: Abdominal pain and vomiting. Prior   abdominal surgery. History of bladder cancer. Evaluate for small bowel   obstruction.    COMPARISON: CT abdomen pelvis from 2018.    PROCEDURE:   CT of the Abdomen and Pelvis was performed without intravenous contrast.   Intravenous contrast: None.  Oral contrast: None.  Sagittal and coronal reformats were performed.    FINDINGS:    Limited study secondary to patient positioning and metallic streak   artifact.    LOWER CHEST: Small left pleural effusion. Subsegmental left lower lobe   atelectasis.     LIVER: The previous noted hemangioma in segment 5 is not well evaluated   on this study.  BILE DUCTS: Normal caliber.  GALLBLADDER: Within normal limits.  SPLEEN: Within normal limits.  PANCREAS: Within normal limits.  ADRENALS: Within normal limits.  KIDNEYS/URETERS: Increased bilateral perinephric stranding.   Nonobstructive 7 mm right renal calculus is noted. Mild bilateral   hydroureter, increased from the prior study on 2018. There is also   distention of the ileal conduit.    BLADDER: Status post partial cystectomy.  REPRODUCTIVE ORGANS: Status post cystectomy. No abdominal masses.    BOWEL: Status post partial small bowel resection with anastomosis in the   right lower quadrant. Right lower quadrant ileal conduit. Appendix is not   visualized.  PERITONEUM: A small amount of pelvic ascites is noted.  VESSELS:  Atherosclerosis.  RETROPERITONEUM: No lymphadenopathy.    ABDOMINAL WALL: Small fat-containing umbilical and supraumbilical hernias  BONES: Degenerative changes. Status post L4-L5 lumbar fusion with pedicle   screws and posterior connecting rods.    IMPRESSION:     Distention of the ileal conduit with mild bilateral hydroureter and   increased bilateral perinephric stranding. These findings may represent   bilateral pyelonephritis secondary to obstruction.     Dr. Alvarez discussed these findings with Dr. Stokes on 2018 5:21   AM, with read back.

## 2018-05-21 NOTE — PROVIDER CONTACT NOTE (OTHER) - ASSESSMENT
Pt states a  came in early this am and stated she needs stronger abx and will take off n allergery to put on amoxicillin. Pt was receiving abx. Pt is a&ox4, denies any SOB/rigors. No rash noted, pt eccymotic. Pt complains of pain & burning in right arm where "IV site is running through veins" VSS

## 2018-05-21 NOTE — PROGRESS NOTE ADULT - PROBLEM SELECTOR PLAN 1
(fever and tachycardia present on admission) due to bilateral pyelonephritis from an ileal conduit obstruction.  (+)blood cultures, possibly enterococcus  appreciate ID  Continue vanco/ampicillin for now

## 2018-05-21 NOTE — PROGRESS NOTE ADULT - SUBJECTIVE AND OBJECTIVE BOX
Interventional Radiology    S/P B/L Nephrostomy tube placement, POD # 1    Vital Signs Last 24 Hrs  T(C): 37.3 (21 May 2018 09:18), Max: 37.3 (20 May 2018 23:45)  T(F): 99.1 (21 May 2018 09:18), Max: 99.1 (20 May 2018 23:45)  HR: 86 (21 May 2018 09:18) (83 - 97)  BP: 99/51 (21 May 2018 09:18) (98/48 - 145/64)  BP(mean): 77 (20 May 2018 23:02) (77 - 92)  RR: 16 (21 May 2018 09:18) (12 - 29)  SpO2: 95% (21 May 2018 09:18) (92% - 100%)    General Appearance:     Procedure Site (Right Flank):   (Left Flank):    Outputs:      Nephrostomy Tube: 635 mL    Nephrostomy Tube: 825 mL    LABS:                   8.4    10.6  )-----------( 72       ( 21 May 2018 09:44 )             25.5     05-20    136  |  106  |  34<H>  ----------------------------<  105<H>  5.3   |  18<L>  |  2.85<H>    Ca    8.1<L>      20 May 2018 23:14  Phos  3.2     05-20  Mg     2.0     05-20    TPro  7.2  /  Alb  4.2  /  TBili  0.4  /  DBili  x   /  AST  123<H>  /  ALT  18  /  AlkPhos  78  05-20        77y Female with PMH of Bladder cancer obstructive uropathy s/p B/L PCN     Continue care as per primary team  Monitor outputs from B/L PCN, maintain dressings around the sites, flush once daily with 10 ml NS as ordered.     Mikey Hartley, BIBIANAC  [facility] [id number]  Ext [phone] Interventional Radiology    S/P B/L Nephrostomy tube placement, POD # 1    Vital Signs Last 24 Hrs  T(C): 37.3 (21 May 2018 09:18), Max: 37.3 (20 May 2018 23:45)  T(F): 99.1 (21 May 2018 09:18), Max: 99.1 (20 May 2018 23:45)  HR: 86 (21 May 2018 09:18) (83 - 97)  BP: 99/51 (21 May 2018 09:18) (98/48 - 145/64)  BP(mean): 77 (20 May 2018 23:02) (77 - 92)  RR: 16 (21 May 2018 09:18) (12 - 29)  SpO2: 95% (21 May 2018 09:18) (92% - 100%)    General Appearance: NAD     Procedure Site (Right Flank): dressing is C/D/I draining clear urine to leg bag  (Left Flank): dressing is C/D/I draining blood tinged urine    Outputs:      Left Nephrostomy Tube: 635 mL    Right Nephrostomy Tube: 825 mL    LABS:                   8.4    10.6  )-----------( 72       ( 21 May 2018 09:44 )             25.5     05-20    136  |  106  |  34<H>  ----------------------------<  105<H>  5.3   |  18<L>  |  2.85<H>    Ca    8.1<L>      20 May 2018 23:14  Phos  3.2     05-20  Mg     2.0     05-20    TPro  7.2  /  Alb  4.2  /  TBili  0.4  /  DBili  x   /  AST  123<H>  /  ALT  18  /  AlkPhos  78  05-20        77y Female with PMH of Bladder cancer obstructive uropathy s/p B/L PCN     Continue care as per primary team  Monitor outputs from B/L PCN, maintain dressings around the sites, flush once daily with 10 ml NS as ordered.     Mikey Hartley, SANAZ-C  [facility] [id number]  Ext [phone]

## 2018-05-21 NOTE — CHART NOTE - NSCHARTNOTEFT_GEN_A_CORE
Pt seen and examined for IV site pain   ~30 minutes in to the infusion of Unasyn , noted redness around the IV site . It does not appears to be an allergic reaction , Pt is a difficult IV , will access mediport . Pt is also c/o B/L flank pain and lower abdominal pain  will add IV Tylenol. Narcotics was offered as per Pt no response to it in the past  DR Bryant aware .    Rahel Yanez NP-C [id number]

## 2018-05-21 NOTE — PROGRESS NOTE ADULT - SUBJECTIVE AND OBJECTIVE BOX
Patient is a 77y old  Female who presents with a chief complaint of abdominal pain (20 May 2018 13:53)      SUBJECTIVE / OVERNIGHT EVENTS:        Vital Signs Last 24 Hrs  T(C): 37.3 (21 May 2018 09:18), Max: 37.3 (20 May 2018 23:45)  T(F): 99.1 (21 May 2018 09:18), Max: 99.1 (20 May 2018 23:45)  HR: 86 (21 May 2018 09:18) (83 - 97)  BP: 99/51 (21 May 2018 09:18) (98/48 - 145/64)  BP(mean): 77 (20 May 2018 23:02) (77 - 92)  RR: 16 (21 May 2018 09:18) (12 - 29)  SpO2: 95% (21 May 2018 09:18) (92% - 100%)  I&O's Summary    20 May 2018 07:  -  21 May 2018 07:00  --------------------------------------------------------  IN: 2700 mL / OUT: 1461 mL / NET: 1239 mL    21 May 2018 07:01  -  21 May 2018 09:56  --------------------------------------------------------  IN: 610 mL / OUT: 600 mL / NET: 10 mL        GENERAL: NAD, AAOx3  HEAD:  Atraumatic, Normocephalic  EYES: EOMI, PERRLA, conjunctiva and sclera clear  NECK: Supple, No JVD, No LAD  CHEST/LUNG: Clear to auscultation bilaterally; No wheeze  HEART: Regular rate and rhythm; No murmurs, rubs, or gallops  ABDOMEN: Soft, Nontender, (+)distended; Bowel sounds present; lt nephrostomy draining slightly blood-tinged urine; rt nephrostomy draining clear urine  EXTREMITIES:  2+ Peripheral Pulses, No clubbing, cyanosis, or edema  SKIN: No rashes or lesions  NEURO: nonfocal CN/motor/sensory/reflexes    LABS:                        8.4    10.6  )-----------( x        ( 21 May 2018 09:44 )             25.5     05-    136  |  106  |  34<H>  ----------------------------<  105<H>  5.3   |  18<L>  |  2.85<H>    Ca    8.1<L>      20 May 2018 23:14  Phos  3.2     -  Mg     2.0         TPro  7.2  /  Alb  4.2  /  TBili  0.4  /  DBili  x   /  AST  123<H>  /  ALT  18  /  AlkPhos  78      PT/INR - ( 20 May 2018 03:33 )   PT: 10.2 sec;   INR: 0.95 ratio         PTT - ( 20 May 2018 03:33 )  PTT:27.3 sec  CAPILLARY BLOOD GLUCOSE        CARDIAC MARKERS ( 20 May 2018 03:33 )  x     / <0.01 ng/mL / 196 U/L / x     / x          Urinalysis Basic - ( 20 May 2018 06:21 )    Color: PL Yellow / Appearance: SL Turbid / S.009 / pH: x  Gluc: x / Ketone: Negative  / Bili: Negative / Urobili: Negative   Blood: x / Protein: Trace / Nitrite: Negative   Leuk Esterase: Large / RBC: 5-10 /HPF / WBC 6-10 /HPF   Sq Epi: x / Non Sq Epi: OCC /HPF / Bacteria: x        RADIOLOGY & ADDITIONAL TESTS:    Imaging Personally Reviewed:  [x] YES  [ ] NO    Consultant(s) Notes Reviewed:  [x] YES  [ ] NO      MEDICATIONS  (STANDING):  ampicillin/sulbactam  IVPB      ampicillin/sulbactam  IVPB 3 Gram(s) IV Intermittent once  ampicillin/sulbactam  IVPB 3 Gram(s) IV Intermittent every 12 hours  calcium carbonate 500 mG (Tums) Chewable 1 Tablet(s) Chew daily  diltiazem    milliGRAM(s) Oral daily  DULoxetine 60 milliGRAM(s) Oral daily  metoprolol succinate ER 50 milliGRAM(s) Oral daily  montelukast 10 milliGRAM(s) Oral daily  pantoprazole    Tablet 40 milliGRAM(s) Oral before breakfast  pregabalin 75 milliGRAM(s) Oral two times a day  psyllium Powder 1 Packet(s) Oral two times a day  raloxifene 60 milliGRAM(s) Oral daily  sodium bicarbonate 1300 milliGRAM(s) Oral two times a day  sodium chloride 0.9%. 1000 milliLiter(s) (100 mL/Hr) IV Continuous <Continuous>  traZODone 100 milliGRAM(s) Oral at bedtime  vancomycin  IVPB        MEDICATIONS  (PRN):  acetaminophen   Tablet 650 milliGRAM(s) Oral every 6 hours PRN For Temp greater than 38 C (100.4 F)  acetaminophen   Tablet. 650 milliGRAM(s) Oral every 6 hours PRN Mild Pain (1 - 3)      Care Discussed with Consultants/Other Providers [x] YES  [ ] NO    HEALTH ISSUES - PROBLEM Dx:  Bladder cancer metastasized to bone: Bladder cancer metastasized to bone  Bacteremia due to Gram-positive bacteria: Bacteremia due to Gram-positive bacteria  Pyelonephritis, acute: Pyelonephritis, acute  Tachycardia: Tachycardia  Fibromyalgia: Fibromyalgia  Other hydronephrosis: Other hydronephrosis  DEVON (acute kidney injury): DEVON (acute kidney injury)  Severe sepsis: Severe sepsis

## 2018-05-21 NOTE — CONSULT NOTE ADULT - ASSESSMENT
76 yo Female with  bladder cancer metastatic to bone dx 9/2017 s/p ileal conduit on Carboplatin/Etoposide with showed bilateral pyelonephritis due to obstruction (ileal conduit distension). underwent IR urgent bilateral nephrostomy placement and noted bacteremia with gram + cocci concerning morphology for enterococcus

## 2018-05-22 DIAGNOSIS — N13.9 OBSTRUCTIVE AND REFLUX UROPATHY, UNSPECIFIED: ICD-10-CM

## 2018-05-22 DIAGNOSIS — A41.81 SEPSIS DUE TO ENTEROCOCCUS: ICD-10-CM

## 2018-05-22 DIAGNOSIS — A41.4 SEPSIS DUE TO ANAEROBES: ICD-10-CM

## 2018-05-22 LAB
-  AMIKACIN: SIGNIFICANT CHANGE UP
-  AMIKACIN: SIGNIFICANT CHANGE UP
-  AMOXICILLIN/CLAVULANIC ACID: SIGNIFICANT CHANGE UP
-  AMPICILLIN/SULBACTAM: SIGNIFICANT CHANGE UP
-  AMPICILLIN/SULBACTAM: SIGNIFICANT CHANGE UP
-  AMPICILLIN: SIGNIFICANT CHANGE UP
-  AZTREONAM: SIGNIFICANT CHANGE UP
-  AZTREONAM: SIGNIFICANT CHANGE UP
-  CEFAZOLIN: SIGNIFICANT CHANGE UP
-  CEFAZOLIN: SIGNIFICANT CHANGE UP
-  CEFEPIME: SIGNIFICANT CHANGE UP
-  CEFEPIME: SIGNIFICANT CHANGE UP
-  CEFOXITIN: SIGNIFICANT CHANGE UP
-  CEFOXITIN: SIGNIFICANT CHANGE UP
-  CEFTRIAXONE: SIGNIFICANT CHANGE UP
-  CEFTRIAXONE: SIGNIFICANT CHANGE UP
-  CIPROFLOXACIN: SIGNIFICANT CHANGE UP
-  ERTAPENEM: SIGNIFICANT CHANGE UP
-  ERTAPENEM: SIGNIFICANT CHANGE UP
-  GENTAMICIN SYNERGY: SIGNIFICANT CHANGE UP
-  GENTAMICIN: SIGNIFICANT CHANGE UP
-  GENTAMICIN: SIGNIFICANT CHANGE UP
-  IMIPENEM: SIGNIFICANT CHANGE UP
-  IMIPENEM: SIGNIFICANT CHANGE UP
-  LEVOFLOXACIN: SIGNIFICANT CHANGE UP
-  LEVOFLOXACIN: SIGNIFICANT CHANGE UP
-  MEROPENEM: SIGNIFICANT CHANGE UP
-  MEROPENEM: SIGNIFICANT CHANGE UP
-  NITROFURANTOIN: SIGNIFICANT CHANGE UP
-  PIPERACILLIN/TAZOBACTAM: SIGNIFICANT CHANGE UP
-  PIPERACILLIN/TAZOBACTAM: SIGNIFICANT CHANGE UP
-  TETRACYCLINE: SIGNIFICANT CHANGE UP
-  TETRACYCLINE: SIGNIFICANT CHANGE UP
-  TIGECYCLINE: SIGNIFICANT CHANGE UP
-  TOBRAMYCIN: SIGNIFICANT CHANGE UP
-  TOBRAMYCIN: SIGNIFICANT CHANGE UP
-  TRIMETHOPRIM/SULFAMETHOXAZOLE: SIGNIFICANT CHANGE UP
-  TRIMETHOPRIM/SULFAMETHOXAZOLE: SIGNIFICANT CHANGE UP
-  VANCOMYCIN: SIGNIFICANT CHANGE UP
ALBUMIN SERPL ELPH-MCNC: 2.7 G/DL — LOW (ref 3.3–5)
ALP SERPL-CCNC: 57 U/L — SIGNIFICANT CHANGE UP (ref 40–120)
ALT FLD-CCNC: 9 U/L — LOW (ref 10–45)
ANION GAP SERPL CALC-SCNC: 11 MMOL/L — SIGNIFICANT CHANGE UP (ref 5–17)
AST SERPL-CCNC: 12 U/L — SIGNIFICANT CHANGE UP (ref 10–40)
BASOPHILS # BLD AUTO: 0 K/UL — SIGNIFICANT CHANGE UP (ref 0–0.2)
BASOPHILS NFR BLD AUTO: 0 % — SIGNIFICANT CHANGE UP (ref 0–2)
BILIRUB SERPL-MCNC: 0.1 MG/DL — LOW (ref 0.2–1.2)
BUN SERPL-MCNC: 29 MG/DL — HIGH (ref 7–23)
CALCIUM SERPL-MCNC: 7.4 MG/DL — LOW (ref 8.4–10.5)
CHLORIDE SERPL-SCNC: 108 MMOL/L — SIGNIFICANT CHANGE UP (ref 96–108)
CO2 SERPL-SCNC: 19 MMOL/L — LOW (ref 22–31)
CREAT SERPL-MCNC: 2.09 MG/DL — HIGH (ref 0.5–1.3)
CULTURE RESULTS: SIGNIFICANT CHANGE UP
EOSINOPHIL # BLD AUTO: 0 K/UL — SIGNIFICANT CHANGE UP (ref 0–0.5)
EOSINOPHIL NFR BLD AUTO: 1 % — SIGNIFICANT CHANGE UP (ref 0–6)
GLUCOSE SERPL-MCNC: 109 MG/DL — HIGH (ref 70–99)
GRAM STN FLD: SIGNIFICANT CHANGE UP
HBA1C BLD-MCNC: 4.9 % — SIGNIFICANT CHANGE UP (ref 4–5.6)
HCT VFR BLD CALC: 23.7 % — LOW (ref 34.5–45)
HGB BLD-MCNC: 7.8 G/DL — LOW (ref 11.5–15.5)
LYMPHOCYTES # BLD AUTO: 0.7 K/UL — LOW (ref 1–3.3)
LYMPHOCYTES # BLD AUTO: 12 % — LOW (ref 13–44)
MCHC RBC-ENTMCNC: 33.1 GM/DL — SIGNIFICANT CHANGE UP (ref 32–36)
MCHC RBC-ENTMCNC: 33.7 PG — SIGNIFICANT CHANGE UP (ref 27–34)
MCV RBC AUTO: 102 FL — HIGH (ref 80–100)
METHOD TYPE: SIGNIFICANT CHANGE UP
MONOCYTES # BLD AUTO: 0.6 K/UL — SIGNIFICANT CHANGE UP (ref 0–0.9)
MONOCYTES NFR BLD AUTO: 9 % — SIGNIFICANT CHANGE UP (ref 2–14)
NEUTROPHILS # BLD AUTO: 5.5 K/UL — SIGNIFICANT CHANGE UP (ref 1.8–7.4)
NEUTROPHILS NFR BLD AUTO: 76 % — SIGNIFICANT CHANGE UP (ref 43–77)
ORGANISM # SPEC MICROSCOPIC CNT: SIGNIFICANT CHANGE UP
PLATELET # BLD AUTO: 78 K/UL — LOW (ref 150–400)
POTASSIUM SERPL-MCNC: 4 MMOL/L — SIGNIFICANT CHANGE UP (ref 3.5–5.3)
POTASSIUM SERPL-SCNC: 4 MMOL/L — SIGNIFICANT CHANGE UP (ref 3.5–5.3)
PROT SERPL-MCNC: 5 G/DL — LOW (ref 6–8.3)
RBC # BLD: 2.33 M/UL — LOW (ref 3.8–5.2)
RBC # FLD: 16.7 % — HIGH (ref 10.3–14.5)
SODIUM SERPL-SCNC: 138 MMOL/L — SIGNIFICANT CHANGE UP (ref 135–145)
SPECIMEN SOURCE: SIGNIFICANT CHANGE UP
WBC # BLD: 6.8 K/UL — SIGNIFICANT CHANGE UP (ref 3.8–10.5)
WBC # FLD AUTO: 6.8 K/UL — SIGNIFICANT CHANGE UP (ref 3.8–10.5)

## 2018-05-22 RX ORDER — DIPHENHYDRAMINE HCL 50 MG
25 CAPSULE ORAL AT BEDTIME
Qty: 0 | Refills: 0 | Status: DISCONTINUED | OUTPATIENT
Start: 2018-05-22 | End: 2018-05-28

## 2018-05-22 RX ORDER — ACETAMINOPHEN 500 MG
1000 TABLET ORAL ONCE
Qty: 0 | Refills: 0 | Status: COMPLETED | OUTPATIENT
Start: 2018-05-22 | End: 2018-05-22

## 2018-05-22 RX ADMIN — MONTELUKAST 10 MILLIGRAM(S): 4 TABLET, CHEWABLE ORAL at 11:10

## 2018-05-22 RX ADMIN — SIMETHICONE 80 MILLIGRAM(S): 80 TABLET, CHEWABLE ORAL at 17:00

## 2018-05-22 RX ADMIN — Medication 650 MILLIGRAM(S): at 16:59

## 2018-05-22 RX ADMIN — PANTOPRAZOLE SODIUM 40 MILLIGRAM(S): 20 TABLET, DELAYED RELEASE ORAL at 05:08

## 2018-05-22 RX ADMIN — AMPICILLIN SODIUM AND SULBACTAM SODIUM 200 GRAM(S): 250; 125 INJECTION, POWDER, FOR SUSPENSION INTRAMUSCULAR; INTRAVENOUS at 22:21

## 2018-05-22 RX ADMIN — DULOXETINE HYDROCHLORIDE 60 MILLIGRAM(S): 30 CAPSULE, DELAYED RELEASE ORAL at 11:10

## 2018-05-22 RX ADMIN — Medication 1000 MILLIGRAM(S): at 06:15

## 2018-05-22 RX ADMIN — Medication 50 MILLIGRAM(S): at 05:09

## 2018-05-22 RX ADMIN — AMPICILLIN SODIUM AND SULBACTAM SODIUM 200 GRAM(S): 250; 125 INJECTION, POWDER, FOR SUSPENSION INTRAMUSCULAR; INTRAVENOUS at 11:04

## 2018-05-22 RX ADMIN — Medication 1 PACKET(S): at 17:00

## 2018-05-22 RX ADMIN — Medication 166.67 MILLIGRAM(S): at 09:22

## 2018-05-22 RX ADMIN — Medication 180 MILLIGRAM(S): at 05:09

## 2018-05-22 RX ADMIN — Medication 650 MILLIGRAM(S): at 23:24

## 2018-05-22 RX ADMIN — Medication 75 MILLIGRAM(S): at 17:00

## 2018-05-22 RX ADMIN — RALOXIFENE HYDROCHLORIDE 60 MILLIGRAM(S): 60 TABLET, COATED ORAL at 11:10

## 2018-05-22 RX ADMIN — Medication 100 MILLIGRAM(S): at 22:21

## 2018-05-22 RX ADMIN — Medication 1 PACKET(S): at 05:08

## 2018-05-22 RX ADMIN — SODIUM CHLORIDE 100 MILLILITER(S): 9 INJECTION INTRAMUSCULAR; INTRAVENOUS; SUBCUTANEOUS at 09:22

## 2018-05-22 RX ADMIN — Medication 1 TABLET(S): at 14:20

## 2018-05-22 RX ADMIN — Medication 400 MILLIGRAM(S): at 05:45

## 2018-05-22 RX ADMIN — Medication 650 MILLIGRAM(S): at 18:09

## 2018-05-22 RX ADMIN — Medication 75 MILLIGRAM(S): at 05:09

## 2018-05-22 NOTE — PHYSICAL THERAPY INITIAL EVALUATION ADULT - ADDITIONAL COMMENTS
Pt lives in a PH alone, +5 steps to enter with HR, 1 flight of stairs to negotiate up to bedroom with HR. Pt reports she was ambulating independently without use of an AD inside the home, would use rollator for long distances outside the home. Pt states she was independent with all ADL's. Owns R/W Pt lives in a PH alone, +5 steps to enter with HR, 1 flight of stairs to negotiate up to bedroom with HR. Pt reports she was ambulating independently without use of AD, rollator and rolling walker for longer distances. Pt states she was independent with all ADL's. Owns R/W

## 2018-05-22 NOTE — PROGRESS NOTE ADULT - PROBLEM SELECTOR PLAN 1
(fever and tachycardia present on admission) due to bilateral pyelonephritis from an ileal conduit obstruction.  (+)blood cultures 2' enterococcus  repeat blood cxs testing from 5/21  appreciate ID  Will deescalate antibx today

## 2018-05-22 NOTE — PHYSICAL THERAPY INITIAL EVALUATION ADULT - PERTINENT HX OF CURRENT PROBLEM, REHAB EVAL
78 yo F with PMH bladder cancer metastatic to bone dx 9/2017 s/p ileal conduit with recent admission for UTI/ bacteremia, pw abdominal pain. The patient states that over the last couple of days she began to develop lower abdominal pain, slowly progressing to 10/10 severity. CT Abd showed bilateral pyelonephritis due to obstruction (ileal conduit distension).  evaluated the patient and contacted IR for urgent bilateral nephrostomy placement on 5/20.

## 2018-05-22 NOTE — PROVIDER CONTACT NOTE (CRITICAL VALUE NOTIFICATION) - TEST AND RESULT REPORTED:
Blood culture collected on 5-21-18 at 09: 44 AM is positive for growth in anaerobic bottle Gram + cocci in pairs and chains.

## 2018-05-22 NOTE — PROGRESS NOTE ADULT - SUBJECTIVE AND OBJECTIVE BOX
(+)gas bloatedness  (+)insomnia    no acute pain, no fevers or chills    Vital Signs Last 24 Hrs  T(C): 36.7 (22 May 2018 09:18), Max: 37.2 (22 May 2018 05:02)  T(F): 98.1 (22 May 2018 09:18), Max: 99 (22 May 2018 05:02)  HR: 80 (22 May 2018 09:18) (74 - 88)  BP: 109/63 (22 May 2018 09:18) (101/61 - 126/72)  BP(mean): --  RR: 16 (22 May 2018 09:18) (14 - 18)  SpO2: 97% (22 May 2018 09:18) (94% - 98%)    GENERAL: NAD, AAOx3  HEAD:  Atraumatic, Normocephalic  EYES: EOMI, PERRLA, conjunctiva and sclera clear  NECK: Supple, No JVD, No LAD  CHEST/LUNG: Clear to auscultation bilaterally; No wheeze  HEART: Regular rate and rhythm; No murmurs, rubs, or gallops  ABDOMEN: Soft, Nontender, (+)distended; Bowel sounds present; b/l nephrostomies draining clear urine  EXTREMITIES:  2+ Peripheral Pulses, No LE edema  SKIN: No rashes or lesions  NEURO: nonfocal CN/motor/sensory/reflexes    LABS:                        7.8    6.8   )-----------( 78       ( 22 May 2018 06:45 )             23.7     05-22    138  |  108  |  29<H>  ----------------------------<  109<H>  4.0   |  19<L>  |  2.09<H>    Ca    7.4<L>      22 May 2018 06:45  Phos  3.2     05-21  Mg     1.8     05-21    TPro  5.0<L>  /  Alb  2.7<L>  /  TBili  0.1<L>  /  DBili  x   /  AST  12  /  ALT  9<L>  /  AlkPhos  57  05-22      CAPILLARY BLOOD GLUCOSE

## 2018-05-22 NOTE — PROGRESS NOTE ADULT - SUBJECTIVE AND OBJECTIVE BOX
[facility], Division of Infectious Diseases  BERNICE Sheffield A. Lee  [phone]    Name: AUBREE ALONZO  Age: 77y  Gender: Female  MRN: [id number]    Interval History--  Notes reviewed. Having a tough day with respect to her cancer diagnosis, feels af if road blocks have been put in front of her one after another. Denies fevers, chills, or rigors. Pain not an issue. No anorexia. Flatulence only medical complaint.    Past Medical History--  Bladder cancer metastasized to bone  Radial fracture  Uterine Polyp  Gastric Ulcer  Migraine, Ophthalmoplegic  Fibromyalgia  Obese  Calcified Thoracic Aorta  Hypertrophic Cardiomyopathy  Paroxysmal Ventricular Tachycardia  Sleep Apnea  Nasal Polyp  Arthritis  HTN (Hypertension)  Asthmatic Bronchitis  Acid Reflux  History of ileal conduit  S/P total knee arthroplasty, left  EPS study  Nasal Polyp removal  History of Arthroscopy  History of D&C      For details regarding the patient's social history, family history, and other miscellaneous elements, please refer the initial infectious diseases consultation and/or the admitting history and physical examination for this admission.    Allergies    No Known Allergies    Intolerances        Medications--  Antibiotics:  ampicillin/sulbactam  IVPB      ampicillin/sulbactam  IVPB 3 Gram(s) IV Intermittent every 12 hours  vancomycin  IVPB 1250 milliGRAM(s) IV Intermittent every 24 hours  vancomycin  IVPB        Immunologic:    Other:  acetaminophen   Tablet PRN  acetaminophen   Tablet. PRN  calcium carbonate 500 mG (Tums) Chewable  diltiazem   CD  diphenhydrAMINE   Capsule PRN  DULoxetine  metoprolol succinate ER  montelukast  pantoprazole    Tablet  pregabalin  psyllium Powder  raloxifene  simethicone PRN  sodium bicarbonate  sodium chloride 0.9%.  traZODone      Review of Systems--  A 10-point review of systems was obtained.   Review of systems otherwise negative except as previously noted.    Physical Examination--  Vital Signs: T(F): 99.1 (05-22-18 @ 13:34), Max: 99.1 (05-22-18 @ 13:34)  HR: 81 (05-22-18 @ 13:34)  BP: 138/76 (05-22-18 @ 13:34)  RR: 16 (05-22-18 @ 13:34)  SpO2: 95% (05-22-18 @ 13:34)  Wt(kg): --  General: Nontoxic-appearing Female in no acute distress.  HEENT: Partial alopecia. Anicteric. Conjunctiva pink and moist. Oropharynx clear.   Neck: Not rigid. No sense of mass.  Nodes: None palpable.  Lungs: Clear bilaterally without rales, wheezing or rhonchi  Heart: Regular rate and rhythm. No Murmur. No rub. No gallop. No palpable thrill.  Chest: Mediport without No inflammatory signs.  Abdomen: Bowel sounds present and normoactive. Soft. Nondistended. Nontender. abdominal wall. Ileal conduit yellow urine.  Back: B NT, left blood tingedm, right clear pale yellow. More urine in NT bags than ileal conduit bag.  Extremities: No cyanosis or clubbing. No edema.   Skin: Warm. Dry. Good turgor. No vasculitic stigmata. Traumatic/purpuric changes.  Psychiatric: Awake, alert, appropriate, interactive.        Laboratory Studies--  CBC                        7.8    6.8   )-----------( 78       ( 22 May 2018 06:45 )             23.7       Chemistries  05-22    138  |  108  |  29<H>  ----------------------------<  109<H>  4.0   |  19<L>  |  2.09<H>    Ca    7.4<L>      22 May 2018 06:45  Phos  3.2     05-21  Mg     1.8     05-21    TPro  5.0<L>  /  Alb  2.7<L>  /  TBili  0.1<L>  /  DBili  x   /  AST  12  /  ALT  9<L>  /  AlkPhos  57  05-22      Culture Data    Culture - Blood (collected 21 May 2018 14:17)  Source: .Blood Blood-Peripheral  Preliminary Report (22 May 2018 15:01):    No growth to date.    Culture - Blood (collected 21 May 2018 14:17)  Source: .Blood Blood-Peripheral  Preliminary Report (22 May 2018 15:01):    No growth to date.    Culture - Urine (collected 20 May 2018 13:57)  Source: .Urine Nephrostomy - Right  Final Report (22 May 2018 12:56):    >100,000 CFU/ml Enterococcus faecalis  Organism: Enterococcus faecalis (22 May 2018 12:56)  Organism: Enterococcus faecalis (22 May 2018 12:56)    Culture - Urine (collected 20 May 2018 13:57)  Source: .Urine Nephrostomy - Left  Final Report (22 May 2018 12:57):    >100,000 CFU/ml Enterococcus faecalis    See previous culture [id number]    Culture - Urine (collected 20 May 2018 08:49)  Source: .Urine Ileal Conduit  Final Report (22 May 2018 13:05):    >100,000 CFU/ml Enterococcus faecalis    50,000 - 99,000 CFU/mL Klebsiella pneumoniae  Organism: Enterococcus faecalis  Klebsiella pneumoniae (22 May 2018 13:05)  Organism: Klebsiella pneumoniae (22 May 2018 13:05)  Organism: Enterococcus faecalis (22 May 2018 13:05)    Culture - Blood (collected 20 May 2018 08:36)  Source: .Blood Blood-Heart  Gram Stain (20 May 2018 17:40):    Growth in aerobic and anaerobic bottles: Gram Positive Cocci in Pairs and    Chains  Preliminary Report (21 May 2018 23:51):    Growth in aerobic and anaerobic bottles: Enterococcus faecalis    See previous culture [id number]    Culture - Blood (collected 20 May 2018 08:36)  Source: .Blood Blood-Peripheral  Gram Stain (20 May 2018 16:49):    Growth in aerobic bottle: Gram Positive Cocci in Pairs and Chains    Growth in anaerobic bottle: Gram Positive Cocci in Pairs and Chains  Preliminary Report (21 May 2018 23:45):    Growth in aerobic and anaerobic bottles: Enterococcus faecalis and    Klebsiella pneumoniae    "Due to technical problems, Proteus sp. will Not be reported as part of    the BCID panel until further notice"    ***Blood Panel PCR results on this specimen are available    approximately 3 hours after the Gram stain result.***    Gram stain, PCR, and/or culture results may not always    correspond due to difference in methodologies.    ************************************************************    This PCR assay was performed using [employer].    The following targets are tested for: Enterococcus,    vancomycin resistant enterococci, Listeria monocytogenes,    coagulase negative staphylococci, S. aureus,    methicillin resistant S. aureus, Streptococcus agalactiae    (Group B), S. pneumoniae, S. pyogenes (Group A),    Acinetobacter baumannii, Enterobacter cloacae, E. coli,    Klebsiella oxytoca, K. pneumoniae, Proteus sp.,    Serratia marcescens, Haemophilus influenzae,    Neisseria meningitidis, Pseudomonas aeruginosa, Candida    albicans, C. glabrata, C krusei, C parapsilosis,    C. tropicalis and the KPC resistance gene.  Organism: Blood Culture PCR (20 May 2018 19:10)  Organism: Blood Culture PCR (20 May 2018 19:10)

## 2018-05-22 NOTE — PROGRESS NOTE ADULT - SUBJECTIVE AND OBJECTIVE BOX
B/L NT's draining clear urine  min output from IC    Vital Signs Last 24 Hrs  T(C): 37.3 (22 May 2018 13:34), Max: 37.3 (22 May 2018 13:34)  T(F): 99.1 (22 May 2018 13:34), Max: 99.1 (22 May 2018 13:34)  HR: 81 (22 May 2018 13:34) (74 - 88)  BP: 138/76 (22 May 2018 13:34) (101/61 - 138/76)  BP(mean): --  RR: 16 (22 May 2018 13:34) (16 - 18)  SpO2: 95% (22 May 2018 13:34) (94% - 97%)    ROS  Normal respiration  No chest pain    Physical exam  alert  nl respiratory effort  no cva tenderness    Urine:     I&O's Summary    21 May 2018 07:01  -  22 May 2018 07:00  --------------------------------------------------------  IN: 5070 mL / OUT: 4700 mL / NET: 370 mL    22 May 2018 07:01  -  22 May 2018 14:08  --------------------------------------------------------  IN: 970 mL / OUT: 1900 mL / NET: -930 mL        LABS:                        7.8    6.8   )-----------( 78       ( 22 May 2018 06:45 )             23.7     05-22    138  |  108  |  29<H>  ----------------------------<  109<H>  4.0   |  19<L>  |  2.09<H>    Ca    7.4<L>      22 May 2018 06:45  Phos  3.2     05-21  Mg     1.8     05-21    TPro  5.0<L>  /  Alb  2.7<L>  /  TBili  0.1<L>  /  DBili  x   /  AST  12  /  ALT  9<L>  /  AlkPhos  57  05-22      Urine Culture:       Radiology    Prior notes/chart reviewed.

## 2018-05-22 NOTE — PROGRESS NOTE ADULT - PROBLEM SELECTOR PLAN 1
Continue Abx  F/U repeat cx data  Given overall improvement I see no reason to alter antibiotics  Recheck vanco trough

## 2018-05-23 LAB
ANION GAP SERPL CALC-SCNC: 9 MMOL/L — SIGNIFICANT CHANGE UP (ref 5–17)
BLD GP AB SCN SERPL QL: NEGATIVE — SIGNIFICANT CHANGE UP
BUN SERPL-MCNC: 22 MG/DL — SIGNIFICANT CHANGE UP (ref 7–23)
CALCIUM SERPL-MCNC: 7.9 MG/DL — LOW (ref 8.4–10.5)
CHLORIDE SERPL-SCNC: 115 MMOL/L — HIGH (ref 96–108)
CO2 SERPL-SCNC: 21 MMOL/L — LOW (ref 22–31)
CREAT SERPL-MCNC: 1.68 MG/DL — HIGH (ref 0.5–1.3)
CULTURE RESULTS: SIGNIFICANT CHANGE UP
GLUCOSE SERPL-MCNC: 90 MG/DL — SIGNIFICANT CHANGE UP (ref 70–99)
HCT VFR BLD CALC: 24.6 % — LOW (ref 34.5–45)
HGB BLD-MCNC: 7.9 G/DL — LOW (ref 11.5–15.5)
MCHC RBC-ENTMCNC: 32 GM/DL — SIGNIFICANT CHANGE UP (ref 32–36)
MCHC RBC-ENTMCNC: 32.5 PG — SIGNIFICANT CHANGE UP (ref 27–34)
MCV RBC AUTO: 102 FL — HIGH (ref 80–100)
PLATELET # BLD AUTO: 78 K/UL — LOW (ref 150–400)
POTASSIUM SERPL-MCNC: 4.5 MMOL/L — SIGNIFICANT CHANGE UP (ref 3.5–5.3)
POTASSIUM SERPL-SCNC: 4.5 MMOL/L — SIGNIFICANT CHANGE UP (ref 3.5–5.3)
RBC # BLD: 2.43 M/UL — LOW (ref 3.8–5.2)
RBC # FLD: 16.5 % — HIGH (ref 10.3–14.5)
RH IG SCN BLD-IMP: POSITIVE — SIGNIFICANT CHANGE UP
SODIUM SERPL-SCNC: 145 MMOL/L — SIGNIFICANT CHANGE UP (ref 135–145)
SPECIMEN SOURCE: SIGNIFICANT CHANGE UP
WBC # BLD: 4.9 K/UL — SIGNIFICANT CHANGE UP (ref 3.8–10.5)
WBC # FLD AUTO: 4.9 K/UL — SIGNIFICANT CHANGE UP (ref 3.8–10.5)

## 2018-05-23 PROCEDURE — 82803 BLOOD GASES ANY COMBINATION: CPT

## 2018-05-23 PROCEDURE — 96374 THER/PROPH/DIAG INJ IV PUSH: CPT

## 2018-05-23 PROCEDURE — 87040 BLOOD CULTURE FOR BACTERIA: CPT

## 2018-05-23 PROCEDURE — 82330 ASSAY OF CALCIUM: CPT

## 2018-05-23 PROCEDURE — 80048 BASIC METABOLIC PNL TOTAL CA: CPT

## 2018-05-23 PROCEDURE — 84295 ASSAY OF SERUM SODIUM: CPT

## 2018-05-23 PROCEDURE — 87486 CHLMYD PNEUM DNA AMP PROBE: CPT

## 2018-05-23 PROCEDURE — 71045 X-RAY EXAM CHEST 1 VIEW: CPT

## 2018-05-23 PROCEDURE — 93005 ELECTROCARDIOGRAM TRACING: CPT

## 2018-05-23 PROCEDURE — 87581 M.PNEUMON DNA AMP PROBE: CPT

## 2018-05-23 PROCEDURE — 86923 COMPATIBILITY TEST ELECTRIC: CPT

## 2018-05-23 PROCEDURE — 94640 AIRWAY INHALATION TREATMENT: CPT

## 2018-05-23 PROCEDURE — 86901 BLOOD TYPING SEROLOGIC RH(D): CPT

## 2018-05-23 PROCEDURE — 80053 COMPREHEN METABOLIC PANEL: CPT

## 2018-05-23 PROCEDURE — 86850 RBC ANTIBODY SCREEN: CPT

## 2018-05-23 PROCEDURE — 86900 BLOOD TYPING SEROLOGIC ABO: CPT

## 2018-05-23 PROCEDURE — 99285 EMERGENCY DEPT VISIT HI MDM: CPT | Mod: 25

## 2018-05-23 PROCEDURE — 83605 ASSAY OF LACTIC ACID: CPT

## 2018-05-23 PROCEDURE — 87186 SC STD MICRODIL/AGAR DIL: CPT

## 2018-05-23 PROCEDURE — P9016: CPT

## 2018-05-23 PROCEDURE — 85014 HEMATOCRIT: CPT

## 2018-05-23 PROCEDURE — 36430 TRANSFUSION BLD/BLD COMPNT: CPT

## 2018-05-23 PROCEDURE — 82947 ASSAY GLUCOSE BLOOD QUANT: CPT

## 2018-05-23 PROCEDURE — 87798 DETECT AGENT NOS DNA AMP: CPT

## 2018-05-23 PROCEDURE — 87633 RESP VIRUS 12-25 TARGETS: CPT

## 2018-05-23 PROCEDURE — 85027 COMPLETE CBC AUTOMATED: CPT

## 2018-05-23 PROCEDURE — 81001 URINALYSIS AUTO W/SCOPE: CPT

## 2018-05-23 PROCEDURE — 87086 URINE CULTURE/COLONY COUNT: CPT

## 2018-05-23 PROCEDURE — 84132 ASSAY OF SERUM POTASSIUM: CPT

## 2018-05-23 PROCEDURE — 82435 ASSAY OF BLOOD CHLORIDE: CPT

## 2018-05-23 RX ORDER — OXYCODONE AND ACETAMINOPHEN 5; 325 MG/1; MG/1
2 TABLET ORAL EVERY 4 HOURS
Qty: 0 | Refills: 0 | Status: DISCONTINUED | OUTPATIENT
Start: 2018-05-23 | End: 2018-05-28

## 2018-05-23 RX ORDER — ACETAMINOPHEN 500 MG
1000 TABLET ORAL ONCE
Qty: 0 | Refills: 0 | Status: COMPLETED | OUTPATIENT
Start: 2018-05-23 | End: 2018-05-23

## 2018-05-23 RX ADMIN — AMPICILLIN SODIUM AND SULBACTAM SODIUM 200 GRAM(S): 250; 125 INJECTION, POWDER, FOR SUSPENSION INTRAMUSCULAR; INTRAVENOUS at 05:36

## 2018-05-23 RX ADMIN — Medication 100 MILLIGRAM(S): at 23:16

## 2018-05-23 RX ADMIN — Medication 1 TABLET(S): at 13:01

## 2018-05-23 RX ADMIN — OXYCODONE AND ACETAMINOPHEN 2 TABLET(S): 5; 325 TABLET ORAL at 19:24

## 2018-05-23 RX ADMIN — Medication 75 MILLIGRAM(S): at 19:24

## 2018-05-23 RX ADMIN — RALOXIFENE HYDROCHLORIDE 60 MILLIGRAM(S): 60 TABLET, COATED ORAL at 11:59

## 2018-05-23 RX ADMIN — MONTELUKAST 10 MILLIGRAM(S): 4 TABLET, CHEWABLE ORAL at 11:59

## 2018-05-23 RX ADMIN — Medication 75 MILLIGRAM(S): at 05:34

## 2018-05-23 RX ADMIN — SIMETHICONE 80 MILLIGRAM(S): 80 TABLET, CHEWABLE ORAL at 11:57

## 2018-05-23 RX ADMIN — Medication 1 PACKET(S): at 05:35

## 2018-05-23 RX ADMIN — Medication 650 MILLIGRAM(S): at 00:24

## 2018-05-23 RX ADMIN — OXYCODONE AND ACETAMINOPHEN 2 TABLET(S): 5; 325 TABLET ORAL at 20:00

## 2018-05-23 RX ADMIN — SODIUM CHLORIDE 100 MILLILITER(S): 9 INJECTION INTRAMUSCULAR; INTRAVENOUS; SUBCUTANEOUS at 05:37

## 2018-05-23 RX ADMIN — Medication 1300 MILLIGRAM(S): at 05:35

## 2018-05-23 RX ADMIN — AMPICILLIN SODIUM AND SULBACTAM SODIUM 200 GRAM(S): 250; 125 INJECTION, POWDER, FOR SUSPENSION INTRAMUSCULAR; INTRAVENOUS at 23:19

## 2018-05-23 RX ADMIN — Medication 650 MILLIGRAM(S): at 15:30

## 2018-05-23 RX ADMIN — Medication 1 PACKET(S): at 19:11

## 2018-05-23 RX ADMIN — SODIUM CHLORIDE 100 MILLILITER(S): 9 INJECTION INTRAMUSCULAR; INTRAVENOUS; SUBCUTANEOUS at 16:08

## 2018-05-23 RX ADMIN — Medication 180 MILLIGRAM(S): at 05:36

## 2018-05-23 RX ADMIN — PANTOPRAZOLE SODIUM 40 MILLIGRAM(S): 20 TABLET, DELAYED RELEASE ORAL at 05:35

## 2018-05-23 RX ADMIN — DULOXETINE HYDROCHLORIDE 60 MILLIGRAM(S): 30 CAPSULE, DELAYED RELEASE ORAL at 11:59

## 2018-05-23 RX ADMIN — Medication 650 MILLIGRAM(S): at 14:35

## 2018-05-23 RX ADMIN — Medication 50 MILLIGRAM(S): at 05:35

## 2018-05-23 NOTE — PROGRESS NOTE ADULT - SUBJECTIVE AND OBJECTIVE BOX
[facility], Division of Infectious Diseases  BERNICE Sheffield A. Lee  [phone]    Name: AUBREE ALONZO  Age: 77y  Gender: Female  MRN: [id number]    Interval History--  No interval progress notes. +BC overnight, not clear that any physician was made aware.  Patient feeling more or less back to baseline.    Past Medical History--  Bladder cancer metastasized to bone  Radial fracture  Uterine Polyp  Gastric Ulcer  Migraine, Ophthalmoplegic  Fibromyalgia  Obese  Calcified Thoracic Aorta  Hypertrophic Cardiomyopathy  Paroxysmal Ventricular Tachycardia  Sleep Apnea  Nasal Polyp  Arthritis  HTN (Hypertension)  Asthmatic Bronchitis  Acid Reflux  History of ileal conduit  S/P total knee arthroplasty, left  EPS study  Nasal Polyp removal  History of Arthroscopy  History of D&C      For details regarding the patient's social history, family history, and other miscellaneous elements, please refer the initial infectious diseases consultation and/or the admitting history and physical examination for this admission.    Allergies    No Known Allergies    Intolerances        Medications--  Antibiotics:  ampicillin/sulbactam  IVPB      ampicillin/sulbactam  IVPB 3 Gram(s) IV Intermittent every 12 hours    Immunologic:    Other:  acetaminophen   Tablet PRN  acetaminophen   Tablet. PRN  calcium carbonate 500 mG (Tums) Chewable  diltiazem   CD  diphenhydrAMINE   Capsule PRN  DULoxetine  metoprolol succinate ER  montelukast  pantoprazole    Tablet  pregabalin  psyllium Powder  raloxifene  simethicone PRN  sodium bicarbonate  sodium chloride 0.9%.  traZODone      Review of Systems--  A 10-point review of systems was obtained.   Review of systems otherwise unchanged compared to prior visit except as previously noted.    Physical Examination--  Vital Signs: T(F): 97.8 (05-23-18 @ 09:13), Max: 99.1 (05-22-18 @ 13:34)  HR: 85 (05-23-18 @ 09:13)  BP: 154/75 (05-23-18 @ 09:13)  RR: 16 (05-23-18 @ 09:13)  SpO2: 95% (05-23-18 @ 09:13)  Wt(kg): --  General: Nontoxic-appearing Female in no acute distress.  HEENT: Partial alopecia. Anicteric. Conjunctiva pink and moist. Oropharynx clear.   Neck: Not rigid. No sense of mass.  Nodes: None palpable.  Lungs: Clear bilaterally without rales, wheezing or rhonchi  Heart: Regular rate and rhythm. No Murmur. No rub. No gallop. No palpable thrill.  Chest: Mediport without No inflammatory signs.  Abdomen: Bowel sounds present and normoactive. Soft. Nondistended. Nontender. abdominal wall. Ileal conduit yellow urine.  Back: B NT clear urine.  Extremities: No cyanosis or clubbing. No edema.   Skin: Warm. Dry. Good turgor. No vasculitic stigmata.   Psychiatric: Awake, alert, interactive. Slightly depressed affect.        Laboratory Studies--  CBC                        7.9    4.9   )-----------( 78       ( 23 May 2018 05:46 )             24.6       Chemistries  05-23    145  |  115<H>  |  22  ----------------------------<  90  4.5   |  21<L>  |  1.68<H>    Ca    7.9<L>      23 May 2018 05:46    TPro  5.0<L>  /  Alb  2.7<L>  /  TBili  0.1<L>  /  DBili  x   /  AST  12  /  ALT  9<L>  /  AlkPhos  57  05-22      Culture Data    Culture - Blood (collected 21 May 2018 14:17)  Source: .Blood Blood-Peripheral  Gram Stain (22 May 2018 19:34):    Growth in anaerobic bottle: Gram Positive Cocci in Pairs and Chains  Preliminary Report (22 May 2018 19:34):    Growth in anaerobic bottle: Gram Positive Cocci in Pairs and Chains    Culture - Blood (collected 21 May 2018 14:17)  Source: .Blood Blood-Peripheral  Preliminary Report (22 May 2018 15:01):    No growth to date.    Culture - Urine (collected 20 May 2018 13:57)  Source: .Urine Nephrostomy - Right  Final Report (22 May 2018 12:56):    >100,000 CFU/ml Enterococcus faecalis  Organism: Enterococcus faecalis (22 May 2018 12:56)  Organism: Enterococcus faecalis (22 May 2018 12:56)    Culture - Urine (collected 20 May 2018 13:57)  Source: .Urine Nephrostomy - Left  Final Report (22 May 2018 12:57):    >100,000 CFU/ml Enterococcus faecalis    See previous culture [id number]    Culture - Urine (collected 20 May 2018 08:49)  Source: .Urine Ileal Conduit  Final Report (22 May 2018 13:05):    >100,000 CFU/ml Enterococcus faecalis    50,000 - 99,000 CFU/mL Klebsiella pneumoniae  Organism: Enterococcus faecalis  Klebsiella pneumoniae (22 May 2018 13:05)  Organism: Klebsiella pneumoniae (22 May 2018 13:05)  Organism: Enterococcus faecalis (22 May 2018 13:05)    Culture - Blood (collected 20 May 2018 08:36)  Source: .Blood Blood-Heart  Gram Stain (20 May 2018 17:40):    Growth in aerobic and anaerobic bottles: Gram Positive Cocci in Pairs and    Chains  Final Report (22 May 2018 21:57):    Growth in aerobic and anaerobic bottles: Enterococcus faecalis    See previous culture [id number]    Culture - Blood (collected 20 May 2018 08:36)  Source: .Blood Blood-Peripheral  Gram Stain (20 May 2018 16:49):    Growth in aerobic bottle: Gram Positive Cocci in Pairs and Chains    Growth in anaerobic bottle: Gram Positive Cocci in Pairs and Chains  Final Report (22 May 2018 21:55):    Growth in aerobic and anaerobic bottles: Enterococcus faecalis and    Klebsiella pneumoniae    "Due to technical problems, Proteus sp. will Not be reported as part of    the BCID panel until further notice"    ***Blood Panel PCR results on this specimen are available    approximately 3 hours after the Gram stain result.***    Gram stain, PCR, and/or culture results may not always    correspond due to difference in methodologies.    ************************************************************    This PCR assay was performed using [employer].    The following targets are tested for: Enterococcus,    vancomycin resistant enterococci, Listeria monocytogenes,    coagulase negative staphylococci, S. aureus,    methicillin resistant S. aureus, Streptococcus agalactiae    (Group B), S. pneumoniae, S. pyogenes (Group A),    Acinetobacter baumannii, Enterobacter cloacae, E. coli,    Klebsiella oxytoca, K. pneumoniae, Proteus sp.,    Serratia marcescens, Haemophilus influenzae,    Neisseria meningitidis, Pseudomonas aeruginosa, Candida    albicans, C. glabrata, C krusei, C parapsilosis,    C. tropicalis and the KPC resistance gene.  Organism: Blood Culture PCR  Enterococcus faecalis  Klebsiella pneumoniae (22 May 2018 21:55)  Organism: Klebsiella pneumoniae (22 May 2018 21:55)  Organism: Enterococcus faecalis (22 May 2018 21:55)  Organism: Blood Culture PCR (22 May 2018 21:55)

## 2018-05-23 NOTE — PROGRESS NOTE ADULT - SUBJECTIVE AND OBJECTIVE BOX
Patient is a 77y old  Female who presents with a chief complaint of abdominal pain (20 May 2018 13:53)      SUBJECTIVE / OVERNIGHT EVENTS:  No overnight events.  Pt feels frustrated. "No one explained to me how to take care of my nephrostomy bags"  Denied cp, sob, n/v/d.  no abdominal pain. No HA/dizziness.   c/w abdominal distention despite regular bowel movement.       Vital Signs Last 24 Hrs  T(C): 36.7 (23 May 2018 13:33), Max: 37.1 (23 May 2018 05:28)  T(F): 98.1 (23 May 2018 13:33), Max: 98.7 (23 May 2018 05:28)  HR: 85 (23 May 2018 13:33) (80 - 85)  BP: 152/78 (23 May 2018 13:33) (131/70 - 155/71)  BP(mean): --  RR: 16 (23 May 2018 13:33) (16 - 18)  SpO2: 97% (23 May 2018 13:33) (93% - 97%)  I&O's Summary    22 May 2018 07:01  -  23 May 2018 07:00  --------------------------------------------------------  IN: 2590 mL / OUT: 5950 mL / NET: -3360 mL    23 May 2018 07:01  -  23 May 2018 18:02  --------------------------------------------------------  IN: 1680 mL / OUT: 1400 mL / NET: 280 mL        PHYSICAL EXAM:  GENERAL: NAD, Comfortable  HEAD:  Atraumatic, Normocephalic  EYES: EOMI, PERRLA, conjunctiva and sclera clear  NECK: Supple, No JVD  CHEST/LUNG: Clear to auscultation bilaterally; No wheeze  HEART: Regular rate and rhythm; No murmurs, rubs, or gallops  ABDOMEN: Soft, Nontender, Nondistended; Bowel sounds present  BACK: b/l nephrostomy bags, dressing d/c/i.   Neuro: AAO x 3, no focal deficit, 5/5 b/l extremities  EXTREMITIES:  2+ Peripheral Pulses, No clubbing, cyanosis, or edema  SKIN: No rashes or lesions    LABS:                        7.9    4.9   )-----------( 78       ( 23 May 2018 05:46 )             24.6     05-23    145  |  115<H>  |  22  ----------------------------<  90  4.5   |  21<L>  |  1.68<H>    Ca    7.9<L>      23 May 2018 05:46    TPro  5.0<L>  /  Alb  2.7<L>  /  TBili  0.1<L>  /  DBili  x   /  AST  12  /  ALT  9<L>  /  AlkPhos  57  05-22      CAPILLARY BLOOD GLUCOSE                RADIOLOGY & ADDITIONAL TESTS:    Imaging Personally Reviewed:  [x] YES  [ ] NO    Consultant(s) Notes Reviewed:  [x] YES  [ ] NO      MEDICATIONS  (STANDING):  acetaminophen  IVPB. 1000 milliGRAM(s) IV Intermittent once  ampicillin/sulbactam  IVPB      ampicillin/sulbactam  IVPB 3 Gram(s) IV Intermittent every 12 hours  calcium carbonate 500 mG (Tums) Chewable 1 Tablet(s) Chew daily  diltiazem    milliGRAM(s) Oral daily  DULoxetine 60 milliGRAM(s) Oral daily  metoprolol succinate ER 50 milliGRAM(s) Oral daily  montelukast 10 milliGRAM(s) Oral daily  pantoprazole    Tablet 40 milliGRAM(s) Oral before breakfast  pregabalin 75 milliGRAM(s) Oral two times a day  psyllium Powder 1 Packet(s) Oral two times a day  raloxifene 60 milliGRAM(s) Oral daily  sodium bicarbonate 1300 milliGRAM(s) Oral two times a day  sodium chloride 0.9%. 1000 milliLiter(s) (100 mL/Hr) IV Continuous <Continuous>  traZODone 100 milliGRAM(s) Oral at bedtime    MEDICATIONS  (PRN):  acetaminophen   Tablet 650 milliGRAM(s) Oral every 6 hours PRN For Temp greater than 38 C (100.4 F)  acetaminophen   Tablet. 650 milliGRAM(s) Oral every 6 hours PRN Mild Pain (1 - 3)  diphenhydrAMINE   Capsule 25 milliGRAM(s) Oral at bedtime PRN Insomnia  simethicone 80 milliGRAM(s) Chew every 8 hours PRN Gas      Care Discussed with Consultants/Other Providers [x] YES  [ ] NO    HEALTH ISSUES - PROBLEM Dx:  Septicemia due to Klebsiella pneumoniae: Septicemia due to Klebsiella pneumoniae  Septicemia due to enterococcus: Septicemia due to enterococcus  Obstruction, uropathy: Obstruction, uropathy  Bladder cancer metastasized to bone: Bladder cancer metastasized to bone  Bacteremia due to Gram-positive bacteria: Bacteremia due to Gram-positive bacteria  Pyelonephritis, acute: Pyelonephritis, acute  Tachycardia: Tachycardia  Fibromyalgia: Fibromyalgia  Other hydronephrosis: Other hydronephrosis  DEVON (acute kidney injury): DEVON (acute kidney injury)  Severe sepsis: Severe sepsis

## 2018-05-23 NOTE — CONSULT NOTE ADULT - ASSESSMENT
pt is a 75 year old female with a history of obesity, fibromyalgia, HTN, CM, CKD3, smoker, bladder cancer with mets to the bone presented for a ileal conduit and thereafter has received 4 cycles of chemo with carboplatin and VP16. She was to have another pet scan done but she was admitted. If the pet was negative she was to have WBRT. She was admitted here for a recent UTI bacteremia and was admitted here with abdominal pain and back pain. She was found to have bilateral pyelo and 2 organisms Klebsiella Flagyl and ceftriaxone. She required nephrostomy stents which were placed in here and she had here last chemotherapy on 5/3/18. The white cell count now is 4.9 and [place] 180[address] and she is afebrile. She will need negative cultures before going home. Consult was requested.      Fortunately we are 20 days s/p chemotherapy and the white count should not go down further. She will have a pet scan in the near future as a outpt and from there a decision on WBRT and further chemo can be made.

## 2018-05-23 NOTE — PROGRESS NOTE ADULT - PROBLEM SELECTOR PLAN 1
Liberalize Ampicillin/SB dosing to q8H  Will order repeat cx  Vancomycin stopped by me yesterday after sensi became available

## 2018-05-23 NOTE — CONSULT NOTE ADULT - SUBJECTIVE AND OBJECTIVE BOX
The pt is a 75 year old female with a history of obesity, fibromyalgia, HTN, CM, CKD3, smoker, bladder cancer with mets to the bone presented for a ileal conduit and thereafter has received 4 cycles of chemo with carboplatin and VP16. She was to have another pet scan done but she was admitted. If the pet was negative she was to have WBRT. She was admitted here for a recent UTI bacteremia and was admitted here with abdominal pain and back pain. She was found to have bilateral pyelo and 2 organisms Klebsiella Flagyl and ceftriaxone. She required nephrostomy stents which were placed in here and she had here last chemotherapy on 5/3/18. The white cell count now is 4.9 and [place] [address] and she is afebrile. She will need negative cultures before going home. Consult was requested.                        7.9    4.9   )-----------( 78       ( 23 May 2018 05:46 )             24.6   MEDICATIONS  (STANDING):  ampicillin/sulbactam  IVPB      ampicillin/sulbactam  IVPB 3 Gram(s) IV Intermittent every 12 hours  calcium carbonate 500 mG (Tums) Chewable 1 Tablet(s) Chew daily  diltiazem    milliGRAM(s) Oral daily  DULoxetine 60 milliGRAM(s) Oral daily  metoprolol succinate ER 50 milliGRAM(s) Oral daily  montelukast 10 milliGRAM(s) Oral daily  pantoprazole    Tablet 40 milliGRAM(s) Oral before breakfast  pregabalin 75 milliGRAM(s) Oral two times a day  psyllium Powder 1 Packet(s) Oral two times a day  raloxifene 60 milliGRAM(s) Oral daily  sodium bicarbonate 1300 milliGRAM(s) Oral two times a day  sodium chloride 0.9%. 1000 milliLiter(s) (100 mL/Hr) IV Continuous <Continuous>  traZODone 100 milliGRAM(s) Oral at bedtime  PAST MEDICAL & SURGICAL HISTORY:  Bladder cancer metastasized to bone  Radial fracture: left  Uterine Polyp  Gastric Ulcer  Migraine, Ophthalmoplegic  Fibromyalgia  Obese  Calcified Thoracic Aorta  Hypertrophic Cardiomyopathy  Paroxysmal Ventricular Tachycardia: 6/2010  Nasal Polyp: 1960  Arthritis  HTN (Hypertension)  Asthmatic Bronchitis  Acid Reflux  History of ileal conduit  S/P total knee arthroplasty, left  EPS study: with no intervention 6/2010  Nasal Polyp removal: 1960  History of Arthroscopy: 1997 Right  Left 2000  History of D&C: 1968

## 2018-05-23 NOTE — PROGRESS NOTE ADULT - PROBLEM SELECTOR PLAN 1
(fever and tachycardia present on admission) due to bilateral pyelonephritis from an ileal conduit obstruction.  (+)blood cultures 2' enterococcus  repeat blood cxs testing from 5/21, 1/2 positive.   appreciate ID  Repeat cultures to be sent today. (Blood culture x 2 ordered) (fever and tachycardia present on admission) due to bilateral pyelonephritis from an ileal conduit obstruction.  (+)blood cultures 2' enterococcus  repeat blood cxs testing from 5/21, 1/2 positive.   appreciate ID  Repeat cultures to be sent today. (Blood culture x 2 ordered)  TTE pending.

## 2018-05-23 NOTE — PROGRESS NOTE ADULT - ATTENDING COMMENTS
- Dr. EFRA Manning ([facility])  - [phone] Abd US to evaluate etiology of abdominal distention with intermittent discomfort. r/o Ascites.  +BM. Add percocet for pain control.   PRN stool softeners.     - Dr. EFRA Manning ([facility])  - [phone]

## 2018-05-24 LAB
ANION GAP SERPL CALC-SCNC: 10 MMOL/L — SIGNIFICANT CHANGE UP (ref 5–17)
BUN SERPL-MCNC: 16 MG/DL — SIGNIFICANT CHANGE UP (ref 7–23)
CALCIUM SERPL-MCNC: 8.5 MG/DL — SIGNIFICANT CHANGE UP (ref 8.4–10.5)
CHLORIDE SERPL-SCNC: 113 MMOL/L — HIGH (ref 96–108)
CO2 SERPL-SCNC: 20 MMOL/L — LOW (ref 22–31)
CREAT SERPL-MCNC: 1.51 MG/DL — HIGH (ref 0.5–1.3)
GLUCOSE SERPL-MCNC: 84 MG/DL — SIGNIFICANT CHANGE UP (ref 70–99)
HCT VFR BLD CALC: 26.5 % — LOW (ref 34.5–45)
HGB BLD-MCNC: 8.8 G/DL — LOW (ref 11.5–15.5)
MCHC RBC-ENTMCNC: 33.2 GM/DL — SIGNIFICANT CHANGE UP (ref 32–36)
MCHC RBC-ENTMCNC: 33.3 PG — SIGNIFICANT CHANGE UP (ref 27–34)
MCV RBC AUTO: 101 FL — HIGH (ref 80–100)
PLATELET # BLD AUTO: 88 K/UL — LOW (ref 150–400)
POTASSIUM SERPL-MCNC: 4.5 MMOL/L — SIGNIFICANT CHANGE UP (ref 3.5–5.3)
POTASSIUM SERPL-SCNC: 4.5 MMOL/L — SIGNIFICANT CHANGE UP (ref 3.5–5.3)
RBC # BLD: 2.64 M/UL — LOW (ref 3.8–5.2)
RBC # FLD: 16.2 % — HIGH (ref 10.3–14.5)
SODIUM SERPL-SCNC: 143 MMOL/L — SIGNIFICANT CHANGE UP (ref 135–145)
WBC # BLD: 5.3 K/UL — SIGNIFICANT CHANGE UP (ref 3.8–10.5)
WBC # FLD AUTO: 5.3 K/UL — SIGNIFICANT CHANGE UP (ref 3.8–10.5)

## 2018-05-24 PROCEDURE — 76700 US EXAM ABDOM COMPLETE: CPT | Mod: 26

## 2018-05-24 RX ORDER — LACTOBACILLUS ACIDOPHILUS 100MM CELL
1 CAPSULE ORAL DAILY
Qty: 0 | Refills: 0 | Status: DISCONTINUED | OUTPATIENT
Start: 2018-05-24 | End: 2018-05-28

## 2018-05-24 RX ADMIN — Medication 1 PACKET(S): at 17:39

## 2018-05-24 RX ADMIN — RALOXIFENE HYDROCHLORIDE 60 MILLIGRAM(S): 60 TABLET, COATED ORAL at 12:08

## 2018-05-24 RX ADMIN — DULOXETINE HYDROCHLORIDE 60 MILLIGRAM(S): 30 CAPSULE, DELAYED RELEASE ORAL at 12:08

## 2018-05-24 RX ADMIN — Medication 650 MILLIGRAM(S): at 17:40

## 2018-05-24 RX ADMIN — Medication 1 PACKET(S): at 06:28

## 2018-05-24 RX ADMIN — Medication 1 TABLET(S): at 12:08

## 2018-05-24 RX ADMIN — PANTOPRAZOLE SODIUM 40 MILLIGRAM(S): 20 TABLET, DELAYED RELEASE ORAL at 06:27

## 2018-05-24 RX ADMIN — AMPICILLIN SODIUM AND SULBACTAM SODIUM 200 GRAM(S): 250; 125 INJECTION, POWDER, FOR SUSPENSION INTRAMUSCULAR; INTRAVENOUS at 21:42

## 2018-05-24 RX ADMIN — Medication 650 MILLIGRAM(S): at 06:27

## 2018-05-24 RX ADMIN — AMPICILLIN SODIUM AND SULBACTAM SODIUM 200 GRAM(S): 250; 125 INJECTION, POWDER, FOR SUSPENSION INTRAMUSCULAR; INTRAVENOUS at 11:04

## 2018-05-24 RX ADMIN — Medication 50 MILLIGRAM(S): at 06:27

## 2018-05-24 RX ADMIN — Medication 650 MILLIGRAM(S): at 06:57

## 2018-05-24 RX ADMIN — Medication 100 MILLIGRAM(S): at 21:42

## 2018-05-24 RX ADMIN — Medication 75 MILLIGRAM(S): at 17:39

## 2018-05-24 RX ADMIN — MONTELUKAST 10 MILLIGRAM(S): 4 TABLET, CHEWABLE ORAL at 12:08

## 2018-05-24 RX ADMIN — Medication 180 MILLIGRAM(S): at 06:27

## 2018-05-24 RX ADMIN — Medication 75 MILLIGRAM(S): at 06:27

## 2018-05-24 NOTE — PROGRESS NOTE ADULT - SUBJECTIVE AND OBJECTIVE BOX
Patient is a 77y old  Female who presents with a chief complaint of abdominal pain (20 May 2018 13:53)      SUBJECTIVE / OVERNIGHT EVENTS:  in a better mood.  tolerating diet.  feels okay.  no cp, no sob.        Vital Signs Last 24 Hrs  T(C): 37.1 (24 May 2018 17:20), Max: 37.1 (24 May 2018 17:20)  T(F): 98.7 (24 May 2018 17:20), Max: 98.7 (24 May 2018 17:20)  HR: 92 (24 May 2018 17:20) (82 - 92)  BP: 158/77 (24 May 2018 17:20) (137/66 - 158/77)  BP(mean): --  RR: 18 (24 May 2018 17:20) (16 - 18)  SpO2: 95% (24 May 2018 17:20) (95% - 97%)  I&O's Summary    23 May 2018 07:01  -  24 May 2018 07:00  --------------------------------------------------------  IN: 2280 mL / OUT: 3750 mL / NET: -1470 mL    24 May 2018 07:01  -  24 May 2018 19:35  --------------------------------------------------------  IN: 700 mL / OUT: 1700 mL / NET: -1000 mL      PHYSICAL EXAM:  GENERAL: NAD, Comfortable  HEAD:  Atraumatic, Normocephalic  EYES: EOMI, PERRLA, conjunctiva and sclera clear  NECK: Supple, No JVD  CHEST/LUNG: Clear to auscultation bilaterally; No wheeze  HEART: Regular rate and rhythm; No murmurs, rubs, or gallops  ABDOMEN: Soft, Nontender, Nondistended; Bowel sounds present  BACK: b/l nephrostomy bags, dressing d/c/i.   Neuro: AAO x 3, no focal deficit, 5/5 b/l extremities  EXTREMITIES:  2+ Peripheral Pulses, No clubbing, cyanosis, or edema  SKIN: No rashes or lesions      LABS:                        8.8    5.3   )-----------( 88       ( 24 May 2018 09:30 )             26.5     05-24    143  |  113<H>  |  16  ----------------------------<  84  4.5   |  20<L>  |  1.51<H>    Ca    8.5      24 May 2018 09:30        CAPILLARY BLOOD GLUCOSE                RADIOLOGY & ADDITIONAL TESTS:    Imaging Personally Reviewed:  [x] YES  [ ] NO    Consultant(s) Notes Reviewed:  [x] YES  [ ] NO      MEDICATIONS  (STANDING):  ampicillin/sulbactam  IVPB      ampicillin/sulbactam  IVPB 3 Gram(s) IV Intermittent every 12 hours  calcium carbonate 500 mG (Tums) Chewable 1 Tablet(s) Chew daily  diltiazem    milliGRAM(s) Oral daily  DULoxetine 60 milliGRAM(s) Oral daily  lactobacillus acidophilus 1 Tablet(s) Oral daily  metoprolol succinate ER 50 milliGRAM(s) Oral daily  montelukast 10 milliGRAM(s) Oral daily  pantoprazole    Tablet 40 milliGRAM(s) Oral before breakfast  pregabalin 75 milliGRAM(s) Oral two times a day  psyllium Powder 1 Packet(s) Oral two times a day  raloxifene 60 milliGRAM(s) Oral daily  sodium bicarbonate 1300 milliGRAM(s) Oral two times a day  traZODone 100 milliGRAM(s) Oral at bedtime    MEDICATIONS  (PRN):  acetaminophen   Tablet 650 milliGRAM(s) Oral every 6 hours PRN For Temp greater than 38 C (100.4 F)  acetaminophen   Tablet. 650 milliGRAM(s) Oral every 6 hours PRN Mild Pain (1 - 3)  diphenhydrAMINE   Capsule 25 milliGRAM(s) Oral at bedtime PRN Insomnia  oxyCODONE    5 mG/acetaminophen 325 mG 2 Tablet(s) Oral every 4 hours PRN Moderate Pain (4 - 6)  simethicone 80 milliGRAM(s) Chew every 8 hours PRN Gas      Care Discussed with Consultants/Other Providers [x] YES  [ ] NO    HEALTH ISSUES - PROBLEM Dx:  Septicemia due to Klebsiella pneumoniae: Septicemia due to Klebsiella pneumoniae  Septicemia due to enterococcus: Septicemia due to enterococcus  Obstruction, uropathy: Obstruction, uropathy  Bladder cancer metastasized to bone: Bladder cancer metastasized to bone  Bacteremia due to Gram-positive bacteria: Bacteremia due to Gram-positive bacteria  Pyelonephritis, acute: Pyelonephritis, acute  Tachycardia: Tachycardia  Fibromyalgia: Fibromyalgia  Other hydronephrosis: Other hydronephrosis  DEVON (acute kidney injury): DEVON (acute kidney injury)  Severe sepsis: Severe sepsis

## 2018-05-24 NOTE — PROGRESS NOTE ADULT - SUBJECTIVE AND OBJECTIVE BOX
The pt is a 75 year old female with a history of obesity, fibromyalgia, HTN, CM, CKD3, smoker, bladder cancer with mets to the bone presented for a ileal conduit and thereafter has received 4 cycles of chemo with carboplatin and VP16. She was to have another pet scan done but she was admitted. If the pet was negative she was to have WBRT. She was admitted here for a recent UTI bacteremia and was admitted here with abdominal pain and back pain. She was found to have bilateral pyelo and 2 organisms Klebsiella Flagyl and ceftriaxone. She required nephrostomy stents which were placed in here and she had here last chemotherapy on 5/3/18. The white cell count now is 4.9 and [place] [address] and she is afebrile. She will need negative cultures before going home. Consult was requested. 5/24 pt feeling better and continues on antibiotics. The pt complained of bloating and on exam there were decreased normal pitched bowel sounds and no pain and no bowel habit changes. Likely dealing with side affects of  16.                        8.8    5.3   )-----------( 88       ( 24 May 2018 09:30 )             26.5      PAST MEDICAL & SURGICAL HISTORY:  Bladder cancer metastasized to bone  Radial fracture: left  Uterine Polyp  Gastric Ulcer  Migraine, Ophthalmoplegic  Fibromyalgia  Obese  Calcified Thoracic Aorta  Hypertrophic Cardiomyopathy  Paroxysmal Ventricular Tachycardia: 6/2010  Nasal Polyp: 1960  Arthritis  HTN (Hypertension)  Asthmatic Bronchitis  Acid Reflux  History of ileal conduit  S/P total knee arthroplasty, left  EPS study: with no intervention 6/2010  Nasal Polyp removal: 1960  History of Arthroscopy: 1997 Right  Left 2000  History of D&C: 1968  PE looks stronger and not in distress mouth clear heart rr abdomen decreased bowel sounds and normal pitch extremities no edema

## 2018-05-24 NOTE — PROGRESS NOTE ADULT - SUBJECTIVE AND OBJECTIVE BOX
infectious diseases progress note:    AUBREE ALONZO is a 77y y. o. Female patient    Patient reports: feeling better    ROS:    EYES:  Negative  blurry vision or double vision  GASTROINTESTINAL:  Negative for nausea, vomiting, diarrhea  -otherwise negative except for subjective    Allergies    No Known Allergies    Intolerances        ANTIBIOTICS/RELEVANT:  antimicrobials  ampicillin/sulbactam  IVPB      ampicillin/sulbactam  IVPB 3 Gram(s) IV Intermittent every 12 hours    immunologic:    OTHER:  acetaminophen   Tablet 650 milliGRAM(s) Oral every 6 hours PRN  acetaminophen   Tablet. 650 milliGRAM(s) Oral every 6 hours PRN  calcium carbonate 500 mG (Tums) Chewable 1 Tablet(s) Chew daily  diltiazem    milliGRAM(s) Oral daily  diphenhydrAMINE   Capsule 25 milliGRAM(s) Oral at bedtime PRN  DULoxetine 60 milliGRAM(s) Oral daily  metoprolol succinate ER 50 milliGRAM(s) Oral daily  montelukast 10 milliGRAM(s) Oral daily  oxyCODONE    5 mG/acetaminophen 325 mG 2 Tablet(s) Oral every 4 hours PRN  pantoprazole    Tablet 40 milliGRAM(s) Oral before breakfast  pregabalin 75 milliGRAM(s) Oral two times a day  psyllium Powder 1 Packet(s) Oral two times a day  raloxifene 60 milliGRAM(s) Oral daily  simethicone 80 milliGRAM(s) Chew every 8 hours PRN  sodium bicarbonate 1300 milliGRAM(s) Oral two times a day  traZODone 100 milliGRAM(s) Oral at bedtime      Objective:  Vital Signs Last 24 Hrs  T(C): 36.6 (24 May 2018 06:30), Max: 36.9 (23 May 2018 18:45)  T(F): 97.8 (24 May 2018 06:30), Max: 98.5 (23 May 2018 18:45)  HR: 88 (24 May 2018 06:30) (82 - 90)  BP: 152/73 (24 May 2018 06:30) (152/73 - 156/86)  BP(mean): --  RR: 17 (24 May 2018 06:30) (16 - 17)  SpO2: 96% (24 May 2018 06:30) (95% - 97%)    T(C): 36.6 (05-24-18 @ 06:30), Max: 37.3 (05-22-18 @ 13:34)  T(C): 36.6 (05-24-18 @ 06:30), Max: 37.3 (05-22-18 @ 13:34)  T(C): 36.6 (05-24-18 @ 06:30), Max: 37.3 (05-20-18 @ 23:45)    PHYSICAL EXAM:  Constitutional: Well-developed, well nourished  Eyes: PERRLA, EOMI  Ear/Nose/Throat: oropharynx normal	  Neck: no JVD, no lymphadenopathy, supple  Respiratory: no accessory muscle use  Cardiovascular: RRR,   Gastrointestinal: soft, NT  Extremities: no clubbing, no cyanosis, edema absent      LABS:                        8.8    5.3   )-----------( 88       ( 24 May 2018 09:30 )             26.5       5.3 05-24 @ 09:30  4.9 05-23 @ 05:46  6.8 05-22 @ 06:45  10.6 05-21 @ 09:44  13.0 05-20 @ 03:33      05-24    143  |  113<H>  |  16  ----------------------------<  84  4.5   |  20<L>  |  1.51<H>    Ca    8.5      24 May 2018 09:30    eGFR if Non : 33: (05.24.18 @ 09:30)        Creatinine, Serum: 1.51 mg/dL (05-24-18 @ 09:30)  Creatinine, Serum: 1.68 mg/dL (05-23-18 @ 05:46)  Creatinine, Serum: 2.09 mg/dL (05-22-18 @ 06:45)  Creatinine, Serum: 2.83 mg/dL (05-21-18 @ 09:44)  Creatinine, Serum: 2.85 mg/dL (05-20-18 @ 23:14)  Creatinine, Serum: 2.80 mg/dL (05-20-18 @ 19:14)  Creatinine, Serum: 2.34 mg/dL (05-20-18 @ 05:57)  Creatinine, Serum: 2.16 mg/dL (05-20-18 @ 03:33)      MICROBIOLOGY:      Culture - Blood (collected 21 May 2018 14:17)  Source: .Blood Blood-Peripheral  Gram Stain (22 May 2018 19:34):    Growth in anaerobic bottle: Gram Positive Cocci in Pairs and Chains  Final Report (23 May 2018 19:43):    Growth in anaerobic bottle: Enterococcus faecalis    See previous culture [id number]    Culture - Blood (collected 21 May 2018 14:17)  Source: .Blood Blood-Peripheral  Preliminary Report (22 May 2018 15:01):    No growth to date.        RADIOLOGY & ADDITIONAL STUDIES:

## 2018-05-24 NOTE — PROGRESS NOTE ADULT - PROBLEM SELECTOR PLAN 1
continue Unasyn  Will follow results of ordered repeat cx with duration based on when bacteremia clears

## 2018-05-24 NOTE — PROGRESS NOTE ADULT - ATTENDING COMMENTS
Abd US to evaluate etiology of abdominal distention with intermittent discomfort. ruled out Ascites.  +BM. Add percocet for pain control.   PRN stool softeners.     - Dr. EFRA Manning ([facility])  - [phone]

## 2018-05-24 NOTE — PROGRESS NOTE ADULT - PROBLEM SELECTOR PLAN 1
(fever and tachycardia present on admission) due to bilateral pyelonephritis from an ileal conduit obstruction.  (+)blood cultures 2' enterococcus  repeat blood cxs testing from 5/21, 1/2 positive.   appreciate ID, repeat cultures sent this am.   TTE pending.

## 2018-05-25 LAB
ANION GAP SERPL CALC-SCNC: 11 MMOL/L — SIGNIFICANT CHANGE UP (ref 5–17)
BUN SERPL-MCNC: 16 MG/DL — SIGNIFICANT CHANGE UP (ref 7–23)
CALCIUM SERPL-MCNC: 8.4 MG/DL — SIGNIFICANT CHANGE UP (ref 8.4–10.5)
CHLORIDE SERPL-SCNC: 112 MMOL/L — HIGH (ref 96–108)
CO2 SERPL-SCNC: 22 MMOL/L — SIGNIFICANT CHANGE UP (ref 22–31)
CREAT SERPL-MCNC: 1.49 MG/DL — HIGH (ref 0.5–1.3)
GLUCOSE SERPL-MCNC: 96 MG/DL — SIGNIFICANT CHANGE UP (ref 70–99)
HCT VFR BLD CALC: 27.7 % — LOW (ref 34.5–45)
HGB BLD-MCNC: 9 G/DL — LOW (ref 11.5–15.5)
MCHC RBC-ENTMCNC: 32.5 GM/DL — SIGNIFICANT CHANGE UP (ref 32–36)
MCHC RBC-ENTMCNC: 32.6 PG — SIGNIFICANT CHANGE UP (ref 27–34)
MCV RBC AUTO: 100 FL — SIGNIFICANT CHANGE UP (ref 80–100)
PLATELET # BLD AUTO: 95 K/UL — LOW (ref 150–400)
POTASSIUM SERPL-MCNC: 4.5 MMOL/L — SIGNIFICANT CHANGE UP (ref 3.5–5.3)
POTASSIUM SERPL-SCNC: 4.5 MMOL/L — SIGNIFICANT CHANGE UP (ref 3.5–5.3)
RBC # BLD: 2.76 M/UL — LOW (ref 3.8–5.2)
RBC # FLD: 16.2 % — HIGH (ref 10.3–14.5)
SODIUM SERPL-SCNC: 145 MMOL/L — SIGNIFICANT CHANGE UP (ref 135–145)
WBC # BLD: 5.1 K/UL — SIGNIFICANT CHANGE UP (ref 3.8–10.5)
WBC # FLD AUTO: 5.1 K/UL — SIGNIFICANT CHANGE UP (ref 3.8–10.5)

## 2018-05-25 PROCEDURE — 93306 TTE W/DOPPLER COMPLETE: CPT | Mod: 26

## 2018-05-25 RX ORDER — ACETAMINOPHEN 500 MG
1000 TABLET ORAL ONCE
Qty: 0 | Refills: 0 | Status: COMPLETED | OUTPATIENT
Start: 2018-05-25 | End: 2018-05-25

## 2018-05-25 RX ORDER — AMPICILLIN SODIUM AND SULBACTAM SODIUM 250; 125 MG/ML; MG/ML
3 INJECTION, POWDER, FOR SUSPENSION INTRAMUSCULAR; INTRAVENOUS EVERY 8 HOURS
Qty: 0 | Refills: 0 | Status: DISCONTINUED | OUTPATIENT
Start: 2018-05-25 | End: 2018-05-28

## 2018-05-25 RX ORDER — HYDROCHLOROTHIAZIDE 25 MG
12.5 TABLET ORAL DAILY
Qty: 0 | Refills: 0 | Status: DISCONTINUED | OUTPATIENT
Start: 2018-05-25 | End: 2018-05-26

## 2018-05-25 RX ADMIN — MONTELUKAST 10 MILLIGRAM(S): 4 TABLET, CHEWABLE ORAL at 11:29

## 2018-05-25 RX ADMIN — Medication 50 MILLIGRAM(S): at 05:48

## 2018-05-25 RX ADMIN — DULOXETINE HYDROCHLORIDE 60 MILLIGRAM(S): 30 CAPSULE, DELAYED RELEASE ORAL at 11:29

## 2018-05-25 RX ADMIN — Medication 1 TABLET(S): at 11:29

## 2018-05-25 RX ADMIN — Medication 1300 MILLIGRAM(S): at 17:44

## 2018-05-25 RX ADMIN — Medication 400 MILLIGRAM(S): at 06:05

## 2018-05-25 RX ADMIN — PANTOPRAZOLE SODIUM 40 MILLIGRAM(S): 20 TABLET, DELAYED RELEASE ORAL at 05:49

## 2018-05-25 RX ADMIN — AMPICILLIN SODIUM AND SULBACTAM SODIUM 200 GRAM(S): 250; 125 INJECTION, POWDER, FOR SUSPENSION INTRAMUSCULAR; INTRAVENOUS at 11:16

## 2018-05-25 RX ADMIN — AMPICILLIN SODIUM AND SULBACTAM SODIUM 200 GRAM(S): 250; 125 INJECTION, POWDER, FOR SUSPENSION INTRAMUSCULAR; INTRAVENOUS at 21:39

## 2018-05-25 RX ADMIN — Medication 75 MILLIGRAM(S): at 05:48

## 2018-05-25 RX ADMIN — Medication 75 MILLIGRAM(S): at 17:43

## 2018-05-25 RX ADMIN — SIMETHICONE 80 MILLIGRAM(S): 80 TABLET, CHEWABLE ORAL at 21:39

## 2018-05-25 RX ADMIN — Medication 100 MILLIGRAM(S): at 21:39

## 2018-05-25 RX ADMIN — SIMETHICONE 80 MILLIGRAM(S): 80 TABLET, CHEWABLE ORAL at 11:30

## 2018-05-25 RX ADMIN — Medication 180 MILLIGRAM(S): at 05:49

## 2018-05-25 RX ADMIN — Medication 1 PACKET(S): at 05:50

## 2018-05-25 RX ADMIN — RALOXIFENE HYDROCHLORIDE 60 MILLIGRAM(S): 60 TABLET, COATED ORAL at 11:29

## 2018-05-25 RX ADMIN — Medication 1000 MILLIGRAM(S): at 06:20

## 2018-05-25 RX ADMIN — Medication 12.5 MILLIGRAM(S): at 17:49

## 2018-05-25 RX ADMIN — Medication 650 MILLIGRAM(S): at 05:48

## 2018-05-25 NOTE — CHART NOTE - NSCHARTNOTEFT_GEN_A_CORE
Patient SBP persistently in 150s-170 x 3 days, asymptomatic. Pt reports BP normally around 100s in past. No source of new medications as source of HTN. Discussed case with attending Dr. Manning - will start HCTZ 12.5mg today and monitor BP response. Can titrate up to 25 mg if not responding. Will continue to monitor.    Gary Parker PA-C  Department of Medicine  #[id number]

## 2018-05-25 NOTE — PROGRESS NOTE ADULT - SUBJECTIVE AND OBJECTIVE BOX
[facility], Division of Infectious Diseases  BERNICE [address] A. Lee  [phone]    Name: AUBREE ALONZO  Age: 77y  Gender: Female  MRN: [id number]    Interval History--  Notes reviewed    Past Medical History--  Bladder cancer metastasized to bone  Radial fracture  Uterine Polyp  Gastric Ulcer  Migraine, Ophthalmoplegic  Fibromyalgia  Obese  Calcified Thoracic Aorta  Hypertrophic Cardiomyopathy  Paroxysmal Ventricular Tachycardia  Sleep Apnea  Nasal Polyp  Arthritis  HTN (Hypertension)  Asthmatic Bronchitis  Acid Reflux  History of ileal conduit  S/P total knee arthroplasty, left  EPS study  Nasal Polyp removal  History of Arthroscopy  History of D&C      For details regarding the patient's social history, family history, and other miscellaneous elements, please refer the initial infectious diseases consultation and/or the admitting history and physical examination for this admission.    Allergies    No Known Allergies    Intolerances        Medications--  Antibiotics:  ampicillin/sulbactam  IVPB 3 Gram(s) IV Intermittent every 8 hours    Immunologic:    Other:  acetaminophen   Tablet PRN  acetaminophen   Tablet. PRN  calcium carbonate 500 mG (Tums) Chewable  diltiazem   CD  diphenhydrAMINE   Capsule PRN  DULoxetine  hydrochlorothiazide  lactobacillus acidophilus  metoprolol succinate ER  montelukast  oxyCODONE    5 mG/acetaminophen 325 mG PRN  pantoprazole    Tablet  pregabalin  psyllium Powder  raloxifene  simethicone PRN  sodium bicarbonate  traZODone      Review of Systems--  A 10-point review of systems was obtained.     Pertinent positives and negatives--  Constitutional: No fevers. No Chills. No Rigors.   Cardiovascular: No chest pain. No palpitations.  Respiratory: No shortness of breath. No cough.  Gastrointestinal: No nausea or vomiting. No diarrhea or constipation.   Psychiatric: Pleasant. Appropriate affect.    Review of systems otherwise negative except as previously noted.    Physical Examination--  Vital Signs: T(F): 98.8 (05-25-18 @ 17:22), Max: 98.8 (05-25-18 @ 17:22)  HR: 79 (05-25-18 @ 17:22)  BP: 162/84 (05-25-18 @ 17:22)  RR: 18 (05-25-18 @ 17:22)  SpO2: 96% (05-25-18 @ 17:22)  Wt(kg): --  General: Nontoxic-appearing Female in no acute distress.  HEENT: AT/NC. PERRL. EOMI. Anicteric. Conjunctiva pink and moist. Oropharynx clear. Dentition fair.  Neck: Not rigid. No sense of mass.  Nodes: None palpable.  Lungs: Clear bilaterally without rales, wheezing or rhonchi  Heart: Regular rate and rhythm. No Murmur. No rub. No gallop. No palpable thrill.  Abdomen: Bowel sounds present and normoactive. Soft. Nondistended. Nontender. No sense of mass. No organomegaly.  Back: No spinal tenderness. No costovertebral angle tenderness.   Extremities: No cyanosis or clubbing. No edema.   Skin: Warm. Dry. Good turgor. No rash. No vasculitic stigmata.  Psychiatric: Appropriate affect and mood for situation.         Laboratory Studies--  CBC                        9.0    5.1   )-----------( 95       ( 25 May 2018 06:56 )             27.7       Chemistries  05-25    145  |  112<H>  |  16  ----------------------------<  96  4.5   |  22  |  1.49<H>    Ca    8.4      25 May 2018 06:56        Culture Data    Culture - Blood (collected 24 May 2018 11:31)  Source: .Blood Blood  Preliminary Report (25 May 2018 12:01):    No growth to date.    Culture - Blood (collected 24 May 2018 11:30)  Source: .Blood Blood  Preliminary Report (25 May 2018 12:01):    No growth to date.    Culture - Blood (collected 21 May 2018 14:17)  Source: .Blood Blood-Peripheral  Gram Stain (22 May 2018 19:34):    Growth in anaerobic bottle: Gram Positive Cocci in Pairs and Chains  Final Report (23 May 2018 19:43):    Growth in anaerobic bottle: Enterococcus faecalis    See previous culture [id number]    Culture - Blood (collected 21 May 2018 14:17)  Source: .Blood Blood-Peripheral  Preliminary Report (22 May 2018 15:01):    No growth to date.    Culture - Urine (collected 20 May 2018 13:57)  Source: .Urine Nephrostomy - Right  Final Report (22 May 2018 12:56):    >100,000 CFU/ml Enterococcus faecalis  Organism: Enterococcus faecalis (22 May 2018 12:56)  Organism: Enterococcus faecalis (22 May 2018 12:56)    Culture - Urine (collected 20 May 2018 13:57)  Source: .Urine Nephrostomy - Left  Final Report (22 May 2018 12:57):    >100,000 CFU/ml Enterococcus faecalis    See previous culture [id number]    Culture - Urine (collected 20 May 2018 08:49)  Source: .Urine Ileal Conduit  Final Report (22 May 2018 13:05):    >100,000 CFU/ml Enterococcus faecalis    50,000 - 99,000 CFU/mL Klebsiella pneumoniae  Organism: Enterococcus faecalis  Klebsiella pneumoniae (22 May 2018 13:05)  Organism: Klebsiella pneumoniae (22 May 2018 13:05)  Organism: Enterococcus faecalis (22 May 2018 13:05)    Culture - Blood (collected 20 May 2018 08:36)  Source: .Blood Blood-Heart  Gram Stain (20 May 2018 17:40):    Growth in aerobic and anaerobic bottles: Gram Positive Cocci in Pairs and    Chains  Final Report (22 May 2018 21:57):    Growth in aerobic and anaerobic bottles: Enterococcus faecalis    See previous culture [id number]    Culture - Blood (collected 20 May 2018 08:36)  Source: .Blood Blood-Peripheral  Gram Stain (20 May 2018 16:49):    Growth in aerobic bottle: Gram Positive Cocci in Pairs and Chains    Growth in anaerobic bottle: Gram Positive Cocci in Pairs and Chains  Final Report (22 May 2018 21:55):    Growth in aerobic and anaerobic bottles: Enterococcus faecalis and    Klebsiella pneumoniae    "Due to technical problems, Proteus sp. will Not be reported as part of    the BCID panel until further notice"    ***Blood Panel PCR results on this specimen are available    approximately 3 hours after the Gram stain result.***    Gram stain, PCR, and/or culture results may not always    correspond due to difference in methodologies.    ************************************************************    This PCR assay was performed using [employer].    The following targets are tested for: Enterococcus,    vancomycin resistant enterococci, Listeria monocytogenes,    coagulase negative staphylococci, S. aureus,    methicillin resistant S. aureus, Streptococcus agalactiae    (Group B), S. pneumoniae, S. pyogenes (Group A),    Acinetobacter baumannii, Enterobacter cloacae, E. coli,    Klebsiella oxytoca, K. pneumoniae, Proteus sp.,    Serratia marcescens, Haemophilus influenzae,    Neisseria meningitidis, Pseudomonas aeruginosa, Candida    albicans, C. glabrata, C krusei, C parapsilosis,    C. tropicalis and the KPC resistance gene.  Organism: Blood Culture PCR  Enterococcus faecalis  Klebsiella pneumoniae (22 May 2018 21:55)  Organism: Klebsiella pneumoniae (22 May 2018 21:55)  Organism: Enterococcus faecalis (22 May 2018 21:55)  Organism: Blood Culture PCR (22 May 2018 21:55) [facility], Division of Infectious Diseases  BERNICE Sheffield A. Lee  [phone]    Name: AUBREE ALONZO  Age: 77y  Gender: Female  MRN: [id number]    Interval History--  Notes reviewed. Feeling pretty good. A little worried about her BP, but also worried about taking HCTZ becuase she "didn't drink a lot today."  No new complaints otherwise.    Past Medical History--  Bladder cancer metastasized to bone  Radial fracture  Uterine Polyp  Gastric Ulcer  Migraine, Ophthalmoplegic  Fibromyalgia  Obese  Calcified Thoracic Aorta  Hypertrophic Cardiomyopathy  Paroxysmal Ventricular Tachycardia  Sleep Apnea  Nasal Polyp  Arthritis  HTN (Hypertension)  Asthmatic Bronchitis  Acid Reflux  History of ileal conduit  S/P total knee arthroplasty, left  EPS study  Nasal Polyp removal  History of Arthroscopy  History of D&C      For details regarding the patient's social history, family history, and other miscellaneous elements, please refer the initial infectious diseases consultation and/or the admitting history and physical examination for this admission.    Allergies    No Known Allergies    Intolerances        Medications--  Antibiotics:  ampicillin/sulbactam  IVPB 3 Gram(s) IV Intermittent every 8 hours    Immunologic:    Other:  acetaminophen   Tablet PRN  acetaminophen   Tablet. PRN  calcium carbonate 500 mG (Tums) Chewable  diltiazem   CD  diphenhydrAMINE   Capsule PRN  DULoxetine  hydrochlorothiazide  lactobacillus acidophilus  metoprolol succinate ER  montelukast  oxyCODONE    5 mG/acetaminophen 325 mG PRN  pantoprazole    Tablet  pregabalin  psyllium Powder  raloxifene  simethicone PRN  sodium bicarbonate  traZODone      Review of Systems--  Review of systems otherwise unchanged compared to prior visit except as previously noted.    Physical Examination--  Vital Signs: T(F): 98.8 (05-25-18 @ 17:22), Max: 98.8 (05-25-18 @ 17:22)  HR: 79 (05-25-18 @ 17:22)  BP: 162/84 (05-25-18 @ 17:22)  RR: 18 (05-25-18 @ 17:22)  SpO2: 96% (05-25-18 @ 17:22)  Wt(kg): --  General: Nontoxic-appearing Female in no acute distress.  HEENT: Partial alopecia. Anicteric. Conjunctiva pink and moist. Oropharynx clear.   Neck: Not rigid. No sense of mass.  Nodes: None palpable.  Lungs: Clear bilaterally without rales, wheezing or rhonchi  Heart: Regular rate and rhythm. No Murmur. No rub. No gallop. No palpable thrill.  Chest: Mediport without No inflammatory signs.  Abdomen: Bowel sounds present and normoactive. Soft. Nondistended. Nontender. abdominal wall. Ileal conduit yellow urine.  Back: B NT clear urine.  Extremities: No cyanosis or clubbing. No edema.   Skin: Warm. Dry. Good turgor. No vasculitic stigmata.   Psychiatric: Awake, alert, interactive. Better mood.    Laboratory Studies--  CBC                        9.0    5.1   )-----------( 95       ( 25 May 2018 06:56 )             27.7       Chemistries  05-25    145  |  112<H>  |  16  ----------------------------<  96  4.5   |  22  |  1.49<H>    Ca    8.4      25 May 2018 06:56        Culture Data    Culture - Blood (collected 24 May 2018 11:31)  Source: .Blood Blood  Preliminary Report (25 May 2018 12:01):    No growth to date.    Culture - Blood (collected 24 May 2018 11:30)  Source: .Blood Blood  Preliminary Report (25 May 2018 12:01):    No growth to date.    Culture - Blood (collected 21 May 2018 14:17)  Source: .Blood Blood-Peripheral  Gram Stain (22 May 2018 19:34):    Growth in anaerobic bottle: Gram Positive Cocci in Pairs and Chains  Final Report (23 May 2018 19:43):    Growth in anaerobic bottle: Enterococcus faecalis    See previous culture [id number]    Culture - Blood (collected 21 May 2018 14:17)  Source: .Blood Blood-Peripheral  Preliminary Report (22 May 2018 15:01):    No growth to date.    Culture - Urine (collected 20 May 2018 13:57)  Source: .Urine Nephrostomy - Right  Final Report (22 May 2018 12:56):    >100,000 CFU/ml Enterococcus faecalis  Organism: Enterococcus faecalis (22 May 2018 12:56)  Organism: Enterococcus faecalis (22 May 2018 12:56)    Culture - Urine (collected 20 May 2018 13:57)  Source: .Urine Nephrostomy - Left  Final Report (22 May 2018 12:57):    >100,000 CFU/ml Enterococcus faecalis    See previous culture [id number]    Culture - Urine (collected 20 May 2018 08:49)  Source: .Urine Ileal Conduit  Final Report (22 May 2018 13:05):    >100,000 CFU/ml Enterococcus faecalis    50,000 - 99,000 CFU/mL Klebsiella pneumoniae  Organism: Enterococcus faecalis  Klebsiella pneumoniae (22 May 2018 13:05)  Organism: Klebsiella pneumoniae (22 May 2018 13:05)  Organism: Enterococcus faecalis (22 May 2018 13:05)    Culture - Blood (collected 20 May 2018 08:36)  Source: .Blood Blood-Heart  Gram Stain (20 May 2018 17:40):    Growth in aerobic and anaerobic bottles: Gram Positive Cocci in Pairs and    Chains  Final Report (22 May 2018 21:57):    Growth in aerobic and anaerobic bottles: Enterococcus faecalis    See previous culture [id number]    Culture - Blood (collected 20 May 2018 08:36)  Source: .Blood Blood-Peripheral  Gram Stain (20 May 2018 16:49):    Growth in aerobic bottle: Gram Positive Cocci in Pairs and Chains    Growth in anaerobic bottle: Gram Positive Cocci in Pairs and Chains  Final Report (22 May 2018 21:55):    Growth in aerobic and anaerobic bottles: Enterococcus faecalis and    Klebsiella pneumoniae    "Due to technical problems, Proteus sp. will Not be reported as part of    the BCID panel until further notice"    ***Blood Panel PCR results on this specimen are available    approximately 3 hours after the Gram stain result.***    Gram stain, PCR, and/or culture results may not always    correspond due to difference in methodologies.    ************************************************************    This PCR assay was performed using [employer].    The following targets are tested for: Enterococcus,    vancomycin resistant enterococci, Listeria monocytogenes,    coagulase negative staphylococci, S. aureus,    methicillin resistant S. aureus, Streptococcus agalactiae    (Group B), S. pneumoniae, S. pyogenes (Group A),    Acinetobacter baumannii, Enterobacter cloacae, E. coli,    Klebsiella oxytoca, K. pneumoniae, Proteus sp.,    Serratia marcescens, Haemophilus influenzae,    Neisseria meningitidis, Pseudomonas aeruginosa, Candida    albicans, C. glabrata, C krusei, C parapsilosis,    C. tropicalis and the KPC resistance gene.  Organism: Blood Culture PCR  Enterococcus faecalis  Klebsiella pneumoniae (22 May 2018 21:55)  Organism: Klebsiella pneumoniae (22 May 2018 21:55)  Organism: Enterococcus faecalis (22 May 2018 21:55)  Organism: Blood Culture PCR (22 May 2018 21:55)

## 2018-05-25 NOTE — PROGRESS NOTE ADULT - SUBJECTIVE AND OBJECTIVE BOX
The pt is a 75 year old female with a history of obesity, fibromyalgia, HTN, CM, CKD3, smoker, bladder cancer with mets to the bone presented for a ileal conduit and thereafter has received 4 cycles of chemo with carboplatin and VP16. She was to have another pet scan done but she was admitted. If the pet was negative she was to have WBRT. She was admitted here for a recent UTI bacteremia and was admitted here with abdominal pain and back pain. She was found to have bilateral pyelo and 2 organisms Klebsiella Flagyl and ceftriaxone. She required nephrostomy stents which were placed in here and she had here last chemotherapy on 5/3/18. The white cell count now is 4.9 and [place] count [place] 56953 and she is afebrile. She will need negative cultures before going home. Consult was requested. 5/24 pt feeling better and continues on antibiotics. The pt complained of bloating and on exam there were decreased normal pitched bowel sounds and no pain and no bowel habit changes. Likely dealing with side affects of  16. 5/25 stable and continues on antibiotics. No new complaints.              PE looks stronger and not in distress mouth clear heart rr abdomen decreased bowel sounds and normal pitch extremities no edema  ICU Vital Signs Last 24 Hrs  T(C): 36.9 (25 May 2018 13:10), Max: 37.1 (24 May 2018 17:20)  T(F): 98.5 (25 May 2018 13:10), Max: 98.7 (24 May 2018 17:20)  HR: 85 (25 May 2018 13:10) (76 - 92)  BP: 168/70 (25 May 2018 13:10) (158/77 - 176/91)  BP(mean): --  ABP: --  ABP(mean): --  RR: 18 (25 May 2018 13:10) (18 - 18)  SpO2: 95% (25 May 2018 13:10) (94% - 98%)                        9.0    5.1   )-----------( 95       ( 25 May 2018 06:56 )             27.7   PE able to ambulate and was not short of breath mouth clear heart rr abdomen soft extremities no edemal

## 2018-05-25 NOTE — PROGRESS NOTE ADULT - SUBJECTIVE AND OBJECTIVE BOX
Patient is a 77y old  Female who presents with a chief complaint of abdominal pain (20 May 2018 13:53)      SUBJECTIVE / OVERNIGHT EVENTS:  No overnight events.  No complaints. walking around in her room.   No cp, sob, abdominal pain.  No n/v/d. no HA/dizziness.   dont like the percocet that much. "make my head go crazy"  stable on tylenol for now.       Vital Signs Last 24 Hrs  T(C): 36.8 (25 May 2018 09:05), Max: 37.1 (24 May 2018 17:20)  T(F): 98.3 (25 May 2018 09:05), Max: 98.7 (24 May 2018 17:20)  HR: 76 (25 May 2018 09:05) (76 - 92)  BP: 165/75 (25 May 2018 09:05) (137/66 - 176/91)  BP(mean): --  RR: 18 (25 May 2018 09:05) (18 - 18)  SpO2: 98% (25 May 2018 09:05) (94% - 98%)  I&O's Summary    24 May 2018 07:01  -  25 May 2018 07:00  --------------------------------------------------------  IN: 940 mL / OUT: 3275 mL / NET: -2335 mL    25 May 2018 07:01  -  25 May 2018 12:06  --------------------------------------------------------  IN: 340 mL / OUT: 475 mL / NET: -135 mL      PHYSICAL EXAM:  GENERAL: NAD, Comfortable  HEAD:  Atraumatic, Normocephalic  EYES: EOMI, PERRLA, conjunctiva and sclera clear  NECK: Supple, No JVD  CHEST/LUNG: Clear to auscultation bilaterally; No wheeze  HEART: Regular rate and rhythm; No murmurs, rubs, or gallops  ABDOMEN: Soft, Nontender, Nondistended; Bowel sounds present  BACK: b/l nephrostomy bags, dressing d/c/i.   Neuro: AAO x 3, no focal deficit, 5/5 b/l extremities  EXTREMITIES:  2+ Peripheral Pulses, No clubbing, cyanosis, or edema  SKIN: No rashes or lesions    LABS:                        9.0    5.1   )-----------( 95       ( 25 May 2018 06:56 )             27.7     05-25    145  |  112<H>  |  16  ----------------------------<  96  4.5   |  22  |  1.49<H>    Ca    8.4      25 May 2018 06:56        CAPILLARY BLOOD GLUCOSE                RADIOLOGY & ADDITIONAL TESTS:    Imaging Personally Reviewed:  [x] YES  [ ] NO    Consultant(s) Notes Reviewed:  [x] YES  [ ] NO      MEDICATIONS  (STANDING):  ampicillin/sulbactam  IVPB      ampicillin/sulbactam  IVPB 3 Gram(s) IV Intermittent every 12 hours  calcium carbonate 500 mG (Tums) Chewable 1 Tablet(s) Chew daily  diltiazem    milliGRAM(s) Oral daily  DULoxetine 60 milliGRAM(s) Oral daily  lactobacillus acidophilus 1 Tablet(s) Oral daily  metoprolol succinate ER 50 milliGRAM(s) Oral daily  montelukast 10 milliGRAM(s) Oral daily  pantoprazole    Tablet 40 milliGRAM(s) Oral before breakfast  pregabalin 75 milliGRAM(s) Oral two times a day  psyllium Powder 1 Packet(s) Oral two times a day  raloxifene 60 milliGRAM(s) Oral daily  sodium bicarbonate 1300 milliGRAM(s) Oral two times a day  traZODone 100 milliGRAM(s) Oral at bedtime    MEDICATIONS  (PRN):  acetaminophen   Tablet 650 milliGRAM(s) Oral every 6 hours PRN For Temp greater than 38 C (100.4 F)  acetaminophen   Tablet. 650 milliGRAM(s) Oral every 6 hours PRN Mild Pain (1 - 3)  diphenhydrAMINE   Capsule 25 milliGRAM(s) Oral at bedtime PRN Insomnia  oxyCODONE    5 mG/acetaminophen 325 mG 2 Tablet(s) Oral every 4 hours PRN Moderate Pain (4 - 6)  simethicone 80 milliGRAM(s) Chew every 8 hours PRN Gas      Care Discussed with Consultants/Other Providers [x] YES  [ ] NO    HEALTH ISSUES - PROBLEM Dx:  Septicemia due to Klebsiella pneumoniae: Septicemia due to Klebsiella pneumoniae  Septicemia due to enterococcus: Septicemia due to enterococcus  Obstruction, uropathy: Obstruction, uropathy  Bladder cancer metastasized to bone: Bladder cancer metastasized to bone  Bacteremia due to Gram-positive bacteria: Bacteremia due to Gram-positive bacteria  Pyelonephritis, acute: Pyelonephritis, acute  Tachycardia: Tachycardia  Fibromyalgia: Fibromyalgia  Other hydronephrosis: Other hydronephrosis  DEVON (acute kidney injury): DEVON (acute kidney injury)  Severe sepsis: Severe sepsis

## 2018-05-25 NOTE — PROGRESS NOTE ADULT - PROBLEM SELECTOR PLAN 1
(fever and tachycardia present on admission) due to bilateral pyelonephritis from an ileal conduit obstruction.  (+)blood cultures 2' enterococcus  repeat blood cxs testing from 5/21, 1/2 positive.   appreciate ID, repeat cultures sent again 5/24 morning. Prelimp neg.   TTE reviewed: no apparent endocarditis. moderate MR, hyperdynamic LV.

## 2018-05-25 NOTE — PROGRESS NOTE ADULT - PROBLEM SELECTOR PLAN 1
continue Unasyn  Discussed Rx options with patient in layman's terms. Advantages/drawbacks of each addressed. After discussion and all questions answered, will opt to Rx with oral antibiotics assuming she continues to improve.  Likely discharge on Augmentin 875mg PO Q12H to complete 2 weeks Rx after last +BC  Red lfags addressed

## 2018-05-25 NOTE — PROGRESS NOTE ADULT - ATTENDING COMMENTS
No objection to discharge tomorrow assuming she continues to improve.    Vikram Means MD  [phone]

## 2018-05-25 NOTE — PROGRESS NOTE ADULT - ATTENDING COMMENTS
Abd US to evaluate etiology of abdominal distention with intermittent discomfort. ruled out Ascites.  +BM. Add percocet for pain control.   PRN stool softeners.     - Dr. EFRA Manning ([facility])  - [phone]

## 2018-05-26 LAB
ANION GAP SERPL CALC-SCNC: 11 MMOL/L — SIGNIFICANT CHANGE UP (ref 5–17)
BUN SERPL-MCNC: 17 MG/DL — SIGNIFICANT CHANGE UP (ref 7–23)
CALCIUM SERPL-MCNC: 8.6 MG/DL — SIGNIFICANT CHANGE UP (ref 8.4–10.5)
CHLORIDE SERPL-SCNC: 110 MMOL/L — HIGH (ref 96–108)
CO2 SERPL-SCNC: 22 MMOL/L — SIGNIFICANT CHANGE UP (ref 22–31)
CREAT SERPL-MCNC: 1.52 MG/DL — HIGH (ref 0.5–1.3)
CULTURE RESULTS: SIGNIFICANT CHANGE UP
GLUCOSE SERPL-MCNC: 89 MG/DL — SIGNIFICANT CHANGE UP (ref 70–99)
HCT VFR BLD CALC: 27.4 % — LOW (ref 34.5–45)
HGB BLD-MCNC: 9 G/DL — LOW (ref 11.5–15.5)
MCHC RBC-ENTMCNC: 32.7 PG — SIGNIFICANT CHANGE UP (ref 27–34)
MCHC RBC-ENTMCNC: 32.8 GM/DL — SIGNIFICANT CHANGE UP (ref 32–36)
MCV RBC AUTO: 99.7 FL — SIGNIFICANT CHANGE UP (ref 80–100)
PLATELET # BLD AUTO: 103 K/UL — LOW (ref 150–400)
POTASSIUM SERPL-MCNC: 4.4 MMOL/L — SIGNIFICANT CHANGE UP (ref 3.5–5.3)
POTASSIUM SERPL-SCNC: 4.4 MMOL/L — SIGNIFICANT CHANGE UP (ref 3.5–5.3)
RBC # BLD: 2.75 M/UL — LOW (ref 3.8–5.2)
RBC # FLD: 16.4 % — HIGH (ref 10.3–14.5)
SODIUM SERPL-SCNC: 143 MMOL/L — SIGNIFICANT CHANGE UP (ref 135–145)
SPECIMEN SOURCE: SIGNIFICANT CHANGE UP
WBC # BLD: 4.6 K/UL — SIGNIFICANT CHANGE UP (ref 3.8–10.5)
WBC # FLD AUTO: 4.6 K/UL — SIGNIFICANT CHANGE UP (ref 3.8–10.5)

## 2018-05-26 RX ORDER — HYDROCHLOROTHIAZIDE 25 MG
25 TABLET ORAL DAILY
Qty: 0 | Refills: 0 | Status: DISCONTINUED | OUTPATIENT
Start: 2018-05-26 | End: 2018-05-28

## 2018-05-26 RX ADMIN — Medication 1 PACKET(S): at 17:42

## 2018-05-26 RX ADMIN — AMPICILLIN SODIUM AND SULBACTAM SODIUM 200 GRAM(S): 250; 125 INJECTION, POWDER, FOR SUSPENSION INTRAMUSCULAR; INTRAVENOUS at 06:01

## 2018-05-26 RX ADMIN — MONTELUKAST 10 MILLIGRAM(S): 4 TABLET, CHEWABLE ORAL at 11:35

## 2018-05-26 RX ADMIN — Medication 1 TABLET(S): at 11:35

## 2018-05-26 RX ADMIN — Medication 50 MILLIGRAM(S): at 06:02

## 2018-05-26 RX ADMIN — RALOXIFENE HYDROCHLORIDE 60 MILLIGRAM(S): 60 TABLET, COATED ORAL at 11:35

## 2018-05-26 RX ADMIN — Medication 650 MILLIGRAM(S): at 03:23

## 2018-05-26 RX ADMIN — SIMETHICONE 80 MILLIGRAM(S): 80 TABLET, CHEWABLE ORAL at 06:02

## 2018-05-26 RX ADMIN — Medication 12.5 MILLIGRAM(S): at 06:02

## 2018-05-26 RX ADMIN — Medication 75 MILLIGRAM(S): at 06:02

## 2018-05-26 RX ADMIN — AMPICILLIN SODIUM AND SULBACTAM SODIUM 200 GRAM(S): 250; 125 INJECTION, POWDER, FOR SUSPENSION INTRAMUSCULAR; INTRAVENOUS at 15:06

## 2018-05-26 RX ADMIN — Medication 1 TABLET(S): at 15:01

## 2018-05-26 RX ADMIN — Medication 75 MILLIGRAM(S): at 17:43

## 2018-05-26 RX ADMIN — Medication 1300 MILLIGRAM(S): at 17:43

## 2018-05-26 RX ADMIN — AMPICILLIN SODIUM AND SULBACTAM SODIUM 200 GRAM(S): 250; 125 INJECTION, POWDER, FOR SUSPENSION INTRAMUSCULAR; INTRAVENOUS at 22:42

## 2018-05-26 RX ADMIN — Medication 650 MILLIGRAM(S): at 03:53

## 2018-05-26 RX ADMIN — Medication 25 MILLIGRAM(S): at 15:06

## 2018-05-26 RX ADMIN — DULOXETINE HYDROCHLORIDE 60 MILLIGRAM(S): 30 CAPSULE, DELAYED RELEASE ORAL at 11:35

## 2018-05-26 RX ADMIN — Medication 180 MILLIGRAM(S): at 06:02

## 2018-05-26 RX ADMIN — Medication 100 MILLIGRAM(S): at 22:43

## 2018-05-26 RX ADMIN — PANTOPRAZOLE SODIUM 40 MILLIGRAM(S): 20 TABLET, DELAYED RELEASE ORAL at 06:02

## 2018-05-26 RX ADMIN — Medication 1300 MILLIGRAM(S): at 06:01

## 2018-05-26 NOTE — PROGRESS NOTE ADULT - ATTENDING COMMENTS
Added HCTZ 25 mg for HTN.  d/c planning likely in am.   Case management to set up home care and visiting RN for nephrostomy tube management.     - Dr. EFRA Manning ([facility])  - [phone]

## 2018-05-26 NOTE — PROGRESS NOTE ADULT - SUBJECTIVE AND OBJECTIVE BOX
[facility], Division of Infectious Diseases  BERNICE Sheffield A. Lee  [phone]    Name: AUBREE ALONZO  Age: 77y  Gender: Female  MRN: [id number]    Interval History--  Notes reviewed. Discharge delayed due to home care issues. Brother & sister-in-law visiting.  No new issues.    Past Medical History--  Bladder cancer metastasized to bone  Radial fracture  Uterine Polyp  Gastric Ulcer  Migraine, Ophthalmoplegic  Fibromyalgia  Obese  Calcified Thoracic Aorta  Hypertrophic Cardiomyopathy  Paroxysmal Ventricular Tachycardia  Sleep Apnea  Nasal Polyp  Arthritis  HTN (Hypertension)  Asthmatic Bronchitis  Acid Reflux  History of ileal conduit  S/P total knee arthroplasty, left  EPS study  Nasal Polyp removal  History of Arthroscopy  History of D&C      For details regarding the patient's social history, family history, and other miscellaneous elements, please refer the initial infectious diseases consultation and/or the admitting history and physical examination for this admission.    Allergies    No Known Allergies    Intolerances        Medications--  Antibiotics:  ampicillin/sulbactam  IVPB 3 Gram(s) IV Intermittent every 8 hours    Immunologic:    Other:  acetaminophen   Tablet PRN  acetaminophen   Tablet. PRN  calcium carbonate 500 mG (Tums) Chewable  diltiazem   CD  diphenhydrAMINE   Capsule PRN  DULoxetine  hydrochlorothiazide  lactobacillus acidophilus  metoprolol succinate ER  montelukast  oxyCODONE    5 mG/acetaminophen 325 mG PRN  pantoprazole    Tablet  pregabalin  psyllium Powder  raloxifene  simethicone PRN  sodium bicarbonate  traZODone      Review of Systems--  A 10-point review of systems was obtained.   Review of systems otherwise unchanged compared to prior visit except as previously noted.    Physical Examination--  Vital Signs: T(F): 98.4 (05-26-18 @ 14:33), Max: 98.8 (05-25-18 @ 17:22)  HR: 85 (05-26-18 @ 14:33)  BP: 172/81 (05-26-18 @ 14:33)  RR: 18 (05-26-18 @ 14:33)  SpO2: 96% (05-26-18 @ 14:33)  Wt(kg): --  General: Nontoxic-appearing Female in no acute distress.  HEENT: AT/NC. PERRL. EOMI. Anicteric. Conjunctiva pink and moist. Oropharynx clear. Dentition fair.  Neck: Not rigid. No sense of mass.  Nodes: None palpable.  Lungs: Clear bilaterally without rales, wheezing or rhonchi  Heart: Regular rate and rhythm. No Murmur. No rub. No gallop. No palpable thrill.  Abdomen: Bowel sounds present and normoactive. Soft. Nondistended. Nontender. No sense of mass. No organomegaly.  Back: No spinal tenderness. No costovertebral angle tenderness.   Extremities: No cyanosis or clubbing. No edema.   Skin: Warm. Dry. Good turgor. No rash. No vasculitic stigmata.  Psychiatric: Appropriate affect and mood for situation.         Laboratory Studies--  CBC                        9.0    4.6   )-----------( 103      ( 26 May 2018 07:18 )             27.4       Chemistries  05-26    143  |  110<H>  |  17  ----------------------------<  89  4.4   |  22  |  1.52<H>    Ca    8.6      26 May 2018 07:18        Culture Data    Culture - Blood (collected 24 May 2018 11:31)  Source: .Blood Blood  Preliminary Report (25 May 2018 12:01):    No growth to date.    Culture - Blood (collected 24 May 2018 11:30)  Source: .Blood Blood  Preliminary Report (25 May 2018 12:01):    No growth to date.    Culture - Blood (collected 21 May 2018 14:17)  Source: .Blood Blood-Peripheral  Gram Stain (22 May 2018 19:34):    Growth in anaerobic bottle: Gram Positive Cocci in Pairs and Chains  Final Report (23 May 2018 19:43):    Growth in anaerobic bottle: Enterococcus faecalis    See previous culture [id number]    Culture - Blood (collected 21 May 2018 14:17)  Source: .Blood Blood-Peripheral  Final Report (26 May 2018 15:03):    No growth at 5 days.    Culture - Urine (collected 20 May 2018 13:57)  Source: .Urine Nephrostomy - Right  Final Report (22 May 2018 12:56):    >100,000 CFU/ml Enterococcus faecalis  Organism: Enterococcus faecalis (22 May 2018 12:56)  Organism: Enterococcus faecalis (22 May 2018 12:56)    Culture - Urine (collected 20 May 2018 13:57)  Source: .Urine Nephrostomy - Left  Final Report (22 May 2018 12:57):    >100,000 CFU/ml Enterococcus faecalis    See previous culture [id number]    Culture - Urine (collected 20 May 2018 08:49)  Source: .Urine Ileal Conduit  Final Report (22 May 2018 13:05):    >100,000 CFU/ml Enterococcus faecalis    50,000 - 99,000 CFU/mL Klebsiella pneumoniae  Organism: Enterococcus faecalis  Klebsiella pneumoniae (22 May 2018 13:05)  Organism: Klebsiella pneumoniae (22 May 2018 13:05)  Organism: Enterococcus faecalis (22 May 2018 13:05)    Culture - Blood (collected 20 May 2018 08:36)  Source: .Blood Blood-Heart  Gram Stain (20 May 2018 17:40):    Growth in aerobic and anaerobic bottles: Gram Positive Cocci in Pairs and    Chains  Final Report (22 May 2018 21:57):    Growth in aerobic and anaerobic bottles: Enterococcus faecalis    See previous culture [id number]    Culture - Blood (collected 20 May 2018 08:36)  Source: .Blood Blood-Peripheral  Gram Stain (20 May 2018 16:49):    Growth in aerobic bottle: Gram Positive Cocci in Pairs and Chains    Growth in anaerobic bottle: Gram Positive Cocci in Pairs and Chains  Final Report (22 May 2018 21:55):    Growth in aerobic and anaerobic bottles: Enterococcus faecalis and    Klebsiella pneumoniae    "Due to technical problems, Proteus sp. will Not be reported as part of    the BCID panel until further notice"    ***Blood Panel PCR results on this specimen are available    approximately 3 hours after the Gram stain result.***    Gram stain, PCR, and/or culture results may not always    correspond due to difference in methodologies.    ************************************************************    This PCR assay was performed using [employer].    The following targets are tested for: Enterococcus,    vancomycin resistant enterococci, Listeria monocytogenes,    coagulase negative staphylococci, S. aureus,    methicillin resistant S. aureus, Streptococcus agalactiae    (Group B), S. pneumoniae, S. pyogenes (Group A),    Acinetobacter baumannii, Enterobacter cloacae, E. coli,    Klebsiella oxytoca, K. pneumoniae, Proteus sp.,    Serratia marcescens, Haemophilus influenzae,    Neisseria meningitidis, Pseudomonas aeruginosa, Candida    albicans, C. glabrata, C krusei, C parapsilosis,    C. tropicalis and the KPC resistance gene.  Organism: Blood Culture PCR  Enterococcus faecalis  Klebsiella pneumoniae (22 May 2018 21:55)  Organism: Klebsiella pneumoniae (22 May 2018 21:55)  Organism: Enterococcus faecalis (22 May 2018 21:55)  Organism: Blood Culture PCR (22 May 2018 21:55)

## 2018-05-26 NOTE — PROGRESS NOTE ADULT - PROBLEM SELECTOR PLAN 1
(fever and tachycardia present on admission) due to bilateral pyelonephritis from an ileal conduit obstruction.  (+)blood cultures 2' enterococcus  repeat blood cxs testing from 5/21, 1/2 positive.   appreciate ID, repeat cultures sent again 5/24 morning. Prelimp neg.   TTE reviewed: no apparent endocarditis. moderate MR, hyperdynamic LV.  Plan to d/c home on Augmentin.  ID f/u appreciated.

## 2018-05-26 NOTE — PROGRESS NOTE ADULT - ATTENDING COMMENTS
I'm available for issues over the remainder of the long weekend, please call if ID input needed.     Vikram Means MD  [phone]

## 2018-05-26 NOTE — PROGRESS NOTE ADULT - SUBJECTIVE AND OBJECTIVE BOX
[facility] Hematology/Oncology - Obey Cruz / Onofre / Francis - [phone]  Primary Outpatient Hematologist/Oncologist: Dr. Manjinder Gomez    Subjective  Patient seen and examined. Lying in bed comfortably. Feeling well. No acute complaitns.     Admitting Complaint/History  HPI:  77 year old Female with PMH of obesity, fibromyalgia, hypertension, hypertrophic cardiomyopathy, CKD 3, former smoker (quit November 2017), bladder cancer metastatic to bone dx 9/2017 s/p ileal conduit on Carboplatin/Etoposide with recent admission for UTI/ bacteremia, presenting with abdominal pain. The patient states that over the last couple of days she began to develop lower abdominal pain, slowly progressing to 10/10 severity by today, not improved by anything, non radiating, and associated with nausea and retching. In the ED, vitals were: T 103.4, , /42, RR 20, 96% RA NC. CT Abd showed bilateral pyelonephritis due to obstruction (ileal conduit distension).  evaluated the patient and contacted IR for urgent bilateral nephrostomy placement. She was given IV fluids LR x1 liter, Cipro IV, Flagyl IV, and Ceftriaxone IV. Admitted to medicine from the PACU after the IR procedure. (20 May 2018 13:53)    ROS:  General:  (-)Pain, (-)Decrease appeite, (-)Fevers, (-)Chills, (-)Weight Loss  Eyes: (-)Blurry Vision, (-)Double Vision, (-)Vision Loss  ENT: (-)Sinus Congestion, (-)Decreased Hearing, (-)Nosebleeds, (-)Sore Throat  Cardiac: (-)Chest Pain, (-)Palpitations, (-)Shortness of breathe on exertion  Respiratory: (-)Cough, (-)hemoptysis, (-)Shortness of breathe  GI: (-)Nausea, (-)Vomiting, (-)Diarrhea, (-)Constipation, (-)Melena, (-)BRBPR  : (-)Hematuria, (-)Dysuria, (-)Polyuia  MSK: (-)Back pain, (-)Joint pain, (-)Stiffness  Dermatology: (-)Rash, (-)Itching  Neurology:  (-)Numbness, (-)Tingling, (-)Difficulty Walking, (-)Tremors, (-)Weakness  Psych: (-)Anxiety, (-)Depression, (-)Memory Loss  Hematology:  (-)Easy Bruising, (-)Easy Bleeding, (-)Night Sweats.     Objective  Vital Signs Last 24 Hrs  T(C): 36.6 (26 May 2018 06:00), Max: 37.1 (25 May 2018 17:22)  T(F): 97.8 (26 May 2018 06:00), Max: 98.8 (25 May 2018 17:22)  HR: 91 (26 May 2018 06:00) (79 - 92)  BP: 164/87 (26 May 2018 06:00) (144/86 - 168/70)  RR: 18 (26 May 2018 06:00) (18 - 18)  SpO2: 94% (26 May 2018 06:00) (94% - 96%)    Physical Exam:  General: AAO x 3. NAD. Lying in bed comfortably.  HEENT: clear oropharynx, anicteric sclera, pink conjunctivae, EOMi. PERRLA  Cardiac: S1, S2 present. No audible murmurs. RRR  Lungs: CTA B/L.   Abdomen: Soft, Non-Tender, Non-Distended. No palpable hepatosplenomegaly. +B/L Nephrostomy tubes  Extremities: 2+ pulses. No edema  Skin: No Rashes. No petechiae  Neuro: No focal motor or sensory deficits.     Medications:  MEDICATIONS  (STANDING):  ampicillin/sulbactam  IVPB 3 Gram(s) IV Intermittent every 8 hours  calcium carbonate 500 mG (Tums) Chewable 1 Tablet(s) Chew daily  diltiazem    milliGRAM(s) Oral daily  DULoxetine 60 milliGRAM(s) Oral daily  hydrochlorothiazide 12.5 milliGRAM(s) Oral daily  lactobacillus acidophilus 1 Tablet(s) Oral daily  metoprolol succinate ER 50 milliGRAM(s) Oral daily  montelukast 10 milliGRAM(s) Oral daily  pantoprazole    Tablet 40 milliGRAM(s) Oral before breakfast  pregabalin 75 milliGRAM(s) Oral two times a day  psyllium Powder 1 Packet(s) Oral two times a day  raloxifene 60 milliGRAM(s) Oral daily  sodium bicarbonate 1300 milliGRAM(s) Oral two times a day  traZODone 100 milliGRAM(s) Oral at bedtime    MEDICATIONS  (PRN):  acetaminophen   Tablet 650 milliGRAM(s) Oral every 6 hours PRN For Temp greater than 38 C (100.4 F)  acetaminophen   Tablet. 650 milliGRAM(s) Oral every 6 hours PRN Mild Pain (1 - 3)  diphenhydrAMINE   Capsule 25 milliGRAM(s) Oral at bedtime PRN Insomnia  oxyCODONE    5 mG/acetaminophen 325 mG 2 Tablet(s) Oral every 4 hours PRN Moderate Pain (4 - 6)  simethicone 80 milliGRAM(s) Chew every 8 hours PRN Gas    Labs:                        9.0    4.6   )-----------( 103      ( 26 May 2018 07:18 )             27.4     05-26    143  |  110<H>  |  17  ----------------------------<  89  4.4   |  22  |  1.52<H>    Ca    8.6      26 May 2018 07:18    Radiology:  CT Abdomen 5/20/201  IMPRESSION:   Distention of the ileal conduit with mild bilateral hydroureter and   increased bilateral perinephric stranding. These findings may represent   bilateral pyelonephritis secondary to obstruction.

## 2018-05-26 NOTE — PROGRESS NOTE ADULT - SUBJECTIVE AND OBJECTIVE BOX
Patient is a 77y old  Female who presents with a chief complaint of abdominal pain (20 May 2018 13:53)      SUBJECTIVE / OVERNIGHT EVENTS:  pt not in a good mood.  frustrated. BP elevated.  no cp, no sob, no n/v/d. no abdominal pain.  no headache, no dizziness.         Vital Signs Last 24 Hrs  T(C): 36.7 (26 May 2018 21:40), Max: 36.9 (26 May 2018 14:33)  T(F): 98.1 (26 May 2018 21:40), Max: 98.4 (26 May 2018 14:33)  HR: 85 (26 May 2018 21:40) (81 - 91)  BP: 158/77 (26 May 2018 21:40) (155/82 - 172/81)  BP(mean): --  RR: 18 (26 May 2018 21:40) (18 - 18)  SpO2: 94% (26 May 2018 21:40) (94% - 96%)  I&O's Summary    25 May 2018 07:01  -  26 May 2018 07:00  --------------------------------------------------------  IN: 1620 mL / OUT: 3475 mL / NET: -1855 mL    26 May 2018 07:01  -  26 May 2018 23:44  --------------------------------------------------------  IN: 1030 mL / OUT: 2980 mL / NET: -1950 mL      PHYSICAL EXAM:  GENERAL: NAD, Comfortable  HEAD:  Atraumatic, Normocephalic  EYES: EOMI, PERRLA, conjunctiva and sclera clear  NECK: Supple, No JVD  CHEST/LUNG: Clear to auscultation bilaterally; No wheeze  HEART: Regular rate and rhythm; No murmurs, rubs, or gallops  ABDOMEN: Soft, Nontender, Nondistended; Bowel sounds present  BACK: b/l nephrostomy bags, dressing d/c/i.   Neuro: AAO x 3, no focal deficit, 5/5 b/l extremities  EXTREMITIES:  2+ Peripheral Pulses, No clubbing, cyanosis, or edema  SKIN: No rashes or lesions      LABS:                        9.0    4.6   )-----------( 103      ( 26 May 2018 07:18 )             27.4     05-26    143  |  110<H>  |  17  ----------------------------<  89  4.4   |  22  |  1.52<H>    Ca    8.6      26 May 2018 07:18        CAPILLARY BLOOD GLUCOSE                RADIOLOGY & ADDITIONAL TESTS:    Imaging Personally Reviewed:  [x] YES  [ ] NO    Consultant(s) Notes Reviewed:  [x] YES  [ ] NO      MEDICATIONS  (STANDING):  ampicillin/sulbactam  IVPB 3 Gram(s) IV Intermittent every 8 hours  calcium carbonate 500 mG (Tums) Chewable 1 Tablet(s) Chew daily  diltiazem    milliGRAM(s) Oral daily  DULoxetine 60 milliGRAM(s) Oral daily  hydrochlorothiazide 25 milliGRAM(s) Oral daily  lactobacillus acidophilus 1 Tablet(s) Oral daily  metoprolol succinate ER 50 milliGRAM(s) Oral daily  montelukast 10 milliGRAM(s) Oral daily  pantoprazole    Tablet 40 milliGRAM(s) Oral before breakfast  pregabalin 75 milliGRAM(s) Oral two times a day  psyllium Powder 1 Packet(s) Oral two times a day  raloxifene 60 milliGRAM(s) Oral daily  sodium bicarbonate 1300 milliGRAM(s) Oral two times a day  traZODone 100 milliGRAM(s) Oral at bedtime    MEDICATIONS  (PRN):  acetaminophen   Tablet 650 milliGRAM(s) Oral every 6 hours PRN For Temp greater than 38 C (100.4 F)  acetaminophen   Tablet. 650 milliGRAM(s) Oral every 6 hours PRN Mild Pain (1 - 3)  diphenhydrAMINE   Capsule 25 milliGRAM(s) Oral at bedtime PRN Insomnia  oxyCODONE    5 mG/acetaminophen 325 mG 2 Tablet(s) Oral every 4 hours PRN Moderate Pain (4 - 6)  simethicone 80 milliGRAM(s) Chew every 8 hours PRN Gas      Care Discussed with Consultants/Other Providers [x] YES  [ ] NO    HEALTH ISSUES - PROBLEM Dx:  Septicemia due to Klebsiella pneumoniae: Septicemia due to Klebsiella pneumoniae  Septicemia due to enterococcus: Septicemia due to enterococcus  Obstruction, uropathy: Obstruction, uropathy  Bladder cancer metastasized to bone: Bladder cancer metastasized to bone  Bacteremia due to Gram-positive bacteria: Bacteremia due to Gram-positive bacteria  Pyelonephritis, acute: Pyelonephritis, acute  Tachycardia: Tachycardia  Fibromyalgia: Fibromyalgia  Other hydronephrosis: Other hydronephrosis  DEVON (acute kidney injury): DEVON (acute kidney injury)  Severe sepsis: Severe sepsis

## 2018-05-27 LAB
ANION GAP SERPL CALC-SCNC: 7 MMOL/L — SIGNIFICANT CHANGE UP (ref 5–17)
BUN SERPL-MCNC: 19 MG/DL — SIGNIFICANT CHANGE UP (ref 7–23)
CALCIUM SERPL-MCNC: 8.3 MG/DL — LOW (ref 8.4–10.5)
CHLORIDE SERPL-SCNC: 109 MMOL/L — HIGH (ref 96–108)
CO2 SERPL-SCNC: 27 MMOL/L — SIGNIFICANT CHANGE UP (ref 22–31)
CREAT SERPL-MCNC: 1.59 MG/DL — HIGH (ref 0.5–1.3)
GLUCOSE SERPL-MCNC: 89 MG/DL — SIGNIFICANT CHANGE UP (ref 70–99)
HCT VFR BLD CALC: 26.8 % — LOW (ref 34.5–45)
HGB BLD-MCNC: 8.4 G/DL — LOW (ref 11.5–15.5)
MCHC RBC-ENTMCNC: 31 PG — SIGNIFICANT CHANGE UP (ref 27–34)
MCHC RBC-ENTMCNC: 31.2 GM/DL — LOW (ref 32–36)
MCV RBC AUTO: 99.2 FL — SIGNIFICANT CHANGE UP (ref 80–100)
PLATELET # BLD AUTO: 110 K/UL — LOW (ref 150–400)
POTASSIUM SERPL-MCNC: 4.2 MMOL/L — SIGNIFICANT CHANGE UP (ref 3.5–5.3)
POTASSIUM SERPL-SCNC: 4.2 MMOL/L — SIGNIFICANT CHANGE UP (ref 3.5–5.3)
RBC # BLD: 2.7 M/UL — LOW (ref 3.8–5.2)
RBC # FLD: 16.4 % — HIGH (ref 10.3–14.5)
SODIUM SERPL-SCNC: 143 MMOL/L — SIGNIFICANT CHANGE UP (ref 135–145)
WBC # BLD: 4.2 K/UL — SIGNIFICANT CHANGE UP (ref 3.8–10.5)
WBC # FLD AUTO: 4.2 K/UL — SIGNIFICANT CHANGE UP (ref 3.8–10.5)

## 2018-05-27 RX ORDER — SODIUM BICARBONATE 1 MEQ/ML
650 SYRINGE (ML) INTRAVENOUS
Qty: 0 | Refills: 0 | Status: DISCONTINUED | OUTPATIENT
Start: 2018-05-28 | End: 2018-05-28

## 2018-05-27 RX ADMIN — Medication 1 PACKET(S): at 05:32

## 2018-05-27 RX ADMIN — Medication 75 MILLIGRAM(S): at 17:14

## 2018-05-27 RX ADMIN — AMPICILLIN SODIUM AND SULBACTAM SODIUM 200 GRAM(S): 250; 125 INJECTION, POWDER, FOR SUSPENSION INTRAMUSCULAR; INTRAVENOUS at 13:02

## 2018-05-27 RX ADMIN — Medication 50 MILLIGRAM(S): at 05:27

## 2018-05-27 RX ADMIN — Medication 1 TABLET(S): at 21:52

## 2018-05-27 RX ADMIN — AMPICILLIN SODIUM AND SULBACTAM SODIUM 200 GRAM(S): 250; 125 INJECTION, POWDER, FOR SUSPENSION INTRAMUSCULAR; INTRAVENOUS at 21:52

## 2018-05-27 RX ADMIN — Medication 1300 MILLIGRAM(S): at 17:14

## 2018-05-27 RX ADMIN — Medication 180 MILLIGRAM(S): at 05:27

## 2018-05-27 RX ADMIN — DULOXETINE HYDROCHLORIDE 60 MILLIGRAM(S): 30 CAPSULE, DELAYED RELEASE ORAL at 13:02

## 2018-05-27 RX ADMIN — RALOXIFENE HYDROCHLORIDE 60 MILLIGRAM(S): 60 TABLET, COATED ORAL at 13:02

## 2018-05-27 RX ADMIN — Medication 25 MILLIGRAM(S): at 05:32

## 2018-05-27 RX ADMIN — Medication 1300 MILLIGRAM(S): at 05:28

## 2018-05-27 RX ADMIN — MONTELUKAST 10 MILLIGRAM(S): 4 TABLET, CHEWABLE ORAL at 13:02

## 2018-05-27 RX ADMIN — PANTOPRAZOLE SODIUM 40 MILLIGRAM(S): 20 TABLET, DELAYED RELEASE ORAL at 05:27

## 2018-05-27 RX ADMIN — AMPICILLIN SODIUM AND SULBACTAM SODIUM 200 GRAM(S): 250; 125 INJECTION, POWDER, FOR SUSPENSION INTRAMUSCULAR; INTRAVENOUS at 05:29

## 2018-05-27 RX ADMIN — Medication 100 MILLIGRAM(S): at 21:52

## 2018-05-27 RX ADMIN — Medication 1 TABLET(S): at 13:01

## 2018-05-27 RX ADMIN — Medication 75 MILLIGRAM(S): at 05:28

## 2018-05-27 NOTE — PROGRESS NOTE ADULT - PROBLEM SELECTOR PROBLEM 1
Pyelonephritis, acute
Septicemia due to Klebsiella pneumoniae
Severe sepsis
Pyelonephritis, acute
Pyelonephritis, acute
Severe sepsis

## 2018-05-27 NOTE — PROGRESS NOTE ADULT - PROBLEM SELECTOR PROBLEM 4
Fibromyalgia
Septicemia due to Klebsiella pneumoniae
Fibromyalgia
Septicemia due to Klebsiella pneumoniae

## 2018-05-27 NOTE — PROGRESS NOTE ADULT - PROVIDER SPECIALTY LIST ADULT
Heme/Onc
Heme/Onc
Infectious Disease
Internal Medicine
Intervent Radiology
Urology
Urology
Heme/Onc
Infectious Disease
Internal Medicine
Infectious Disease

## 2018-05-27 NOTE — PROGRESS NOTE ADULT - ATTENDING COMMENTS
Added HCTZ 25 mg for HTN. BP much better.   d/c planning likely in am.   Case management to set up home care and visiting RN for nephrostomy tube management.     - Dr. EFRA Manning ([facility])  - [phone]

## 2018-05-27 NOTE — PROGRESS NOTE ADULT - ASSESSMENT
5/24 pt feeling better and continues on antibiotics. The pt complained of bloating and on exam there were decreased normal pitched bowel sounds and no pain and no bowel habit changes. Likely dealing with side affects of  16.
5/24 pt feeling better and continues on antibiotics. The pt complained of bloating and on exam there were decreased normal pitched bowel sounds and no pain and no bowel habit changes. Likely dealing with side affects of  16.
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   Found to have severe sepsis due to bilateral pyelonephritis from an ileal conduit obstruction, with DEVON and dehydration.
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 Found to have severe sepsis due to bilateral pyelonephritis from an ileal conduit obstruction, with DEVON and dehydration.
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 Found to have severe sepsis due to bilateral pyelonephritis from an ileal conduit obstruction, with DEVON and dehydration.
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 She is s/p placement of percutanous nephrostomy tubes.
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 septicemia, sustained bacteremia but on ampicillin <24h after relief of obstruction. Did receive single dose of vancomycin prior.  Relief of obstruction the key element, antibiotics adjunctive  Creatinine improved
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 septicemia, sustained bacteremia but on ampicillin <24h after relief of obstruction. Did receive single dose of vancomycin prior.  Relief of obstruction the key element, antibiotics adjunctive  Creatinine improved  ECHo no IE evident
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   Found to have severe sepsis due to bilateral pyelonephritis from an ileal conduit obstruction, with DEVON and dehydration.
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   Found to have severe sepsis due to bilateral pyelonephritis from an ileal conduit obstruction, with DEVON and dehydration.
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   Found to have severe sepsis due to bilateral pyelonephritis from an ileal conduit obstruction, with DEVON and dehydration.
S/p B/L nephrostomies for b/l hydronephrosis  will need home care to help care for tubes  f/u with Dr. Guerra as outpatient  Pls reconsult  prn
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 septicemia, sustained bacteremia but on ampicillin <24h after relief of obstruction. Did receive single dose of vancomycin prior.  Relief of obstruction the key element, antibiotics adjunctive  Creatinine improved  ECHo no IE evident
pt with hx of bladder ca s/p ileal conduit - now with obstructed conduit - b/l neph tubes placed   plan - abx - for + blood   leave in perc   ? re do of stoma once medically fit   plan d/w pt
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 septicemia, sustained bacteremia but on ampicillin <24h after relief of obstruction. Did receive single dose of vancomycin prior.  Relief of obstruction the key element, antibiotics adjunctive  Creatinine improved
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   Found to have severe sepsis due to bilateral pyelonephritis from an ileal conduit obstruction, with DEVON and dehydration.
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 septicemia, hopefully controlled.  Relief of obstruction the key element, antibiotics adjunctive  WBc improved  Creatinine improved

## 2018-05-27 NOTE — PROGRESS NOTE ADULT - PROBLEM SELECTOR PLAN 3
s/p IR bilateral nephrostomy tubes  Appreciate /IR  Monitor strict I/O's  pt wants to speak with IR in regards to taking care of her nephrostomy bags.
F/U Cx  F/U sensi profile of isolate  If it's senstitive to ampicillin, stop vancomycin
F/U Cx data  ECHO given sustained bacteremia  Platelets still low, suspect related to infection as last chemo was from May 3 or so.
F/U Cx data  Platelets still low, but better
Metastatic small cell bladder cancer  - s/p chemotherapy with response. Currently in remission  - Follow up in the office once patient is discharged.   - Will follow while hospitalized    Umer Blanco MD  [facility]   [address] [place] [id number]  (T) [phone]  (F) [phone]
s/p IR bilateral nephrostomy tubes  Appreciate /IR  Monitor strict I/O's
s/p IR bilateral nephrostomy tubes  Appreciate /IR  Monitor strict I/O's  pt wants to speak with IR in regards to taking care of her nephrostomy bags.
F/U Cx data  Platelets still low, but better
F/U Cx data  ECHO given sustained bacteremia  Platelets still low, suspect related to infection as last chemo was from May 3 or so.

## 2018-05-27 NOTE — PROGRESS NOTE ADULT - PROBLEM SELECTOR PLAN 5
Management as per urology/oncology    Over the weekend Dr. Vikram Means will be covering for our group. If you have any questions please reach out to him at [phone].
metoprolol ER 50mg daily and Cardizem CD 180mg daily
Management as per urology/oncology
Resume home meds with hold parameters:   metoprolol ER 50mg daily and Cardizem CD 180mg daily
metoprolol ER 50mg daily and Cardizem CD 180mg daily
Management as per urology/oncology

## 2018-05-27 NOTE — PROGRESS NOTE ADULT - PROBLEM SELECTOR PROBLEM 3
Septicemia due to enterococcus
Bladder cancer metastasized to bone
Other hydronephrosis
Septicemia due to enterococcus
Other hydronephrosis

## 2018-05-27 NOTE — PROGRESS NOTE ADULT - PROBLEM SELECTOR PROBLEM 2
Obstruction, uropathy
DEVON (acute kidney injury)
Obstruction, uropathy
Obstruction, uropathy
Pyelonephritis, acute
Obstruction, uropathy
Obstruction, uropathy
DEVON (acute kidney injury)

## 2018-05-27 NOTE — PROGRESS NOTE ADULT - PROBLEM SELECTOR PLAN 4
F/U Cx  Cont Amp/SB
Continue lyrica and duloxetine
F/U Cx  Connt Amp/SB
Continue lyrica and duloxetine
Continue lyrical and duloxetine
F/U Cx  Connt Amp/SB
F/U Cx  Cont Amp/SB
F/U Cx  Cont Amp/SB

## 2018-05-27 NOTE — PROGRESS NOTE ADULT - SUBJECTIVE AND OBJECTIVE BOX
Patient is a 77y old  Female who presents with a chief complaint of abdominal pain (20 May 2018 13:53)      SUBJECTIVE / OVERNIGHT EVENTS:  No overnight events.  No cp/sob/n/v/d.  No HA/dizziness.  No abdominal pain.   in a better mood today.       Vital Signs Last 24 Hrs  T(C): 37.2 (27 May 2018 17:14), Max: 37.2 (27 May 2018 17:14)  T(F): 98.9 (27 May 2018 17:14), Max: 98.9 (27 May 2018 17:14)  HR: 80 (27 May 2018 17:14) (66 - 85)  BP: 132/65 (27 May 2018 17:14) (129/61 - 158/77)  BP(mean): --  RR: 18 (27 May 2018 17:14) (18 - 18)  SpO2: 96% (27 May 2018 17:14) (94% - 96%)  I&O's Summary    26 May 2018 07:01  -  27 May 2018 07:00  --------------------------------------------------------  IN: 1610 mL / OUT: 2980 mL / NET: -1370 mL    27 May 2018 07:01  -  27 May 2018 21:10  --------------------------------------------------------  IN: 860 mL / OUT: 2000 mL / NET: -1140 mL        PHYSICAL EXAM:  GENERAL: NAD, Comfortable  HEAD:  Atraumatic, Normocephalic  EYES: EOMI, PERRLA, conjunctiva and sclera clear  NECK: Supple, No JVD  CHEST/LUNG: Clear to auscultation bilaterally; No wheeze  HEART: Regular rate and rhythm; No murmurs, rubs, or gallops  ABDOMEN: Soft, Nontender, Nondistended; Bowel sounds present  BACK: b/l nephrostomy bags, dressing d/c/i.   Neuro: AAO x 3, no focal deficit, 5/5 b/l extremities  EXTREMITIES:  2+ Peripheral Pulses, No clubbing, cyanosis, or edema  SKIN: No rashes or lesions      LABS:                        8.4    4.2   )-----------( 110      ( 27 May 2018 06:44 )             26.8     05-27    143  |  109<H>  |  19  ----------------------------<  89  4.2   |  27  |  1.59<H>    Ca    8.3<L>      27 May 2018 06:44        CAPILLARY BLOOD GLUCOSE                RADIOLOGY & ADDITIONAL TESTS:    Imaging Personally Reviewed:  [x] YES  [ ] NO    Consultant(s) Notes Reviewed:  [x] YES  [ ] NO      MEDICATIONS  (STANDING):  ampicillin/sulbactam  IVPB 3 Gram(s) IV Intermittent every 8 hours  calcium carbonate 500 mG (Tums) Chewable 1 Tablet(s) Chew daily  diltiazem    milliGRAM(s) Oral daily  DULoxetine 60 milliGRAM(s) Oral daily  hydrochlorothiazide 25 milliGRAM(s) Oral daily  lactobacillus acidophilus 1 Tablet(s) Oral daily  metoprolol succinate ER 50 milliGRAM(s) Oral daily  montelukast 10 milliGRAM(s) Oral daily  pantoprazole    Tablet 40 milliGRAM(s) Oral before breakfast  psyllium Powder 1 Packet(s) Oral two times a day  raloxifene 60 milliGRAM(s) Oral daily  sodium bicarbonate 1300 milliGRAM(s) Oral two times a day  traZODone 100 milliGRAM(s) Oral at bedtime    MEDICATIONS  (PRN):  acetaminophen   Tablet 650 milliGRAM(s) Oral every 6 hours PRN For Temp greater than 38 C (100.4 F)  acetaminophen   Tablet. 650 milliGRAM(s) Oral every 6 hours PRN Mild Pain (1 - 3)  diphenhydrAMINE   Capsule 25 milliGRAM(s) Oral at bedtime PRN Insomnia  oxyCODONE    5 mG/acetaminophen 325 mG 2 Tablet(s) Oral every 4 hours PRN Moderate Pain (4 - 6)  simethicone 80 milliGRAM(s) Chew every 8 hours PRN Gas      Care Discussed with Consultants/Other Providers [x] YES  [ ] NO    HEALTH ISSUES - PROBLEM Dx:  Septicemia due to Klebsiella pneumoniae: Septicemia due to Klebsiella pneumoniae  Septicemia due to enterococcus: Septicemia due to enterococcus  Obstruction, uropathy: Obstruction, uropathy  Bladder cancer metastasized to bone: Bladder cancer metastasized to bone  Bacteremia due to Gram-positive bacteria: Bacteremia due to Gram-positive bacteria  Pyelonephritis, acute: Pyelonephritis, acute  Tachycardia: Tachycardia  Fibromyalgia: Fibromyalgia  Other hydronephrosis: Other hydronephrosis  DEVON (acute kidney injury): DEVON (acute kidney injury)  Severe sepsis: Severe sepsis

## 2018-05-27 NOTE — PROGRESS NOTE ADULT - PROBLEM SELECTOR PLAN 2
S/P diversion  OPD f/u re: options
due to obstruction and dehydration (hydronephrosis on imaging, and labs with significant hemoconcentration)  Hyperkalemia K 5.7 - s/p kayexalate 30g PO x1  Cont IVF NS at 100 cc/hr  s/p IR bilateral nephrostomy tubes 5/20/18  Check BMP daily  Sodium bicarb tabs to 1300mg PO BID (acidosis)  avoid nephrotoxic agents
due to obstruction and dehydration (hydronephrosis on imaging, and labs with significant hemoconcentration)  Hyperkalemia K 5.7 - s/p kayexalate 30g PO x1  Cont IVF NS at 100 cc/hr  s/p IR bilateral nephrostomy tubes 5/20/18  Check BMP daily  Sodium bicarb tabs to 1300mg PO BID (acidosis)  avoid nephrotoxic agents
due to obstruction and dehydration (hydronephrosis on imaging, and labs with significant hemoconcentration)  Hyperkalemia K 5.7 - s/p kayexalate 30g PO x1, resolved.   s/p IVF NS at 100 cc/hr. will hold IVF for now. Cr improving.   s/p IR bilateral nephrostomy tubes 5/20/18  Sodium bicarb tabs to 1300mg PO BID (acidosis improving).   Can resume reduced home dose 650 mg BID on discharge.   avoid nephrotoxic agents
due to obstruction and dehydration (hydronephrosis on imaging, and labs with significant hemoconcentration)  Hyperkalemia K 5.7 - s/p kayexalate 30g PO x1, resolved.   s/p IVF NS at 100 cc/hr. will hold IVF for now. Cr improving.   s/p IR bilateral nephrostomy tubes 5/20/18  Sodium bicarb tabs to 1300mg PO BID (acidosis improving).   avoid nephrotoxic agents
due to obstruction and dehydration (hydronephrosis on imaging, and labs with significant hemoconcentration)  Hyperkalemia K 5.7 - s/p kayexalate 30g PO x1, resolved.   s/p IVF NS at 100 cc/hr. will hold IVF for now. Cr improving.   s/p IR bilateral nephrostomy tubes 5/20/18  Sodium bicarb tabs to 1300mg PO BID (acidosis improving). Can resume reduced home dose on discharge.   avoid nephrotoxic agents
due to obstruction and dehydration (hydronephrosis on imaging, and labs with significant hemoconcentration)  Hyperkalemia K 5.7 - s/p kayexalate 30g PO x1, resolved.   s/p IVF NS at 100 cc/hr. will hold IVF for now. Cr improving.   s/p IR bilateral nephrostomy tubes 5/20/18  Sodium bicarb tabs to 1300mg PO BID (acidosis)  avoid nephrotoxic agents
s/p nephrostomy tube placement  - Good output. Patient feeling better  -  follow up once discharged
S/P diversion  OPD f/u re: options
S/P diversion  OPD f/u re: options
due to obstruction and dehydration (hydronephrosis on imaging, and labs with significant hemoconcentration)  Hyperkalemia K 5.7 - s/p kayexalate 30g PO x1  s/p IVF NS at 100 cc/hr. will hold IVF for now.   s/p IR bilateral nephrostomy tubes 5/20/18  Sodium bicarb tabs to 1300mg PO BID (acidosis)  avoid nephrotoxic agents

## 2018-05-27 NOTE — PROGRESS NOTE ADULT - PROBLEM SELECTOR PROBLEM 5
Bladder cancer metastasized to bone
Tachycardia
Bladder cancer metastasized to bone
Tachycardia
Bladder cancer metastasized to bone

## 2018-05-28 ENCOUNTER — TRANSCRIPTION ENCOUNTER (OUTPATIENT)
Age: 78
End: 2018-05-28

## 2018-05-28 VITALS
SYSTOLIC BLOOD PRESSURE: 125 MMHG | HEART RATE: 79 BPM | TEMPERATURE: 98 F | OXYGEN SATURATION: 96 % | RESPIRATION RATE: 18 BRPM | DIASTOLIC BLOOD PRESSURE: 76 MMHG

## 2018-05-28 LAB
ANION GAP SERPL CALC-SCNC: 11 MMOL/L — SIGNIFICANT CHANGE UP (ref 5–17)
BASOPHILS # BLD AUTO: 0 K/UL — SIGNIFICANT CHANGE UP (ref 0–0.2)
BASOPHILS NFR BLD AUTO: 1.1 % — SIGNIFICANT CHANGE UP (ref 0–2)
BUN SERPL-MCNC: 20 MG/DL — SIGNIFICANT CHANGE UP (ref 7–23)
CALCIUM SERPL-MCNC: 8.9 MG/DL — SIGNIFICANT CHANGE UP (ref 8.4–10.5)
CHLORIDE SERPL-SCNC: 106 MMOL/L — SIGNIFICANT CHANGE UP (ref 96–108)
CO2 SERPL-SCNC: 25 MMOL/L — SIGNIFICANT CHANGE UP (ref 22–31)
CREAT SERPL-MCNC: 1.7 MG/DL — HIGH (ref 0.5–1.3)
EOSINOPHIL # BLD AUTO: 0.1 K/UL — SIGNIFICANT CHANGE UP (ref 0–0.5)
EOSINOPHIL NFR BLD AUTO: 1.4 % — SIGNIFICANT CHANGE UP (ref 0–6)
GLUCOSE SERPL-MCNC: 87 MG/DL — SIGNIFICANT CHANGE UP (ref 70–99)
HCT VFR BLD CALC: 27.7 % — LOW (ref 34.5–45)
HGB BLD-MCNC: 9.2 G/DL — LOW (ref 11.5–15.5)
LYMPHOCYTES # BLD AUTO: 1 K/UL — SIGNIFICANT CHANGE UP (ref 1–3.3)
LYMPHOCYTES # BLD AUTO: 21.9 % — SIGNIFICANT CHANGE UP (ref 13–44)
MCHC RBC-ENTMCNC: 33.3 PG — SIGNIFICANT CHANGE UP (ref 27–34)
MCHC RBC-ENTMCNC: 33.4 GM/DL — SIGNIFICANT CHANGE UP (ref 32–36)
MCV RBC AUTO: 99.6 FL — SIGNIFICANT CHANGE UP (ref 80–100)
MONOCYTES # BLD AUTO: 0.7 K/UL — SIGNIFICANT CHANGE UP (ref 0–0.9)
MONOCYTES NFR BLD AUTO: 15.5 % — HIGH (ref 2–14)
NEUTROPHILS # BLD AUTO: 2.7 K/UL — SIGNIFICANT CHANGE UP (ref 1.8–7.4)
NEUTROPHILS NFR BLD AUTO: 60 % — SIGNIFICANT CHANGE UP (ref 43–77)
PLATELET # BLD AUTO: 124 K/UL — LOW (ref 150–400)
POTASSIUM SERPL-MCNC: 4.5 MMOL/L — SIGNIFICANT CHANGE UP (ref 3.5–5.3)
POTASSIUM SERPL-SCNC: 4.5 MMOL/L — SIGNIFICANT CHANGE UP (ref 3.5–5.3)
RBC # BLD: 2.78 M/UL — LOW (ref 3.8–5.2)
RBC # FLD: 16.3 % — HIGH (ref 10.3–14.5)
SODIUM SERPL-SCNC: 142 MMOL/L — SIGNIFICANT CHANGE UP (ref 135–145)
WBC # BLD: 4.4 K/UL — SIGNIFICANT CHANGE UP (ref 3.8–10.5)
WBC # FLD AUTO: 4.4 K/UL — SIGNIFICANT CHANGE UP (ref 3.8–10.5)

## 2018-05-28 PROCEDURE — 86900 BLOOD TYPING SEROLOGIC ABO: CPT

## 2018-05-28 PROCEDURE — 85027 COMPLETE CBC AUTOMATED: CPT

## 2018-05-28 PROCEDURE — C1894: CPT

## 2018-05-28 PROCEDURE — 85730 THROMBOPLASTIN TIME PARTIAL: CPT

## 2018-05-28 PROCEDURE — 97161 PT EVAL LOW COMPLEX 20 MIN: CPT

## 2018-05-28 PROCEDURE — 83036 HEMOGLOBIN GLYCOSYLATED A1C: CPT

## 2018-05-28 PROCEDURE — 87040 BLOOD CULTURE FOR BACTERIA: CPT

## 2018-05-28 PROCEDURE — 96374 THER/PROPH/DIAG INJ IV PUSH: CPT | Mod: XU

## 2018-05-28 PROCEDURE — 76700 US EXAM ABDOM COMPLETE: CPT

## 2018-05-28 PROCEDURE — 85014 HEMATOCRIT: CPT

## 2018-05-28 PROCEDURE — 83605 ASSAY OF LACTIC ACID: CPT

## 2018-05-28 PROCEDURE — 84295 ASSAY OF SERUM SODIUM: CPT

## 2018-05-28 PROCEDURE — 87186 SC STD MICRODIL/AGAR DIL: CPT

## 2018-05-28 PROCEDURE — 80202 ASSAY OF VANCOMYCIN: CPT

## 2018-05-28 PROCEDURE — 93306 TTE W/DOPPLER COMPLETE: CPT

## 2018-05-28 PROCEDURE — 82330 ASSAY OF CALCIUM: CPT

## 2018-05-28 PROCEDURE — 82550 ASSAY OF CK (CPK): CPT

## 2018-05-28 PROCEDURE — 74176 CT ABD & PELVIS W/O CONTRAST: CPT

## 2018-05-28 PROCEDURE — 82803 BLOOD GASES ANY COMBINATION: CPT

## 2018-05-28 PROCEDURE — 81001 URINALYSIS AUTO W/SCOPE: CPT

## 2018-05-28 PROCEDURE — 82435 ASSAY OF BLOOD CHLORIDE: CPT

## 2018-05-28 PROCEDURE — 84443 ASSAY THYROID STIM HORMONE: CPT

## 2018-05-28 PROCEDURE — 96375 TX/PRO/DX INJ NEW DRUG ADDON: CPT

## 2018-05-28 PROCEDURE — 82947 ASSAY GLUCOSE BLOOD QUANT: CPT

## 2018-05-28 PROCEDURE — 86901 BLOOD TYPING SEROLOGIC RH(D): CPT

## 2018-05-28 PROCEDURE — C1769: CPT

## 2018-05-28 PROCEDURE — 93005 ELECTROCARDIOGRAM TRACING: CPT

## 2018-05-28 PROCEDURE — 83735 ASSAY OF MAGNESIUM: CPT

## 2018-05-28 PROCEDURE — 84132 ASSAY OF SERUM POTASSIUM: CPT

## 2018-05-28 PROCEDURE — C1729: CPT

## 2018-05-28 PROCEDURE — 84484 ASSAY OF TROPONIN QUANT: CPT

## 2018-05-28 PROCEDURE — 87150 DNA/RNA AMPLIFIED PROBE: CPT

## 2018-05-28 PROCEDURE — 80048 BASIC METABOLIC PNL TOTAL CA: CPT

## 2018-05-28 PROCEDURE — 87086 URINE CULTURE/COLONY COUNT: CPT

## 2018-05-28 PROCEDURE — 86850 RBC ANTIBODY SCREEN: CPT

## 2018-05-28 PROCEDURE — 83690 ASSAY OF LIPASE: CPT

## 2018-05-28 PROCEDURE — 50432 PLMT NEPHROSTOMY CATHETER: CPT

## 2018-05-28 PROCEDURE — 99285 EMERGENCY DEPT VISIT HI MDM: CPT | Mod: 25

## 2018-05-28 PROCEDURE — 85610 PROTHROMBIN TIME: CPT

## 2018-05-28 PROCEDURE — 71045 X-RAY EXAM CHEST 1 VIEW: CPT

## 2018-05-28 PROCEDURE — 84100 ASSAY OF PHOSPHORUS: CPT

## 2018-05-28 PROCEDURE — 80053 COMPREHEN METABOLIC PANEL: CPT

## 2018-05-28 RX ORDER — LACTOBACILLUS ACIDOPHILUS 100MM CELL
1 CAPSULE ORAL
Qty: 0 | Refills: 0 | COMMUNITY
Start: 2018-05-28

## 2018-05-28 RX ORDER — SODIUM BICARBONATE 1 MEQ/ML
1 SYRINGE (ML) INTRAVENOUS
Qty: 0 | Refills: 0 | COMMUNITY

## 2018-05-28 RX ORDER — TRAZODONE HCL 50 MG
1 TABLET ORAL
Qty: 0 | Refills: 0 | COMMUNITY

## 2018-05-28 RX ORDER — TRAZODONE HCL 50 MG
1 TABLET ORAL
Qty: 0 | Refills: 0 | COMMUNITY
Start: 2018-05-28

## 2018-05-28 RX ORDER — MONTELUKAST 4 MG/1
1 TABLET, CHEWABLE ORAL
Qty: 0 | Refills: 0 | COMMUNITY
Start: 2018-05-28

## 2018-05-28 RX ORDER — DULOXETINE HYDROCHLORIDE 30 MG/1
1 CAPSULE, DELAYED RELEASE ORAL
Qty: 0 | Refills: 0 | COMMUNITY
Start: 2018-05-28

## 2018-05-28 RX ORDER — SODIUM BICARBONATE 1 MEQ/ML
1 SYRINGE (ML) INTRAVENOUS
Qty: 0 | Refills: 0 | COMMUNITY
Start: 2018-05-28

## 2018-05-28 RX ORDER — MONTELUKAST 4 MG/1
1 TABLET, CHEWABLE ORAL
Qty: 0 | Refills: 0 | COMMUNITY

## 2018-05-28 RX ORDER — RALOXIFENE HYDROCHLORIDE 60 MG/1
1 TABLET, COATED ORAL
Qty: 0 | Refills: 0 | COMMUNITY

## 2018-05-28 RX ORDER — RALOXIFENE HYDROCHLORIDE 60 MG/1
1 TABLET, COATED ORAL
Qty: 0 | Refills: 0 | COMMUNITY
Start: 2018-05-28

## 2018-05-28 RX ORDER — ACETAMINOPHEN 500 MG
2 TABLET ORAL
Qty: 0 | Refills: 0 | COMMUNITY

## 2018-05-28 RX ADMIN — Medication 1 TABLET(S): at 13:01

## 2018-05-28 RX ADMIN — Medication 75 MILLIGRAM(S): at 09:29

## 2018-05-28 RX ADMIN — RALOXIFENE HYDROCHLORIDE 60 MILLIGRAM(S): 60 TABLET, COATED ORAL at 13:01

## 2018-05-28 RX ADMIN — Medication 25 MILLIGRAM(S): at 05:51

## 2018-05-28 RX ADMIN — DULOXETINE HYDROCHLORIDE 60 MILLIGRAM(S): 30 CAPSULE, DELAYED RELEASE ORAL at 13:01

## 2018-05-28 RX ADMIN — Medication 180 MILLIGRAM(S): at 05:51

## 2018-05-28 RX ADMIN — Medication 50 MILLIGRAM(S): at 05:51

## 2018-05-28 RX ADMIN — PANTOPRAZOLE SODIUM 40 MILLIGRAM(S): 20 TABLET, DELAYED RELEASE ORAL at 06:28

## 2018-05-28 RX ADMIN — AMPICILLIN SODIUM AND SULBACTAM SODIUM 200 GRAM(S): 250; 125 INJECTION, POWDER, FOR SUSPENSION INTRAMUSCULAR; INTRAVENOUS at 05:51

## 2018-05-28 RX ADMIN — Medication 650 MILLIGRAM(S): at 06:29

## 2018-05-28 RX ADMIN — MONTELUKAST 10 MILLIGRAM(S): 4 TABLET, CHEWABLE ORAL at 13:01

## 2018-05-28 NOTE — DISCHARGE NOTE ADULT - PLAN OF CARE
Resolving r improving.   s/p IR bilateral nephrostomy tubes 5/20/18  r improving.   s/p IR bilateral nephrostomy tubes 5/20/18  r improving.   s/p IR bilateral nephrostomy tubes 5/20/18  r improving.   s/p IR bilateral nephrostomy tubes 5/20/18  Sodium bicarb tabs home dose 650 mg BID   avoid nephrotoxic agents. Added HCTZ 25 mg for HTN. BP much better. s/p IR bilateral nephrostomy tubes.  Pyelonephritis, acute.  Received Unasyn in the hospital.  Discharge on Augmentin 875mg PO Q12H to complete 2 weeks Rx after last +BC. Continue lyrical and duloxetine. metoprolol ER 50mg daily and Cardizem CD 180mg daily.

## 2018-05-28 NOTE — DISCHARGE NOTE ADULT - CARE PLAN
Principal Discharge DX:	DEVON (acute kidney injury)  Goal:	Resolving  Assessment and plan of treatment:	r improving.   s/p IR bilateral nephrostomy tubes 5/20/18  r improving.   s/p IR bilateral nephrostomy tubes 5/20/18  r improving.   s/p IR bilateral nephrostomy tubes 5/20/18  r improving.   s/p IR bilateral nephrostomy tubes 5/20/18  Sodium bicarb tabs home dose 650 mg BID   avoid nephrotoxic agents.  Secondary Diagnosis:	Hypertension  Assessment and plan of treatment:	Added HCTZ 25 mg for HTN. BP much better.  Secondary Diagnosis:	Hydronephrosis  Assessment and plan of treatment:	s/p IR bilateral nephrostomy tubes.  Pyelonephritis, acute.  Received Unasyn in the hospital.  Discharge on Augmentin 875mg PO Q12H to complete 2 weeks Rx after last +BC.  Secondary Diagnosis:	Fibromyalgia  Assessment and plan of treatment:	Continue lyrical and duloxetine.  Secondary Diagnosis:	Tachycardia  Assessment and plan of treatment:	metoprolol ER 50mg daily and Cardizem CD 180mg daily.

## 2018-05-28 NOTE — DISCHARGE NOTE ADULT - HOSPITAL COURSE
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 Found to have severe sepsis due to bilateral pyelonephritis from an ileal conduit obstruction, with DEVON and dehydration. (+)blood cultures 2' enterococcus  repeat blood cxs testing from 5/21, 1/2 positive.   appreciate ID, repeat cultures sent again 5/24 morning. Prelim neg.   TTE reviewed: no apparent endocarditis. moderate MR, hyperdynamic LV.  Plan to d/c home on Augmentin.  DEVON (acute kidney injury).  Plan: due to obstruction and dehydration (hydronephrosis on imaging,  . Cr improving.   s/p IR bilateral nephrostomy tubes 5/20/18  Sodium bicarb tabs to home dose 650 mg BID on discharge. (acidosis improving).   Added HCTZ 25 mg for HTN. BP much better.     Metastatic small cell bladder cancer  - s/p chemotherapy with response. Currently in remission  - Follow up in the office with Umer Blanco MD    Case management to set up home care and visiting RN for nephrostomy tube management.   Medically stable for discharge with F/u as advised by  (Attending)

## 2018-05-28 NOTE — DISCHARGE NOTE ADULT - MEDICATION SUMMARY - MEDICATIONS TO STOP TAKING
I will STOP taking the medications listed below when I get home from the hospital:    Tylenol 500 mg oral tablet  -- 2 tab(s) by mouth 1 to 4 times a day, As Needed    cefuroxime 500 mg oral tablet  -- 1 tab(s) by mouth every 12 hours

## 2018-05-28 NOTE — DISCHARGE NOTE ADULT - INSTRUCTIONS
Regular diet Call MD or go to ER if severe flank pain not relieved with pain meds, nausea, vomiting, fever, signs and symptoms of infections, no urine output in b/l nephrostomy tubes and urostomy.

## 2018-05-28 NOTE — DISCHARGE NOTE ADULT - PROVIDER TOKENS
TOKEN:'[id number]',FREE:[LAST:[oncology],PHONE:[(   )    -],FAX:[(   )    -],ADDRESS:[Umer Blanco MD  [facility]   [address] [place] [address]  (T) [phone]  (F) [phone].]]

## 2018-05-28 NOTE — DISCHARGE NOTE ADULT - CARE PROVIDER_API CALL
Vikram Means), Infectious Disease; Internal Medicine  [address] [facility]  [address]  [place] [address]  Phone: [phone]  Fax: [phone]    oncology,   Umer Blanco MD  [facility]   [address]  [place] [address]  (T) [phone]  (F) [phone].  Phone: (   )    -  Fax: (   )    -

## 2018-05-28 NOTE — DISCHARGE NOTE ADULT - PATIENT PORTAL LINK FT
You can access the [facility], offered by [facility], by registering with the following website: [email or web address]

## 2018-05-28 NOTE — DISCHARGE NOTE ADULT - MEDICATION SUMMARY - MEDICATIONS TO TAKE
I will START or STAY ON the medications listed below when I get home from the hospital:    oxyCODONE-acetaminophen 5 mg-325 mg oral tablet  -- 2 tab(s) by mouth every 6 hours, As Needed -Moderate Pain (4 - 6) MDD:4 tab  -- Indication: For Pain    sodium bicarbonate 650 mg oral tablet  -- 1 tab(s) by mouth 2 times a day  -- Indication: For Acidosis    calcium (as carbonate) 600 mg oral tablet  -- 1 tab(s) by mouth once a day  -- Indication: For gas    DilTIAZem Hydrochloride  mg/24 hours oral capsule, extended release  -- 1 cap(s) by mouth once a day  -- Indication: For CAD    pregabalin 75 mg oral capsule  -- 1 cap(s) by mouth 2 times a day  -- Indication: For Pain    traZODone 100 mg oral tablet  -- 1 tab(s) by mouth once a day (at bedtime)  -- Indication: For depression    DULoxetine 60 mg oral delayed release capsule  -- 1 cap(s) by mouth once a day  -- Indication: For depression    raloxifene 60 mg oral tablet  -- 1 tab(s) by mouth once a day  -- Indication: For Anti neoplastic    metoprolol succinate 50 mg oral tablet, extended release  -- 1 tab(s) by mouth once a day  -- Indication: For HTN    Xopenex HFA 45 mcg/inh inhalation aerosol  -- 2 puff(s) inhaled , As Needed  -- Indication: For Bronchospasm    hydroCHLOROthiazide 25 mg oral tablet  -- 1 tab(s) by mouth once a day  -- Indication: For HTN    Benefiber oral powder for reconstitution  -- 1 packet(s) by mouth 2 times a day  -- Indication: For constipation    montelukast 10 mg oral tablet  -- 1 tab(s) by mouth once a day  -- Indication: For Bronchospasm     Flonase 50 mcg/inh nasal spray  -- 1 spray(s) into nose 2 times a day, As Needed  -- Indication: For nasal congestion    amoxicillin-clavulanate 875 mg-125 mg oral tablet  -- 1 tab(s) by mouth every 12 hours .   Until Anjelica 3/2018  -- Finish all this medication unless otherwise directed by prescriber.  Take with food or milk.    -- Indication: For Pyelonephritis     lactobacillus acidophilus oral capsule  -- 1 tab(s) by mouth once a day  -- Indication: For GI prophylaxis     omeprazole 40 mg oral delayed release capsule  -- 1 cap(s) by mouth once a day  -- Indication: For GERD    Multiple Vitamins oral tablet  -- 1 tab(s) by mouth once a day  -- Indication: For Supplement     Vitamin D3  -- 1200 unit(s) by mouth once a day  -- Indication: For Supplement I will START or STAY ON the medications listed below when I get home from the hospital:    calcium (as carbonate) 600 mg oral tablet  -- 1 tab(s) by mouth once a day  -- Indication: For gas    sodium bicarbonate 650 mg oral tablet  -- 1 tab(s) by mouth 2 times a day  -- Indication: For Acidosis    DilTIAZem Hydrochloride  mg/24 hours oral capsule, extended release  -- 1 cap(s) by mouth once a day  -- Indication: For CAD    pregabalin 75 mg oral capsule  -- 1 cap(s) by mouth 2 times a day  -- Indication: For Pain    traZODone 100 mg oral tablet  -- 1 tab(s) by mouth once a day (at bedtime)  -- Indication: For depression    DULoxetine 60 mg oral delayed release capsule  -- 1 cap(s) by mouth once a day  -- Indication: For depression    raloxifene 60 mg oral tablet  -- 1 tab(s) by mouth once a day  -- Indication: For Anti neoplastic    metoprolol succinate 50 mg oral tablet, extended release  -- 1 tab(s) by mouth once a day  -- Indication: For HTN    Xopenex HFA 45 mcg/inh inhalation aerosol  -- 2 puff(s) inhaled , As Needed  -- Indication: For Bronchospasm    hydroCHLOROthiazide 25 mg oral tablet  -- 1 tab(s) by mouth once a day  -- Indication: For HTN    Benefiber oral powder for reconstitution  -- 1 packet(s) by mouth 2 times a day  -- Indication: For constipation    montelukast 10 mg oral tablet  -- 1 tab(s) by mouth once a day  -- Indication: For Bronchospasm     Flonase 50 mcg/inh nasal spray  -- 1 spray(s) into nose 2 times a day, As Needed  -- Indication: For nasal congestion    amoxicillin-clavulanate 875 mg-125 mg oral tablet  -- 1 tab(s) by mouth every 12 hours .   Until June 7/2018  -- Finish all this medication unless otherwise directed by prescriber.  Take with food or milk.    -- Indication: For Pyelonephritis     lactobacillus acidophilus oral capsule  -- 1 tab(s) by mouth once a day  -- Indication: For GI prophylaxis     omeprazole 40 mg oral delayed release capsule  -- 1 cap(s) by mouth once a day  -- Indication: For GERD    Multiple Vitamins oral tablet  -- 1 tab(s) by mouth once a day  -- Indication: For Supplement     Vitamin D3  -- 1200 unit(s) by mouth once a day  -- Indication: For Supplement

## 2018-07-29 ENCOUNTER — INPATIENT (INPATIENT)
Facility: HOSPITAL | Age: 78
LOS: 7 days | End: 2018-08-06
Attending: INTERNAL MEDICINE | Admitting: INTERNAL MEDICINE
Payer: MEDICARE

## 2018-07-29 VITALS
OXYGEN SATURATION: 99 % | DIASTOLIC BLOOD PRESSURE: 80 MMHG | RESPIRATION RATE: 18 BRPM | HEART RATE: 74 BPM | SYSTOLIC BLOOD PRESSURE: 161 MMHG | TEMPERATURE: 98 F

## 2018-07-29 DIAGNOSIS — Z98.890 OTHER SPECIFIED POSTPROCEDURAL STATES: Chronic | ICD-10-CM

## 2018-07-29 LAB
ALBUMIN SERPL ELPH-MCNC: 3.6 G/DL — SIGNIFICANT CHANGE UP (ref 3.3–5)
ALP SERPL-CCNC: 86 U/L — SIGNIFICANT CHANGE UP (ref 40–120)
ALT FLD-CCNC: 10 U/L — SIGNIFICANT CHANGE UP (ref 4–33)
AST SERPL-CCNC: 22 U/L — SIGNIFICANT CHANGE UP (ref 4–32)
BASE EXCESS BLDV CALC-SCNC: -10.9 MMOL/L — SIGNIFICANT CHANGE UP
BASOPHILS # BLD AUTO: 0.04 K/UL — SIGNIFICANT CHANGE UP (ref 0–0.2)
BASOPHILS NFR BLD AUTO: 0.3 % — SIGNIFICANT CHANGE UP (ref 0–2)
BILIRUB SERPL-MCNC: 0.2 MG/DL — SIGNIFICANT CHANGE UP (ref 0.2–1.2)
BLOOD GAS VENOUS - CREATININE: 2.28 MG/DL — HIGH (ref 0.5–1.3)
BUN SERPL-MCNC: 59 MG/DL — HIGH (ref 7–23)
CALCIUM SERPL-MCNC: 9.9 MG/DL — SIGNIFICANT CHANGE UP (ref 8.4–10.5)
CHLORIDE BLDV-SCNC: 115 MMOL/L — HIGH (ref 96–108)
CHLORIDE SERPL-SCNC: 108 MMOL/L — HIGH (ref 98–107)
CO2 SERPL-SCNC: 9 MMOL/L — CRITICAL LOW (ref 22–31)
CREAT SERPL-MCNC: 2.28 MG/DL — HIGH (ref 0.5–1.3)
EOSINOPHIL # BLD AUTO: 0.07 K/UL — SIGNIFICANT CHANGE UP (ref 0–0.5)
EOSINOPHIL NFR BLD AUTO: 0.5 % — SIGNIFICANT CHANGE UP (ref 0–6)
GAS PNL BLDV: 136 MMOL/L — SIGNIFICANT CHANGE UP (ref 136–146)
GLUCOSE BLDV-MCNC: 130 — HIGH (ref 70–99)
GLUCOSE SERPL-MCNC: 132 MG/DL — HIGH (ref 70–99)
HCO3 BLDV-SCNC: 15 MMOL/L — LOW (ref 20–27)
HCT VFR BLD CALC: 33.3 % — LOW (ref 34.5–45)
HCT VFR BLDV CALC: 34.7 % — SIGNIFICANT CHANGE UP (ref 34.5–45)
HGB BLD-MCNC: 10.8 G/DL — LOW (ref 11.5–15.5)
HGB BLDV-MCNC: 11.3 G/DL — LOW (ref 11.5–15.5)
IMM GRANULOCYTES # BLD AUTO: 0.09 # — SIGNIFICANT CHANGE UP
IMM GRANULOCYTES NFR BLD AUTO: 0.6 % — SIGNIFICANT CHANGE UP (ref 0–1.5)
LACTATE BLDV-MCNC: 2.3 MMOL/L — HIGH (ref 0.5–2)
LIDOCAIN IGE QN: 24.8 U/L — SIGNIFICANT CHANGE UP (ref 7–60)
LYMPHOCYTES # BLD AUTO: 0.45 K/UL — LOW (ref 1–3.3)
LYMPHOCYTES # BLD AUTO: 3.1 % — LOW (ref 13–44)
MCHC RBC-ENTMCNC: 30.9 PG — SIGNIFICANT CHANGE UP (ref 27–34)
MCHC RBC-ENTMCNC: 32.4 % — SIGNIFICANT CHANGE UP (ref 32–36)
MCV RBC AUTO: 95.1 FL — SIGNIFICANT CHANGE UP (ref 80–100)
MONOCYTES # BLD AUTO: 0.28 K/UL — SIGNIFICANT CHANGE UP (ref 0–0.9)
MONOCYTES NFR BLD AUTO: 1.9 % — LOW (ref 2–14)
NEUTROPHILS # BLD AUTO: 13.54 K/UL — HIGH (ref 1.8–7.4)
NEUTROPHILS NFR BLD AUTO: 93.6 % — HIGH (ref 43–77)
NRBC # FLD: 0 — SIGNIFICANT CHANGE UP
PCO2 BLDV: 43 MMHG — SIGNIFICANT CHANGE UP (ref 41–51)
PH BLDV: 7.19 PH — CRITICAL LOW (ref 7.32–7.43)
PLATELET # BLD AUTO: 196 K/UL — SIGNIFICANT CHANGE UP (ref 150–400)
PMV BLD: 9.7 FL — SIGNIFICANT CHANGE UP (ref 7–13)
PO2 BLDV: 44 MMHG — HIGH (ref 35–40)
POTASSIUM BLDV-SCNC: 4.4 MMOL/L — SIGNIFICANT CHANGE UP (ref 3.4–4.5)
POTASSIUM SERPL-MCNC: 4.7 MMOL/L — SIGNIFICANT CHANGE UP (ref 3.5–5.3)
POTASSIUM SERPL-SCNC: 4.7 MMOL/L — SIGNIFICANT CHANGE UP (ref 3.5–5.3)
PROT SERPL-MCNC: 7.1 G/DL — SIGNIFICANT CHANGE UP (ref 6–8.3)
RBC # BLD: 3.5 M/UL — LOW (ref 3.8–5.2)
RBC # FLD: 12.6 % — SIGNIFICANT CHANGE UP (ref 10.3–14.5)
REVIEW TO FOLLOW: YES — SIGNIFICANT CHANGE UP
SAO2 % BLDV: 72.7 % — SIGNIFICANT CHANGE UP (ref 60–85)
SODIUM SERPL-SCNC: 137 MMOL/L — SIGNIFICANT CHANGE UP (ref 135–145)
TROPONIN T, HIGH SENSITIVITY: 45 NG/L — SIGNIFICANT CHANGE UP (ref ?–14)
WBC # BLD: 14.47 K/UL — HIGH (ref 3.8–10.5)
WBC # FLD AUTO: 14.47 K/UL — HIGH (ref 3.8–10.5)

## 2018-07-29 PROCEDURE — 74176 CT ABD & PELVIS W/O CONTRAST: CPT | Mod: 26

## 2018-07-29 RX ORDER — SODIUM CHLORIDE 9 MG/ML
1000 INJECTION INTRAMUSCULAR; INTRAVENOUS; SUBCUTANEOUS ONCE
Qty: 0 | Refills: 0 | Status: DISCONTINUED | OUTPATIENT
Start: 2018-07-29 | End: 2018-07-29

## 2018-07-29 RX ORDER — SODIUM BICARBONATE 1 MEQ/ML
50 SYRINGE (ML) INTRAVENOUS ONCE
Qty: 0 | Refills: 0 | Status: COMPLETED | OUTPATIENT
Start: 2018-07-29 | End: 2018-07-29

## 2018-07-29 RX ORDER — SODIUM CHLORIDE 9 MG/ML
1000 INJECTION INTRAMUSCULAR; INTRAVENOUS; SUBCUTANEOUS ONCE
Qty: 0 | Refills: 0 | Status: COMPLETED | OUTPATIENT
Start: 2018-07-29 | End: 2018-07-29

## 2018-07-29 RX ORDER — SODIUM BICARBONATE 1 MEQ/ML
25 SYRINGE (ML) INTRAVENOUS ONCE
Qty: 0 | Refills: 0 | Status: DISCONTINUED | OUTPATIENT
Start: 2018-07-29 | End: 2018-07-29

## 2018-07-29 RX ORDER — SODIUM CHLORIDE 9 MG/ML
1000 INJECTION, SOLUTION INTRAVENOUS
Qty: 0 | Refills: 0 | Status: DISCONTINUED | OUTPATIENT
Start: 2018-07-29 | End: 2018-07-31

## 2018-07-29 RX ORDER — MORPHINE SULFATE 50 MG/1
4 CAPSULE, EXTENDED RELEASE ORAL ONCE
Qty: 0 | Refills: 0 | Status: DISCONTINUED | OUTPATIENT
Start: 2018-07-29 | End: 2018-07-29

## 2018-07-29 RX ORDER — ONDANSETRON 8 MG/1
4 TABLET, FILM COATED ORAL ONCE
Qty: 0 | Refills: 0 | Status: COMPLETED | OUTPATIENT
Start: 2018-07-29 | End: 2018-07-29

## 2018-07-29 RX ADMIN — SODIUM CHLORIDE 1000 MILLILITER(S): 9 INJECTION INTRAMUSCULAR; INTRAVENOUS; SUBCUTANEOUS at 23:11

## 2018-07-29 RX ADMIN — MORPHINE SULFATE 4 MILLIGRAM(S): 50 CAPSULE, EXTENDED RELEASE ORAL at 21:29

## 2018-07-29 RX ADMIN — SODIUM CHLORIDE 1000 MILLILITER(S): 9 INJECTION INTRAMUSCULAR; INTRAVENOUS; SUBCUTANEOUS at 22:56

## 2018-07-29 RX ADMIN — SODIUM CHLORIDE 1000 MILLILITER(S): 9 INJECTION INTRAMUSCULAR; INTRAVENOUS; SUBCUTANEOUS at 21:29

## 2018-07-29 RX ADMIN — MORPHINE SULFATE 4 MILLIGRAM(S): 50 CAPSULE, EXTENDED RELEASE ORAL at 23:07

## 2018-07-29 RX ADMIN — MORPHINE SULFATE 4 MILLIGRAM(S): 50 CAPSULE, EXTENDED RELEASE ORAL at 22:58

## 2018-07-29 RX ADMIN — Medication 50 MILLIEQUIVALENT(S): at 23:30

## 2018-07-29 RX ADMIN — ONDANSETRON 4 MILLIGRAM(S): 8 TABLET, FILM COATED ORAL at 21:29

## 2018-07-29 NOTE — ED PROVIDER NOTE - CONSTITUTIONAL, MLM
- - - normal... Well nourished, awake, alert, oriented to person, place, time/situation and in mild distress.

## 2018-07-29 NOTE — ED ADULT NURSE NOTE - OBJECTIVE STATEMENT
Patient Marisol RN: Patient received in room #19 c/o abdominal pain, N/V starting 4pm today. Patient A&OX3, reports having softer BM today than usual. Patient denies any blood in emesis or stool. Patient reports having about 6 BMs today, normally has 1 BM/day. Patient received with RLQ urostomy, patient reports decreased output noticed today. Patient reports hx. bladder CA, last PET scan "clear". Redness and lump noted to RLQ where patient states she had a hernia repair done.  VS as noted. 22G IV placed in right hand, chest wall port noted to right chest wall; currently not accessed. Report given to primary RN Sandy, Will monitor.

## 2018-07-29 NOTE — ED ADULT TRIAGE NOTE - CHIEF COMPLAINT QUOTE
Pt presents with via EMS with c/o abd pain, nausea and vomiting. Pt with a pmhx of obesity, fibromyalgia, hypertension, hypertrophic cardiomyopathy, CKD 3, bladder cancer metastatic to bone dx 9/2017 s/p ileal conduit presents with via EMS with c/o abd pain, nausea and vomiting x ~4 hours.

## 2018-07-29 NOTE — ED PROVIDER NOTE - CHIEF COMPLAINT
The patient is a 78y Female complaining of The patient is a 78y Female complaining of nausea, vomiting, abdominal pain

## 2018-07-29 NOTE — ED ADULT NURSE NOTE - CHIEF COMPLAINT QUOTE
Pt with a pmhx of obesity, fibromyalgia, hypertension, hypertrophic cardiomyopathy, CKD 3, bladder cancer metastatic to bone dx 9/2017 s/p ileal conduit presents with via EMS with c/o abd pain, nausea and vomiting x ~4 hours.

## 2018-07-29 NOTE — ED PROVIDER NOTE - CARE PLAN
Principal Discharge DX:	Abdominal pain Principal Discharge DX:	Abdominal pain  Secondary Diagnosis:	Acute kidney injury superimposed on chronic kidney disease  Secondary Diagnosis:	Obstructive uropathy

## 2018-07-29 NOTE — ED PROVIDER NOTE - ABDOMINAL EXAM
soft/+ urostomy RLQ and umbilical hernia repair incision with overlying erythema./tender.../HERNIATION/DISTENDED

## 2018-07-29 NOTE — ED PROVIDER NOTE - ATTENDING CONTRIBUTION TO CARE
79 yo F h/o partial small bowel resection with anastamosis in the RLQ, also with partial bladder resection with ileal conduit and RLQ urostomy. patient presents with diffuse abdominal pain radiating to her chest since this afternoon. 10/10 pain cramping/sharp. + nausea and several episodes of vomiting. normal BM today. no f//ch. no output from urostomy bag since the pain started.   PE with RLE urostomy bag in placed. medial vertical incision with palpable nonreducible hernia with overlying skin changes. diffuse abdominal TTP. no peritoneal signs 79 yo F h/o partial small bowel resection with anastomosis in the RLQ, also with partial bladder resection with ileal conduit and RLQ urostomy. patient presents with diffuse abdominal pain radiating to her chest since this afternoon. 10/10 pain cramping/sharp. + nausea and several episodes of vomiting. normal BM today. no f//ch. no output from urostomy bag since the pain started.   PE with RLE urostomy bag in placed. medial vertical incision with palpable nonreducible tender mass (hernia?) with overlying skin changes. diffuse abdominal TTP. no peritoneal signs. lungs CTA.     labs showing severe metabolic acidosis with bicarb of 9, and DEVON BUN 59 and Cr 2.28. elevated lactate.   patient started on IVFs in the ER given hypovolemic status. I consulted nephrology re starting bicarb drip. After discussing HPI and results, they were in agreement  with bicarb supplementation. they recommended additional NS, bicarb 50meq IVP followed by bicarb drip @100ml/hr. will see pt in the morning.     pt signed out to Dr. Garcia at this time. CT a/p results and admission pending at time of signout 79 yo F h/o partial small bowel resection with anastomosis in the RLQ, also with partial bladder resection with ileal conduit and RLQ urostomy. patient presents with diffuse abdominal pain radiating to her chest since this afternoon. 10/10 pain cramping/sharp. + nausea and several episodes of vomiting. normal BM today. no f//ch. no output from urostomy bag since the pain started.   PE dry MM, lungs cta. with RLE urostomy bag in place. medial small vertical incision with palpable nonreducible tender mass (hernia, abscess, seroma?) with overlying erythema. diffuse abdominal TTP. no peritoneal signs. lungs CTA.     labs showing severe metabolic acidosis with bicarb of 9, and DEVON BUN 59 and Cr 2.28. elevated lactate.   patient started on IVFs in the ER as she appeared dehydrated/hypovolemic. this was administered slowly due to h/o hydronephrosis. I consulted nephrology re starting bicarb drip. After discussing HPI and reviewing results, they were in agreement  with bicarb supplementation. they recommended additional NS, bicarb 50meq IVP followed by bicarb drip @100ml/hr. will see pt in the morning.     pt signed out to Dr. Garcia at this time. CT a/p results and admission pending at time of signout 79 yo F h/o partial small bowel resection with anastomosis in the RLQ, also with partial bladder resection with ileal conduit and RLQ urostomy. patient presents with diffuse abdominal pain radiating to her chest since this afternoon. 10/10 pain cramping/sharp. + nausea and several episodes of vomiting. normal BM today. no f//ch. no output from urostomy bag since the pain started.   PE dry MM, lungs cta. with RLE urostomy bag in place. medial small vertical incision with palpable nonreducible tender mass (hernia, abscess, seroma?) with overlying erythema. diffuse abdominal TTP. no peritoneal signs. lungs CTA.     labs showing severe metabolic acidosis with bicarb of 9, and DEVON BUN 59 and Cr 2.28. elevated lactate.   patient started on IVFs in the ER as she appeared dehydrated/hypovolemic. unclear at this time whether decreased urostomy output secondary to obstruction versus dehydration and therefore IVFs was administed slowly. I consulted nephrology re starting bicarb drip. After discussing HPI and reviewing results, they were in agreement  with bicarb supplementation. they recommended additional NS, bicarb 50meq IVP followed by bicarb drip @100ml/hr. will see pt in the morning.     pt signed out to Dr. Garcia at this time. CT a/p results, urology consult, possible surgery consult, and admission pending at time of signout

## 2018-07-29 NOTE — ED PROVIDER NOTE - MEDICAL DECISION MAKING DETAILS
77 YO female with PMHx of metastatic bladder CA with ileal conduit in the RLQ, small bowel resection with anastomosis in the RLQ, CKD, presents with diffuse abdominal pain. DDX: surgical wound complication, SBO, hydronephrosis. CT abdomen due to extensive abdominal surgery. consult nephrology and surgery if indicated, likely admission.

## 2018-07-29 NOTE — ED PROVIDER NOTE - OBJECTIVE STATEMENT
77 YO female with PMHx of metastatic bladder CA with ileal conduit in the RLQ, small bowel resection with anastomosis in the RLQ, CKD, hypertrophic cardiomyopathy, HTN, fibromyalgia, obesity, presents with nausea,  5 episodes of vomiting since 4pm today. Pt also has erythema and pain over her umbilical hernia repair incision that was completed 79 YO female with PMHx of metastatic bladder CA with ileal conduit in the RLQ, small bowel resection with anastomosis in the RLQ, CKD, hypertrophic cardiomyopathy, HTN, fibromyalgia, obesity, presents with nausea, 5 episodes of vomiting since 4pm today. Pt's abdomen feels diffusely distended and painful 9/10, Pt also has erythema and pain over her umbilical hernia repair incision for 2 days. Pt denies fever, recent abx use, recent ravel, hematemesis, coffee ground emesis, red or dark colored stools. Pt endorses chills, diarrhea x 2 eipsodes.

## 2018-07-29 NOTE — ED PROVIDER NOTE - PMH
Acid Reflux    Arthritis    Asthmatic Bronchitis    Bladder cancer metastasized to bone    Calcified Thoracic Aorta    CKD (chronic kidney disease) stage 3, GFR 30-59 ml/min    Fibromyalgia    Gastric Ulcer    HTN (Hypertension)    Hypertrophic Cardiomyopathy    Migraine, Ophthalmoplegic    Nasal Polyp  1960  Obese    Paroxysmal Ventricular Tachycardia  6/2010  Radial fracture  left  Uterine Polyp

## 2018-07-29 NOTE — ED ADULT NURSE REASSESSMENT NOTE - NS ED NURSE REASSESS COMMENT FT1
patient recd back from ct scan, c/o worsening generalized abd pain, nad noted, vs as stated, ivf infusing, resps even and unlabored, will ctm closely.

## 2018-07-30 DIAGNOSIS — I10 ESSENTIAL (PRIMARY) HYPERTENSION: ICD-10-CM

## 2018-07-30 DIAGNOSIS — J45.909 UNSPECIFIED ASTHMA, UNCOMPLICATED: ICD-10-CM

## 2018-07-30 DIAGNOSIS — D64.9 ANEMIA, UNSPECIFIED: ICD-10-CM

## 2018-07-30 DIAGNOSIS — M79.7 FIBROMYALGIA: ICD-10-CM

## 2018-07-30 DIAGNOSIS — Z29.9 ENCOUNTER FOR PROPHYLACTIC MEASURES, UNSPECIFIED: ICD-10-CM

## 2018-07-30 DIAGNOSIS — R10.9 UNSPECIFIED ABDOMINAL PAIN: ICD-10-CM

## 2018-07-30 DIAGNOSIS — N13.9 OBSTRUCTIVE AND REFLUX UROPATHY, UNSPECIFIED: ICD-10-CM

## 2018-07-30 DIAGNOSIS — N17.9 ACUTE KIDNEY FAILURE, UNSPECIFIED: ICD-10-CM

## 2018-07-30 DIAGNOSIS — R10.84 GENERALIZED ABDOMINAL PAIN: ICD-10-CM

## 2018-07-30 PROBLEM — C67.9 MALIGNANT NEOPLASM OF BLADDER, UNSPECIFIED: Chronic | Status: ACTIVE | Noted: 2018-03-24

## 2018-07-30 LAB
ALBUMIN SERPL ELPH-MCNC: 3.3 G/DL — SIGNIFICANT CHANGE UP (ref 3.3–5)
ALP SERPL-CCNC: 75 U/L — SIGNIFICANT CHANGE UP (ref 40–120)
ALT FLD-CCNC: 13 U/L — SIGNIFICANT CHANGE UP (ref 4–33)
APPEARANCE UR: SIGNIFICANT CHANGE UP
APTT BLD: 30.8 SEC — SIGNIFICANT CHANGE UP (ref 27.5–37.4)
AST SERPL-CCNC: 17 U/L — SIGNIFICANT CHANGE UP (ref 4–32)
BASE EXCESS BLDV CALC-SCNC: -8.9 MMOL/L — SIGNIFICANT CHANGE UP
BILIRUB SERPL-MCNC: < 0.2 MG/DL — LOW (ref 0.2–1.2)
BILIRUB UR-MCNC: NEGATIVE — SIGNIFICANT CHANGE UP
BLD GP AB SCN SERPL QL: NEGATIVE — SIGNIFICANT CHANGE UP
BLOOD GAS VENOUS - CREATININE: 2.88 MG/DL — HIGH (ref 0.5–1.3)
BLOOD UR QL VISUAL: NEGATIVE — SIGNIFICANT CHANGE UP
BUN SERPL-MCNC: 63 MG/DL — HIGH (ref 7–23)
BUN SERPL-MCNC: 75 MG/DL — HIGH (ref 7–23)
CALCIUM SERPL-MCNC: 8.7 MG/DL — SIGNIFICANT CHANGE UP (ref 8.4–10.5)
CALCIUM SERPL-MCNC: 8.8 MG/DL — SIGNIFICANT CHANGE UP (ref 8.4–10.5)
CHLORIDE BLDV-SCNC: 116 MMOL/L — HIGH (ref 96–108)
CHLORIDE SERPL-SCNC: 108 MMOL/L — HIGH (ref 98–107)
CHLORIDE SERPL-SCNC: 110 MMOL/L — HIGH (ref 98–107)
CO2 SERPL-SCNC: 17 MMOL/L — LOW (ref 22–31)
CO2 SERPL-SCNC: 17 MMOL/L — LOW (ref 22–31)
COLOR SPEC: YELLOW — SIGNIFICANT CHANGE UP
CREAT SERPL-MCNC: 2.81 MG/DL — HIGH (ref 0.5–1.3)
CREAT SERPL-MCNC: 4.38 MG/DL — HIGH (ref 0.5–1.3)
GAS PNL BLDV: 138 MMOL/L — SIGNIFICANT CHANGE UP (ref 136–146)
GLUCOSE BLDV-MCNC: 155 — HIGH (ref 70–99)
GLUCOSE SERPL-MCNC: 157 MG/DL — HIGH (ref 70–99)
GLUCOSE SERPL-MCNC: 98 MG/DL — SIGNIFICANT CHANGE UP (ref 70–99)
GLUCOSE UR-MCNC: NEGATIVE — SIGNIFICANT CHANGE UP
HCO3 BLDV-SCNC: 17 MMOL/L — LOW (ref 20–27)
HCT VFR BLD CALC: 32.3 % — LOW (ref 34.5–45)
HCT VFR BLDV CALC: 33.7 % — LOW (ref 34.5–45)
HGB BLD-MCNC: 10.6 G/DL — LOW (ref 11.5–15.5)
HGB BLDV-MCNC: 10.9 G/DL — LOW (ref 11.5–15.5)
INR BLD: 0.98 — SIGNIFICANT CHANGE UP (ref 0.88–1.17)
KETONES UR-MCNC: NEGATIVE — SIGNIFICANT CHANGE UP
LACTATE BLDV-MCNC: 1.6 MMOL/L — SIGNIFICANT CHANGE UP (ref 0.5–2)
LEUKOCYTE ESTERASE UR-ACNC: HIGH
MCHC RBC-ENTMCNC: 30.9 PG — SIGNIFICANT CHANGE UP (ref 27–34)
MCHC RBC-ENTMCNC: 32.8 % — SIGNIFICANT CHANGE UP (ref 32–36)
MCV RBC AUTO: 94.2 FL — SIGNIFICANT CHANGE UP (ref 80–100)
MUCOUS THREADS # UR AUTO: SIGNIFICANT CHANGE UP
NITRITE UR-MCNC: NEGATIVE — SIGNIFICANT CHANGE UP
NRBC # FLD: 0 — SIGNIFICANT CHANGE UP
PCO2 BLDV: 41 MMHG — SIGNIFICANT CHANGE UP (ref 41–51)
PH BLDV: 7.24 PH — LOW (ref 7.32–7.43)
PH UR: 8 — SIGNIFICANT CHANGE UP (ref 4.6–8)
PLATELET # BLD AUTO: 172 K/UL — SIGNIFICANT CHANGE UP (ref 150–400)
PMV BLD: 10.5 FL — SIGNIFICANT CHANGE UP (ref 7–13)
PO2 BLDV: 57 MMHG — HIGH (ref 35–40)
POTASSIUM BLDV-SCNC: 4.6 MMOL/L — HIGH (ref 3.4–4.5)
POTASSIUM SERPL-MCNC: 4.9 MMOL/L — SIGNIFICANT CHANGE UP (ref 3.5–5.3)
POTASSIUM SERPL-MCNC: 5.5 MMOL/L — HIGH (ref 3.5–5.3)
POTASSIUM SERPL-SCNC: 4.9 MMOL/L — SIGNIFICANT CHANGE UP (ref 3.5–5.3)
POTASSIUM SERPL-SCNC: 5.5 MMOL/L — HIGH (ref 3.5–5.3)
PROT SERPL-MCNC: 6.4 G/DL — SIGNIFICANT CHANGE UP (ref 6–8.3)
PROT UR-MCNC: 30 MG/DL — HIGH
PROTHROM AB SERPL-ACNC: 10.9 SEC — SIGNIFICANT CHANGE UP (ref 9.8–13.1)
RBC # BLD: 3.43 M/UL — LOW (ref 3.8–5.2)
RBC # FLD: 13.1 % — SIGNIFICANT CHANGE UP (ref 10.3–14.5)
RH IG SCN BLD-IMP: POSITIVE — SIGNIFICANT CHANGE UP
SAO2 % BLDV: 88 % — HIGH (ref 60–85)
SODIUM SERPL-SCNC: 139 MMOL/L — SIGNIFICANT CHANGE UP (ref 135–145)
SODIUM SERPL-SCNC: 142 MMOL/L — SIGNIFICANT CHANGE UP (ref 135–145)
SP GR SPEC: 1.01 — SIGNIFICANT CHANGE UP (ref 1–1.04)
SQUAMOUS # UR AUTO: SIGNIFICANT CHANGE UP
UROBILINOGEN FLD QL: NORMAL MG/DL — SIGNIFICANT CHANGE UP
WBC # BLD: 12.2 K/UL — HIGH (ref 3.8–10.5)
WBC # FLD AUTO: 12.2 K/UL — HIGH (ref 3.8–10.5)
WBC UR QL: SIGNIFICANT CHANGE UP (ref 0–?)

## 2018-07-30 PROCEDURE — 93010 ELECTROCARDIOGRAM REPORT: CPT

## 2018-07-30 PROCEDURE — 99223 1ST HOSP IP/OBS HIGH 75: CPT | Mod: GC

## 2018-07-30 PROCEDURE — 99223 1ST HOSP IP/OBS HIGH 75: CPT

## 2018-07-30 PROCEDURE — 99222 1ST HOSP IP/OBS MODERATE 55: CPT

## 2018-07-30 RX ORDER — MORPHINE SULFATE 50 MG/1
4 CAPSULE, EXTENDED RELEASE ORAL ONCE
Qty: 0 | Refills: 0 | Status: DISCONTINUED | OUTPATIENT
Start: 2018-07-30 | End: 2018-07-30

## 2018-07-30 RX ORDER — MONTELUKAST 4 MG/1
1 TABLET, CHEWABLE ORAL
Qty: 0 | Refills: 0 | COMMUNITY

## 2018-07-30 RX ORDER — DILTIAZEM HCL 120 MG
1 CAPSULE, EXT RELEASE 24 HR ORAL
Qty: 0 | Refills: 0 | COMMUNITY

## 2018-07-30 RX ORDER — PANTOPRAZOLE SODIUM 20 MG/1
40 TABLET, DELAYED RELEASE ORAL
Qty: 0 | Refills: 0 | Status: DISCONTINUED | OUTPATIENT
Start: 2018-07-30 | End: 2018-08-03

## 2018-07-30 RX ORDER — WHEAT DEXTRIN 3 G/4 G
0 POWDER IN PACKET (EA) ORAL
Qty: 0 | Refills: 0 | COMMUNITY

## 2018-07-30 RX ORDER — INSULIN HUMAN 100 [IU]/ML
10 INJECTION, SOLUTION SUBCUTANEOUS ONCE
Qty: 0 | Refills: 0 | Status: COMPLETED | OUTPATIENT
Start: 2018-07-30 | End: 2018-07-31

## 2018-07-30 RX ORDER — HEPARIN SODIUM 5000 [USP'U]/ML
5000 INJECTION INTRAVENOUS; SUBCUTANEOUS ONCE
Qty: 0 | Refills: 0 | Status: COMPLETED | OUTPATIENT
Start: 2018-07-30 | End: 2018-07-30

## 2018-07-30 RX ORDER — DULOXETINE HYDROCHLORIDE 30 MG/1
60 CAPSULE, DELAYED RELEASE ORAL DAILY
Qty: 0 | Refills: 0 | Status: DISCONTINUED | OUTPATIENT
Start: 2018-07-30 | End: 2018-08-06

## 2018-07-30 RX ORDER — FLUTICASONE PROPIONATE 50 MCG
1 SPRAY, SUSPENSION NASAL
Qty: 0 | Refills: 0 | COMMUNITY

## 2018-07-30 RX ORDER — ACETAMINOPHEN 500 MG
975 TABLET ORAL EVERY 8 HOURS
Qty: 0 | Refills: 0 | Status: DISCONTINUED | OUTPATIENT
Start: 2018-07-30 | End: 2018-08-06

## 2018-07-30 RX ORDER — BUDESONIDE AND FORMOTEROL FUMARATE DIHYDRATE 160; 4.5 UG/1; UG/1
2 AEROSOL RESPIRATORY (INHALATION)
Qty: 0 | Refills: 0 | Status: DISCONTINUED | OUTPATIENT
Start: 2018-07-30 | End: 2018-08-03

## 2018-07-30 RX ORDER — CHOLECALCIFEROL (VITAMIN D3) 125 MCG
1200 CAPSULE ORAL
Qty: 0 | Refills: 0 | COMMUNITY

## 2018-07-30 RX ORDER — POLYETHYLENE GLYCOL 3350 17 G/17G
17 POWDER, FOR SOLUTION ORAL DAILY
Qty: 0 | Refills: 0 | Status: DISCONTINUED | OUTPATIENT
Start: 2018-07-30 | End: 2018-08-03

## 2018-07-30 RX ORDER — MORPHINE SULFATE 50 MG/1
2 CAPSULE, EXTENDED RELEASE ORAL ONCE
Qty: 0 | Refills: 0 | Status: DISCONTINUED | OUTPATIENT
Start: 2018-07-30 | End: 2018-07-30

## 2018-07-30 RX ORDER — HEPARIN SODIUM 5000 [USP'U]/ML
5000 INJECTION INTRAVENOUS; SUBCUTANEOUS EVERY 12 HOURS
Qty: 0 | Refills: 0 | Status: DISCONTINUED | OUTPATIENT
Start: 2018-07-30 | End: 2018-07-30

## 2018-07-30 RX ORDER — CALCIUM CARBONATE 500(1250)
1 TABLET ORAL
Qty: 0 | Refills: 0 | COMMUNITY

## 2018-07-30 RX ORDER — PROCHLORPERAZINE MALEATE 5 MG
1 TABLET ORAL
Qty: 0 | Refills: 0 | COMMUNITY

## 2018-07-30 RX ORDER — LEVALBUTEROL 1.25 MG/.5ML
2 SOLUTION, CONCENTRATE RESPIRATORY (INHALATION)
Qty: 0 | Refills: 0 | COMMUNITY

## 2018-07-30 RX ORDER — SODIUM POLYSTYRENE SULFONATE 4.1 MEQ/G
15 POWDER, FOR SUSPENSION ORAL ONCE
Qty: 0 | Refills: 0 | Status: COMPLETED | OUTPATIENT
Start: 2018-07-30 | End: 2018-07-30

## 2018-07-30 RX ORDER — OMEPRAZOLE 10 MG/1
1 CAPSULE, DELAYED RELEASE ORAL
Qty: 0 | Refills: 0 | COMMUNITY

## 2018-07-30 RX ORDER — MONTELUKAST 4 MG/1
10 TABLET, CHEWABLE ORAL DAILY
Qty: 0 | Refills: 0 | Status: DISCONTINUED | OUTPATIENT
Start: 2018-07-30 | End: 2018-08-03

## 2018-07-30 RX ORDER — DOCUSATE SODIUM 100 MG
100 CAPSULE ORAL
Qty: 0 | Refills: 0 | Status: DISCONTINUED | OUTPATIENT
Start: 2018-07-30 | End: 2018-08-03

## 2018-07-30 RX ORDER — DEXTROSE 50 % IN WATER 50 %
50 SYRINGE (ML) INTRAVENOUS ONCE
Qty: 0 | Refills: 0 | Status: COMPLETED | OUTPATIENT
Start: 2018-07-30 | End: 2018-07-31

## 2018-07-30 RX ORDER — SODIUM CHLORIDE 9 MG/ML
500 INJECTION INTRAMUSCULAR; INTRAVENOUS; SUBCUTANEOUS ONCE
Qty: 0 | Refills: 0 | Status: COMPLETED | OUTPATIENT
Start: 2018-07-30 | End: 2018-07-30

## 2018-07-30 RX ORDER — HYDROMORPHONE HYDROCHLORIDE 2 MG/ML
0.5 INJECTION INTRAMUSCULAR; INTRAVENOUS; SUBCUTANEOUS
Qty: 0 | Refills: 0 | Status: DISCONTINUED | OUTPATIENT
Start: 2018-07-30 | End: 2018-08-02

## 2018-07-30 RX ORDER — METOPROLOL TARTRATE 50 MG
50 TABLET ORAL DAILY
Qty: 0 | Refills: 0 | Status: DISCONTINUED | OUTPATIENT
Start: 2018-07-30 | End: 2018-08-06

## 2018-07-30 RX ORDER — WHEAT DEXTRIN 3 G/4 G
1 POWDER IN PACKET (EA) ORAL
Qty: 0 | Refills: 0 | COMMUNITY

## 2018-07-30 RX ADMIN — DULOXETINE HYDROCHLORIDE 60 MILLIGRAM(S): 30 CAPSULE, DELAYED RELEASE ORAL at 11:44

## 2018-07-30 RX ADMIN — MORPHINE SULFATE 2 MILLIGRAM(S): 50 CAPSULE, EXTENDED RELEASE ORAL at 14:55

## 2018-07-30 RX ADMIN — SODIUM POLYSTYRENE SULFONATE 15 GRAM(S): 4.1 POWDER, FOR SUSPENSION ORAL at 23:57

## 2018-07-30 RX ADMIN — SODIUM CHLORIDE 1000 MILLILITER(S): 9 INJECTION INTRAMUSCULAR; INTRAVENOUS; SUBCUTANEOUS at 00:22

## 2018-07-30 RX ADMIN — SODIUM CHLORIDE 100 MILLILITER(S): 9 INJECTION, SOLUTION INTRAVENOUS at 21:41

## 2018-07-30 RX ADMIN — Medication 975 MILLIGRAM(S): at 11:43

## 2018-07-30 RX ADMIN — MONTELUKAST 10 MILLIGRAM(S): 4 TABLET, CHEWABLE ORAL at 11:44

## 2018-07-30 RX ADMIN — SODIUM CHLORIDE 100 MILLILITER(S): 9 INJECTION, SOLUTION INTRAVENOUS at 11:34

## 2018-07-30 RX ADMIN — SODIUM CHLORIDE 1000 MILLILITER(S): 9 INJECTION INTRAMUSCULAR; INTRAVENOUS; SUBCUTANEOUS at 20:46

## 2018-07-30 RX ADMIN — MORPHINE SULFATE 4 MILLIGRAM(S): 50 CAPSULE, EXTENDED RELEASE ORAL at 01:21

## 2018-07-30 RX ADMIN — Medication 975 MILLIGRAM(S): at 21:03

## 2018-07-30 RX ADMIN — MORPHINE SULFATE 4 MILLIGRAM(S): 50 CAPSULE, EXTENDED RELEASE ORAL at 03:21

## 2018-07-30 RX ADMIN — MORPHINE SULFATE 2 MILLIGRAM(S): 50 CAPSULE, EXTENDED RELEASE ORAL at 13:17

## 2018-07-30 RX ADMIN — SODIUM CHLORIDE 100 MILLILITER(S): 9 INJECTION, SOLUTION INTRAVENOUS at 00:22

## 2018-07-30 RX ADMIN — Medication 75 MILLIGRAM(S): at 17:40

## 2018-07-30 RX ADMIN — HEPARIN SODIUM 5000 UNIT(S): 5000 INJECTION INTRAVENOUS; SUBCUTANEOUS at 17:40

## 2018-07-30 RX ADMIN — Medication 975 MILLIGRAM(S): at 12:42

## 2018-07-30 NOTE — H&P ADULT - PROBLEM SELECTOR PLAN 6
Chronic and stable. Likely multifactorial - suspect anemia of chronic disease in addition to effects of chemotherapy. Will assess for nutritional deficiency as well  - trend CBC  - check iron studies, including ferritin, TIBC, check Vit B12/folate

## 2018-07-30 NOTE — H&P ADULT - HISTORY OF PRESENT ILLNESS
Initial ED triage VS:  temp 98.3  /80  HR 74  RR 18 O2 sat 99% on RA Ms. Moore is a 79 yo woman with PMHx most notable for metastatic bladder cancer to the bone undergoing active chemotherapy (carboplatin/etoposide) s/p cystectomy with ileal conduit creation c/b prior obstructive uropathy requiring b/l percutaneous nephrostomy tubes on 5/20/18, morbid obesity, hypertension, hypertrophic cardiomyopathy, and CKD 3 presenting with 1-day onset of diffuse abdominal pain.    Patient developed sever abdominal pain since yesterday afternoon around 4pm. She chronically take Tylenol 2 tabs about 3 times a day for her chronic pain. However, she says this pain is different. She also noticed that since yesterday afternoon, urine has not been coming out of her ostomy. She endorses episode of vomiting at home but no diarrhea. She says she recently had a procedure on in July where they closed her old ostomy site and created a new one more laterally on the right side of her abdomen. Patient denies any fevers or chills, but currently feels very fatigued.      Initial ED triage VS:  temp 98.3  /80  HR 74  RR 18 O2 sat 99% on RA  While in the ED, she received morphine 4mg IV x2, which she states helped a little with her pain and 2L normal saline IV. Pt started on D5 with sodium bicarb gtt.

## 2018-07-30 NOTE — CONSULT NOTE ADULT - PROBLEM SELECTOR RECOMMENDATION 9
-nephrostomy tubes as per Urology  -avoid nephrotoxins  -renal dosing  -monitor CMP    Indu Jett, PGY-1 Pt. with DEVON in setting of obstructive uropathy. Scr was 1.7 on 5/28/18, increased to 2.81 today (7/30/28). Pt. with bilateral hydronephrosis on CT scan done on 7/29/18. Pt. with history of obstructive uropathy in May 2018. Recommend urology/IR evaluation. Pt. may need bilateral nephrostomy tube placement. Monitor labs and urine output. Avoid any potential nephrotoxins

## 2018-07-30 NOTE — ED ADULT NURSE REASSESSMENT NOTE - NS ED NURSE REASSESS COMMENT FT1
RN Break Coverage : Alert and oriented x 4. Pt received from KATE Meier . Pt has abdominal pain. Pt given Medication as ordered. RN for ESS[facility] 2 took report .

## 2018-07-30 NOTE — H&P ADULT - PROBLEM SELECTOR PLAN 5
Pt on Advair 250/125 BID at home. Pt without acute exacerbation.  - start Symbicort BID while pt is admitted

## 2018-07-30 NOTE — H&P ADULT - NSHPLABSRESULTS_GEN_ALL_CORE
LABS:                        10.8   14.47 )-----------( 196      ( 29 Jul 2018 21:20 )             33.3     07-30    139  |  110<H>  |  63<H>  ----------------------------<  157<H>  4.9   |  17<L>  |  2.81<H>    Ca    8.8      30 Jul 2018 03:57    TPro  6.4  /  Alb  3.3  /  TBili  < 0.2<L>  /  DBili  x   /  AST  17  /  ALT  13  /  AlkPhos  75  07-30      PT/INR - ( 30 Jul 2018 06:07 )   PT: 10.9 SEC;   INR: 0.98          PTT - ( 30 Jul 2018 06:07 )  PTT:30.8 SEC          VBG 07-30 @ 03:57  pH: 7.24/pCO2: 41 /pO2: 57/HCO3: 17/lactate: 1.6  VBG 07-29 @ 21:20  pH: 7.19/pCO2: 43 /pO2: 44/HCO3: 15/lactate: 2.3        EKG:    RADIOLOGY & ADDITIONAL TESTS:    07/29/18:  CT ABDOMEN AND PELVIS      IMPRESSION:  Patient is status post cystectomy and ileal conduit with right lower   quadrant ostomy.  The patient appears to be status post interval revision of the stoma   since the prior CT scan of 05/20/2018.  There has been interval development of a 7.8 x 3.3 cm postoperative fluid   in the ventral abdominal wall, at the site of previous ostomy.  This may   represent resolving hematoma or sterile seroma.  Superimposed infection   should be excluded clinically.    Bilateral hydronephrosis and perinephric fat stranding is grossly stable   since 05/20/2018.  A nonobstructing right upper pole renal stone measures   9 x 5 mm.  Superimposed genitourinary tract infection should be excluded   clinically.    Small nodular foci along the pleural surface/right hemidiaphragm   measuring up to 5 mm.  Special attention to this region on follow-up   imaging is recommended.

## 2018-07-30 NOTE — H&P ADULT - GASTROINTESTINAL COMMENTS
Erythema noted and closed prior ileostomy site with noted reducible hernia. R sided ileostomy bag with minimal yellow urine in it.

## 2018-07-30 NOTE — H&P ADULT - PROBLEM SELECTOR PLAN 4
- hold home regimen of diltiazem 180mg daily for now - hold home regimen of diltiazem 180mg daily for now  - continue home regimen of metoprolol succinate 50mg daily (unclear if taken for HTN or other indication)

## 2018-07-30 NOTE — ED ADULT NURSE REASSESSMENT NOTE - NS ED NURSE REASSESS COMMENT FT1
repeat labs drawn and sent nad noted pt appears comfortable and in nad, intermittently sleeping in stretcher, will ctm closely, awaiting dispo at this time

## 2018-07-30 NOTE — H&P ADULT - PROBLEM SELECTOR PLAN 1
Unclear etiology. Will need to r/o infectious cause given pt complex urology hx and prior hx of pyelonephritis due to enterococcus faecalis c/b bacteremia in UTI. Unclear at this time why and when prior nephrostomy tubes from 5/2018 were removed. CT abd/pelvis revealed stable bilateral hydronephrosis and perinephric fat stranding, but nonobstructing stone noted in R kidney may have superimposed infection  - obtain UA and urine cx  - strict I/Os  - IR consult Unclear etiology. Will need to r/o infectious cause given pt with complex urologic hx and prior hx of pyelonephritis due to enterococcus faecalis c/b bacteremia in the setting of blocked ileal conduit in May 2018. Unclear at this time why and when prior nephrostomy tubes from 5/2018 were removed. CT abd/pelvis revealed stable bilateral hydronephrosis and perinephric fat stranding, but nonobstructing stone noted in R kidney may have superimposed infection  - obtain UA and urine cx  - will defer initiating abx at this time since pt afebrile and hemodynamically stable  - urology consult  - IR consult  - trend Cr. Renally dose meds.   - strict I/Os Unclear etiology. 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 Unclear at this time why and when prior nephrostomy tubes from 5/2018 were removed. CT abd/pelvis revealed stable bilateral hydronephrosis and perinephric fat stranding, but nonobstructing stone noted in R kidney may have superimposed infection  - obtain UA and urine cx  - will defer initiating abx at this time since pt afebrile and hemodynamically stable  - urology consult  - IR consult  - trend Cr. Renally dose meds.   - strict I/Os

## 2018-07-30 NOTE — ED ADULT NURSE REASSESSMENT NOTE - NS ED NURSE REASSESS COMMENT FT1
pt continues to await uro c/s, nad noted vss resps even and unlabored, npo status maintained throughout night, will ctm for remainder of stay in ed.

## 2018-07-30 NOTE — H&P ADULT - PMH
Acid Reflux    Arthritis    Asthmatic Bronchitis    Bladder cancer metastasized to bone    Calcified Thoracic Aorta    CKD (chronic kidney disease) stage 3, GFR 30-59 ml/min    Fibromyalgia    Gastric Ulcer    HTN (Hypertension)    Hypertrophic Cardiomyopathy    Migraine, Ophthalmoplegic    Nasal Polyp  1960  Obese    Paroxysmal Ventricular Tachycardia  6/2010  Radial fracture  left  Uterine Polyp Acid Reflux    Anemia, unspecified type    Arthritis    Asthmatic Bronchitis    Bladder cancer metastasized to bone    Calcified Thoracic Aorta    CKD (chronic kidney disease) stage 3, GFR 30-59 ml/min    Fibromyalgia    Gastric Ulcer    HTN (Hypertension)    Hypertrophic Cardiomyopathy    Migraine, Ophthalmoplegic    Nasal Polyp  1960  Obese    Paroxysmal Ventricular Tachycardia  6/2010  Radial fracture  left  Uterine Polyp

## 2018-07-30 NOTE — CONSULT NOTE ADULT - SUBJECTIVE AND OBJECTIVE BOX
HPI    PAST MEDICAL & SURGICAL HISTORY:  Anemia, unspecified type  CKD (chronic kidney disease) stage 3, GFR 30-59 ml/min  Bladder cancer metastasized to bone  Radial fracture: left  Uterine Polyp  Gastric Ulcer  Migraine, Ophthalmoplegic  Fibromyalgia  Obese  Calcified Thoracic Aorta  Hypertrophic Cardiomyopathy  Paroxysmal Ventricular Tachycardia: 2010  Nasal Polyp:   Arthritis  HTN (Hypertension)  Asthmatic Bronchitis  Acid Reflux  History of ileal conduit  S/P total knee arthroplasty, left  EPS study: with no intervention 2010  Nasal Polyp removal:   History of Arthroscopy:  Right  Left   History of D&C:       MEDICATIONS  (STANDING):  acetaminophen   Tablet. 975 milliGRAM(s) Oral every 8 hours  buDESOnide 160 MICROgram(s)/formoterol 4.5 MICROgram(s) Inhaler 2 Puff(s) Inhalation two times a day  dextrose 5% 1000 milliLiter(s) (100 mL/Hr) IV Continuous <Continuous>  docusate sodium 100 milliGRAM(s) Oral two times a day  DULoxetine 60 milliGRAM(s) Oral daily  heparin  Injectable 5000 Unit(s) SubCutaneous once  metoprolol succinate ER 50 milliGRAM(s) Oral daily  montelukast 10 milliGRAM(s) Oral daily  multivitamin 1 Tablet(s) Oral daily  pantoprazole    Tablet 40 milliGRAM(s) Oral before breakfast  polyethylene glycol 3350 17 Gram(s) Oral daily  pregabalin 75 milliGRAM(s) Oral two times a day    MEDICATIONS  (PRN):  HYDROmorphone  Injectable 0.5 milliGRAM(s) IV Push every 3 hours PRN Severe Pain (7 - 10)      FAMILY HISTORY:      Allergies    No Known Allergies    Intolerances        SOCIAL HISTORY:    REVIEW OF SYSTEMS: Otherwise negative as stated in HPI    Physical Exam  Vital signs  T(C): 36.7 (18 @ 14:51), Max: 36.8 (18 @ 19:33)  HR: 84 (18 @ 14:51)  BP: 110/33 (18 @ 14:51)  SpO2: 98% (18 @ 14:51)  Wt(kg): --    Output    UOP    Gen:  NAD    Pulm:  No respiratory distress  	  CV:  RRR    GI:  S/ND/NT    :      MSK:      LABS:       @ 03:57    WBC --    / Hct --    / SCr 2.81     07-29 @ 21:20    WBC 14.47 / Hct 33.3  / SCr 2.28     07    139  |  110<H>  |  63<H>  ----------------------------<  157<H>  4.9   |  17<L>  |  2.81<H>    Ca    8.8      2018 03:57    TPro  6.4  /  Alb  3.3  /  TBili  < 0.2<L>  /  DBili  x   /  AST  17  /  ALT  13  /  AlkPhos  75  07-30    PT/INR - ( 2018 06:07 )   PT: 10.9 SEC;   INR: 0.98          PTT - ( 2018 06:07 )  PTT:30.8 SEC  Urinalysis Basic - ( 2018 13:00 )    Color: YELLOW / Appearance: TURBID / S.014 / pH: 8.0  Gluc: NEGATIVE / Ketone: NEGATIVE  / Bili: NEGATIVE / Urobili: NORMAL mg/dL   Blood: NEGATIVE / Protein: 30 mg/dL / Nitrite: NEGATIVE   Leuk Esterase: LARGE / RBC: x / WBC 10-25   Sq Epi: OCC / Non Sq Epi: x / Bacteria: x        Urine Cx:  Blood Cx:    RADIOLOGY: HPI    77 yo woman with PMHx most notable for metastatic bladder cancer to the bone undergoing active chemotherapy (carboplatin/etoposide - in remission) s/p cystectomy with ileal conduit creation c/b prior obstructive uropathy requiring b/l percutaneous nephrostomy tubes on 18 and stoma revision early July at [place] (Dr. Forest Jaimes), obesity, hypertension, hypertrophic cardiomyopathy, and CKD 3 presenting with sudden onset diffuse abdominal and back pain since 4pm. Since yesterday afternoon urine has not been coming out of her ostomy. She endorses nausea and vomiting at home but no diarrhea/constipation. Patient denies any fevers or chills, but currently feels very fatigued.        PAST MEDICAL & SURGICAL HISTORY:  Anemia, unspecified type  CKD (chronic kidney disease) stage 3, GFR 30-59 ml/min  Bladder cancer metastasized to bone  Radial fracture: left  Uterine Polyp  Gastric Ulcer  Migraine, Ophthalmoplegic  Fibromyalgia  Obese  Calcified Thoracic Aorta  Hypertrophic Cardiomyopathy  Paroxysmal Ventricular Tachycardia: 2010  Nasal Polyp:   Arthritis  HTN (Hypertension)  Asthmatic Bronchitis  Acid Reflux  History of ileal conduit  S/P total knee arthroplasty, left  EPS study: with no intervention 2010  Nasal Polyp removal:   History of Arthroscopy:  Right  Left   History of D&C:       MEDICATIONS  (STANDING):  acetaminophen   Tablet. 975 milliGRAM(s) Oral every 8 hours  buDESOnide 160 MICROgram(s)/formoterol 4.5 MICROgram(s) Inhaler 2 Puff(s) Inhalation two times a day  dextrose 5% 1000 milliLiter(s) (100 mL/Hr) IV Continuous <Continuous>  docusate sodium 100 milliGRAM(s) Oral two times a day  DULoxetine 60 milliGRAM(s) Oral daily  heparin  Injectable 5000 Unit(s) SubCutaneous once  metoprolol succinate ER 50 milliGRAM(s) Oral daily  montelukast 10 milliGRAM(s) Oral daily  multivitamin 1 Tablet(s) Oral daily  pantoprazole    Tablet 40 milliGRAM(s) Oral before breakfast  polyethylene glycol 3350 17 Gram(s) Oral daily  pregabalin 75 milliGRAM(s) Oral two times a day    MEDICATIONS  (PRN):  HYDROmorphone  Injectable 0.5 milliGRAM(s) IV Push every 3 hours PRN Severe Pain (7 - 10)      FAMILY HISTORY:      Allergies    No Known Allergies    Intolerances        SOCIAL HISTORY:    REVIEW OF SYSTEMS: Otherwise negative as stated in HPI    Physical Exam  Vital signs  T(C): 36.7 (18 @ 14:51), Max: 36.8 (18 @ 19:33)  HR: 84 (18 @ 14:51)  BP: 110/33 (18 @ 14:51)  SpO2: 98% (18 @ 14:51)  Wt(kg): --    Output    UOP    Gen:  NAD    Pulm:  No respiratory distress  	  CV:  RRR    GI:  S/ND/NT    :  ileostomy pink, flush with skin, unable to pass catheter at bedside, stoma digitized and wire and ucath placed with positive return of urine. 16Fr Craig placed over with with return of urine and mucus. Irigated and left to drainage    MSK:  Moves all extremities     LABS:       @ 03:57    WBC --    / Hct --    / SCr 2.81      @ 21:20    WBC 14.47 / Hct 33.3  / SCr 2.28         139  |  110<H>  |  63<H>  ----------------------------<  157<H>  4.9   |  17<L>  |  2.81<H>    Ca    8.8      2018 03:57    TPro  6.4  /  Alb  3.3  /  TBili  < 0.2<L>  /  DBili  x   /  AST  17  /  ALT  13  /  AlkPhos  75  07-30    PT/INR - ( 2018 06:07 )   PT: 10.9 SEC;   INR: 0.98          PTT - ( 2018 06:07 )  PTT:30.8 SEC  Urinalysis Basic - ( 2018 13:00 )    Color: YELLOW / Appearance: TURBID / S.014 / pH: 8.0  Gluc: NEGATIVE / Ketone: NEGATIVE  / Bili: NEGATIVE / Urobili: NORMAL mg/dL   Blood: NEGATIVE / Protein: 30 mg/dL / Nitrite: NEGATIVE   Leuk Esterase: LARGE / RBC: x / WBC 10-25   Sq Epi: OCC / Non Sq Epi: x / Bacteria: x        Urine Cx:  Blood Cx:    RADIOLOGY:

## 2018-07-30 NOTE — ED ADULT NURSE REASSESSMENT NOTE - NS ED NURSE REASSESS COMMENT FT1
break coverage RN- pt vitals as noted, in NAD, on fluids as ordered, pt comfortable at this time, currently sleeping, will continue to monitor

## 2018-07-30 NOTE — H&P ADULT - PROBLEM SELECTOR PLAN 2
Suspect underlying urologic obstruction contributing to pain. Pt with similar presentation in May 2018  - plan as above  - pain control with PRN morphine 4mg IV with bowel regimen. Hold morphine for oversedation. Suspect underlying urologic obstruction contributing to pain. Pt with similar presentation in May 2018  - plan as above  - pain control with PRN dilaudid 0.5mg q3h IV with bowel regimen. Hold IV dilaudid for oversedation.

## 2018-07-30 NOTE — H&P ADULT - PROBLEM SELECTOR PLAN 3
- continue home regimen of Lyrica 75mg BID and Cymbalta 60mg daily  - acetaminophen 975mg q8h (pt on standing Tylenol regimen at home)

## 2018-07-30 NOTE — CONSULT NOTE ADULT - SUBJECTIVE AND OBJECTIVE BOX
[facility] DIVISION OF KIDNEY DISEASES AND HYPERTENSION -- INITIAL CONSULT NOTE  --------------------------------------------------------------------------------  HPI:     78 F with PMH significant for HCM, Bladder Ca s/p ileal conduit Nov 2017 and revision 2 weeks ago presents with severe abdominal pain and lack of urine output from the stoma starting yesterday afternoon. She states that there has been no urine output from her ileal conduit since 4 pm yesterday, and that the night prior she had noticed less volume from usual with a darker shade of yellow. Previously, she said, overnight she usually drained 800 to 1200 mL in a night and it would be very pale in color. She also had intense abdominal pain that was diffusely spread over the front of her abdomen, not involving her back, that made her significantly nauseous and led her to vomit 3 times at 6pm. She vomited again about one hour later, and then again while in the ED waiting. She denies any blood or coffee-ground like material in the vomit. She reports continued nausea currently, but denies fevers or chills or lightheadedness. She reports a history of HTN that has been on and off, but denies ever having diabetes or kidney problems prior to the bladder resection and ileal conduit in November. The conduit had become obstructed in late May, nephrostomy tubes were placed, and she had urine output through the tubes (though she states the R did not drain) until they were removed two weeks ago, and the ileal conduit was revised by Dr. Ashford. Since the revision she reports no issues with urine output from the stoma.      PAST HISTORY  --------------------------------------------------------------------------------  PAST MEDICAL & SURGICAL HISTORY:  CKD (chronic kidney disease) stage 3, GFR 30-59 ml/min  Bladder cancer metastasized to bone  Radial fracture: left  Uterine Polyp  Gastric Ulcer  Migraine, Ophthalmoplegic  Fibromyalgia  Obese  Calcified Thoracic Aorta  Hypertrophic Cardiomyopathy  Paroxysmal Ventricular Tachycardia: 6/2010  Nasal Polyp: 1960  Arthritis  HTN (Hypertension)  Asthmatic Bronchitis  Acid Reflux  History of ileal conduit  S/P total knee arthroplasty, left  EPS study: with no intervention 6/2010  Nasal Polyp removal: 1960  History of Arthroscopy: 1997 Right  Left 2000  History of D&C: 1968  FAMILY HISTORY:  Family history of dementia  Family history of hyperlipidemia  Family history of prostate cancer  PAST SOCIAL HISTORY:  ALLERGIES & MEDICATIONS  --------------------------------------------------------------------------------  Allergies  No Known Allergies  Intolerances  Home Medications:   * Patient Currently Takes Medications as of 28-May-2018 17:37 documented in Structured Notes  ?? hydroCHLOROthiazide 25 mg oral tablet: 1 tab(s) orally once a day  ?? amoxicillin-clavulanate 875 mg-125 mg oral tablet: 1 tab(s) orally every 12 hours .   Until June 7/2018  ?? lactobacillus acidophilus oral capsule: 1 tab(s) orally once a day  ?? sodium bicarbonate 650 mg oral tablet: 1 tab(s) orally 2 times a day  ?? montelukast 10 mg oral tablet: 1 tab(s) orally once a day  ?? raloxifene 60 mg oral tablet: 1 tab(s) orally once a day  ?? DULoxetine 60 mg oral delayed release capsule: 1 cap(s) orally once a day  ?? traZODone 100 mg oral tablet: 1 tab(s) orally once a day (at bedtime)  ?? pregabalin 75 mg oral capsule: 1 cap(s) orally 2 times a day  ?? metoprolol succinate 50 mg oral tablet, extended release: 1 tab(s) orally once a day  ?? DilTIAZem Hydrochloride  mg/24 hours oral capsule, extended release: 1 cap(s) orally once a day  ?? omeprazole 40 mg oral delayed release capsule: 1 cap(s) orally once a day  ?? Multiple Vitamins oral tablet: 1 tab(s) orally once a day  ?? Vitamin D3: 1200 unit(s) orally once a day  ?? calcium (as carbonate) 600 mg oral tablet: 1 tab(s) orally once a day  ?? Flonase 50 mcg/inh nasal spray: 1 spray(s) nasal 2 times a day, As Needed  ?? Xopenex HFA 45 mcg/inh inhalation aerosol: 2 puff(s) inhaled , As Needed  ?? Benefiber oral powder for reconstitution: 1 packet(s) orally 2 times a day     Standing Inpatient Medications  dextrose 5% 1000 milliLiter(s) IV Continuous <Continuous>  PRN Inpatient Medications     REVIEW OF SYSTEMS  --------------------------------------------------------------------------------  Gen: No fevers/chills  Skin: Redness over the revised stoma site of two days  Respiratory: No dyspnea or SOB  CV: No chest pain  GI: Diffuse severe abdominal pain, with nausea, and 5 episodes vomiting since yesterday, no hematemesis  : As reported above, no change in odor but darker shade of yellow the night prior to inciting events, with an unspecified decreased volume  MSK: Chronic on/off back pain, though not associated with this episode; no LE edema  Neuro: No dizziness/lightheadedness     All other systems were reviewed and are negative, except as noted.  VITALS/PHYSICAL EXAM  --------------------------------------------------------------------------------  T(C): 36.8 (07-30-18 @ 09:03), Max: 36.8 (07-29-18 @ 19:33)  HR: 90 (07-30-18 @ 09:03) (72 - 99)  BP: 129/68 (07-30-18 @ 09:03) (115/49 - 161/80)  RR: 14 (07-30-18 @ 09:03) (14 - 18)  SpO2: 99% (07-30-18 @ 09:03) (99% - 100%)  Wt(kg): --        Physical Exam:  Gen: NAD, drowsy, but well-appearing  HEENT: PERRL  Pulm: CTA B/L  CV: RRR, S1S2 present with holosystolic murmur loudest along L sternal border  Back: No spinal or CVA tenderness;  Abd: +BS, soft with palpable masses, tender at site of ileal stoma revision in the RLQ, nondistended, current ileal stoma with drainage bag attached, appears clean and without redness visualized through the collection bag  : No suprapubic tenderness  LE: Warm, no edema  Skin: Warm, without rashes, erythema at site of ileal stoma revision  Vascular access: PIV RUE  LABS/STUDIES  --------------------------------------------------------------------------------  10.8   14.47 >-----------< 196 [07-29-18 @ 21:20]  33.3   139 | 110 | 63  ----------------------------< 157 [07-30-18 @ 03:57]  4.9 | 17 | 2.81  Ca 8.8 [07-30-18 @ 03:57]  TPro 6.4 / Alb 3.3 / TBili < 0.2 / DBili x / AST 17 / ALT 13 / AlkPhos 75 [07-30-18 @ 03:57]  PT/INR: PT 10.9 , INR 0.98 [07-30-18 @ 06:07]  PTT: 30.8 [07-30-18 @ 06:07]     Creatinine Trend:  SCr 2.81 [07-30 @ 03:57]  SCr 2.28 [07-29 @ 21:20]  Urinalysis - [05-20-18 @ 06:21]  Color PL Yellow / Appearance SL Turbid / SG 1.009 / pH 6.5  Gluc Negative / Ketone Negative / Bili Negative / Urobili Negative   Blood Negative / Protein Trace / Leuk Est Large / Nitrite Negative  RBC 5-10 / WBC 6-10 / Hyaline / Gran / Sq Epi / Non Sq Epi OCC / Bacteria      Iron 84, TIBC 176, %sat -- [03-24-18 @ 09:12]  Ferritin 417.1 [03-24-18 @ 09:12]  HbA1c 4.9 [05-21-18 @ 11:32]  TSH 0.77 [05-21-18 @ 11:28] [facility] DIVISION OF KIDNEY DISEASES AND HYPERTENSION -- INITIAL CONSULT NOTE  --------------------------------------------------------------------------------  HPI:     78 F with PMH significant for HCM, Bladder Ca s/p ileal conduit Nov 2017, c/b obstruction in May with b/l nephrostomy tube placement  with subsequent tube removal and conduit revision 2 weeks ago presents with severe abdominal pain and lack of urine output from the conduit stoma starting yesterday afternoon. She states that there has been no urine output from her ileal conduit since 4 pm yesterday, and that the night prior she had noticed less volume from usual with a darker shade of yellow. Previously, she said, overnight she usually drained 800 to 1200 mL in a night and it would be very pale in color. She also had intense abdominal pain that was diffusely spread over the front of her abdomen, not involving her back, that made her significantly nauseous and led her to vomit 3 times at 6pm. She vomited again about one hour later, and then again while in the ED waiting. She denies any blood or coffee-ground like material in the vomit. She reports continued nausea currently, but denies fevers or chills or lightheadedness. She reports a history of HTN that has been on and off, but denies ever having diabetes or kidney problems prior to the bladder resection and ileal conduit in November. Her last normal Cr per chart review was in Feb 2016 at 0.9. Since March of 2018 her baseline has been 1.4-1.5, and in May during the obstructive episode her Cr levels pauline to 2.85 prior to tube placement, and then fell to 1.59 and 1.7 before discharge. When the conduit became obstructed in late May, nephrostomy tubes were placed, and she had urine output through the tubes (though she states the R did not drain) until they were removed two weeks ago, and the ileal conduit was revised by nazia Ashford at [place]. Since the revision she reports no issues with urine output from the stoma.        PAST HISTORY  --------------------------------------------------------------------------------  PAST MEDICAL & SURGICAL HISTORY:  CKD (chronic kidney disease) stage 3, GFR 30-59 ml/min  Bladder cancer metastasized to bone  Radial fracture: left  Uterine Polyp  Gastric Ulcer  Migraine, Ophthalmoplegic  Fibromyalgia  Obese  Calcified Thoracic Aorta  Hypertrophic Cardiomyopathy  Paroxysmal Ventricular Tachycardia: 6/2010  Nasal Polyp: 1960  Arthritis  HTN (Hypertension)  Asthmatic Bronchitis  Acid Reflux  History of ileal conduit  S/P total knee arthroplasty, left  EPS study: with no intervention 6/2010  Nasal Polyp removal: 1960  History of Arthroscopy: 1997 Right  Left 2000  History of D&C: 1968  FAMILY HISTORY:  Family history of dementia  Family history of hyperlipidemia  Family history of prostate cancer  PAST SOCIAL HISTORY:  ALLERGIES & MEDICATIONS  --------------------------------------------------------------------------------  Allergies  No Known Allergies  Intolerances  Home Medications:    Patient Currently Takes Medications as of 30-Jul-2018 11:35 documented in Structured Notes  · 	sodium bicarbonate 650 mg oral tablet: 1 tab(s) orally 2 times a day, Last Dose Taken:    · 	montelukast 10 mg oral tablet: 1 tab(s) orally once a day, Last Dose Taken:    · 	raloxifene 60 mg oral tablet: 1 tab(s) orally once a day, Last Dose Taken:    · 	DULoxetine 60 mg oral delayed release capsule: 1 cap(s) orally once a day, Last Dose Taken:    · 	traZODone 100 mg oral tablet: 1 tab(s) orally once a day (at bedtime), Last Dose Taken:    · 	pregabalin 75 mg oral capsule: 1 cap(s) orally 2 times a day  	  	ISTOP Reference# [id number]   	RxDispensed: 7/3/2018  	Q#60  	DS#30, Last Dose Taken:    · 	metoprolol succinate 50 mg oral tablet, extended release: 1 tab(s) orally once a day, Last Dose Taken:    · 	omeprazole 40 mg oral delayed release capsule: 1 cap(s) orally once a day, Last Dose Taken:    · 	Multiple Vitamins oral tablet: 1 tab(s) orally once a day, Last Dose Taken:    · 	dilTIAZem 180 mg/24 hours oral capsule, extended release: 1 cap(s) orally once a day, Last Dose Taken:    · 	Benefiber oral powder for reconstitution: Take as directed twice daily, Last Dose Taken:    · 	Calcium 600+D oral tablet: 1 tab(s) orally 2 times a day, Last Dose Taken:    · 	Singulair 10 mg oral tablet: 1 tab(s) orally once a day, Last Dose Taken:    · 	Compazine 10 mg oral tablet: 1 tab(s) orally 3 times a day, As Needed, Last Dose Taken:         Standing Inpatient Medications  dextrose 5% 1000 milliLiter(s) IV Continuous <Continuous>  PRN Inpatient Medications     REVIEW OF SYSTEMS  --------------------------------------------------------------------------------  Gen: No fevers/chills  Skin: Redness over the revised stoma site of two days  Respiratory: No dyspnea or SOB  CV: No chest pain  GI: Diffuse severe abdominal pain, with nausea, and 5 episodes vomiting since yesterday, no hematemesis  : As reported above, no change in odor but darker shade of yellow the night prior to inciting events, with an unspecified decreased volume  MSK: Chronic on/off back pain, though not associated with this episode; no LE edema  Neuro: No dizziness/lightheadedness     All other systems were reviewed and are negative, except as noted.  VITALS/PHYSICAL EXAM  --------------------------------------------------------------------------------  T(C): 36.8 (07-30-18 @ 09:03), Max: 36.8 (07-29-18 @ 19:33)  HR: 90 (07-30-18 @ 09:03) (72 - 99)  BP: 129/68 (07-30-18 @ 09:03) (115/49 - 161/80)  RR: 14 (07-30-18 @ 09:03) (14 - 18)  SpO2: 99% (07-30-18 @ 09:03) (99% - 100%)  Wt(kg): --        Physical Exam:  Gen: NAD, drowsy, but well-appearing  HEENT: PERRL  Pulm: CTA B/L  CV: RRR, S1S2 present with holosystolic murmur loudest along L sternal border  Back: No spinal or CVA tenderness;  Abd: +BS, soft with palpable masses, tender at site of ileal stoma revision in the RLQ, nondistended, current ileal stoma with drainage bag attached, appears clean and without redness visualized through the collection bag  : No suprapubic tenderness  LE: Warm, no edema  Skin: Warm, without rashes, erythema at site of ileal stoma revision  Vascular access: PIV RUE  LABS/STUDIES  --------------------------------------------------------------------------------  10.8   14.47 >-----------< 196 [07-29-18 @ 21:20]  33.3   139 | 110 | 63  ----------------------------< 157 [07-30-18 @ 03:57]  4.9 | 17 | 2.81  Ca 8.8 [07-30-18 @ 03:57]  TPro 6.4 / Alb 3.3 / TBili < 0.2 / DBili x / AST 17 / ALT 13 / AlkPhos 75 [07-30-18 @ 03:57]  PT/INR: PT 10.9 , INR 0.98 [07-30-18 @ 06:07]  PTT: 30.8 [07-30-18 @ 06:07]     Creatinine Trend:  SCr 2.81 [07-30 @ 03:57]  SCr 2.28 [07-29 @ 21:20]  Prior admission:  SCr 1.7 [05-28 @ 07:02]  SCr 2.85 [05-20 @ 22:44]    Urinalysis - [05-20-18 @ 06:21]  Color PL Yellow / Appearance SL Turbid / SG 1.009 / pH 6.5  Gluc Negative / Ketone Negative / Bili Negative / Urobili Negative   Blood Negative / Protein Trace / Leuk Est Large / Nitrite Negative  RBC 5-10 / WBC 6-10 / Hyaline / Gran / Sq Epi / Non Sq Epi OCC / Bacteria      Iron 84, TIBC 176, %sat -- [03-24-18 @ 09:12]  Ferritin 417.1 [03-24-18 @ 09:12]  HbA1c 4.9 [05-21-18 @ 11:32]  TSH 0.77 [05-21-18 @ 11:28]    Imaging:  < from: CT Abdomen and Pelvis No Cont (07.29.18 @ 22:37) >  Patient is status post cystectomy and ileal conduit with right lower   quadrant ostomy.  The patient appears to be status post interval revision of the stoma   since the prior CT scan of 05/20/2018.  There has been interval development of a 7.8 x 3.3 cm postoperative fluid   in the ventral abdominal wall, at the site of previous ostomy.  This may   represent resolving hematoma or sterile seroma.  Superimposed infection   should be excluded clinically.    Bilateral hydronephrosis and perinephric fat stranding is grossly stable   since 05/20/2018.  A nonobstructing right upper pole renal stone measures   9 x 5 mm.  Superimposed genitourinary tract infection should be excluded   clinically.    Small nodular foci along the pleural surface/right hemidiaphragm   measuring up to 5 mm.  Special attention to this region on follow-up   imaging is recommended.    < end of copied text > [facility] DIVISION OF KIDNEY DISEASES AND HYPERTENSION -- INITIAL CONSULT NOTE  --------------------------------------------------------------------------------  HPI: 78 F with PMH significant for HCM, Bladder Ca s/p ileal conduit Nov 2017, c/b obstruction in May with b/l nephrostomy tube placement  with subsequent tube removal and conduit revision 2 weeks ago presents with severe abdominal pain and lack of urine output from the conduit stoma starting yesterday afternoon. She states that there has been no urine output from her ileal conduit since 4 pm yesterday, and that the night prior she had noticed less volume from usual with a darker shade of yellow. Previously, she said, overnight she usually drained 800 to 1200 mL in a night and it would be very pale in color. She also had intense abdominal pain that was diffusely spread over the front of her abdomen, not involving her back, that made her significantly nauseous and led her to vomit 3 times at 6pm. She vomited again about one hour later, and then again while in the ED waiting. She denies any blood or coffee-ground like material in the vomit. She reports continued nausea currently, but denies fevers or chills or lightheadedness. She reports a history of HTN that has been on and off, but denies ever having diabetes or kidney problems prior to the bladder resection and ileal conduit in November. Her last normal Scr per chart review was in Feb 2016 at 0.9. Since March of 2018 her baseline has been 1.4-1.5, and in May 2018 during the obstructive episode her Scr levels pauline to 2.85 prior to nephrostomy tube placement, and then decreased to 1.59 and 1.7 before discharge. When the conduit became obstructed in late May, nephrostomy tubes were placed, and she had urine output through the tubes (though she states the R did not drain) until they were removed two weeks ago, and the ileal conduit was revised by nazia Ashford at [facility]. Since the revision she reports no issues with urine output from the stoma.     PAST HISTORY  --------------------------------------------------------------------------------  PAST MEDICAL & SURGICAL HISTORY:  CKD (chronic kidney disease) stage 3, GFR 30-59 ml/min  Bladder cancer metastasized to bone  Radial fracture: left  Uterine Polyp  Gastric Ulcer  Migraine, Ophthalmoplegic  Fibromyalgia  Obese  Calcified Thoracic Aorta  Hypertrophic Cardiomyopathy  Paroxysmal Ventricular Tachycardia: 6/2010  Nasal Polyp: 1960  Arthritis  HTN (Hypertension)  Asthmatic Bronchitis  Acid Reflux  History of ileal conduit  S/P total knee arthroplasty, left  EPS study: with no intervention 6/2010  Nasal Polyp removal: 1960  History of Arthroscopy: 1997 Right  Left 2000  History of D&C: 1968  FAMILY HISTORY:  Family history of dementia  Family history of hyperlipidemia  Family history of prostate cancer  PAST SOCIAL HISTORY:  ALLERGIES & MEDICATIONS  --------------------------------------------------------------------------------  Allergies  No Known Allergies  Intolerances  Home Medications:    Patient Currently Takes Medications as of 30-Jul-2018 11:35 documented in Structured Notes  · 	sodium bicarbonate 650 mg oral tablet: 1 tab(s) orally 2 times a day, Last Dose Taken:    · 	montelukast 10 mg oral tablet: 1 tab(s) orally once a day, Last Dose Taken:    · 	raloxifene 60 mg oral tablet: 1 tab(s) orally once a day, Last Dose Taken:    · 	DULoxetine 60 mg oral delayed release capsule: 1 cap(s) orally once a day, Last Dose Taken:    · 	traZODone 100 mg oral tablet: 1 tab(s) orally once a day (at bedtime), Last Dose Taken:    · 	pregabalin 75 mg oral capsule: 1 cap(s) orally 2 times a day  	  	ISTOP Reference# [id number]   	RxDispensed: 7/3/2018  	Q#60  	DS#30, Last Dose Taken:    · 	metoprolol succinate 50 mg oral tablet, extended release: 1 tab(s) orally once a day, Last Dose Taken:    · 	omeprazole 40 mg oral delayed release capsule: 1 cap(s) orally once a day, Last Dose Taken:    · 	Multiple Vitamins oral tablet: 1 tab(s) orally once a day, Last Dose Taken:    · 	dilTIAZem 180 mg/24 hours oral capsule, extended release: 1 cap(s) orally once a day, Last Dose Taken:    · 	Benefiber oral powder for reconstitution: Take as directed twice daily, Last Dose Taken:    · 	Calcium 600+D oral tablet: 1 tab(s) orally 2 times a day, Last Dose Taken:    · 	Singulair 10 mg oral tablet: 1 tab(s) orally once a day, Last Dose Taken:    · 	Compazine 10 mg oral tablet: 1 tab(s) orally 3 times a day, As Needed, Last Dose Taken:      Standing Inpatient Medications  dextrose 5% 1000 milliLiter(s) IV Continuous <Continuous>  PRN Inpatient Medications     REVIEW OF SYSTEMS  --------------------------------------------------------------------------------  Gen: No fevers/chills  Skin: Redness over the revised stoma site of two days  Respiratory: No dyspnea or SOB  CV: No chest pain  GI: Diffuse severe abdominal pain, with nausea, and 5 episodes vomiting since yesterday, no hematemesis  : As reported above, no change in odor but darker shade of yellow the night prior to inciting events, with an unspecified decreased volume  MSK: Chronic on/off back pain, though not associated with this episode; no LE edema  Neuro: No dizziness/lightheadedness  All other systems were reviewed and are negative, except as noted.    VITALS/PHYSICAL EXAM  --------------------------------------------------------------------------------  T(C): 36.8 (07-30-18 @ 09:03), Max: 36.8 (07-29-18 @ 19:33)  HR: 90 (07-30-18 @ 09:03) (72 - 99)  BP: 129/68 (07-30-18 @ 09:03) (115/49 - 161/80)  RR: 14 (07-30-18 @ 09:03) (14 - 18)  SpO2: 99% (07-30-18 @ 09:03) (99% - 100%)  Wt(kg): --     Physical Exam:  Gen: resting, in mild distress  HEENT: PERRL  Pulm: CTA B/L  CV: S1S2 present with holosystolic murmur loudest along L sternal border  Abd: +BS, soft with palpable masses, tender at site of ileal stoma revision in the RLQ, nondistended, current ileal stoma with drainage bag attached, appears clean and without redness visualized through the collection bag  : No suprapubic tenderness  LE: Warm, no edema  Skin: Erythema seen at site of ileal stoma revision  Neuro: Awake  Vascular access: PIV RUE    LABS/STUDIES  --------------------------------------------------------------------------------  10.8   14.47 >-----------< 196 [07-29-18 @ 21:20]  33.3   139 | 110 | 63  ----------------------------< 157 [07-30-18 @ 03:57]  4.9 | 17 | 2.81  Ca 8.8 [07-30-18 @ 03:57]  TPro 6.4 / Alb 3.3 / TBili < 0.2 / DBili x / AST 17 / ALT 13 / AlkPhos 75 [07-30-18 @ 03:57]    Creatinine Trend:  SCr 2.81 [07-30 @ 03:57]  SCr 2.28 [07-29 @ 21:20]  Prior admission:  SCr 1.7 [05-28 @ 07:02]  SCr 2.85 [05-20 @ 22:44]    Urinalysis - [05-20-18 @ 06:21]  Color PL Yellow / Appearance SL Turbid / SG 1.009 / pH 6.5  Gluc Negative / Ketone Negative / Bili Negative / Urobili Negative   Blood Negative / Protein Trace / Leuk Est Large / Nitrite Negative  RBC 5-10 / WBC 6-10 / Hyaline / Gran / Sq Epi / Non Sq Epi OCC / Bacteria      Iron 84, TIBC 176, %sat -- [03-24-18 @ 09:12]  Ferritin 417.1 [03-24-18 @ 09:12]  HbA1c 4.9 [05-21-18 @ 11:32]  TSH 0.77 [05-21-18 @ 11:28]    Imaging:  < from: CT Abdomen and Pelvis No Cont (07.29.18 @ 22:37) >  Patient is status post cystectomy and ileal conduit with right lower   quadrant ostomy.  The patient appears to be status post interval revision of the stoma   since the prior CT scan of 05/20/2018.  There has been interval development of a 7.8 x 3.3 cm postoperative fluid   in the ventral abdominal wall, at the site of previous ostomy.  This may   represent resolving hematoma or sterile seroma.  Superimposed infection   should be excluded clinically.    Bilateral hydronephrosis and perinephric fat stranding is grossly stable   since 05/20/2018.  A nonobstructing right upper pole renal stone measures   9 x 5 mm.  Superimposed genitourinary tract infection should be excluded   clinically.    Small nodular foci along the pleural surface/right hemidiaphragm   measuring up to 5 mm.  Special attention to this region on follow-up   imaging is recommended.    < end of copied text > [facility] DIVISION OF KIDNEY DISEASES AND HYPERTENSION -- INITIAL CONSULT NOTE  --------------------------------------------------------------------------------  HPI: 78 F with PMH significant for HCM, Bladder Ca s/p ileal conduit (Nov 2017), c/b obstructive uropathy in May 2018 with b/l nephrostomy tube placement  with subsequent tube removal and conduit revision 2 weeks ago presents with severe abdominal pain and lack of urine output from the conduit stoma starting yesterday afternoon. She states that there has been no urine output from her ileal conduit since 4 pm yesterday, and that the night prior she had noticed less volume from usual with a darker shade of yellow. Previously, she said, overnight she usually drained 800 to 1200 mL in a night and it would be very pale in color. She also had intense abdominal pain that was diffusely spread over the front of her abdomen, not involving her back, that made her significantly nauseous and led her to vomit 3 times at 6pm. She vomited again about one hour later, and then again while in the ED waiting. She denies any blood or coffee-ground like material in the vomit. She reports continued nausea currently, but denies fevers or chills or lightheadedness. She reports a history of HTN that has been on and off, but denies ever having diabetes or kidney problems prior to the bladder resection and ileal conduit in November. Her last normal Scr per chart review was in Feb 2016 at 0.9. Since March of 2018 her baseline has been 1.4-1.5, and in May 2018 during the obstructive episode her Scr levels pauline to 2.85 prior to nephrostomy tube placement, and then decreased to 1.59 and 1.7 before discharge. When the conduit became obstructed in late May, nephrostomy tubes were placed, and she had urine output through the tubes (though she states the R did not drain) until they were removed two weeks ago, and the ileal conduit was revised by nazia Ashford at [facility]. Since the revision she reports no issues with urine output from the stoma.     PAST HISTORY  --------------------------------------------------------------------------------  PAST MEDICAL & SURGICAL HISTORY:  CKD (chronic kidney disease) stage 3, GFR 30-59 ml/min  Bladder cancer metastasized to bone  Radial fracture: left  Uterine Polyp  Gastric Ulcer  Migraine, Ophthalmoplegic  Fibromyalgia  Obese  Calcified Thoracic Aorta  Hypertrophic Cardiomyopathy  Paroxysmal Ventricular Tachycardia: 6/2010  Nasal Polyp: 1960  Arthritis  HTN (Hypertension)  Asthmatic Bronchitis  Acid Reflux  History of ileal conduit  S/P total knee arthroplasty, left  EPS study: with no intervention 6/2010  Nasal Polyp removal: 1960  History of Arthroscopy: 1997 Right  Left 2000  History of D&C: 1968  FAMILY HISTORY:  Family history of dementia  Family history of hyperlipidemia  Family history of prostate cancer  PAST SOCIAL HISTORY:  ALLERGIES & MEDICATIONS  --------------------------------------------------------------------------------  Allergies  No Known Allergies  Intolerances  Home Medications:    Patient Currently Takes Medications as of 30-Jul-2018 11:35 documented in Structured Notes  · 	sodium bicarbonate 650 mg oral tablet: 1 tab(s) orally 2 times a day, Last Dose Taken:    · 	montelukast 10 mg oral tablet: 1 tab(s) orally once a day, Last Dose Taken:    · 	raloxifene 60 mg oral tablet: 1 tab(s) orally once a day, Last Dose Taken:    · 	DULoxetine 60 mg oral delayed release capsule: 1 cap(s) orally once a day, Last Dose Taken:    · 	traZODone 100 mg oral tablet: 1 tab(s) orally once a day (at bedtime), Last Dose Taken:    · 	pregabalin 75 mg oral capsule: 1 cap(s) orally 2 times a day  	  	ISTOP Reference# [id number]   	RxDispensed: 7/3/2018  	Q#60  	DS#30, Last Dose Taken:    · 	metoprolol succinate 50 mg oral tablet, extended release: 1 tab(s) orally once a day, Last Dose Taken:    · 	omeprazole 40 mg oral delayed release capsule: 1 cap(s) orally once a day, Last Dose Taken:    · 	Multiple Vitamins oral tablet: 1 tab(s) orally once a day, Last Dose Taken:    · 	dilTIAZem 180 mg/24 hours oral capsule, extended release: 1 cap(s) orally once a day, Last Dose Taken:    · 	Benefiber oral powder for reconstitution: Take as directed twice daily, Last Dose Taken:    · 	Calcium 600+D oral tablet: 1 tab(s) orally 2 times a day, Last Dose Taken:    · 	Singulair 10 mg oral tablet: 1 tab(s) orally once a day, Last Dose Taken:    · 	Compazine 10 mg oral tablet: 1 tab(s) orally 3 times a day, As Needed, Last Dose Taken:      Standing Inpatient Medications  dextrose 5% 1000 milliLiter(s) IV Continuous <Continuous>  PRN Inpatient Medications     REVIEW OF SYSTEMS  --------------------------------------------------------------------------------  Gen: No fevers/chills  Skin: Redness over the revised stoma site of two days  Respiratory: No dyspnea or SOB  CV: No chest pain  GI: Diffuse severe abdominal pain, with nausea, and 5 episodes vomiting since yesterday, no hematemesis  : As reported above, no change in odor but darker shade of yellow the night prior to inciting events, with an unspecified decreased volume  MSK: Chronic on/off back pain, though not associated with this episode; no LE edema  Neuro: No dizziness/lightheadedness  All other systems were reviewed and are negative, except as noted.    VITALS/PHYSICAL EXAM  --------------------------------------------------------------------------------  T(C): 36.8 (07-30-18 @ 09:03), Max: 36.8 (07-29-18 @ 19:33)  HR: 90 (07-30-18 @ 09:03) (72 - 99)  BP: 129/68 (07-30-18 @ 09:03) (115/49 - 161/80)  RR: 14 (07-30-18 @ 09:03) (14 - 18)  SpO2: 99% (07-30-18 @ 09:03) (99% - 100%)  Wt(kg): --     Physical Exam:  Gen: resting, in mild distress  HEENT: PERRL  Pulm: CTA B/L  CV: S1S2 present with holosystolic murmur loudest along L sternal border  Abd: +BS, soft with palpable masses, tender at site of ileal stoma revision in the RLQ, nondistended, current ileal stoma with drainage bag attached, appears clean and without redness visualized through the collection bag  : No suprapubic tenderness  LE: Warm, no edema  Skin: Erythema seen at site of ileal stoma revision  Neuro: Awake  Vascular access: PIV RUE    LABS/STUDIES  --------------------------------------------------------------------------------  10.8   14.47 >-----------< 196 [07-29-18 @ 21:20]  33.3   139 | 110 | 63  ----------------------------< 157 [07-30-18 @ 03:57]  4.9 | 17 | 2.81  Ca 8.8 [07-30-18 @ 03:57]  TPro 6.4 / Alb 3.3 / TBili < 0.2 / DBili x / AST 17 / ALT 13 / AlkPhos 75 [07-30-18 @ 03:57]    Creatinine Trend:  SCr 2.81 [07-30 @ 03:57]  SCr 2.28 [07-29 @ 21:20]  Prior admission:  SCr 1.7 [05-28 @ 07:02]  SCr 2.85 [05-20 @ 22:44]    Urinalysis - [05-20-18 @ 06:21]  Color PL Yellow / Appearance SL Turbid / SG 1.009 / pH 6.5  Gluc Negative / Ketone Negative / Bili Negative / Urobili Negative   Blood Negative / Protein Trace / Leuk Est Large / Nitrite Negative  RBC 5-10 / WBC 6-10 / Hyaline / Gran / Sq Epi / Non Sq Epi OCC / Bacteria      Iron 84, TIBC 176, %sat -- [03-24-18 @ 09:12]  Ferritin 417.1 [03-24-18 @ 09:12]  HbA1c 4.9 [05-21-18 @ 11:32]  TSH 0.77 [05-21-18 @ 11:28]    Imaging:  < from: CT Abdomen and Pelvis No Cont (07.29.18 @ 22:37) >  Patient is status post cystectomy and ileal conduit with right lower   quadrant ostomy.  The patient appears to be status post interval revision of the stoma   since the prior CT scan of 05/20/2018.  There has been interval development of a 7.8 x 3.3 cm postoperative fluid   in the ventral abdominal wall, at the site of previous ostomy.  This may   represent resolving hematoma or sterile seroma.  Superimposed infection   should be excluded clinically.    Bilateral hydronephrosis and perinephric fat stranding is grossly stable   since 05/20/2018.  A nonobstructing right upper pole renal stone measures   9 x 5 mm.  Superimposed genitourinary tract infection should be excluded   clinically.    Small nodular foci along the pleural surface/right hemidiaphragm   measuring up to 5 mm.  Special attention to this region on follow-up   imaging is recommended.    < end of copied text >

## 2018-07-30 NOTE — H&P ADULT - ASSESSMENT
Ms. Moore is 79 yo woman with PMHx of metastatic bladder CA with ileal conduit in the RLQ, small bowel resection with anastomosis in the RLQ, CKD, hypertrophic cardiomyopathy, HTN, fibromyalgia, obesity Ms. Moore is a 77 yo woman with PMHx most notable for metastatic bladder cancer to the bone undergoing active chemotherapy (carboplatin/etoposide) s/p cystectomy with ileal conduit creation c/b prior obstructive uropathy requiring b/l percutaneous nephrostomy tubes on 5/20/18, morbid obesity, hypertension, hypertrophic cardiomyopathy, and CKD 3 presenting with 1-day onset of diffuse abdominal pain, found to have DEVON on CKD stage 3. Ms. Moore is a 79 yo woman with PMHx most notable for metastatic bladder cancer to the bone undergoing active chemotherapy (carboplatin/etoposide) s/p cystectomy with ileal conduit creation c/b prior obstructive uropathy requiring b/l percutaneous nephrostomy tubes on 5/20/18, morbid obesity, hypertension, hypertrophic cardiomyopathy, and CKD 3 in addition to other comorbidities presenting with 1-day onset of diffuse abdominal pain, found to have DEVON on CKD stage 3 c/b severe metabolic acidosis, stable anemia, and leukocytosis. Exam notable for erythema at prior ileostomy site with CT showing possible resolving hematoma or sterile seroma but cannot exclude superimposed infection at the site. Pt currently afebrile and hemodynamically stable.

## 2018-07-30 NOTE — ED ADULT NURSE REASSESSMENT NOTE - NS ED NURSE REASSESS COMMENT FT1
pt resting comfortably in stretcher, vs as stated, resps even and unlabored, pre ops drawn and sent. patient updated on plan of care, will ctm closely.

## 2018-07-30 NOTE — CONSULT NOTE ADULT - ASSESSMENT
77 yo with bladder Ca s/p ileostomy and stomal stenosis in may s/p ostomy revision with severe abdominal and CVAT likely 2/2 stomal stenosis.    - 16 Fr [place] catheter placed over guidewire in ED  - irrigate catheter three times a day to remove mucus  - monitor for post-obstructive diuresis  - drink to thirst  - two pitchers of water at bedside at all times  - monitor creatinine and BMP  - if UOP >200 per hour q6 BMP

## 2018-07-30 NOTE — CHART NOTE - NSCHARTNOTEFT_GEN_A_CORE
received callback from IR regarding b/l NT tube, IR seeking urology recommendation before deciding on case. IR to follow up for urology note, advise to make patient npo after midnight in preparation that procedure can occur tomorrow.

## 2018-07-30 NOTE — CONSULT NOTE ADULT - ASSESSMENT
78 F with PMH significant for HCM, Bladder Ca s/p ileal conduit Nov 2017 and revision 2 weeks ago presents with     TBD with nephrology team/attending    Indu Jett, PGY-1 78 F with PMH significant for Bladder Ca s/p ileal conduit Nov 2017 c/b obstruction in May with h/o nephrostomy tube placement and removal with conduit revision 2 weeks ago presents with decreased urine output of one day, moderate b/l hydroureteronephrosis on CT abdomen, and increased SCr to 2.81 suggestive of obstructive uropathy. 78 F with PMH significant for Bladder Ca s/p ileal conduit (Nov 2017) c/b obstructive uropathy in May 2018 for which she underwent nephrostomy tube placement.  Nephrostomy tubes were removed with conduit revision 2 weeks ago. Pt. presents now with decreased urine output of one day, moderate b/l hydroureteronephrosis on CT abdomen, and increased Scr of 2.81. Pt. with DEVON in setting of obstructive uropathy.

## 2018-07-30 NOTE — CHART NOTE - NSCHARTNOTEFT_GEN_A_CORE
ADS NIGHT COVERAGE    Patients creatinine 4.38 increased from 2.81. Spoke with urology on call. Patient put out 225 ml at 21:00. Urology to flush catheter. Possible nephrostomy tubes tomorrow. Will follow up recommendations.      Maxim ALEMAN  [id number]

## 2018-07-30 NOTE — H&P ADULT - PROBLEM SELECTOR PLAN 7
- heparin SC for DVT ppx  - pantoprazole 40mg daily (pt on home regimen of omeprazole 40mg daily)  - resume home regimen of multivitamin daily    IMPROVE VTE Individual Risk Assessment        RISK                                                          Points  [  ] Previous VTE                                               3  [  ] Thrombophilia                                            2  [  ] Lower limb paresis/paralysis                    2    [ x ] Active Cancer (in last 6 months)              2   [  ] Immobilization > 24 hrs                             1  [  ] ICU/CCU stay > 24 hours                            1  [ x ] Age > 60                                                        1                                            Total Score ___3______

## 2018-07-31 DIAGNOSIS — E87.5 HYPERKALEMIA: ICD-10-CM

## 2018-07-31 LAB
ALBUMIN SERPL ELPH-MCNC: 2.5 G/DL — LOW (ref 3.3–5)
ALP SERPL-CCNC: 86 U/L — SIGNIFICANT CHANGE UP (ref 40–120)
ALT FLD-CCNC: 19 U/L — SIGNIFICANT CHANGE UP (ref 4–33)
AST SERPL-CCNC: 21 U/L — SIGNIFICANT CHANGE UP (ref 4–32)
BASE EXCESS BLDA CALC-SCNC: -7.8 MMOL/L — SIGNIFICANT CHANGE UP
BILIRUB SERPL-MCNC: < 0.2 MG/DL — LOW (ref 0.2–1.2)
BUN SERPL-MCNC: 84 MG/DL — HIGH (ref 7–23)
BUN SERPL-MCNC: 97 MG/DL — HIGH (ref 7–23)
CALCIUM SERPL-MCNC: 7.7 MG/DL — LOW (ref 8.4–10.5)
CALCIUM SERPL-MCNC: 8.3 MG/DL — LOW (ref 8.4–10.5)
CHLORIDE SERPL-SCNC: 103 MMOL/L — SIGNIFICANT CHANGE UP (ref 98–107)
CHLORIDE SERPL-SCNC: 105 MMOL/L — SIGNIFICANT CHANGE UP (ref 98–107)
CO2 SERPL-SCNC: 18 MMOL/L — LOW (ref 22–31)
CO2 SERPL-SCNC: 18 MMOL/L — LOW (ref 22–31)
CREAT SERPL-MCNC: 4.72 MG/DL — HIGH (ref 0.5–1.3)
CREAT SERPL-MCNC: 5.28 MG/DL — HIGH (ref 0.5–1.3)
FERRITIN SERPL-MCNC: 480.9 NG/ML — HIGH (ref 15–150)
FOLATE SERPL-MCNC: 19.7 NG/ML — SIGNIFICANT CHANGE UP (ref 4.7–20)
GLUCOSE BLDA-MCNC: 68 MG/DL — LOW (ref 70–99)
GLUCOSE BLDC GLUCOMTR-MCNC: 105 MG/DL — HIGH (ref 70–99)
GLUCOSE BLDC GLUCOMTR-MCNC: 93 MG/DL — SIGNIFICANT CHANGE UP (ref 70–99)
GLUCOSE SERPL-MCNC: 92 MG/DL — SIGNIFICANT CHANGE UP (ref 70–99)
GLUCOSE SERPL-MCNC: 95 MG/DL — SIGNIFICANT CHANGE UP (ref 70–99)
HCO3 BLDA-SCNC: 18 MMOL/L — LOW (ref 22–26)
HCT VFR BLD CALC: 30 % — LOW (ref 34.5–45)
HCT VFR BLDA CALC: 27.3 % — LOW (ref 34.5–46.5)
HGB BLD-MCNC: 9.9 G/DL — LOW (ref 11.5–15.5)
HGB BLDA-MCNC: 8.8 G/DL — LOW (ref 11.5–15.5)
INR BLD: 1.21 — HIGH (ref 0.88–1.17)
IRON SATN MFR SERPL: 222 UG/DL — SIGNIFICANT CHANGE UP (ref 140–530)
IRON SATN MFR SERPL: 8 UG/DL — LOW (ref 30–160)
MCHC RBC-ENTMCNC: 31.1 PG — SIGNIFICANT CHANGE UP (ref 27–34)
MCHC RBC-ENTMCNC: 33 % — SIGNIFICANT CHANGE UP (ref 32–36)
MCV RBC AUTO: 94.3 FL — SIGNIFICANT CHANGE UP (ref 80–100)
NRBC # FLD: 0 — SIGNIFICANT CHANGE UP
PCO2 BLDA: 33 MMHG — SIGNIFICANT CHANGE UP (ref 32–48)
PH BLDA: 7.33 PH — LOW (ref 7.35–7.45)
PLATELET # BLD AUTO: 142 K/UL — LOW (ref 150–400)
PMV BLD: 11.8 FL — SIGNIFICANT CHANGE UP (ref 7–13)
PO2 BLDA: 185 MMHG — HIGH (ref 83–108)
POTASSIUM BLDA-SCNC: 4.4 MMOL/L — SIGNIFICANT CHANGE UP (ref 3.4–4.5)
POTASSIUM SERPL-MCNC: 5.2 MMOL/L — SIGNIFICANT CHANGE UP (ref 3.5–5.3)
POTASSIUM SERPL-MCNC: 5.4 MMOL/L — HIGH (ref 3.5–5.3)
POTASSIUM SERPL-SCNC: 5.2 MMOL/L — SIGNIFICANT CHANGE UP (ref 3.5–5.3)
POTASSIUM SERPL-SCNC: 5.4 MMOL/L — HIGH (ref 3.5–5.3)
PROT SERPL-MCNC: 5.3 G/DL — LOW (ref 6–8.3)
PROTHROM AB SERPL-ACNC: 14 SEC — HIGH (ref 9.8–13.1)
RBC # BLD: 3.18 M/UL — LOW (ref 3.8–5.2)
RBC # FLD: 13.2 % — SIGNIFICANT CHANGE UP (ref 10.3–14.5)
RETICS #: 95 K/UL — SIGNIFICANT CHANGE UP (ref 25–125)
RETICS/RBC NFR: 3 % — HIGH (ref 0.5–2.5)
SAO2 % BLDA: 99.5 % — HIGH (ref 95–99)
SODIUM BLDA-SCNC: 137 MMOL/L — SIGNIFICANT CHANGE UP (ref 136–146)
SODIUM SERPL-SCNC: 139 MMOL/L — SIGNIFICANT CHANGE UP (ref 135–145)
SODIUM SERPL-SCNC: 139 MMOL/L — SIGNIFICANT CHANGE UP (ref 135–145)
UIBC SERPL-MCNC: 214.1 UG/DL — SIGNIFICANT CHANGE UP (ref 110–370)
VIT B12 SERPL-MCNC: 1317 PG/ML — HIGH (ref 200–900)
WBC # BLD: 10.46 K/UL — SIGNIFICANT CHANGE UP (ref 3.8–10.5)
WBC # FLD AUTO: 10.46 K/UL — SIGNIFICANT CHANGE UP (ref 3.8–10.5)

## 2018-07-31 PROCEDURE — 76937 US GUIDE VASCULAR ACCESS: CPT | Mod: 26

## 2018-07-31 PROCEDURE — 36569 INSJ PICC 5 YR+ W/O IMAGING: CPT

## 2018-07-31 PROCEDURE — 99291 CRITICAL CARE FIRST HOUR: CPT

## 2018-07-31 PROCEDURE — 93010 ELECTROCARDIOGRAM REPORT: CPT

## 2018-07-31 PROCEDURE — 99233 SBSQ HOSP IP/OBS HIGH 50: CPT | Mod: GC

## 2018-07-31 RX ORDER — SODIUM CHLORIDE 9 MG/ML
1000 INJECTION INTRAMUSCULAR; INTRAVENOUS; SUBCUTANEOUS
Qty: 0 | Refills: 0 | Status: DISCONTINUED | OUTPATIENT
Start: 2018-07-31 | End: 2018-08-01

## 2018-07-31 RX ORDER — DEXTROSE 50 % IN WATER 50 %
25 SYRINGE (ML) INTRAVENOUS ONCE
Qty: 0 | Refills: 0 | Status: COMPLETED | OUTPATIENT
Start: 2018-07-31 | End: 2018-07-31

## 2018-07-31 RX ORDER — INSULIN HUMAN 100 [IU]/ML
10 INJECTION, SOLUTION SUBCUTANEOUS ONCE
Qty: 0 | Refills: 0 | Status: DISCONTINUED | OUTPATIENT
Start: 2018-07-31 | End: 2018-07-31

## 2018-07-31 RX ORDER — HEPARIN SODIUM 5000 [USP'U]/ML
5000 INJECTION INTRAVENOUS; SUBCUTANEOUS EVERY 8 HOURS
Qty: 0 | Refills: 0 | Status: DISCONTINUED | OUTPATIENT
Start: 2018-07-31 | End: 2018-08-03

## 2018-07-31 RX ORDER — CALCIUM GLUCONATE 100 MG/ML
1 VIAL (ML) INTRAVENOUS ONCE
Qty: 0 | Refills: 0 | Status: COMPLETED | OUTPATIENT
Start: 2018-07-31 | End: 2018-07-31

## 2018-07-31 RX ORDER — PIPERACILLIN AND TAZOBACTAM 4; .5 G/20ML; G/20ML
3.38 INJECTION, POWDER, LYOPHILIZED, FOR SOLUTION INTRAVENOUS EVERY 12 HOURS
Qty: 0 | Refills: 0 | Status: COMPLETED | OUTPATIENT
Start: 2018-07-31 | End: 2018-08-03

## 2018-07-31 RX ORDER — SODIUM POLYSTYRENE SULFONATE 4.1 MEQ/G
15 POWDER, FOR SUSPENSION ORAL ONCE
Qty: 0 | Refills: 0 | Status: COMPLETED | OUTPATIENT
Start: 2018-07-31 | End: 2018-07-31

## 2018-07-31 RX ORDER — SODIUM CHLORIDE 9 MG/ML
1000 INJECTION INTRAMUSCULAR; INTRAVENOUS; SUBCUTANEOUS ONCE
Qty: 0 | Refills: 0 | Status: COMPLETED | OUTPATIENT
Start: 2018-07-31 | End: 2018-08-01

## 2018-07-31 RX ORDER — CHLORHEXIDINE GLUCONATE 213 G/1000ML
1 SOLUTION TOPICAL
Qty: 0 | Refills: 0 | Status: DISCONTINUED | OUTPATIENT
Start: 2018-07-31 | End: 2018-08-06

## 2018-07-31 RX ORDER — SODIUM CHLORIDE 9 MG/ML
1000 INJECTION, SOLUTION INTRAVENOUS ONCE
Qty: 0 | Refills: 0 | Status: COMPLETED | OUTPATIENT
Start: 2018-07-31 | End: 2018-07-31

## 2018-07-31 RX ORDER — INSULIN HUMAN 100 [IU]/ML
10 INJECTION, SOLUTION SUBCUTANEOUS ONCE
Qty: 0 | Refills: 0 | Status: COMPLETED | OUTPATIENT
Start: 2018-07-31 | End: 2018-07-31

## 2018-07-31 RX ORDER — SODIUM POLYSTYRENE SULFONATE 4.1 MEQ/G
30 POWDER, FOR SUSPENSION ORAL ONCE
Qty: 0 | Refills: 0 | Status: COMPLETED | OUTPATIENT
Start: 2018-07-31 | End: 2018-07-31

## 2018-07-31 RX ORDER — SODIUM POLYSTYRENE SULFONATE 4.1 MEQ/G
15 POWDER, FOR SUSPENSION ORAL ONCE
Qty: 0 | Refills: 0 | Status: DISCONTINUED | OUTPATIENT
Start: 2018-07-31 | End: 2018-07-31

## 2018-07-31 RX ORDER — IPRATROPIUM/ALBUTEROL SULFATE 18-103MCG
3 AEROSOL WITH ADAPTER (GRAM) INHALATION ONCE
Qty: 0 | Refills: 0 | Status: COMPLETED | OUTPATIENT
Start: 2018-07-31 | End: 2018-07-31

## 2018-07-31 RX ORDER — SODIUM CHLORIDE 9 MG/ML
1000 INJECTION INTRAMUSCULAR; INTRAVENOUS; SUBCUTANEOUS ONCE
Qty: 0 | Refills: 0 | Status: COMPLETED | OUTPATIENT
Start: 2018-07-31 | End: 2018-07-31

## 2018-07-31 RX ORDER — CALCIUM GLUCONATE 100 MG/ML
1 VIAL (ML) INTRAVENOUS ONCE
Qty: 0 | Refills: 0 | Status: DISCONTINUED | OUTPATIENT
Start: 2018-07-31 | End: 2018-07-31

## 2018-07-31 RX ORDER — DESMOPRESSIN ACETATE 0.1 MG/1
23 TABLET ORAL ONCE
Qty: 0 | Refills: 0 | Status: DISCONTINUED | OUTPATIENT
Start: 2018-07-31 | End: 2018-08-01

## 2018-07-31 RX ORDER — DEXTROSE 50 % IN WATER 50 %
50 SYRINGE (ML) INTRAVENOUS ONCE
Qty: 0 | Refills: 0 | Status: DISCONTINUED | OUTPATIENT
Start: 2018-07-31 | End: 2018-07-31

## 2018-07-31 RX ADMIN — BUDESONIDE AND FORMOTEROL FUMARATE DIHYDRATE 2 PUFF(S): 160; 4.5 AEROSOL RESPIRATORY (INHALATION) at 23:12

## 2018-07-31 RX ADMIN — Medication 100 MILLIGRAM(S): at 23:21

## 2018-07-31 RX ADMIN — HEPARIN SODIUM 5000 UNIT(S): 5000 INJECTION INTRAVENOUS; SUBCUTANEOUS at 23:21

## 2018-07-31 RX ADMIN — Medication 975 MILLIGRAM(S): at 14:44

## 2018-07-31 RX ADMIN — SODIUM POLYSTYRENE SULFONATE 15 GRAM(S): 4.1 POWDER, FOR SUSPENSION ORAL at 11:50

## 2018-07-31 RX ADMIN — INSULIN HUMAN 10 UNIT(S): 100 INJECTION, SOLUTION SUBCUTANEOUS at 00:52

## 2018-07-31 RX ADMIN — Medication 200 GRAM(S): at 10:32

## 2018-07-31 RX ADMIN — PIPERACILLIN AND TAZOBACTAM 25 GRAM(S): 4; .5 INJECTION, POWDER, LYOPHILIZED, FOR SOLUTION INTRAVENOUS at 23:21

## 2018-07-31 RX ADMIN — Medication 25 MILLILITER(S): at 10:32

## 2018-07-31 RX ADMIN — Medication 975 MILLIGRAM(S): at 23:21

## 2018-07-31 RX ADMIN — INSULIN HUMAN 10 UNIT(S): 100 INJECTION, SOLUTION SUBCUTANEOUS at 11:50

## 2018-07-31 RX ADMIN — HYDROMORPHONE HYDROCHLORIDE 0.5 MILLIGRAM(S): 2 INJECTION INTRAMUSCULAR; INTRAVENOUS; SUBCUTANEOUS at 04:57

## 2018-07-31 RX ADMIN — Medication 50 MILLIGRAM(S): at 10:32

## 2018-07-31 RX ADMIN — BUDESONIDE AND FORMOTEROL FUMARATE DIHYDRATE 2 PUFF(S): 160; 4.5 AEROSOL RESPIRATORY (INHALATION) at 10:03

## 2018-07-31 RX ADMIN — HYDROMORPHONE HYDROCHLORIDE 0.5 MILLIGRAM(S): 2 INJECTION INTRAMUSCULAR; INTRAVENOUS; SUBCUTANEOUS at 05:30

## 2018-07-31 RX ADMIN — Medication 50 MILLILITER(S): at 00:51

## 2018-07-31 RX ADMIN — SODIUM CHLORIDE 1000 MILLILITER(S): 9 INJECTION, SOLUTION INTRAVENOUS at 01:00

## 2018-07-31 RX ADMIN — SODIUM CHLORIDE 2000 MILLILITER(S): 9 INJECTION INTRAMUSCULAR; INTRAVENOUS; SUBCUTANEOUS at 20:19

## 2018-07-31 RX ADMIN — Medication 75 MILLIGRAM(S): at 23:20

## 2018-07-31 NOTE — CONSULT NOTE ADULT - ATTENDING COMMENTS
Agree with above. Seen and examined with residents. Admit to MICU, BP support, antibiotics, supportive care. For IR procedure today- nephrostomy tubes this afternoon. Potassium control.

## 2018-07-31 NOTE — PROVIDER CONTACT NOTE (OTHER) - SITUATION
Patient found to have labored breathing, RN at bedside. Patient found lethargic and disoriented to situation. Vital signs taken. Patient's heart rate 131, blood pressure 98/42, 18 RR, 97% RA,
Pt just arrived from ED with low BP
patient has hematuria from ileoconducit
patient tachycardic
patient tachycardic and hypotensive
patient tachycardic

## 2018-07-31 NOTE — CHART NOTE - NSCHARTNOTEFT_GEN_A_CORE
Pre-Interventional Radiology Procedure Note    [id number] yo     fmale    Procedure: Bilateral Nephrostomy Tubes  DEVON     Diagnosis/Indication: Patient is a 78y old  Female who presents with a chief complaint of DEVON 2/2 obstructive uropathy with moderate b/l hydroureteronephrosis (30 Jul 2018 09:03)  CLL    Interventional Radiology Attending Physician Sadi    Ordering Attending Physician Dr. AGUSTIN Bryant    PAST MEDICAL & SURGICAL HISTORY:  Anemia, unspecified type  CKD (chronic kidney disease) stage 3, GFR 30-59 ml/min  Bladder cancer metastasized to bone  Radial fracture: left  Uterine Polyp  Gastric Ulcer  Migraine, Ophthalmoplegic  Fibromyalgia  Obese  Calcified Thoracic Aorta  Hypertrophic Cardiomyopathy  Paroxysmal Ventricular Tachycardia: 6/2010  Nasal Polyp: 1960  Arthritis  HTN (Hypertension)  Asthmatic Bronchitis  Acid Reflux  History of ileal conduit  S/P total knee arthroplasty, left  EPS study: with no intervention 6/2010  Nasal Polyp removal: 1960  History of Arthroscopy: 1997 Right  Left 2000  History of D&C: 1968       CBC Full  -  ( 31 Jul 2018 07:45 )  WBC Count : 10.46 K/uL  Hemoglobin : 9.9 g/dL  Hematocrit : 30.0 %  Platelet Count - Automated : 142 K/uL  Mean Cell Volume : 94.3 fL  Mean Cell Hemoglobin : 31.1 pg  Mean Cell Hemoglobin Concentration : 33.0 %  Auto Neutrophil # : x  Auto Lymphocyte # : x  Auto Monocyte # : x  Auto Eosinophil # : x  Auto Basophil # : x  Auto Neutrophil % : x  Auto Lymphocyte % : x  Auto Monocyte % : x  Auto Eosinophil % : x  Auto Basophil % : x    07-31    139  |  103  |  84<H>  ----------------------------<  92  5.4<H>   |  18<L>  |  4.72<H>    Ca    8.3<L>      31 Jul 2018 07:45    TPro  6.4  /  Alb  3.3  /  TBili  < 0.2<L>  /  DBili  x   /  AST  17  /  ALT  13  /  AlkPhos  75  07-30    PT/INR - ( 31 Jul 2018 07:45 )   PT: 14.0 SEC;   INR: 1.21          PTT - ( 30 Jul 2018 06:07 )  PTT:30.8 SEC    Able to sign consent.  Aware of procedure.

## 2018-07-31 NOTE — PROGRESS NOTE ADULT - ASSESSMENT
77 yo with bladder Ca s/p ileostomy and stomal stenosis in may s/p ostomy revision with severe abdominal and CVAT likely 2/2 stomal stenosis.  Creatinine increasing despite stomal catheter    -Low output from ostomy, Cr increasing  -Recommend IR placement of b/l NT  -Monitor creatinine and BMP

## 2018-07-31 NOTE — CHART NOTE - NSCHARTNOTEFT_GEN_A_CORE
Critically ill pt requring PIV access for medical management and pressor support. Under US guidance identified Rt UE vein, prox to AC and successfully placed 20g x 1.88 inch angiocath into vessel. . Placement confirmed s/p with ultrasound and catheter determined to be in patent lumen of vein. Pt tolerated well w/o complication.    Rachel Odom PA-C  [facility] [id number]

## 2018-07-31 NOTE — CONSULT NOTE ADULT - SUBJECTIVE AND OBJECTIVE BOX
CHIEF COMPLAINT: B/L hydronephrosis pending nephrostomy tubes    HPI:    PAST MEDICAL & SURGICAL HISTORY:  Anemia, unspecified type  CKD (chronic kidney disease) stage 3, GFR 30-59 ml/min  Bladder cancer metastasized to bone  Radial fracture: left  Uterine Polyp  Gastric Ulcer  Migraine, Ophthalmoplegic  Fibromyalgia  Obese  Calcified Thoracic Aorta  Hypertrophic Cardiomyopathy  Paroxysmal Ventricular Tachycardia: 2010  Nasal Polyp:   Arthritis  HTN (Hypertension)  Asthmatic Bronchitis  Acid Reflux  History of ileal conduit  S/P total knee arthroplasty, left  EPS study: with no intervention 2010  Nasal Polyp removal:   History of Arthroscopy:  Right  Left   History of D&C:       Allergies  No Known Allergies    HOME MEDICATIONS:    REVIEW OF SYSTEMS:  [X ] All other systems negative unless stated above       OBJECTIVE:  ICU Vital Signs Last 24 Hrs  T(C): 36.6 (2018 10:30), Max: 36.8 (2018 19:45)  T(F): 97.9 (2018 10:30), Max: 98.2 (2018 19:45)  HR: 132 (2018 11:05) (84 - 132)  BP: 112/53 (2018 11:05) (80/47 - 112/54)  RR: 20 (2018 11:05) (17 - 20)  SpO2: 97% (2018 11:05) (92% - 98%)         @ 07:01  -   @ 07:00  --------------------------------------------------------  IN: 0 mL / OUT: 375 mL / NET: -375 mL      CAPILLARY BLOOD GLUCOSE  POCT Blood Glucose.: 93 mg/dL (2018 10:11)      PHYSICAL EXAM:  General:   HEENT:   Lymph Nodes:  Neck:   Respiratory:   Cardiovascular:   Abdomen:   Extremities:   Skin:   Neurological:  Psychiatry:    LINES: [facility]    HOSPITAL MEDICATIONS:  metoprolol succinate ER 50 milliGRAM(s) Oral daily  buDESOnide 160 MICROgram(s)/formoterol 4.5 MICROgram(s) Inhaler 2 Puff(s) Inhalation two times a day  montelukast 10 milliGRAM(s) Oral daily  acetaminophen   Tablet. 975 milliGRAM(s) Oral every 8 hours  DULoxetine 60 milliGRAM(s) Oral daily  HYDROmorphone  Injectable 0.5 milliGRAM(s) IV Push every 3 hours PRN  pregabalin 75 milliGRAM(s) Oral two times a day  docusate sodium 100 milliGRAM(s) Oral two times a day  pantoprazole    Tablet 40 milliGRAM(s) Oral before breakfast  polyethylene glycol 3350 17 Gram(s) Oral daily  dextrose 5% 1000 milliLiter(s) IV Continuous <Continuous>  multivitamin 1 Tablet(s) Oral daily    LABS:                        9.9    10.46 )-----------( 142      ( 2018 07:45 )             30.0     Hgb Trend: 9.9<--, 10.6<--, 10.8<--      139  |  103  |  84<H>  ----------------------------<  92  5.4<H>   |  18<L>  |  4.72<H>    Ca    8.3<L>      2018 07:45    TPro  6.4  /  Alb  3.3  /  TBili  < 0.2<L>  /  DBili  x   /  AST  17  /  ALT  13  /  AlkPhos  75      Creatinine Trend: 4.72<--, 4.38<--, 2.81<--, 2.28<--  PT/INR - ( 2018 07:45 )   PT: 14.0 SEC;   INR: 1.21          PTT - ( 2018 06:07 )  PTT:30.8 SEC  Urinalysis Basic - ( 2018 13:00 )    Color: YELLOW / Appearance: TURBID / S.014 / pH: 8.0  Gluc: NEGATIVE / Ketone: NEGATIVE  / Bili: NEGATIVE / Urobili: NORMAL mg/dL   Blood: NEGATIVE / Protein: 30 mg/dL / Nitrite: NEGATIVE   Leuk Esterase: LARGE / RBC: x / WBC 10-25   Sq Epi: OCC / Non Sq Epi: x / Bacteria: x      Arterial Blood Gas:   @ 12:30  7.33/33/185/18/99.5/-7.8  ABG lactate: --    Venous Blood Gas:   @ 03:57  7.24/41/57/17/88.0  VBG Lactate: 1.6  Venous Blood Gas:   @ 21:20  7.19/43/44/15/72.7  VBG Lactate: 2.3      MICROBIOLOGY: Reviewed. Urine culture pending     RADIOLOGY:  [x] Reviewed and interpreted by me    EKG: CHIEF COMPLAINT: B/L hydronephrosis pending nephrostomy tubes    HPI:    PAST MEDICAL & SURGICAL HISTORY:  Anemia, unspecified type  CKD (chronic kidney disease) stage 3, GFR 30-59 ml/min  Bladder cancer metastasized to bone  Radial fracture: left  Uterine Polyp  Gastric Ulcer  Migraine, Ophthalmoplegic  Fibromyalgia  Obese  Calcified Thoracic Aorta  Hypertrophic Cardiomyopathy  Paroxysmal Ventricular Tachycardia: 2010  Nasal Polyp:   Arthritis  HTN (Hypertension)  Asthmatic Bronchitis  Acid Reflux  History of ileal conduit  S/P total knee arthroplasty, left  EPS study: with no intervention 2010  Nasal Polyp removal:   History of Arthroscopy:  Right  Left   History of D&C:       Allergies  No Known Allergies    HOME MEDICATIONS:    REVIEW OF SYSTEMS:  [X ] All other systems negative unless stated above       OBJECTIVE:  ICU Vital Signs Last 24 Hrs  T(C): 36.6 (2018 10:30), Max: 36.8 (2018 19:45)  T(F): 97.9 (2018 10:30), Max: 98.2 (2018 19:45)  HR: 132 (2018 11:05) (84 - 132)  BP: 112/53 (2018 11:05) (80/47 - 112/54)  RR: 20 (2018 11:05) (17 - 20)  SpO2: 97% (2018 11:05) (92% - 98%)         @ 07:01  -   @ 07:00  --------------------------------------------------------  IN: 0 mL / OUT: 375 mL / NET: -375 mL      CAPILLARY BLOOD GLUCOSE  POCT Blood Glucose.: 93 mg/dL (2018 10:11)      PHYSICAL EXAM:  Gen: disheveled pt, in NAD   HEENT: PEERLA  Pulm: CTA B/L  CV: RRR, S1S2 with holosystolic murmur loudest along L sternal border; no rub  Back: No spinal or CVA tenderness; no sacral edema  Abd: +BS, soft, mild tenderness RLQ at site of prev conduit stoma, nondistended, current ileal conduit with drainage bag attached and filling with yellow/red tinged fluid  LE: Warm, no edema  Psych: Normal affect and mood  Skin: Warm, without rashes      LINES: [facility]    HOSPITAL MEDICATIONS:  metoprolol succinate ER 50 milliGRAM(s) Oral daily  buDESOnide 160 MICROgram(s)/formoterol 4.5 MICROgram(s) Inhaler 2 Puff(s) Inhalation two times a day  montelukast 10 milliGRAM(s) Oral daily  acetaminophen   Tablet. 975 milliGRAM(s) Oral every 8 hours  DULoxetine 60 milliGRAM(s) Oral daily  HYDROmorphone  Injectable 0.5 milliGRAM(s) IV Push every 3 hours PRN  pregabalin 75 milliGRAM(s) Oral two times a day  docusate sodium 100 milliGRAM(s) Oral two times a day  pantoprazole    Tablet 40 milliGRAM(s) Oral before breakfast  polyethylene glycol 3350 17 Gram(s) Oral daily  dextrose 5% 1000 milliLiter(s) IV Continuous <Continuous>  multivitamin 1 Tablet(s) Oral daily    LABS:                        9.9    10.46 )-----------( 142      ( 2018 07:45 )             30.0     Hgb Trend: 9.9<--, 10.6<--, 10.8<--  07-31    139  |  103  |  84<H>  ----------------------------<  92  5.4<H>   |  18<L>  |  4.72<H>    Ca    8.3<L>      2018 07:45    TPro  6.4  /  Alb  3.3  /  TBili  < 0.2<L>  /  DBili  x   /  AST  17  /  ALT  13  /  AlkPhos  75  07-30    Creatinine Trend: 4.72<--, 4.38<--, 2.81<--, 2.28<--  PT/INR - ( 2018 07:45 )   PT: 14.0 SEC;   INR: 1.21          PTT - ( 2018 06:07 )  PTT:30.8 SEC  Urinalysis Basic - ( 2018 13:00 )    Color: YELLOW / Appearance: TURBID / S.014 / pH: 8.0  Gluc: NEGATIVE / Ketone: NEGATIVE  / Bili: NEGATIVE / Urobili: NORMAL mg/dL   Blood: NEGATIVE / Protein: 30 mg/dL / Nitrite: NEGATIVE   Leuk Esterase: LARGE / RBC: x / WBC 10-25   Sq Epi: OCC / Non Sq Epi: x / Bacteria: x      Arterial Blood Gas:   @ 12:30  7.33/33/185/18/99.5/-7.8  ABG lactate: --    Venous Blood Gas:   @ 03:57  7.24/41/57/17/88.0  VBG Lactate: 1.6  Venous Blood Gas:   @ 21:20  7.19/43/44/15/72.7  VBG Lactate: 2.3      MICROBIOLOGY: Reviewed. Urine culture pending     RADIOLOGY:  [x] Reviewed and interpreted by me CHIEF COMPLAINT: B/L hydronephrosis pending nephrostomy tubes    HPI:    Allergies  No Known Allergies    HOME MEDICATIONS:    REVIEW OF SYSTEMS:  [X ] All other systems negative unless stated above       OBJECTIVE:  ICU Vital Signs Last 24 Hrs  T(C): 36.6 (2018 10:30), Max: 36.8 (2018 19:45)  T(F): 97.9 (2018 10:30), Max: 98.2 (2018 19:45)  HR: 132 (2018 11:05) (84 - 132)  BP: 112/53 (2018 11:05) (80/47 - 112/54)  RR: 20 (2018 11:05) (17 - 20)  SpO2: 97% (2018 11:05) (92% - 98%)         @ 07:01  -   @ 07:00  --------------------------------------------------------  IN: 0 mL / OUT: 375 mL / NET: -375 mL      CAPILLARY BLOOD GLUCOSE  POCT Blood Glucose.: 93 mg/dL (2018 10:11)      PHYSICAL EXAM:  Gen: disheveled pt, in NAD   HEENT: PEERLA  Pulm: CTA B/L  CV: RRR, S1S2 with holosystolic murmur loudest along L sternal border; no rub  Back: No spinal or CVA tenderness; no sacral edema  Abd: +BS, soft, mild tenderness RLQ at site of prev conduit stoma, nondistended, current ileal conduit with drainage bag attached and filling with yellow/red tinged fluid  LE: Warm, no edema  Psych: Normal affect and mood  Skin: Warm, without rashes      LINES: [facility]    HOSPITAL MEDICATIONS:  metoprolol succinate ER 50 milliGRAM(s) Oral daily  buDESOnide 160 MICROgram(s)/formoterol 4.5 MICROgram(s) Inhaler 2 Puff(s) Inhalation two times a day  montelukast 10 milliGRAM(s) Oral daily  acetaminophen   Tablet. 975 milliGRAM(s) Oral every 8 hours  DULoxetine 60 milliGRAM(s) Oral daily  HYDROmorphone  Injectable 0.5 milliGRAM(s) IV Push every 3 hours PRN  pregabalin 75 milliGRAM(s) Oral two times a day  docusate sodium 100 milliGRAM(s) Oral two times a day  pantoprazole    Tablet 40 milliGRAM(s) Oral before breakfast  polyethylene glycol 3350 17 Gram(s) Oral daily  dextrose 5% 1000 milliLiter(s) IV Continuous <Continuous>  multivitamin 1 Tablet(s) Oral daily    LABS:                        9.9    10.46 )-----------( 142      ( 2018 07:45 )             30.0     Hgb Trend: 9.9<--, 10.6<--, 10.8<--      139  |  103  |  84<H>  ----------------------------<  92  5.4<H>   |  18<L>  |  4.72<H>    Ca    8.3<L>      2018 07:45    TPro  6.4  /  Alb  3.3  /  TBili  < 0.2<L>  /  DBili  x   /  AST  17  /  ALT  13  /  AlkPhos  75      Creatinine Trend: 4.72<--, 4.38<--, 2.81<--, 2.28<--  PT/INR - ( 2018 07:45 )   PT: 14.0 SEC;   INR: 1.21          PTT - ( 2018 06:07 )  PTT:30.8 SEC  Urinalysis Basic - ( 2018 13:00 )    Color: YELLOW / Appearance: TURBID / S.014 / pH: 8.0  Gluc: NEGATIVE / Ketone: NEGATIVE  / Bili: NEGATIVE / Urobili: NORMAL mg/dL   Blood: NEGATIVE / Protein: 30 mg/dL / Nitrite: NEGATIVE   Leuk Esterase: LARGE / RBC: x / WBC 10-25   Sq Epi: OCC / Non Sq Epi: x / Bacteria: x      Arterial Blood Gas:   @ 12:30  7.33/33/185/18/99.5/-7.8  ABG lactate: --    Venous Blood Gas:   @ 03:57  7.24/41/57/17/88.0  VBG Lactate: 1.6  Venous Blood Gas:   @ 21:20  7.19/43/44/15/72.7  [facility] Lactate: 2.3      MICROBIOLOGY: Reviewed. Urine culture pending     RADIOLOGY:  [x] Reviewed and interpreted by me CHIEF COMPLAINT: B/L hydronephrosis pending nephrostomy tubes    HPI:  79 yo woman with PMHx most notable for metastatic bladder cancer to the bone undergoing active chemotherapy (carboplatin/etoposide) s/p cystectomy with ileal conduit creation c/b prior obstructive uropathy requiring b/l percutaneous nephrostomy tubes on 18, morbid obesity, hypertension, hypertrophic cardiomyopathy, and CKD 3 presenting with 1-day onset of diffuse abdominal pain, a/w decreased urine output. Pt had ostomy catheterized by urology  however with worsening DEVON on CKD c/b electrolyte abnormalities.     Allergies  No Known Allergies    HOME MEDICATIONS:    REVIEW OF SYSTEMS:  [X ] All other systems negative unless stated above       OBJECTIVE:  ICU Vital Signs Last 24 Hrs  T(C): 36.6 (2018 10:30), Max: 36.8 (2018 19:45)  T(F): 97.9 (2018 10:30), Max: 98.2 (2018 19:45)  HR: 132 (2018 11:05) (84 - 132)  BP: 112/53 (2018 11:05) (80/47 - 112/54)  RR: 20 (2018 11:05) (17 - 20)  SpO2: 97% (2018 11:05) (92% - 98%)         @ 07:01  -   @ 07:00  --------------------------------------------------------  IN: 0 mL / OUT: 375 mL / NET: -375 mL      CAPILLARY BLOOD GLUCOSE  POCT Blood Glucose.: 93 mg/dL (2018 10:11)      PHYSICAL EXAM:  Gen: disheveled pt, in NAD   HEENT: PEERLA  Pulm: CTA B/L  CV: RRR, S1S2 with holosystolic murmur loudest along L sternal border; no rub  Back: No spinal or CVA tenderness; no sacral edema  Abd: +BS, soft, mild tenderness RLQ at site of prev conduit stoma, nondistended, current ileal conduit with drainage bag attached and filling with yellow/red tinged fluid  LE: Warm, no edema  Psych: Normal affect and mood  Skin: Warm, without rashes      LINES: [facility]    HOSPITAL MEDICATIONS:  metoprolol succinate ER 50 milliGRAM(s) Oral daily  buDESOnide 160 MICROgram(s)/formoterol 4.5 MICROgram(s) Inhaler 2 Puff(s) Inhalation two times a day  montelukast 10 milliGRAM(s) Oral daily  acetaminophen   Tablet. 975 milliGRAM(s) Oral every 8 hours  DULoxetine 60 milliGRAM(s) Oral daily  HYDROmorphone  Injectable 0.5 milliGRAM(s) IV Push every 3 hours PRN  pregabalin 75 milliGRAM(s) Oral two times a day  docusate sodium 100 milliGRAM(s) Oral two times a day  pantoprazole    Tablet 40 milliGRAM(s) Oral before breakfast  polyethylene glycol 3350 17 Gram(s) Oral daily  dextrose 5% 1000 milliLiter(s) IV Continuous <Continuous>  multivitamin 1 Tablet(s) Oral daily    LABS:                        9.9    10.46 )-----------( 142      ( 2018 07:45 )             30.0     Hgb Trend: 9.9<--, 10.6<--, 10.8<--  07-31    139  |  103  |  84<H>  ----------------------------<  92  5.4<H>   |  18<L>  |  4.72<H>    Ca    8.3<L>      2018 07:45    TPro  6.4  /  Alb  3.3  /  TBili  < 0.2<L>  /  DBili  x   /  AST  17  /  ALT  13  /  AlkPhos  75  07-30    Creatinine Trend: 4.72<--, 4.38<--, 2.81<--, 2.28<--  PT/INR - ( 2018 07:45 )   PT: 14.0 SEC;   INR: 1.21          PTT - ( 2018 06:07 )  PTT:30.8 SEC  Urinalysis Basic - ( 2018 13:00 )    Color: YELLOW / Appearance: TURBID / S.014 / pH: 8.0  Gluc: NEGATIVE / Ketone: NEGATIVE  / Bili: NEGATIVE / Urobili: NORMAL mg/dL   Blood: NEGATIVE / Protein: 30 mg/dL / Nitrite: NEGATIVE   Leuk Esterase: LARGE / RBC: x / WBC 10-25   Sq Epi: OCC / Non Sq Epi: x / Bacteria: x      Arterial Blood Gas:   @ 12:30  7.33/33/185/18/99.5/-7.8  ABG lactate: --    Venous Blood Gas:   @ 03:57  7.24/41/57/17/88.0  VBG Lactate: 1.6  Venous Blood Gas:   @ 21:20  7.19/43/44/15/72.7  VBG Lactate: 2.3      MICROBIOLOGY: Reviewed. Urine culture pending     RADIOLOGY:  [x] Reviewed and interpreted by me

## 2018-07-31 NOTE — PROGRESS NOTE ADULT - SUBJECTIVE AND OBJECTIVE BOX
[facility] DIVISION OF KIDNEY DISEASES AND HYPERTENSION -- FOLLOW UP NOTE  --------------------------------------------------------------------------------  Chief Complaint:    24 hour events/subjective:    Patient seen lying in bed without distress at time of interview. Reports having abdominal pain earlier today, but that the pain has decreased and she is more comfortable. She has been NPO for possible nephrostomy tube placement by urology, but states she was drinking water prior to midnight, with output from the bag since the ileal conduit stoma was catheterized. 225 mL of urine were drained from the bag at 21:00 yesterday evening. Serum Cr pauline to 4.38 yesterday evening and is now 4.72 this AM.     PAST HISTORY  --------------------------------------------------------------------------------  No significant changes to PMH, PSH, FHx, SHx, unless otherwise noted    ALLERGIES & MEDICATIONS  --------------------------------------------------------------------------------  Allergies    No Known Allergies    Intolerances      Standing Inpatient Medications  acetaminophen   Tablet. 975 milliGRAM(s) Oral every 8 hours  buDESOnide 160 MICROgram(s)/formoterol 4.5 MICROgram(s) Inhaler 2 Puff(s) Inhalation two times a day  dextrose 5% 1000 milliLiter(s) IV Continuous <Continuous>  docusate sodium 100 milliGRAM(s) Oral two times a day  DULoxetine 60 milliGRAM(s) Oral daily  insulin regular  human recombinant. 10 Unit(s) SubCutaneous once  metoprolol succinate ER 50 milliGRAM(s) Oral daily  montelukast 10 milliGRAM(s) Oral daily  multivitamin 1 Tablet(s) Oral daily  pantoprazole    Tablet 40 milliGRAM(s) Oral before breakfast  polyethylene glycol 3350 17 Gram(s) Oral daily  pregabalin 75 milliGRAM(s) Oral two times a day    PRN Inpatient Medications  HYDROmorphone  Injectable 0.5 milliGRAM(s) IV Push every 3 hours PRN      REVIEW OF SYSTEMS  --------------------------------------------------------------------------------  Gen: No fevers/chills  Respiratory: No dyspnea, cough, SOB  CV: No chest pain,   GI: +abdominal pain, decreased nausea, no overnight vomiting,  : + drainage from ileal conduit  MSK: no current back pain; no LE edema  Neuro: No lightheadedness    All other systems were reviewed and are negative, except as noted.    VITALS/PHYSICAL EXAM  --------------------------------------------------------------------------------  T(C): 36.6 (07-31-18 @ 10:30), Max: 36.8 (07-30-18 @ 11:34)  HR: 131 (07-31-18 @ 10:30) (80 - 131)  BP: 101/51 (07-31-18 @ 10:30) (80/47 - 116/38)  RR: 20 (07-31-18 @ 10:30) (16 - 20)  SpO2: 97% (07-31-18 @ 10:30) (92% - 98%)  Wt(kg): --  Height (cm): 160.02 (07-30-18 @ 21:37)  Weight (kg): 76.9 (07-30-18 @ 21:37)  BMI (kg/m2): 30 (07-30-18 @ 21:37)  BSA (m2): 1.8 (07-30-18 @ 21:37)      07-30-18 @ 07:01  -  07-31-18 @ 07:00  --------------------------------------------------------  IN: 0 mL / OUT: 375 mL / NET: -375 mL      Physical Exam:  	Gen: NAD, well-appearing  	HEENT: Pupils constricted (likely 2/2 pain medication)  	Pulm: CTA B/L  	CV: RRR, S1S2 with holosystolic murmur loudest along L sternal border; no rub  	Back: No spinal or CVA tenderness; no sacral edema  	Abd: +BS, soft, mild tenderness RLQ at site of prev conduit stoma, nondistended, current ileal conduit with drainage bag attached and filling with yellow/red tinged fluid  	LE: Warm, no edema  	Psych: Normal affect and mood  	Skin: Warm, without rashes      LABS/STUDIES  --------------------------------------------------------------------------------              9.9    10.46 >-----------<  142      [07-31-18 @ 07:45]              30.0     139  |  103  |  84  ----------------------------<  92      [07-31-18 @ 07:45]  5.4   |  18  |  4.72        Ca     8.3     [07-31-18 @ 07:45]    TPro  6.4  /  Alb  3.3  /  TBili  < 0.2  /  DBili  x   /  AST  17  /  ALT  13  /  AlkPhos  75  [07-30-18 @ 03:57]    PT/INR: PT 14.0 , INR 1.21       [07-31-18 @ 07:45]  PTT: 30.8       [07-30-18 @ 06:07]      Creatinine Trend:  SCr 4.72 [07-31 @ 07:45]  SCr 4.38 [07-30 @ 19:09]  SCr 2.81 [07-30 @ 03:57]  SCr 2.28 [07-29 @ 21:20]    Urinalysis - [07-30-18 @ 13:00]      Color YELLOW / Appearance TURBID / SG 1.014 / pH 8.0      Gluc NEGATIVE / Ketone NEGATIVE  / Bili NEGATIVE / Urobili NORMAL       Blood NEGATIVE / Protein 30 / Leuk Est LARGE / Nitrite NEGATIVE      RBC  / WBC 10-25 / Hyaline  / Gran  / Sq Epi OCC / Non Sq Epi  / Bacteria       Iron 8, TIBC 222, %sat --      [07-31-18 @ 07:45]  Ferritin 480.9      [07-31-18 @ 07:45]  HbA1c 4.9      [05-21-18 @ 11:32]  TSH 0.77      [05-21-18 @ 11:28] [facility] DIVISION OF KIDNEY DISEASES AND HYPERTENSION -- FOLLOW UP NOTE  --------------------------------------------------------------------------------  HPI: 78 F with PMH significant for HCM, Bladder Ca s/p ileal conduit (Nov 2017), c/b obstructive uropathy in May 2018 with b/l nephrostomy tube placement  with subsequent tube removal and conduit revision 2 weeks ago presented with severe abdominal pain and lack of urine output from the conduit stoma. Pt. found to have worsening DEVON in setting of obstructive uropathy.     Patient seen lying in bed without distress at time of interview. Reports having abdominal pain earlier today, but that the pain has decreased and she is more comfortable. She has been NPO for possible nephrostomy tube placement by urology, but states she was drinking water prior to midnight, with output from the bag since the ileal conduit stoma was catheterized. 225 mL of urine were drained from the bag at 21:00 yesterday evening. Serum Cr pauline to 4.38 yesterday evening and is now 4.72 this AM.     PAST HISTORY  --------------------------------------------------------------------------------  No significant changes to PMH, PSH, FHx, SHx, unless otherwise noted    ALLERGIES & MEDICATIONS  --------------------------------------------------------------------------------  Allergies    No Known Allergies    Intolerances    Standing Inpatient Medications  acetaminophen   Tablet. 975 milliGRAM(s) Oral every 8 hours  buDESOnide 160 MICROgram(s)/formoterol 4.5 MICROgram(s) Inhaler 2 Puff(s) Inhalation two times a day  dextrose 5% 1000 milliLiter(s) IV Continuous <Continuous>  docusate sodium 100 milliGRAM(s) Oral two times a day  DULoxetine 60 milliGRAM(s) Oral daily  insulin regular  human recombinant. 10 Unit(s) SubCutaneous once  metoprolol succinate ER 50 milliGRAM(s) Oral daily  montelukast 10 milliGRAM(s) Oral daily  multivitamin 1 Tablet(s) Oral daily  pantoprazole    Tablet 40 milliGRAM(s) Oral before breakfast  polyethylene glycol 3350 17 Gram(s) Oral daily  pregabalin 75 milliGRAM(s) Oral two times a day    PRN Inpatient Medications  HYDROmorphone  Injectable 0.5 milliGRAM(s) IV Push every 3 hours PRN      REVIEW OF SYSTEMS  --------------------------------------------------------------------------------  Gen: No fevers/chills  Respiratory: No dyspnea, cough, SOB  CV: No chest pain,   GI: +abdominal pain, decreased nausea, no overnight vomiting,  : + drainage from ileal conduit  MSK: no current back pain; no LE edema  Neuro: No lightheadedness    All other systems were reviewed and are negative, except as noted.    VITALS/PHYSICAL EXAM  --------------------------------------------------------------------------------  T(C): 36.6 (07-31-18 @ 10:30), Max: 36.8 (07-30-18 @ 11:34)  HR: 131 (07-31-18 @ 10:30) (80 - 131)  BP: 101/51 (07-31-18 @ 10:30) (80/47 - 116/38)  RR: 20 (07-31-18 @ 10:30) (16 - 20)  SpO2: 97% (07-31-18 @ 10:30) (92% - 98%)  Wt(kg): --  Height (cm): 160.02 (07-30-18 @ 21:37)  Weight (kg): 76.9 (07-30-18 @ 21:37)  BMI (kg/m2): 30 (07-30-18 @ 21:37)  BSA (m2): 1.8 (07-30-18 @ 21:37)      07-30-18 @ 07:01  -  07-31-18 @ 07:00  --------------------------------------------------------  IN: 0 mL / OUT: 375 mL / NET: -375 mL    Physical Exam:  	Gen: NAD, well-appearing  	HEENT: Pupils constricted (likely 2/2 pain medication)  	Pulm: CTA B/L  	CV: RRR, S1S2 with holosystolic murmur loudest along L sternal border; no rub  	Back: No spinal or CVA tenderness; no sacral edema  	Abd: +BS, soft, mild tenderness RLQ at site of prev conduit stoma, nondistended, current ileal conduit with drainage bag attached and filling with yellow/red tinged fluid  	LE: Warm, no edema  	Psych: Normal affect and mood  	Skin: Warm, without rashes      LABS/STUDIES  --------------------------------------------------------------------------------              9.9    10.46 >-----------<  142      [07-31-18 @ 07:45]              30.0     139  |  103  |  84  ----------------------------<  92      [07-31-18 @ 07:45]  5.4   |  18  |  4.72        Ca     8.3     [07-31-18 @ 07:45]    TPro  6.4  /  Alb  3.3  /  TBili  < 0.2  /  DBili  x   /  AST  17  /  ALT  13  /  AlkPhos  75  [07-30-18 @ 03:57]    Creatinine Trend:  SCr 4.72 [07-31 @ 07:45]  SCr 4.38 [07-30 @ 19:09]  SCr 2.81 [07-30 @ 03:57]  SCr 2.28 [07-29 @ 21:20]

## 2018-07-31 NOTE — PROVIDER CONTACT NOTE (OTHER) - DATE AND TIME:
30-Jul-2018 20:00
30-Jul-2018 21:00
30-Jul-2018 23:00
31-Jul-2018 01:45
31-Jul-2018 10:00
30-Jul-2018 21:40

## 2018-07-31 NOTE — PROGRESS NOTE ADULT - PROBLEM SELECTOR PLAN 1
sCr rising to 4.72 this AM from 2.81 yesterday morning in the setting of obstructive uropathy. Drainage from ileal conduit improving after urology catheterization but kidney function continues to decline. CT Abd pelvis 7.29 demonstrated b/l hydronephrosis. Recommend nephrostomy tube placement by urology/IR. Patient also with hyperkalemia, K 5.5 last night and 5.4 this AM. Please consider EKG, and treatment to lower K.  Please continue to monitor labs and urine output while avoiding any potential nephrotoxins.    INCOMPLETE NOTE TO BE DISCUSSED WITH ATTENDING/TEAM  Indu Jett, PGY-1  Pager: [phone] sCr rising to 4.72 this AM from 2.81 yesterday morning in the setting of obstructive uropathy. Drainage from ileal conduit improving after urology catheterization but kidney function continues to decline. CT Abd pelvis 7.29 demonstrated b/l hydronephrosis. Recommend nephrostomy tube placement by urology/IR. Patient also with hyperkalemia, K 5.5 last night and 5.4 this AM. Please consider EKG, and treatment to lower K.  Please continue to monitor labs and urine output while avoiding any potential nephrotoxins.    Indu Jett, PGY-1  Pager: [phone] Pt. with DEVON in setting of obstructive uropathy. Scr was 1.7 on 5/28/18, increased to 4.72 today (7/30/18). Pt. with bilateral hydronephrosis on CT scan done on 7/29/18. Pt. with history of obstructive uropathy in May 2018. Pt. awaiting bilateral nephrostomy tube placement. Monitor labs and urine output. Avoid any potential nephrotoxins.

## 2018-07-31 NOTE — PROGRESS NOTE ADULT - ASSESSMENT
78 F with PMH significant for Bladder Ca s/p ileal conduit (Nov 2017) c/b obstructive uropathy in May 2018 for which she underwent nephrostomy tube placement.  Nephrostomy tubes were removed with conduit revision performed 2 weeks ago. Pt. admitted with decreased urine output of one day, moderate b/l hydroureteronephrosis on CT abdomen, and increased Scr of 2.81 concerning for DEVON in setting of obstructive uropathy. DEVON continues to worsen, with sCr rising to 4.38 and 4.72 this morning. Pt. with DEVON in setting of obstructive uropathy

## 2018-07-31 NOTE — CONSULT NOTE ADULT - ASSESSMENT
79 yo woman with PMHx most notable for metastatic bladder cancer to the bone undergoing active chemotherapy (carboplatin/etoposide) s/p cystectomy with ileal conduit creation c/b prior obstructive uropathy requiring b/l percutaneous nephrostomy tubes on 5/20/18, morbid obesity, hypertension, hypertrophic cardiomyopathy, and CKD 3 in addition to other comorbidities presenting with 1-day onset of diffuse abdominal pain, found to have DEVON on CKD stage 3 c/b severe metabolic acidosis, stable anemia, and leukocytosis. 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    Neuro  Alert, awake, reactive- which is at baseline     Cardio  Holosystolic murmur on clinical exam, clinically euvolemic     Pulm    Nephro/  sCr rising to 4.72 this AM from 2.81 yesterday morning in the setting of obstructive uropathy. Drainage from ileal conduit improving after urology catheterization but kidney function continues to decline. CT Abd pelvis 7.29 demonstrated b/l hydronephrosis. Recommend nephrostomy tube placement by urology/IR.    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    Neuro  Alert, awake, reactive- which is at baseline     Cardio  Holosystolic murmur on clinical exam, clinically euvolemic   Cont home doese Toprol 50 mg QD    Pulm  Not requiring supplemental O2, at baseline     Nephro/  sCr rising to 4.72 this AM from 2.81 yesterday morning in the setting of obstructive uropathy. Drainage from ileal conduit improving after urology catheterization but kidney function continues to decline. CT Abd pelvis 7.29 demonstrated b/l hydronephrosis.   Pending nephrostomy tube placement by urology/IR.    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    Neuro  Alert, awake, reactive- which is at baseline     Cardio  Holosystolic murmur on clinical exam, clinically euvolemic   Cont home doese Toprol 50 mg QD    Pulm  Not requiring supplemental O2, at baseline     Nephro/  sCr rising to 4.72 this AM from 2.81 yesterday morning in the setting of obstructive uropathy. Drainage from ileal conduit improving after urology catheterization but kidney function continues to decline. CT Abd pelvis 7.29 demonstrated b/l hydronephrosis.   Pending nephrostomy tube placement by urology/IR.    ID  Start Zosyn erick- procedurally for prophylaxis as per IR   Otherwise low suspicion for infection, afebrile w/o leukocytosis     Heme   Stable anemia 9.9 today, no clinically active bleed     Endocrine   Alc 4.9  TSH 0.77

## 2018-07-31 NOTE — PROGRESS NOTE ADULT - SUBJECTIVE AND OBJECTIVE BOX
for shift yesterday.  Creatinine increasing despite stomal catheterization.    T(F): 97.9, Max: 98.2 (07-30-18 @ 19:45)  HR: 131  BP: 101/51  SpO2: 97%    OUT:    Urostomy: 375 mL  Total OUT: 375 mL    07-31 @ 07:45  WBC 10.46 / Hct 30.0  / SCr 4.72     07-30 @ 19:09  WBC 12.20 / Hct 32.3  / SCr 4.38  for shift yesterday.  Creatinine increasing despite stomal catheterization.    T(F): 97.9, Max: 98.2 (07-30-18 @ 19:45)  HR: 131  BP: 101/51  SpO2: 97%    OUT:    Urostomy: 375 mL  Total OUT: 375 mL    Physical Exam:  Gen: NAD  : Stoma pink and patient, catheter in place, minimal urine draining    07-31 @ 07:45  WBC 10.46 / Hct 30.0  / SCr 4.72     07-30 @ 19:09  WBC 12.20 / Hct 32.3  / SCr 4.38

## 2018-07-31 NOTE — CHART NOTE - NSCHARTNOTEFT_GEN_A_CORE
ADS NIGHT COVERAGE    PT with mildly elevated HR. Pt asymptomatic no acute complaints. VSS. EKG done which showed sinus tachy but no other acute changes, d/w hospitalist. Will monitor vital signs q 4 hrs.     Maxim ALEMAN  [id number]

## 2018-07-31 NOTE — CHART NOTE - NSCHARTNOTEFT_GEN_A_CORE
Patient with worsening creatinine level, hyperkalemic, hypotensive.   MICU called for consult and accepted patient.  Nephrostomy tubes to be placed by IR in MICU setting.

## 2018-07-31 NOTE — PROVIDER CONTACT NOTE (OTHER) - NAME OF MD/NP/PA/DO NOTIFIED:
ADS Jennie Pan (spec [id number])
DEBRA Goodrich
LAZARO Herbert
LAZARO Herbert
Urology Resident
LAZARO Herbert

## 2018-07-31 NOTE — PROGRESS NOTE ADULT - PROBLEM SELECTOR PLAN 1
Unclear etiology. 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 Unclear at this time why and when prior nephrostomy tubes from 5/2018 were removed. CT abd/pelvis revealed stable bilateral hydronephrosis and perinephric fat stranding, but nonobstructing stone noted in R kidney may have superimposed infection  - f/u UA and urine cx  - will defer initiating abx at this time since pt afebrile and hemodynamically stable  - urology consult  - IR consult  - trend Cr. Renally dose meds.   - strict I/Os  - NPO for likely nephrostomy tube placement today

## 2018-07-31 NOTE — PROVIDER CONTACT NOTE (OTHER) - REASON
Elevated HR, labored breathing, and disorientation
Pt has BP 86/44
patient has hematuria from ileoconducit
patient tachycardic
patient tachycardic and hypotensive
patient tachycardic

## 2018-07-31 NOTE — PROVIDER CONTACT NOTE (OTHER) - RECOMMENDATIONS
Please examine pt ! thank you!
Repeat vitals, and obtain EKG
made ADS aware
made ADS aware
made urology resident aware
made ADS aware

## 2018-07-31 NOTE — PROGRESS NOTE ADULT - ASSESSMENT
Ms. Moore is a 79 yo woman with PMHx most notable for metastatic bladder cancer to the bone undergoing active chemotherapy (carboplatin/etoposide) s/p cystectomy with ileal conduit creation c/b prior obstructive uropathy requiring b/l percutaneous nephrostomy tubes on 5/20/18, morbid obesity, hypertension, hypertrophic cardiomyopathy, and CKD 3 in addition to other comorbidities presenting with 1-day onset of diffuse abdominal pain, found to have DEVON on CKD stage 3 c/b severe metabolic acidosis, stable anemia, and leukocytosis. Exam notable for erythema at prior ileostomy site with CT showing possible resolving hematoma or sterile seroma but cannot exclude superimposed infection at the site. Pt currently afebrile and hemodynamically stable.

## 2018-07-31 NOTE — PROGRESS NOTE ADULT - SUBJECTIVE AND OBJECTIVE BOX
Still a bit sleepy, but she is arousable readily and not confused    Vital Signs Last 24 Hrs  T(C): 36.7 (31 Jul 2018 05:30), Max: 36.8 (30 Jul 2018 11:34)  T(F): 98 (31 Jul 2018 05:30), Max: 98.3 (30 Jul 2018 11:34)  HR: 98 (31 Jul 2018 04:56) (80 - 110)  BP: 112/54 (31 Jul 2018 04:56) (80/47 - 116/38)  BP(mean): --  RR: 18 (31 Jul 2018 05:30) (16 - 18)  SpO2: 93% (31 Jul 2018 05:30) (92% - 98%)    · Constitutional	detailed exam	  · Constitutional Details	obese	  · Constitutional Comments	Drowsy and fatigued, in NAD	  · Eyes	detailed exam	  · Eyes Details	PERRL; EOMI; conjunctiva clear	  · ENMT	detailed exam	  · ENMT Details	mouth	  · Mouth	dry	  · Neck	detailed exam	  · Neck Details	supple	  · Respiratory	detailed exam	  · Respiratory Details	clear to auscultation bilaterally	  · Cardiovascular	detailed exam	  · Cardiovascular Details	regular rate and rhythm	  · Cardiovascular Details	murmur	  · Murmur Timing	systolic	  · Character of Systolic Murmur	murmur loudness: III/VI	  · Gastrointestinal	detailed exam	  · GI Normal	soft; no guarding; no rigidity	  · GI Abnormal	tender	  · Tenderness	diffuse	  · Gastrointestinal Comments	Erythema noted and closed prior ileostomy site with noted reducible hernia. R sided ileostomy bag with minimal yellow urine in it.	  · Extremities	detailed exam	  · Extremities Details	no clubbing; no cyanosis; no pedal edema	  · Neurological	detailed exam	  · Neurological Details	responds to verbal commands; sensation intact; Drowsy, but oriented x3. Follows simple commands	  · Musculoskeletal	detailed exam	  · Musculoskeletal Details	ROM intact

## 2018-07-31 NOTE — PROVIDER CONTACT NOTE (OTHER) - ASSESSMENT
Patient found to have labored breathing, RN at bedside. Patient found lethargic and disoriented to situation. Vital signs and FS taken. Patient heart rate 131, blood pressure 98/42, 18 respiratory rate, 97% room air.
Patient is A&Ox 4, denies pain/SOB. BP 86/44, , O2Sat 93% on room air.  Pt is c/o abd pain 5/10. No s/s of distress noted
patient has hematuria from ileoconducit at this time. Provider in room when made aware. Patient has stoma to RUQ draining urine.
patient tachycardic at this time with a heart rate of 105 palpated. Patient is asymptomatic at this time with no c/o chest pain.
patient tachycardic at this time with a heart rate of 110 and hypotensive with a blood pressure of 97/52. No c/o chest pain, shortness of breath, or headache at this time. Patient is asymptomatic and resting comfortably in bed.
patient tachycardic at this time with a heart rate of 109. Palpated heart rate is 108. Patient c/o pain to ileoconducit site. Patient received ordered dose of Tylenol. No c/o chest pain. Patient also hypotensive with a blood pressure of 98/44. Patient is asymptomatic.

## 2018-07-31 NOTE — PROVIDER CONTACT NOTE (OTHER) - BACKGROUND
Patient admitted for abdominal pain.
Pt is admitted for abd pain
patient admitted for abdominal pain

## 2018-08-01 LAB
APTT BLD: 27 SEC — LOW (ref 27.5–37.4)
BUN SERPL-MCNC: 93 MG/DL — HIGH (ref 7–23)
BUN SERPL-MCNC: 96 MG/DL — HIGH (ref 7–23)
CALCIUM SERPL-MCNC: 7 MG/DL — LOW (ref 8.4–10.5)
CALCIUM SERPL-MCNC: 7.4 MG/DL — LOW (ref 8.4–10.5)
CHLORIDE SERPL-SCNC: 103 MMOL/L — SIGNIFICANT CHANGE UP (ref 98–107)
CHLORIDE SERPL-SCNC: 99 MMOL/L — SIGNIFICANT CHANGE UP (ref 98–107)
CO2 SERPL-SCNC: 13 MMOL/L — LOW (ref 22–31)
CO2 SERPL-SCNC: 15 MMOL/L — LOW (ref 22–31)
CREAT ?TM UR-MCNC: 85.3 MG/DL — SIGNIFICANT CHANGE UP
CREAT SERPL-MCNC: 5.09 MG/DL — HIGH (ref 0.5–1.3)
CREAT SERPL-MCNC: 5.54 MG/DL — HIGH (ref 0.5–1.3)
GLUCOSE SERPL-MCNC: 106 MG/DL — HIGH (ref 70–99)
GLUCOSE SERPL-MCNC: 92 MG/DL — SIGNIFICANT CHANGE UP (ref 70–99)
INR BLD: 1.18 — HIGH (ref 0.88–1.17)
MAGNESIUM SERPL-MCNC: 1.6 MG/DL — SIGNIFICANT CHANGE UP (ref 1.6–2.6)
MAGNESIUM SERPL-MCNC: 1.6 MG/DL — SIGNIFICANT CHANGE UP (ref 1.6–2.6)
OSMOLALITY UR: 366 MOSMO/KG — SIGNIFICANT CHANGE UP (ref 50–1200)
PHOSPHATE SERPL-MCNC: 4 MG/DL — SIGNIFICANT CHANGE UP (ref 2.5–4.5)
PHOSPHATE SERPL-MCNC: 4 MG/DL — SIGNIFICANT CHANGE UP (ref 2.5–4.5)
POTASSIUM SERPL-MCNC: 4.7 MMOL/L — SIGNIFICANT CHANGE UP (ref 3.5–5.3)
POTASSIUM SERPL-MCNC: 5.4 MMOL/L — HIGH (ref 3.5–5.3)
POTASSIUM SERPL-SCNC: 4.7 MMOL/L — SIGNIFICANT CHANGE UP (ref 3.5–5.3)
POTASSIUM SERPL-SCNC: 5.4 MMOL/L — HIGH (ref 3.5–5.3)
PROTHROM AB SERPL-ACNC: 13.1 SEC — SIGNIFICANT CHANGE UP (ref 9.8–13.1)
SODIUM SERPL-SCNC: 133 MMOL/L — LOW (ref 135–145)
SODIUM SERPL-SCNC: 135 MMOL/L — SIGNIFICANT CHANGE UP (ref 135–145)
SODIUM UR-SCNC: 78 MMOL/L — SIGNIFICANT CHANGE UP
SPECIMEN SOURCE: SIGNIFICANT CHANGE UP
UUN UR-MCNC: 211.4 MG/DL — SIGNIFICANT CHANGE UP

## 2018-08-01 PROCEDURE — 99233 SBSQ HOSP IP/OBS HIGH 50: CPT | Mod: GC

## 2018-08-01 PROCEDURE — 76604 US EXAM CHEST: CPT | Mod: 26,GC

## 2018-08-01 PROCEDURE — 99291 CRITICAL CARE FIRST HOUR: CPT | Mod: 25

## 2018-08-01 PROCEDURE — 36569 INSJ PICC 5 YR+ W/O IMAGING: CPT

## 2018-08-01 PROCEDURE — 93308 TTE F-UP OR LMTD: CPT | Mod: 26,GC

## 2018-08-01 PROCEDURE — 76770 US EXAM ABDO BACK WALL COMP: CPT | Mod: 26

## 2018-08-01 PROCEDURE — 76775 US EXAM ABDO BACK WALL LIM: CPT | Mod: 26

## 2018-08-01 PROCEDURE — 76937 US GUIDE VASCULAR ACCESS: CPT | Mod: 26

## 2018-08-01 PROCEDURE — 71045 X-RAY EXAM CHEST 1 VIEW: CPT | Mod: 26

## 2018-08-01 RX ORDER — MORPHINE SULFATE 50 MG/1
2 CAPSULE, EXTENDED RELEASE ORAL ONCE
Qty: 0 | Refills: 0 | Status: DISCONTINUED | OUTPATIENT
Start: 2018-08-01 | End: 2018-08-01

## 2018-08-01 RX ORDER — SODIUM CHLORIDE 9 MG/ML
1000 INJECTION INTRAMUSCULAR; INTRAVENOUS; SUBCUTANEOUS
Qty: 0 | Refills: 0 | Status: DISCONTINUED | OUTPATIENT
Start: 2018-08-01 | End: 2018-08-02

## 2018-08-01 RX ORDER — SIMETHICONE 80 MG/1
40 TABLET, CHEWABLE ORAL EVERY 6 HOURS
Qty: 0 | Refills: 0 | Status: DISCONTINUED | OUTPATIENT
Start: 2018-08-01 | End: 2018-08-06

## 2018-08-01 RX ORDER — TRAMADOL HYDROCHLORIDE 50 MG/1
25 TABLET ORAL ONCE
Qty: 0 | Refills: 0 | Status: DISCONTINUED | OUTPATIENT
Start: 2018-08-01 | End: 2018-08-01

## 2018-08-01 RX ORDER — SODIUM POLYSTYRENE SULFONATE 4.1 MEQ/G
30 POWDER, FOR SUSPENSION ORAL ONCE
Qty: 0 | Refills: 0 | Status: COMPLETED | OUTPATIENT
Start: 2018-08-01 | End: 2018-08-01

## 2018-08-01 RX ORDER — SODIUM BICARBONATE 1 MEQ/ML
650 SYRINGE (ML) INTRAVENOUS
Qty: 0 | Refills: 0 | Status: DISCONTINUED | OUTPATIENT
Start: 2018-08-01 | End: 2018-08-06

## 2018-08-01 RX ORDER — HYDROMORPHONE HYDROCHLORIDE 2 MG/ML
1 INJECTION INTRAMUSCULAR; INTRAVENOUS; SUBCUTANEOUS ONCE
Qty: 0 | Refills: 0 | Status: DISCONTINUED | OUTPATIENT
Start: 2018-08-01 | End: 2018-08-01

## 2018-08-01 RX ORDER — HYDROMORPHONE HYDROCHLORIDE 2 MG/ML
0.25 INJECTION INTRAMUSCULAR; INTRAVENOUS; SUBCUTANEOUS ONCE
Qty: 0 | Refills: 0 | Status: DISCONTINUED | OUTPATIENT
Start: 2018-08-01 | End: 2018-08-01

## 2018-08-01 RX ORDER — ALBUMIN HUMAN 25 %
250 VIAL (ML) INTRAVENOUS ONCE
Qty: 0 | Refills: 0 | Status: COMPLETED | OUTPATIENT
Start: 2018-08-01 | End: 2018-08-01

## 2018-08-01 RX ADMIN — SODIUM CHLORIDE 75 MILLILITER(S): 9 INJECTION INTRAMUSCULAR; INTRAVENOUS; SUBCUTANEOUS at 20:44

## 2018-08-01 RX ADMIN — PIPERACILLIN AND TAZOBACTAM 25 GRAM(S): 4; .5 INJECTION, POWDER, LYOPHILIZED, FOR SOLUTION INTRAVENOUS at 06:09

## 2018-08-01 RX ADMIN — TRAMADOL HYDROCHLORIDE 25 MILLIGRAM(S): 50 TABLET ORAL at 18:01

## 2018-08-01 RX ADMIN — Medication 75 MILLIGRAM(S): at 18:01

## 2018-08-01 RX ADMIN — Medication 100 MILLIGRAM(S): at 18:01

## 2018-08-01 RX ADMIN — Medication 125 MILLILITER(S): at 04:17

## 2018-08-01 RX ADMIN — Medication 100 MILLIGRAM(S): at 06:10

## 2018-08-01 RX ADMIN — TRAMADOL HYDROCHLORIDE 25 MILLIGRAM(S): 50 TABLET ORAL at 18:34

## 2018-08-01 RX ADMIN — HEPARIN SODIUM 5000 UNIT(S): 5000 INJECTION INTRAVENOUS; SUBCUTANEOUS at 23:02

## 2018-08-01 RX ADMIN — MORPHINE SULFATE 2 MILLIGRAM(S): 50 CAPSULE, EXTENDED RELEASE ORAL at 19:17

## 2018-08-01 RX ADMIN — HEPARIN SODIUM 5000 UNIT(S): 5000 INJECTION INTRAVENOUS; SUBCUTANEOUS at 06:09

## 2018-08-01 RX ADMIN — SODIUM POLYSTYRENE SULFONATE 30 GRAM(S): 4.1 POWDER, FOR SUSPENSION ORAL at 04:52

## 2018-08-01 RX ADMIN — DULOXETINE HYDROCHLORIDE 60 MILLIGRAM(S): 30 CAPSULE, DELAYED RELEASE ORAL at 13:14

## 2018-08-01 RX ADMIN — SIMETHICONE 40 MILLIGRAM(S): 80 TABLET, CHEWABLE ORAL at 18:50

## 2018-08-01 RX ADMIN — MONTELUKAST 10 MILLIGRAM(S): 4 TABLET, CHEWABLE ORAL at 13:14

## 2018-08-01 RX ADMIN — Medication 975 MILLIGRAM(S): at 13:47

## 2018-08-01 RX ADMIN — SODIUM CHLORIDE 75 MILLILITER(S): 9 INJECTION INTRAMUSCULAR; INTRAVENOUS; SUBCUTANEOUS at 12:59

## 2018-08-01 RX ADMIN — Medication 975 MILLIGRAM(S): at 13:17

## 2018-08-01 RX ADMIN — SODIUM CHLORIDE 2000 MILLILITER(S): 9 INJECTION INTRAMUSCULAR; INTRAVENOUS; SUBCUTANEOUS at 01:16

## 2018-08-01 RX ADMIN — CHLORHEXIDINE GLUCONATE 1 APPLICATION(S): 213 SOLUTION TOPICAL at 06:54

## 2018-08-01 RX ADMIN — Medication 975 MILLIGRAM(S): at 00:15

## 2018-08-01 RX ADMIN — BUDESONIDE AND FORMOTEROL FUMARATE DIHYDRATE 2 PUFF(S): 160; 4.5 AEROSOL RESPIRATORY (INHALATION) at 08:34

## 2018-08-01 RX ADMIN — PIPERACILLIN AND TAZOBACTAM 25 GRAM(S): 4; .5 INJECTION, POWDER, LYOPHILIZED, FOR SOLUTION INTRAVENOUS at 18:33

## 2018-08-01 RX ADMIN — Medication 1 TABLET(S): at 12:58

## 2018-08-01 RX ADMIN — HEPARIN SODIUM 5000 UNIT(S): 5000 INJECTION INTRAVENOUS; SUBCUTANEOUS at 13:14

## 2018-08-01 RX ADMIN — Medication 75 MILLIGRAM(S): at 06:10

## 2018-08-01 RX ADMIN — Medication 650 MILLIGRAM(S): at 19:02

## 2018-08-01 RX ADMIN — Medication 975 MILLIGRAM(S): at 06:10

## 2018-08-01 RX ADMIN — BUDESONIDE AND FORMOTEROL FUMARATE DIHYDRATE 2 PUFF(S): 160; 4.5 AEROSOL RESPIRATORY (INHALATION) at 21:18

## 2018-08-01 RX ADMIN — HYDROMORPHONE HYDROCHLORIDE 1 MILLIGRAM(S): 2 INJECTION INTRAMUSCULAR; INTRAVENOUS; SUBCUTANEOUS at 20:38

## 2018-08-01 RX ADMIN — MORPHINE SULFATE 2 MILLIGRAM(S): 50 CAPSULE, EXTENDED RELEASE ORAL at 19:02

## 2018-08-01 RX ADMIN — HYDROMORPHONE HYDROCHLORIDE 1 MILLIGRAM(S): 2 INJECTION INTRAMUSCULAR; INTRAVENOUS; SUBCUTANEOUS at 21:00

## 2018-08-01 RX ADMIN — PANTOPRAZOLE SODIUM 40 MILLIGRAM(S): 20 TABLET, DELAYED RELEASE ORAL at 06:10

## 2018-08-01 NOTE — PROGRESS NOTE ADULT - PROBLEM SELECTOR PLAN 1
Pt. with DEVON in setting of obstructive uropathy. Scr was 1.7 on 5/28/18, increased to 5.09 today (8/1/18). Pt. with bilateral hydronephrosis on CT scan done on 7/29/18. Pt. with history of obstructive uropathy in May 2018. Pt. awaiting bilateral nephrostomy tube placement. Monitor labs and urine output. Avoid any potential nephrotoxins. Pt. with DEVON in setting of obstructive uropathy. Scr was 1.7 on 5/28/18, increased to 5.09 today (8/1/18). Pt. with bilateral hydronephrosis on CT scan done on 7/29/18. Pt. with history of obstructive uropathy in May 2018. Pt. awaiting bilateral nephrostomy tube placement. Pt. went to IR and no hydronephrosis noted. Recommend urology f/u.  Monitor labs and urine output. Avoid any potential nephrotoxins.

## 2018-08-01 NOTE — PROGRESS NOTE ADULT - SUBJECTIVE AND OBJECTIVE BOX
Patient went to IR yesterday.  NTs not placed, no hydro noted.  BUN/Cr continue to rise.    T(F): 98, Max: 98.8 (07-31-18 @ 14:13)  HR: 116  BP: 102/85  SpO2: 99%    Conduit- 120mL for shift    Gen NAD  Abd Soft, NT, Stoma pink and patent, [place] Torre catheter inside stoma   Urine dark, concentrated    08-01 @ 03:00  WBC --    / Hct --    / SCr 5.09     07-31 @ 21:36  WBC --    / Hct --    / SCr 5.28

## 2018-08-01 NOTE — CHART NOTE - NSCHARTNOTEFT_GEN_A_CORE
Critically ill pt requring PIV access for medical management and pressor support. Under US guidance identified Lt UE vein, distal to AC and successfully placed 20g x 1.88 inch angiocath into vessel. . Placement confirmed s/p with ultrasound and catheter determined to be in patent lumen of vein. Pt tolerated well w/o complication.      Rachel Odom PA-C  [facility] [id number]

## 2018-08-01 NOTE — PROGRESS NOTE ADULT - ASSESSMENT
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    Neuro  Alert, awake, reactive- which is at baseline     Cardio  Holosystolic murmur on clinical exam, clinically euvolemic   Cont home doese Toprol 50 mg QD    Pulm  Not requiring supplemental O2, at baseline     Nephro/  sCr rising to 4.72 this AM from 2.81 yesterday morning in the setting of obstructive uropathy. Drainage from ileal conduit improving after urology catheterization but kidney function continues to decline. CT Abd pelvis 7.29 demonstrated b/l hydronephrosis.   Pending nephrostomy tube placement by urology/IR.    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     Neuro  Alert, awake, reactive- which is at baseline     Cardio  Holosystolic murmur on clinical exam, clinically euvolemic, hx of hypertrophic cardiomyopathy 5/18 ECHO w EF 75%  Cont home dose Toprol 50 mg QD  Will give add 1 L NS Bolus for hypotension & tachycardia likely volume depleted     Pulm  Not requiring supplemental O2, at baseline     Nephro/  sCr downtrending 5.28 -> 5.09 this AM, consider relieved obstruction as bedside US neg for b/l hydronephrosis, stable urine output from ostomy site.  Pt no longer requiring IR nephrostomy tube placement   Will f/u UC as pt w hx of multiple organism in urine prior, cont Zosyn     ID  Cont Zosyn give hx of UTI c.b bacteremia; f/u UC   Otherwise low suspicion for infection, afebrile w/o leukocytosis     Heme   Stable anemia, no clinically active bleed     Endocrine   Alc 4.9  TSH 0.77

## 2018-08-01 NOTE — PROGRESS NOTE ADULT - SUBJECTIVE AND OBJECTIVE BOX
Patient is a 78y old  Female who presents with a chief complaint of DEVON 2/2 obstructive uropathy with moderate b/l hydroureteronephrosis (2018 09:03)      SUBJECTIVE / OVERNIGHT EVENTS:  Pt seen and examined at bedside in the MICU.   Uncomfortable, abdominal discomfort. Reports "gas pain".  No chest pain, no shortness of breath.   No overnight event.   No N/V/D.         Vital Signs Last 24 Hrs  T(C): 36.2 (01 Aug 2018 08:00), Max: 37.1 (2018 14:13)  T(F): 97.1 (01 Aug 2018 08:00), Max: 98.8 (2018 14:13)  HR: 105 (01 Aug 2018 11:00) (103 - 128)  BP: 96/46 (01 Aug 2018 10:00) (73/37 - 124/100)  BP(mean): 62 (01 Aug 2018 10:00) (48 - 109)  RR: 23 (01 Aug 2018 11:00) (13 - 25)  SpO2: 100% (01 Aug 2018 11:00) (87% - 100%)  I&O's Summary    2018 07:01  -  01 Aug 2018 07:00  --------------------------------------------------------  IN: 1925 mL / OUT: 320 mL / NET: 1605 mL        PHYSICAL EXAM:  GENERAL: NAD, Comfortable, but frustrated.   HEAD:  Atraumatic, Normocephalic  EYES: EOMI, PERRLA, conjunctiva and sclera clear  NECK: Supple, No JVD  CHEST/LUNG: mild dec breath sounds bilaterally; No wheeze  HEART: Regular rate and rhythm; No murmurs, rubs, or gallops  ABDOMEN: Soft, mild tenderness diffuse, distended; Bowel sounds present, mid ventral hernia, stoma bag with dark brown urine  Neuro: AAO x 3, no focal deficit, 5/5 b/l extremities  EXTREMITIES:  2+ Peripheral Pulses, No clubbing, cyanosis, or edema  SKIN: No rashes or lesions      LABS:                        9.9    10.46 )-----------( 142      ( 2018 07:45 )             30.0     08-    135  |  103  |  93<H>  ----------------------------<  92  5.4<H>   |  13<L>  |  5.09<H>    Ca    7.4<L>      01 Aug 2018 03:00  Phos  4.0     08-  Mg     1.6         TPro  5.3<L>  /  Alb  2.5<L>  /  TBili  < 0.2<L>  /  DBili  x   /  AST  21  /  ALT  19  /  AlkPhos  86  07-31    PT/INR - ( 01 Aug 2018 03:00 )   PT: 13.1 SEC;   INR: 1.18          PTT - ( 01 Aug 2018 03:00 )  PTT:27.0 SEC  CAPILLARY BLOOD GLUCOSE            Urinalysis Basic - ( 2018 13:00 )    Color: YELLOW / Appearance: TURBID / S.014 / pH: 8.0  Gluc: NEGATIVE / Ketone: NEGATIVE  / Bili: NEGATIVE / Urobili: NORMAL mg/dL   Blood: NEGATIVE / Protein: 30 mg/dL / Nitrite: NEGATIVE   Leuk Esterase: LARGE / RBC: x / WBC 10-25   Sq Epi: OCC / Non Sq Epi: x / Bacteria: x        RADIOLOGY & ADDITIONAL TESTS:    Imaging Personally Reviewed:  [x] YES  [ ] NO    Consultant(s) Notes Reviewed:  [x] YES  [ ] NO      MEDICATIONS  (STANDING):  acetaminophen   Tablet. 975 milliGRAM(s) Oral every 8 hours  buDESOnide 160 MICROgram(s)/formoterol 4.5 MICROgram(s) Inhaler 2 Puff(s) Inhalation two times a day  chlorhexidine 4% Liquid 1 Application(s) Topical <User Schedule>  docusate sodium 100 milliGRAM(s) Oral two times a day  DULoxetine 60 milliGRAM(s) Oral daily  heparin  Injectable 5000 Unit(s) SubCutaneous every 8 hours  lactated ringers Bolus 1000 milliLiter(s) IV Bolus once  metoprolol succinate ER 50 milliGRAM(s) Oral daily  montelukast 10 milliGRAM(s) Oral daily  multivitamin 1 Tablet(s) Oral daily  pantoprazole    Tablet 40 milliGRAM(s) Oral before breakfast  piperacillin/tazobactam IVPB. 3.375 Gram(s) IV Intermittent every 12 hours  polyethylene glycol 3350 17 Gram(s) Oral daily  pregabalin 75 milliGRAM(s) Oral two times a day  sodium chloride 0.9%. 1000 milliLiter(s) (75 mL/Hr) IV Continuous <Continuous>    MEDICATIONS  (PRN):  HYDROmorphone  Injectable 0.5 milliGRAM(s) IV Push every 3 hours PRN Severe Pain (7 - 10)      Care Discussed with Consultants/Other Providers [x] YES  [ ] NO    HEALTH ISSUES - PROBLEM Dx:  Hyperkalemia: Hyperkalemia  DEVON (acute kidney injury): DEVON (acute kidney injury)  Obstructive uropathy: Obstructive uropathy  Preventive measure: Preventive measure  Anemia, unspecified type: Anemia, unspecified type  Asthma, unspecified asthma severity, unspecified whether complicated, unspecified whether persistent: Asthma, unspecified asthma severity, unspecified whether complicated, unspecified whether persistent  Hypertension, unspecified type: Hypertension, unspecified type  Fibromyalgia: Fibromyalgia  Generalized abdominal pain: Generalized abdominal pain  Acute kidney injury superimposed on chronic kidney disease: Acute kidney injury superimposed on chronic kidney disease

## 2018-08-01 NOTE — PROGRESS NOTE ADULT - SUBJECTIVE AND OBJECTIVE BOX
[facility] DIVISION OF KIDNEY DISEASES AND HYPERTENSION -- FOLLOW UP NOTE  --------------------------------------------------------------------------------  HPI: 78 F with PMH significant for HCM, Bladder Ca s/p ileal conduit (Nov 2017), c/b obstructive uropathy in May 2018 with b/l nephrostomy tube placement  with subsequent tube removal and conduit revision 2 weeks ago presented with severe abdominal pain and lack of urine output from the conduit stoma. Pt. found to have worsening DEVON in setting of obstructive uropathy. Scr was 1.7 on 5/28/18, increased to 2.81 on (7/30/28). Urology consulted and performed ostomal catheterization on 7/30/18. Pt transfer to MICU for hypotension and hyperkalemia on 7/30/18.     Patient seen and examined at MICU. Pt. mildly agitated and confused. SCr increased to 5.09 today. Received IV Albumin.     PAST HISTORY  --------------------------------------------------------------------------------  No significant changes to PMH, PSH, FHx, SHx, unless otherwise noted    ALLERGIES & MEDICATIONS  --------------------------------------------------------------------------------  Allergies    No Known Allergies    Intolerances      Standing Inpatient Medications  acetaminophen   Tablet. 975 milliGRAM(s) Oral every 8 hours  buDESOnide 160 MICROgram(s)/formoterol 4.5 MICROgram(s) Inhaler 2 Puff(s) Inhalation two times a day  chlorhexidine 4% Liquid 1 Application(s) Topical <User Schedule>  desmopressin IVPB 23 MICROGram(s) IV Intermittent once  docusate sodium 100 milliGRAM(s) Oral two times a day  DULoxetine 60 milliGRAM(s) Oral daily  heparin  Injectable 5000 Unit(s) SubCutaneous every 8 hours  lactated ringers Bolus 1000 milliLiter(s) IV Bolus once  metoprolol succinate ER 50 milliGRAM(s) Oral daily  montelukast 10 milliGRAM(s) Oral daily  multivitamin 1 Tablet(s) Oral daily  pantoprazole    Tablet 40 milliGRAM(s) Oral before breakfast  piperacillin/tazobactam IVPB. 3.375 Gram(s) IV Intermittent every 12 hours  polyethylene glycol 3350 17 Gram(s) Oral daily  pregabalin 75 milliGRAM(s) Oral two times a day  sodium chloride 0.9%. 1000 milliLiter(s) IV Continuous <Continuous>    PRN Inpatient Medications  HYDROmorphone  Injectable 0.5 milliGRAM(s) IV Push every 3 hours PRN      REVIEW OF SYSTEMS  --------------------------------------------------------------------------------  Limited ROS. Pt confused.  Respiratory: No dyspnea, cough, SOB  CV: No chest pain,   GI: No abdominal pain  : + drainage from ileal conduit    VITALS/PHYSICAL EXAM  --------------------------------------------------------------------------------  T(C): 36.7 (08-01-18 @ 04:00), Max: 37.1 (07-31-18 @ 14:13)  HR: 117 (08-01-18 @ 06:32) (104 - 132)  BP: 106/53 (08-01-18 @ 06:32) (73/37 - 124/100)  RR: 25 (08-01-18 @ 06:32) (13 - 25)  SpO2: 97% (08-01-18 @ 06:32) (87% - 100%)  Wt(kg): --  Height (cm): 160.02 (07-30-18 @ 21:37)  Weight (kg): 76.9 (07-30-18 @ 21:37)  BMI (kg/m2): 30 (07-30-18 @ 21:37)  BSA (m2): 1.8 (07-30-18 @ 21:37)      07-31-18 @ 07:01  -  08-01-18 @ 07:00  --------------------------------------------------------  IN: 1925 mL / OUT: 320 mL / NET: 1605 mL      Physical Exam:  	Gen: NAD, well-appearing  	Pulm: CTA B/L  	CV: RRR, S1S2 with holosystolic murmur loudest along L sternal border; no rub  	Back: No spinal or CVA tenderness; no sacral edema  	Abd: +BS, soft, mild tenderness RLQ at site of prev conduit stoma, nondistended, current ileal conduit with drainage bag attached and filling with 	yellow/red tinged fluid  	LE: Warm, no edema  	Psych: Normal affect and mood  	Skin: Warm, without rashes      LABS/STUDIES  --------------------------------------------------------------------------------              9.9    10.46 >-----------<  142      [07-31-18 @ 07:45]              30.0     135  |  103  |  93  ----------------------------<  92      [08-01-18 @ 03:00]  5.4   |  13  |  5.09        Ca     7.4     [08-01-18 @ 03:00]      Mg     1.6     [08-01-18 @ 03:00]      Phos  4.0     [08-01-18 @ 03:00]    TPro  5.3  /  Alb  2.5  /  TBili  < 0.2  /  DBili  x   /  AST  21  /  ALT  19  /  AlkPhos  86  [07-31-18 @ 21:36]    PT/INR: PT 13.1 , INR 1.18       [08-01-18 @ 03:00]  PTT: 27.0       [08-01-18 @ 03:00]      Creatinine Trend:  SCr 5.09 [08-01 @ 03:00]  SCr 5.28 [07-31 @ 21:36]  SCr 4.72 [07-31 @ 07:45]  SCr 4.38 [07-30 @ 19:09]  SCr 2.81 [07-30 @ 03:57]

## 2018-08-01 NOTE — PROGRESS NOTE ADULT - ASSESSMENT
79 yo woman with PMHx most notable for metastatic bladder cancer to the bone undergoing active chemotherapy (carboplatin/etoposide) s/p cystectomy with ileal conduit creation c/b prior obstructive uropathy requiring b/l percutaneous nephrostomy tubes on 5/20/18, morbid obesity, hypertension, hypertrophic cardiomyopathy, and CKD 3 in addition to other comorbidities presenting with 1-day onset of diffuse abdominal pain, found to have DEVON on CKD stage 3 c/b severe metabolic acidosis, stable anemia, and leukocytosis. Exam notable for erythema at prior ileostomy site with CT showing possible resolving hematoma or sterile seroma but cannot exclude superimposed infection at the site. Pt currently afebrile and hemodynamically stable.

## 2018-08-01 NOTE — PROGRESS NOTE ADULT - SUBJECTIVE AND OBJECTIVE BOX
CHIEF COMPLAINT: Obstructive uropathy    Interval Events: Hypotensive to SBP 70's s/p 2 L w improved BP. Given Kayexalate for hyperkalemia- this AM 5.4. Output approx 300 cc /24hrs for ileal conduit. Patient seen and examined at bedside. Vital signs stable overnight. Patient denies headache, dizziness, chest/abd pain, worsening shortness of breath. ROS negative unless otherwise stated.      OBJECTIVE:  ICU Vital Signs Last 24 Hrs  T(C): 36.2 (01 Aug 2018 08:00), Max: 37.1 (2018 14:13)  T(F): 97.1 (01 Aug 2018 08:00), Max: 98.8 (2018 14:13)  HR: 108 (01 Aug 2018 12:00) (103 - 128)  BP: 110/53 (01 Aug 2018 12:00) (73/37 - 124/100)  BP(mean): 67 (01 Aug 2018 12:00) (48 - 109)  RR: 25 (01 Aug 2018 12:00) (13 - 25)  SpO2: 100% (01 Aug 2018 12:00) (87% - 100%)         @ 07:  -  - @ 07:00  --------------------------------------------------------  IN: 1925 mL / OUT: 320 mL / NET: 1605 mL      CAPILLARY BLOOD GLUCOSE  POCT Blood Glucose.: 93 mg/dL (2018 10:11)      PHYSICAL EXAM:  Gen: elderly woman in NAD   HEENT: PEERLA  Pulm: CTA B/L  CV: RRR, S1S2 with holosystolic murmur loudest along L sternal border; no rub  Back: No spinal or CVA tenderness; no sacral edema  Abd: +BS, soft, mild tenderness RLQ at site of prev conduit stoma, nondistended, current ileal conduit with drainage bag attached and filling with yellow/red tinged fluid  LE: Warm, no edema  Psych: Normal affect and mood  Skin: Warm, without rashes  Bedside US Negative fro hydronephrosis     LINES: PIV    HOSPITAL MEDICATIONS:  heparin  Injectable 5000 Unit(s) SubCutaneous every 8 hours  piperacillin/tazobactam IVPB. 3.375 Gram(s) IV Intermittent every 12 hours  metoprolol succinate ER 50 milliGRAM(s) Oral daily  buDESOnide 160 MICROgram(s)/formoterol 4.5 MICROgram(s) Inhaler 2 Puff(s) Inhalation two times a day  montelukast 10 milliGRAM(s) Oral daily  acetaminophen   Tablet. 975 milliGRAM(s) Oral every 8 hours  DULoxetine 60 milliGRAM(s) Oral daily  HYDROmorphone  Injectable 0.5 milliGRAM(s) IV Push every 3 hours PRN  pregabalin 75 milliGRAM(s) Oral two times a day  docusate sodium 100 milliGRAM(s) Oral two times a day  pantoprazole    Tablet 40 milliGRAM(s) Oral before breakfast  polyethylene glycol 3350 17 Gram(s) Oral daily  lactated ringers Bolus 1000 milliLiter(s) IV Bolus once  multivitamin 1 Tablet(s) Oral daily  sodium chloride 0.9%. 1000 milliLiter(s) IV Continuous <Continuous>  chlorhexidine 4% Liquid 1 Application(s) Topical <User Schedule>      LABS:                        9.9    10.46 )-----------( 142      ( 2018 07:45 )             30.0     Hgb Trend: 9.9<--, 10.6<--, 10.8<--  0801    135  |  103  |  93<H>  ----------------------------<  92  5.4<H>   |  13<L>  |  5.09<H>    Ca    7.4<L>      01 Aug 2018 03:00  Phos  4.0     08-  Mg     1.6     08    TPro  5.3<L>  /  Alb  2.5<L>  /  TBili  < 0.2<L>  /  DBili  x   /  AST  21  /  ALT  19  /  AlkPhos  86  07    Creatinine Trend: 5.09<--, 5.28<--, 4.72<--, 4.38<--, 2.81<--, 2.28<--  PT/INR - ( 01 Aug 2018 03:00 )   PT: 13.1 SEC;   INR: 1.18          PTT - ( 01 Aug 2018 03:00 )  PTT:27.0 SEC  Urinalysis Basic - ( 2018 13:00 )    Color: YELLOW / Appearance: TURBID / S.014 / pH: 8.0  Gluc: NEGATIVE / Ketone: NEGATIVE  / Bili: NEGATIVE / Urobili: NORMAL mg/dL   Blood: NEGATIVE / Protein: 30 mg/dL / Nitrite: NEGATIVE   Leuk Esterase: LARGE / RBC: x / WBC 10-25   Sq Epi: OCC / Non Sq Epi: x / Bacteria: x      Arterial Blood Gas:   @ 12:30  7.33/33/185/18/99.5/-7.8  ABG lactate: --        MICROBIOLOGY: Urine clx pending     RADIOLOGY:  [x ] Reviewed and interpreted by me    EKG:

## 2018-08-01 NOTE — CHART NOTE - NSCHARTNOTEFT_GEN_A_CORE
: MD Graham    INDICATION: Obstructive uropathy, metastatic bladder cancer to the bone    PROCEDURE:  [ ] LIMITED ECHO  [ x] LIMITED CHEST  [ ] LIMITED RETROPERITONEAL  [x ] LIMITED ABDOMINAL  [ ] LIMITED DVT  [ ] NEEDLE GUIDANCE VASCULAR  [ ] NEEDLE GUIDANCE THORACENTESIS  [ ] NEEDLE GUIDANCE PARACENTESIS  [ ] NEEDLE GUIDANCE PERICARDIOCENTESIS  [ ] OTHER    FINDINGS:   -Lung POCUS showed anterior A lines predominantly with some B lines (L>R lung), lung bases with B lines and  bilateral trace pleural effusions   -Renal POCUS demonstrated L. kidney with mild hydronephrosis and R kidney with small renal stone.      INTERPRETATION: A line predominantly, trace pleural effusions, no pulmonary edema evident. L kidney with mild hydronephrosis; R kidney with small renal stone. : MD Graham    INDICATION: Obstructive uropathy, metastatic bladder cancer to the bone    PROCEDURE:  [ ] LIMITED ECHO  [ x] LIMITED CHEST  [ ] LIMITED RETROPERITONEAL  [x ] LIMITED ABDOMINAL  [ ] LIMITED DVT  [ ] NEEDLE GUIDANCE VASCULAR  [ ] NEEDLE GUIDANCE THORACENTESIS  [ ] NEEDLE GUIDANCE PARACENTESIS  [ ] NEEDLE GUIDANCE PERICARDIOCENTESIS  [ ] OTHER    FINDINGS:   -Lung POCUS showed anterior A lines predominantly with some B lines (L>R lung), lung bases with B lines and  bilateral trace pleural effusions   -Renal POCUS demonstrated L. kidney with mild hydronephrosis and R kidney with small renal stone.      INTERPRETATION: A line predominantly, trace pleural effusions, no pulmonary edema evident. L kidney with mild hydronephrosis; R kidney with small renal stone.    Attending note:  Agree with above. At bedside for procedure.

## 2018-08-01 NOTE — PROGRESS NOTE ADULT - PROBLEM SELECTOR PLAN 1
Unclear etiology.   s/p IR yesterday, but did not have nephrostomy tubes placed given no significant hydronephrosis.  urology on the case. obstructive vs. r/o pre-renal/ATN/AIN  nephrology on the case.   Cr 5.09, seem to be plateauing. c/w gentle IVF, urine studies.  monitor Bicarb, monitor for acidosis. Consider Bicarb drip if no improvement.   c/w Empiric IV Zosyn given pt with complex urologic hx and prior hx of pyelonephritis due to enterococcus faecalis and Klebsiella pneumoniae c/b bacteremia in the setting of blocked ileal conduit in May 2018. Unclear at this time why and when prior nephrostomy tubes from 5/2018 were removed.   CT abd/pelvis revealed stable bilateral hydronephrosis and perinephric fat stranding, but nonobstructing stone noted in R kidney may have superimposed infection.

## 2018-08-01 NOTE — PROGRESS NOTE ADULT - ASSESSMENT
79 yo with bladder Ca s/p ileostomy and stomal stenosis in may s/p ostomy revision with severe abdominal and CVAT likely 2/2 stomal stenosis.  Creatinine increasing despite stomal catheter    -Low output from ostomy, Cr increasing  -Check urine electrolytes  -Worsening renal function does not appear to be 2/2 obstructive uropathy  -BUN/Cr ratio 20, consider pre-renal cause, IV fluids  -Monitor creatinine and BMP  -Follow up nephrology recs

## 2018-08-02 LAB
-  AMIKACIN: SIGNIFICANT CHANGE UP
-  AMIKACIN: SIGNIFICANT CHANGE UP
-  AMPICILLIN/SULBACTAM: SIGNIFICANT CHANGE UP
-  AMPICILLIN/SULBACTAM: SIGNIFICANT CHANGE UP
-  AMPICILLIN: SIGNIFICANT CHANGE UP
-  AMPICILLIN: SIGNIFICANT CHANGE UP
-  AZTREONAM: SIGNIFICANT CHANGE UP
-  AZTREONAM: SIGNIFICANT CHANGE UP
-  CEFAZOLIN: SIGNIFICANT CHANGE UP
-  CEFAZOLIN: SIGNIFICANT CHANGE UP
-  CEFEPIME: SIGNIFICANT CHANGE UP
-  CEFEPIME: SIGNIFICANT CHANGE UP
-  CEFOXITIN: SIGNIFICANT CHANGE UP
-  CEFOXITIN: SIGNIFICANT CHANGE UP
-  CEFTAZIDIME: SIGNIFICANT CHANGE UP
-  CEFTAZIDIME: SIGNIFICANT CHANGE UP
-  CEFTRIAXONE: SIGNIFICANT CHANGE UP
-  CEFTRIAXONE: SIGNIFICANT CHANGE UP
-  CIPROFLOXACIN: SIGNIFICANT CHANGE UP
-  CIPROFLOXACIN: SIGNIFICANT CHANGE UP
-  ERTAPENEM: SIGNIFICANT CHANGE UP
-  ERTAPENEM: SIGNIFICANT CHANGE UP
-  GENTAMICIN: SIGNIFICANT CHANGE UP
-  GENTAMICIN: SIGNIFICANT CHANGE UP
-  IMIPENEM: SIGNIFICANT CHANGE UP
-  IMIPENEM: SIGNIFICANT CHANGE UP
-  LEVOFLOXACIN: SIGNIFICANT CHANGE UP
-  LEVOFLOXACIN: SIGNIFICANT CHANGE UP
-  MEROPENEM: SIGNIFICANT CHANGE UP
-  MEROPENEM: SIGNIFICANT CHANGE UP
-  PIPERACILLIN/TAZOBACTAM: SIGNIFICANT CHANGE UP
-  PIPERACILLIN/TAZOBACTAM: SIGNIFICANT CHANGE UP
-  TIGECYCLINE: SIGNIFICANT CHANGE UP
-  TIGECYCLINE: SIGNIFICANT CHANGE UP
-  TOBRAMYCIN: SIGNIFICANT CHANGE UP
-  TOBRAMYCIN: SIGNIFICANT CHANGE UP
-  TRIMETHOPRIM/SULFAMETHOXAZOLE: SIGNIFICANT CHANGE UP
-  TRIMETHOPRIM/SULFAMETHOXAZOLE: SIGNIFICANT CHANGE UP
ALBUMIN SERPL ELPH-MCNC: 2.7 G/DL — LOW (ref 3.3–5)
ALBUMIN SERPL ELPH-MCNC: 2.8 G/DL — LOW (ref 3.3–5)
ALP SERPL-CCNC: 103 U/L — SIGNIFICANT CHANGE UP (ref 40–120)
ALP SERPL-CCNC: 97 U/L — SIGNIFICANT CHANGE UP (ref 40–120)
ALT FLD-CCNC: 19 U/L — SIGNIFICANT CHANGE UP (ref 4–33)
ALT FLD-CCNC: 21 U/L — SIGNIFICANT CHANGE UP (ref 4–33)
APPEARANCE UR: SIGNIFICANT CHANGE UP
AST SERPL-CCNC: 21 U/L — SIGNIFICANT CHANGE UP (ref 4–32)
AST SERPL-CCNC: 21 U/L — SIGNIFICANT CHANGE UP (ref 4–32)
BACTERIA # UR AUTO: SIGNIFICANT CHANGE UP
BACTERIA UR CULT: SIGNIFICANT CHANGE UP
BASE EXCESS BLDV CALC-SCNC: -11.8 MMOL/L — SIGNIFICANT CHANGE UP
BASOPHILS # BLD AUTO: 0 K/UL — SIGNIFICANT CHANGE UP (ref 0–0.2)
BASOPHILS # BLD AUTO: 0.01 K/UL — SIGNIFICANT CHANGE UP (ref 0–0.2)
BASOPHILS NFR BLD AUTO: 0 % — SIGNIFICANT CHANGE UP (ref 0–2)
BASOPHILS NFR BLD AUTO: 0.2 % — SIGNIFICANT CHANGE UP (ref 0–2)
BASOPHILS NFR SPEC: 0 % — SIGNIFICANT CHANGE UP (ref 0–2)
BILIRUB SERPL-MCNC: 0.3 MG/DL — SIGNIFICANT CHANGE UP (ref 0.2–1.2)
BILIRUB SERPL-MCNC: 0.3 MG/DL — SIGNIFICANT CHANGE UP (ref 0.2–1.2)
BILIRUB UR-MCNC: NEGATIVE — SIGNIFICANT CHANGE UP
BLD GP AB SCN SERPL QL: NEGATIVE — SIGNIFICANT CHANGE UP
BLOOD GAS VENOUS - CREATININE: 7.15 MG/DL — HIGH (ref 0.5–1.3)
BLOOD UR QL VISUAL: HIGH
BUN SERPL-MCNC: 100 MG/DL — HIGH (ref 7–23)
BUN SERPL-MCNC: 104 MG/DL — HIGH (ref 7–23)
BUN SERPL-MCNC: 108 MG/DL — HIGH (ref 7–23)
CALCIUM SERPL-MCNC: 6.6 MG/DL — LOW (ref 8.4–10.5)
CALCIUM SERPL-MCNC: 6.7 MG/DL — LOW (ref 8.4–10.5)
CALCIUM SERPL-MCNC: 6.9 MG/DL — LOW (ref 8.4–10.5)
CHLORIDE BLDV-SCNC: 104 MMOL/L — SIGNIFICANT CHANGE UP (ref 96–108)
CHLORIDE SERPL-SCNC: 95 MMOL/L — LOW (ref 98–107)
CHLORIDE SERPL-SCNC: 95 MMOL/L — LOW (ref 98–107)
CHLORIDE SERPL-SCNC: 98 MMOL/L — SIGNIFICANT CHANGE UP (ref 98–107)
CK MB BLD-MCNC: 11.98 NG/ML — HIGH (ref 1–4.7)
CK SERPL-CCNC: 306 U/L — HIGH (ref 25–170)
CO2 SERPL-SCNC: 13 MMOL/L — LOW (ref 22–31)
CO2 SERPL-SCNC: 14 MMOL/L — LOW (ref 22–31)
CO2 SERPL-SCNC: 15 MMOL/L — LOW (ref 22–31)
COLOR SPEC: HIGH
CREAT SERPL-MCNC: 5.81 MG/DL — HIGH (ref 0.5–1.3)
CREAT SERPL-MCNC: 6.34 MG/DL — HIGH (ref 0.5–1.3)
CREAT SERPL-MCNC: 6.43 MG/DL — HIGH (ref 0.5–1.3)
EOSINOPHIL # BLD AUTO: 0.02 K/UL — SIGNIFICANT CHANGE UP (ref 0–0.5)
EOSINOPHIL # BLD AUTO: 0.03 K/UL — SIGNIFICANT CHANGE UP (ref 0–0.5)
EOSINOPHIL NFR BLD AUTO: 0.4 % — SIGNIFICANT CHANGE UP (ref 0–6)
EOSINOPHIL NFR BLD AUTO: 0.6 % — SIGNIFICANT CHANGE UP (ref 0–6)
EOSINOPHIL NFR FLD: 2 % — SIGNIFICANT CHANGE UP (ref 0–6)
GAS PNL BLDV: 128 MMOL/L — LOW (ref 136–146)
GLUCOSE BLDC GLUCOMTR-MCNC: 129 MG/DL — HIGH (ref 70–99)
GLUCOSE BLDC GLUCOMTR-MCNC: 183 MG/DL — HIGH (ref 70–99)
GLUCOSE BLDV-MCNC: 161 — HIGH (ref 70–99)
GLUCOSE SERPL-MCNC: 164 MG/DL — HIGH (ref 70–99)
GLUCOSE SERPL-MCNC: 64 MG/DL — LOW (ref 70–99)
GLUCOSE SERPL-MCNC: 76 MG/DL — SIGNIFICANT CHANGE UP (ref 70–99)
GLUCOSE UR-MCNC: NEGATIVE — SIGNIFICANT CHANGE UP
HCO3 BLDV-SCNC: 14 MMOL/L — LOW (ref 20–27)
HCT VFR BLD CALC: 24.5 % — LOW (ref 34.5–45)
HCT VFR BLD CALC: 24.6 % — LOW (ref 34.5–45)
HCT VFR BLDV CALC: 25.1 % — LOW (ref 34.5–45)
HGB BLD-MCNC: 7.8 G/DL — LOW (ref 11.5–15.5)
HGB BLD-MCNC: 8 G/DL — LOW (ref 11.5–15.5)
HGB BLDV-MCNC: 8.1 G/DL — LOW (ref 11.5–15.5)
IMM GRANULOCYTES # BLD AUTO: 0.04 # — SIGNIFICANT CHANGE UP
IMM GRANULOCYTES # BLD AUTO: 0.05 # — SIGNIFICANT CHANGE UP
IMM GRANULOCYTES NFR BLD AUTO: 0.8 % — SIGNIFICANT CHANGE UP (ref 0–1.5)
IMM GRANULOCYTES NFR BLD AUTO: 1.1 % — SIGNIFICANT CHANGE UP (ref 0–1.5)
KETONES UR-MCNC: NEGATIVE — SIGNIFICANT CHANGE UP
LACTATE BLDV-MCNC: 1.1 MMOL/L — SIGNIFICANT CHANGE UP (ref 0.5–2)
LEUKOCYTE ESTERASE UR-ACNC: HIGH
LYMPHOCYTES # BLD AUTO: 0.28 K/UL — LOW (ref 1–3.3)
LYMPHOCYTES # BLD AUTO: 0.28 K/UL — LOW (ref 1–3.3)
LYMPHOCYTES # BLD AUTO: 5.8 % — LOW (ref 13–44)
LYMPHOCYTES # BLD AUTO: 6.1 % — LOW (ref 13–44)
LYMPHOCYTES NFR SPEC AUTO: 7 % — LOW (ref 13–44)
MACROCYTES BLD QL: SLIGHT — SIGNIFICANT CHANGE UP
MAGNESIUM SERPL-MCNC: 1.7 MG/DL — SIGNIFICANT CHANGE UP (ref 1.6–2.6)
MAGNESIUM SERPL-MCNC: 1.7 MG/DL — SIGNIFICANT CHANGE UP (ref 1.6–2.6)
MANUAL SMEAR VERIFICATION: SIGNIFICANT CHANGE UP
MANUAL SMEAR VERIFICATION: SIGNIFICANT CHANGE UP
MCHC RBC-ENTMCNC: 30 PG — SIGNIFICANT CHANGE UP (ref 27–34)
MCHC RBC-ENTMCNC: 30.3 PG — SIGNIFICANT CHANGE UP (ref 27–34)
MCHC RBC-ENTMCNC: 31.8 % — LOW (ref 32–36)
MCHC RBC-ENTMCNC: 32.5 % — SIGNIFICANT CHANGE UP (ref 32–36)
MCV RBC AUTO: 93.2 FL — SIGNIFICANT CHANGE UP (ref 80–100)
MCV RBC AUTO: 94.2 FL — SIGNIFICANT CHANGE UP (ref 80–100)
METHOD TYPE: SIGNIFICANT CHANGE UP
METHOD TYPE: SIGNIFICANT CHANGE UP
MONOCYTES # BLD AUTO: 0.34 K/UL — SIGNIFICANT CHANGE UP (ref 0–0.9)
MONOCYTES # BLD AUTO: 0.36 K/UL — SIGNIFICANT CHANGE UP (ref 0–0.9)
MONOCYTES NFR BLD AUTO: 7.1 % — SIGNIFICANT CHANGE UP (ref 2–14)
MONOCYTES NFR BLD AUTO: 7.9 % — SIGNIFICANT CHANGE UP (ref 2–14)
MONOCYTES NFR BLD: 9 % — SIGNIFICANT CHANGE UP (ref 2–9)
NEUTROPHIL AB SER-ACNC: 82 % — HIGH (ref 43–77)
NEUTROPHILS # BLD AUTO: 3.85 K/UL — SIGNIFICANT CHANGE UP (ref 1.8–7.4)
NEUTROPHILS # BLD AUTO: 4.11 K/UL — SIGNIFICANT CHANGE UP (ref 1.8–7.4)
NEUTROPHILS NFR BLD AUTO: 84.3 % — HIGH (ref 43–77)
NEUTROPHILS NFR BLD AUTO: 85.7 % — HIGH (ref 43–77)
NITRITE UR-MCNC: NEGATIVE — SIGNIFICANT CHANGE UP
NRBC # BLD: 0 /100WBC — SIGNIFICANT CHANGE UP
NRBC # FLD: 0 — SIGNIFICANT CHANGE UP
NRBC # FLD: 0 — SIGNIFICANT CHANGE UP
ORGANISM # SPEC MICROSCOPIC CNT: SIGNIFICANT CHANGE UP
PCO2 BLDV: 46 MMHG — SIGNIFICANT CHANGE UP (ref 41–51)
PH BLDV: 7.15 PH — CRITICAL LOW (ref 7.32–7.43)
PH UR: 6.5 — SIGNIFICANT CHANGE UP (ref 4.6–8)
PHOSPHATE SERPL-MCNC: 4.7 MG/DL — HIGH (ref 2.5–4.5)
PHOSPHATE SERPL-MCNC: 4.7 MG/DL — HIGH (ref 2.5–4.5)
PLATELET # BLD AUTO: 77 K/UL — LOW (ref 150–400)
PLATELET # BLD AUTO: 78 K/UL — LOW (ref 150–400)
PLATELET COUNT - ESTIMATE: SIGNIFICANT CHANGE UP
PMV BLD: 11.9 FL — SIGNIFICANT CHANGE UP (ref 7–13)
PMV BLD: 12.2 FL — SIGNIFICANT CHANGE UP (ref 7–13)
PO2 BLDV: 31 MMHG — LOW (ref 35–40)
POLYCHROMASIA BLD QL SMEAR: SLIGHT — SIGNIFICANT CHANGE UP
POTASSIUM BLDV-SCNC: 4.8 MMOL/L — HIGH (ref 3.4–4.5)
POTASSIUM SERPL-MCNC: 5 MMOL/L — SIGNIFICANT CHANGE UP (ref 3.5–5.3)
POTASSIUM SERPL-MCNC: 5.3 MMOL/L — SIGNIFICANT CHANGE UP (ref 3.5–5.3)
POTASSIUM SERPL-MCNC: 5.6 MMOL/L — HIGH (ref 3.5–5.3)
POTASSIUM SERPL-SCNC: 5 MMOL/L — SIGNIFICANT CHANGE UP (ref 3.5–5.3)
POTASSIUM SERPL-SCNC: 5.3 MMOL/L — SIGNIFICANT CHANGE UP (ref 3.5–5.3)
POTASSIUM SERPL-SCNC: 5.6 MMOL/L — HIGH (ref 3.5–5.3)
PROT SERPL-MCNC: 6.1 G/DL — SIGNIFICANT CHANGE UP (ref 6–8.3)
PROT SERPL-MCNC: 6.2 G/DL — SIGNIFICANT CHANGE UP (ref 6–8.3)
PROT UR-MCNC: 500 MG/DL — HIGH
RBC # BLD: 2.6 M/UL — LOW (ref 3.8–5.2)
RBC # BLD: 2.64 M/UL — LOW (ref 3.8–5.2)
RBC # FLD: 13.6 % — SIGNIFICANT CHANGE UP (ref 10.3–14.5)
RBC # FLD: 13.7 % — SIGNIFICANT CHANGE UP (ref 10.3–14.5)
RBC CASTS # UR COMP ASSIST: >50 — HIGH (ref 0–?)
RH IG SCN BLD-IMP: POSITIVE — SIGNIFICANT CHANGE UP
SAO2 % BLDV: 51.7 % — LOW (ref 60–85)
SODIUM SERPL-SCNC: 130 MMOL/L — LOW (ref 135–145)
SODIUM SERPL-SCNC: 130 MMOL/L — LOW (ref 135–145)
SODIUM SERPL-SCNC: 132 MMOL/L — LOW (ref 135–145)
SP GR SPEC: 1.02 — SIGNIFICANT CHANGE UP (ref 1–1.04)
TROPONIN T, HIGH SENSITIVITY: 256 NG/L — CRITICAL HIGH (ref ?–14)
UROBILINOGEN FLD QL: NORMAL MG/DL — SIGNIFICANT CHANGE UP
WBC # BLD: 4.57 K/UL — SIGNIFICANT CHANGE UP (ref 3.8–10.5)
WBC # BLD: 4.8 K/UL — SIGNIFICANT CHANGE UP (ref 3.8–10.5)
WBC # FLD AUTO: 4.57 K/UL — SIGNIFICANT CHANGE UP (ref 3.8–10.5)
WBC # FLD AUTO: 4.8 K/UL — SIGNIFICANT CHANGE UP (ref 3.8–10.5)
WBC UR QL: >50 — HIGH (ref 0–?)

## 2018-08-02 PROCEDURE — 99291 CRITICAL CARE FIRST HOUR: CPT | Mod: 25

## 2018-08-02 PROCEDURE — 99231 SBSQ HOSP IP/OBS SF/LOW 25: CPT

## 2018-08-02 PROCEDURE — 74425 UROGRAPHY ANTEGRADE RS&I: CPT | Mod: 26

## 2018-08-02 PROCEDURE — 99233 SBSQ HOSP IP/OBS HIGH 50: CPT | Mod: GC

## 2018-08-02 PROCEDURE — 93308 TTE F-UP OR LMTD: CPT | Mod: 26,GC

## 2018-08-02 PROCEDURE — 76604 US EXAM CHEST: CPT | Mod: 26,GC

## 2018-08-02 RX ORDER — AMIODARONE HYDROCHLORIDE 400 MG/1
150 TABLET ORAL ONCE
Qty: 0 | Refills: 0 | Status: COMPLETED | OUTPATIENT
Start: 2018-08-02 | End: 2018-08-02

## 2018-08-02 RX ORDER — DEXTROSE 50 % IN WATER 50 %
50 SYRINGE (ML) INTRAVENOUS ONCE
Qty: 0 | Refills: 0 | Status: COMPLETED | OUTPATIENT
Start: 2018-08-02 | End: 2018-08-02

## 2018-08-02 RX ORDER — AMIODARONE HYDROCHLORIDE 400 MG/1
1 TABLET ORAL
Qty: 450 | Refills: 0 | Status: DISCONTINUED | OUTPATIENT
Start: 2018-08-02 | End: 2018-08-02

## 2018-08-02 RX ORDER — ALBUTEROL 90 UG/1
2.5 AEROSOL, METERED ORAL
Qty: 0 | Refills: 0 | Status: DISCONTINUED | OUTPATIENT
Start: 2018-08-02 | End: 2018-08-02

## 2018-08-02 RX ORDER — AMIODARONE HYDROCHLORIDE 400 MG/1
150 TABLET ORAL ONCE
Qty: 0 | Refills: 0 | Status: DISCONTINUED | OUTPATIENT
Start: 2018-08-02 | End: 2018-08-02

## 2018-08-02 RX ORDER — ALBUTEROL 90 UG/1
10 AEROSOL, METERED ORAL ONCE
Qty: 0 | Refills: 0 | Status: DISCONTINUED | OUTPATIENT
Start: 2018-08-02 | End: 2018-08-02

## 2018-08-02 RX ORDER — AMIODARONE HYDROCHLORIDE 400 MG/1
0.5 TABLET ORAL
Qty: 900 | Refills: 0 | Status: DISCONTINUED | OUTPATIENT
Start: 2018-08-02 | End: 2018-08-02

## 2018-08-02 RX ORDER — INSULIN HUMAN 100 [IU]/ML
10 INJECTION, SOLUTION SUBCUTANEOUS ONCE
Qty: 0 | Refills: 0 | Status: COMPLETED | OUTPATIENT
Start: 2018-08-02 | End: 2018-08-02

## 2018-08-02 RX ORDER — SODIUM CHLORIDE 9 MG/ML
1000 INJECTION, SOLUTION INTRAVENOUS
Qty: 0 | Refills: 0 | Status: DISCONTINUED | OUTPATIENT
Start: 2018-08-02 | End: 2018-08-03

## 2018-08-02 RX ORDER — ACETAMINOPHEN 500 MG
1000 TABLET ORAL ONCE
Qty: 0 | Refills: 0 | Status: COMPLETED | OUTPATIENT
Start: 2018-08-02 | End: 2018-08-02

## 2018-08-02 RX ORDER — CALCIUM CHLORIDE
1000 POWDER (GRAM) MISCELLANEOUS ONCE
Qty: 0 | Refills: 0 | Status: COMPLETED | OUTPATIENT
Start: 2018-08-02 | End: 2018-08-02

## 2018-08-02 RX ORDER — SODIUM POLYSTYRENE SULFONATE 4.1 MEQ/G
30 POWDER, FOR SUSPENSION ORAL ONCE
Qty: 0 | Refills: 0 | Status: DISCONTINUED | OUTPATIENT
Start: 2018-08-02 | End: 2018-08-02

## 2018-08-02 RX ORDER — AMIODARONE HYDROCHLORIDE 400 MG/1
1 TABLET ORAL
Qty: 450 | Refills: 0 | Status: DISCONTINUED | OUTPATIENT
Start: 2018-08-02 | End: 2018-08-03

## 2018-08-02 RX ORDER — SODIUM CHLORIDE 9 MG/ML
1000 INJECTION INTRAMUSCULAR; INTRAVENOUS; SUBCUTANEOUS
Qty: 0 | Refills: 0 | Status: DISCONTINUED | OUTPATIENT
Start: 2018-08-02 | End: 2018-08-02

## 2018-08-02 RX ORDER — AMIODARONE HYDROCHLORIDE 400 MG/1
0.5 TABLET ORAL
Qty: 450 | Refills: 0 | Status: DISCONTINUED | OUTPATIENT
Start: 2018-08-03 | End: 2018-08-03

## 2018-08-02 RX ADMIN — Medication 400 MILLIGRAM(S): at 05:48

## 2018-08-02 RX ADMIN — HYDROMORPHONE HYDROCHLORIDE 0.5 MILLIGRAM(S): 2 INJECTION INTRAMUSCULAR; INTRAVENOUS; SUBCUTANEOUS at 09:21

## 2018-08-02 RX ADMIN — HEPARIN SODIUM 5000 UNIT(S): 5000 INJECTION INTRAVENOUS; SUBCUTANEOUS at 22:02

## 2018-08-02 RX ADMIN — INSULIN HUMAN 10 UNIT(S): 100 INJECTION, SOLUTION SUBCUTANEOUS at 18:02

## 2018-08-02 RX ADMIN — Medication 50 MILLILITER(S): at 05:48

## 2018-08-02 RX ADMIN — PIPERACILLIN AND TAZOBACTAM 25 GRAM(S): 4; .5 INJECTION, POWDER, LYOPHILIZED, FOR SOLUTION INTRAVENOUS at 05:49

## 2018-08-02 RX ADMIN — HYDROMORPHONE HYDROCHLORIDE 0.5 MILLIGRAM(S): 2 INJECTION INTRAMUSCULAR; INTRAVENOUS; SUBCUTANEOUS at 09:06

## 2018-08-02 RX ADMIN — INSULIN HUMAN 10 UNIT(S): 100 INJECTION, SOLUTION SUBCUTANEOUS at 05:59

## 2018-08-02 RX ADMIN — PIPERACILLIN AND TAZOBACTAM 25 GRAM(S): 4; .5 INJECTION, POWDER, LYOPHILIZED, FOR SOLUTION INTRAVENOUS at 18:02

## 2018-08-02 RX ADMIN — AMIODARONE HYDROCHLORIDE 150 MILLIGRAM(S): 400 TABLET ORAL at 18:09

## 2018-08-02 RX ADMIN — Medication 50 MILLILITER(S): at 18:02

## 2018-08-02 RX ADMIN — SODIUM CHLORIDE 50 MILLILITER(S): 9 INJECTION, SOLUTION INTRAVENOUS at 16:24

## 2018-08-02 RX ADMIN — SODIUM CHLORIDE 50 MILLILITER(S): 9 INJECTION, SOLUTION INTRAVENOUS at 19:41

## 2018-08-02 RX ADMIN — Medication 50 MILLIGRAM(S): at 18:23

## 2018-08-02 RX ADMIN — Medication 1000 MILLIGRAM(S): at 06:30

## 2018-08-02 RX ADMIN — HEPARIN SODIUM 5000 UNIT(S): 5000 INJECTION INTRAVENOUS; SUBCUTANEOUS at 06:02

## 2018-08-02 RX ADMIN — CHLORHEXIDINE GLUCONATE 1 APPLICATION(S): 213 SOLUTION TOPICAL at 09:06

## 2018-08-02 RX ADMIN — AMIODARONE HYDROCHLORIDE 150 MILLIGRAM(S): 400 TABLET ORAL at 18:10

## 2018-08-02 RX ADMIN — BUDESONIDE AND FORMOTEROL FUMARATE DIHYDRATE 2 PUFF(S): 160; 4.5 AEROSOL RESPIRATORY (INHALATION) at 21:05

## 2018-08-02 RX ADMIN — AMIODARONE HYDROCHLORIDE 33.33 MG/MIN: 400 TABLET ORAL at 19:42

## 2018-08-02 RX ADMIN — HEPARIN SODIUM 5000 UNIT(S): 5000 INJECTION INTRAVENOUS; SUBCUTANEOUS at 14:14

## 2018-08-02 RX ADMIN — AMIODARONE HYDROCHLORIDE 33.33 MG/MIN: 400 TABLET ORAL at 18:34

## 2018-08-02 NOTE — PROGRESS NOTE ADULT - ASSESSMENT
79 yo with bladder Ca s/p ileostomy and stomal stenosis in may s/p ostomy revision with severe abdominal and CVAT likely 2/2 stomal stenosis.  Creatinine increasing despite stomal catheter    -Low output from ostomy, Cr increasing  -Agree with nephrology recommendation of loopogram  -Monitor creatinine and BMP  -Will continue to follow

## 2018-08-02 NOTE — CHART NOTE - NSCHARTNOTEFT_GEN_A_CORE
: Michelle Arias     INDICATION:  Obstructive uropathy, metastatic bladder cancer to the bone    PROCEDURE:  [ ] LIMITED ECHO  [ ] LIMITED CHEST  [x ] LIMITED RENAL  [ ] LIMITED ABDOMINAL  [ ] LIMITED DVT  [ ] NEEDLE GUIDANCE VASCULAR  [ ] NEEDLE GUIDANCE THORACENTESIS  [ ] NEEDLE GUIDANCE PARACENTESIS  [ ] NEEDLE GUIDANCE PERICARDIOCENTESIS  [ ] OTHER    FINDINGS:  Lung exam with A line predominance bilaterally. Renal exam shows L kidney with no hydronephrosis. There is mild hydronephrosis present on R kidney.       INTERPRETATION: Lung exam was A line predominant, no evidence of PNA or pulmonary edema. Mild hydronephrosis on R kidney. : Michelle Arias     INDICATION:  Obstructive uropathy, metastatic bladder cancer to the bone    PROCEDURE:  [ ] LIMITED ECHO  [ ] LIMITED CHEST  [x ] LIMITED RENAL  [ ] LIMITED ABDOMINAL  [ ] LIMITED DVT  [ ] NEEDLE GUIDANCE VASCULAR  [ ] NEEDLE GUIDANCE THORACENTESIS  [ ] NEEDLE GUIDANCE PARACENTESIS  [ ] NEEDLE GUIDANCE PERICARDIOCENTESIS  [ ] OTHER    FINDINGS:  Lung exam with A line predominance bilaterally. Renal exam shows L kidney with no hydronephrosis. There is mild hydronephrosis present on R kidney.       INTERPRETATION: Lung exam was A line predominant, no evidence of PNA or pulmonary edema. Mild hydronephrosis on R kidney.    Attending Notes;  Agree with above. At bedside for procedure

## 2018-08-02 NOTE — PROGRESS NOTE ADULT - PROBLEM SELECTOR PLAN 1
Pt. with DEVON in setting of obstructive uropathy. Scr was 1.7 on 5/28/18, increased to 5.81 today (8/1/18). Pt. with bilateral hydronephrosis on CT scan done on 7/29/18. Pt. with history of obstructive uropathy in May 2018. Renal US showed Interval resolution of the bilateral hydronephrosis on 8/1/18. Pt. with ?ATN. Recommend loopogram. Pt. on IV Fluids. Monitor labs and urine output. Avoid any potential nephrotoxins.

## 2018-08-02 NOTE — DIETITIAN INITIAL EVALUATION ADULT. - PROBLEM SELECTOR PLAN 4
- hold home regimen of diltiazem 180mg daily for now  - continue home regimen of metoprolol succinate 50mg daily (unclear if taken for HTN or other indication)

## 2018-08-02 NOTE — DIETITIAN INITIAL EVALUATION ADULT. - PROBLEM SELECTOR PLAN 2
Suspect underlying urologic obstruction contributing to pain. Pt with similar presentation in May 2018  - plan as above  - pain control with PRN dilaudid 0.5mg q3h IV with bowel regimen. Hold IV dilaudid for oversedation.

## 2018-08-02 NOTE — PROGRESS NOTE ADULT - SUBJECTIVE AND OBJECTIVE BOX
[facility] DIVISION OF KIDNEY DISEASES AND HYPERTENSION -- FOLLOW UP NOTE  --------------------------------------------------------------------------------  PI: 78 F with PMH significant for HCM, Bladder Ca s/p ileal conduit (Nov 2017), c/b obstructive uropathy in May 2018 with b/l nephrostomy tube placement  with subsequent tube removal and conduit revision 2 weeks ago presented with severe abdominal pain and lack of urine output from the conduit stoma. Pt. found to have worsening DEVON in setting of obstructive uropathy. Scr was 1.7 on 5/28/18, increased to 2.81 on (7/30/28). Urology consulted and performed ostomal catheterization on 7/30/18. Pt transfer to MICU for hypotension and hyperkalemia on 7/30/18.     Patient seen and examined at MICU. Pt. complaining of dyspnea and mild abdominal pain. Noted to be tachypneic and mildly agitated. SCr increased to 5.81 today.     PAST HISTORY  --------------------------------------------------------------------------------  No significant changes to PMH, PSH, FHx, SHx, unless otherwise noted    ALLERGIES & MEDICATIONS  --------------------------------------------------------------------------------  Allergies  No Known Allergies    Intolerances    Standing Inpatient Medications  acetaminophen   Tablet. 975 milliGRAM(s) Oral every 8 hours  buDESOnide 160 MICROgram(s)/formoterol 4.5 MICROgram(s) Inhaler 2 Puff(s) Inhalation two times a day  chlorhexidine 4% Liquid 1 Application(s) Topical <User Schedule>  docusate sodium 100 milliGRAM(s) Oral two times a day  DULoxetine 60 milliGRAM(s) Oral daily  heparin  Injectable 5000 Unit(s) SubCutaneous every 8 hours  metoprolol succinate ER 50 milliGRAM(s) Oral daily  montelukast 10 milliGRAM(s) Oral daily  multivitamin 1 Tablet(s) Oral daily  pantoprazole    Tablet 40 milliGRAM(s) Oral before breakfast  piperacillin/tazobactam IVPB. 3.375 Gram(s) IV Intermittent every 12 hours  polyethylene glycol 3350 17 Gram(s) Oral daily  pregabalin 75 milliGRAM(s) Oral two times a day  sodium bicarbonate 650 milliGRAM(s) Oral two times a day  sodium chloride 0.9%. 1000 milliLiter(s) IV Continuous <Continuous>    PRN Inpatient Medications  HYDROmorphone  Injectable 0.5 milliGRAM(s) IV Push every 3 hours PRN  simethicone 40 milliGRAM(s) Chew every 6 hours PRN      REVIEW OF SYSTEMS  --------------------------------------------------------------------------------  Limited ROS.   Respiratory: See HPI  CV: No chest pain,   GI: See HPI  : + drainage from ileal conduit    VITALS/PHYSICAL EXAM  --------------------------------------------------------------------------------  T(C): 36.7 (08-02-18 @ 04:00), Max: 36.7 (08-02-18 @ 04:00)  HR: 111 (08-02-18 @ 06:00) (103 - 124)  BP: 123/58 (08-02-18 @ 06:00) (96/46 - 129/48)  RR: 21 (08-02-18 @ 06:00) (10 - 31)  SpO2: 96% (08-02-18 @ 06:00) (94% - 100%)  Wt(kg): --    08-01-18 @ 07:01  -  08-02-18 @ 07:00  --------------------------------------------------------  IN: 2415 mL / OUT: 390 mL / NET: 2025 mL      Physical Exam:  	Gen: NAD, well-appearing  	Pulm: CTA B/L  	CV: RRR, S1S2 with holosystolic murmur loudest along L sternal border; no rub  	Back: No spinal or CVA tenderness; no sacral edema  	Abd: +BS, soft, mild tenderness RLQ at site of prev conduit stoma, nondistended, current ileal conduit with drainage bag attached and filling with 	yellow/red tinged fluid  	LE: Warm, no edema  	Psych: Normal affect and mood  	Skin: Warm, without rashes    LABS/STUDIES  --------------------------------------------------------------------------------              8.0    4.57  >-----------<  77       [08-02-18 @ 05:00]              24.6     130  |  95  |  100  ----------------------------<  76      [08-02-18 @ 04:00]  5.3   |  14  |  5.81        Ca     6.9     [08-02-18 @ 04:00]      Mg     1.7     [08-02-18 @ 04:00]      Phos  4.7     [08-02-18 @ 04:00]    TPro  6.2  /  Alb  2.8  /  TBili  0.3  /  DBili  x   /  AST  21  /  ALT  21  /  AlkPhos  97  [08-02-18 @ 04:00]    PT/INR: PT 13.1 , INR 1.18       [08-01-18 @ 03:00]  PTT: 27.0       [08-01-18 @ 03:00]      Creatinine Trend:  SCr 5.81 [08-02 @ 04:00]  SCr 5.54 [08-01 @ 14:43]  SCr 5.09 [08-01 @ 03:00]  SCr 5.28 [07-31 @ 21:36]  SCr 4.72 [07-31 @ 07:45]

## 2018-08-02 NOTE — PROGRESS NOTE ADULT - PROBLEM SELECTOR PLAN 1
Unclear etiology.   Given history, highly suspicious for post obstructive uropathy vs. ATN  s/p IR, but did not get nephrostomy tubes   urology on the case. obstructive vs. r/o pre-renal/ATN/AIN  nephrology on the case. Loopogram today. pending results.   Cr continue to trend up with low urostomy urine outpt.   Monitor Bicarb, monitor for acidosis. c/w Bicarb tabs.   c/w Empiric IV Zosyn given pt with complex urologic hx and prior hx of pyelonephritis due to enterococcus faecalis and Klebsiella pneumoniae c/b bacteremia in the setting of blocked ileal conduit in May 2018. Unclear at this time why and when prior nephrostomy tubes from 5/2018 were removed.   CT abd/pelvis revealed stable bilateral hydronephrosis and perinephric fat stranding, but nonobstructing stone noted in R kidney may have superimposed infection.

## 2018-08-02 NOTE — DIETITIAN INITIAL EVALUATION ADULT. - OTHER INFO
Pt seen for critical care LOS.  At this time, pt sleeping- sister present and was able to provide nutrition hx.  Pt had lost approximately 50lbs since November 2017, but has regained 34 lbs.  Pt does not have any food related allergies and was on a  regular diet at home.  Pt did not have noted swallowing issues PTA.  At this time, pt made NPO 2/2 coughing after drinking water. Pt seen for critical care LOS.  At this time, pt sleeping- sister present and was able to provide nutrition hx.  Pt had lost approximately 50lbs since November 2017, but has regained 34 lbs.  Pt does not have any food related allergies and was on a  regular diet at home.  Pt did not have noted swallowing issues PTA.  Pt was taking MVI, Benefiber, Calcium w/Vitamin D supplements at home.  At this time, pt made NPO 2/2 coughing after drinking water.

## 2018-08-02 NOTE — PROGRESS NOTE ADULT - ASSESSMENT
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    Neuro  Lethargic this AM 2/2 Pain meds  Hold sedating pain meds, assess for improving mental status     Cardio  Holosystolic murmur on clinical exam, clinically euvolemic, hx of hypertrophic cardiomyopathy 5/18 ECHO w EF 75%  Cont home dose Toprol 50 mg QD w hold parameters   Fluids currently held for CXR suspicious for pulm edema, bedside US a predominant - CTM     Pulm  Relative hypoxia to 93-94% likely 2/2 pain-med induced respiratory depression, now on NC 2L- CTM     Nephro/  sCr persistently elevated to 5.8 this AM, concern for post-obstructive ATN   -Stable urine output from ostomy site.  - F/U official read loopogram   -Ileal conduit clx w Klebsiella and E coli    ID  Cont Zosyn for Ileal conduit clx w Klebsiella and E coli    Heme   Stable anemia, no clinically active bleed     Endocrine   Alc 4.9  TSH 0.77

## 2018-08-02 NOTE — PROGRESS NOTE ADULT - SUBJECTIVE AND OBJECTIVE BOX
CHIEF COMPLAINT: Obstructive Uropathy    Interval Events: Hyperkalemic to 5.3 O/N -given cocktail. Vague complaints of pain, given total of 2 mg Dilaudid & Tylenol. Noticeably lethargic this AM. Urine output approx 300 cc /24hr. Patient seen and examined at bedside. Complaints of diffuse abd and chest pain. O/n Metoprolol held for soft BP. Some dysphagia this AM- currently NPO. ROS negative unless otherwise stated. Pending official read of loopogram.     REVIEW OF SYSTEMS:  See HPI    OBJECTIVE:  ICU Vital Signs Last 24 Hrs  T(C): 36.6 (02 Aug 2018 12:00), Max: 36.7 (02 Aug 2018 04:00)  T(F): 97.8 (02 Aug 2018 12:00), Max: 98 (02 Aug 2018 04:00)  HR: 105 (02 Aug 2018 13:00) (104 - 124)  BP: 100/52 (02 Aug 2018 13:00) (98/53 - 129/48)  BP(mean): 66 (02 Aug 2018 13:00) (51 - 92)  RR: 13 (02 Aug 2018 13:00) (10 - 31)  SpO2: 97% (02 Aug 2018 13:00) (94% - 98%)        08-01 @ 07:01 - 08-02 @ 07:00  --------------------------------------------------------  IN: 2415 mL / OUT: 390 mL / NET: 2025 mL    08-02 @ 07:01 - 08-02 @ 13:53  --------------------------------------------------------  IN: 0 mL / OUT: 20 mL / NET: -20 mL      CAPILLARY BLOOD GLUCOSE  POCT Blood Glucose.: 129 mg/dL (02 Aug 2018 06:30)      PHYSICAL EXAM:  Gen: elderly woman in NAD   HEENT: PEERLA  Pulm: CTA B/L 2L NC  CV: RRR, S1S2 with holosystolic murmur loudest along L sternal border; no rub  Back: No spinal or CVA tenderness; no sacral edema  Abd: +BS, soft, mild tenderness RLQ at site of prev conduit stoma, nondistended, current ileal conduit with drainage bag attached and filling with yellow/red tinged fluid  LE: Warm, no edema  Psych: Normal affect and mood  Skin: Warm, without rashes  Bedside US Negative fro hydronephrosis     LINES: [facility] MEDICATIONS:  heparin  Injectable 5000 Unit(s) SubCutaneous every 8 hours  piperacillin/tazobactam IVPB. 3.375 Gram(s) IV Intermittent every 12 hours  metoprolol succinate ER 50 milliGRAM(s) Oral daily  buDESOnide 160 MICROgram(s)/formoterol 4.5 MICROgram(s) Inhaler 2 Puff(s) Inhalation two times a day  montelukast 10 milliGRAM(s) Oral daily  acetaminophen   Tablet. 975 milliGRAM(s) Oral every 8 hours  DULoxetine 60 milliGRAM(s) Oral daily  docusate sodium 100 milliGRAM(s) Oral two times a day  pantoprazole    Tablet 40 milliGRAM(s) Oral before breakfast  polyethylene glycol 3350 17 Gram(s) Oral daily  simethicone 40 milliGRAM(s) Chew every 6 hours PRN  multivitamin 1 Tablet(s) Oral daily  sodium bicarbonate 650 milliGRAM(s) Oral two times a day  chlorhexidine 4% Liquid 1 Application(s) Topical <User Schedule>        LABS:                        8.0    4.57  )-----------( 77       ( 02 Aug 2018 05:00 )             24.6     Hgb Trend: 8.0<--, 7.8<--, 9.9<--, 10.6<--, 10.8<--  08-02    130<L>  |  95<L>  |  100<H>  ----------------------------<  76  5.3   |  14<L>  |  5.81<H>    Ca    6.9<L>      02 Aug 2018 04:00  Phos  4.7     08-02  Mg     1.7     08-02    TPro  6.2  /  Alb  2.8<L>  /  TBili  0.3  /  DBili  x   /  AST  21  /  ALT  21  /  AlkPhos  97  08-02    Creatinine Trend: 5.81<--, 5.54<--, 5.09<--, 5.28<--, 4.72<--, 4.38<--  PT/INR - ( 01 Aug 2018 03:00 )   PT: 13.1 SEC;   INR: 1.18          PTT - ( 01 Aug 2018 03:00 )  PTT:27.0 SEC          MICROBIOLOGY: Klebsiella and Ecoli + Ileal conduit     RADIOLOGY:  [x ] Reviewed and interpreted by me    EKG: No ischemic changes

## 2018-08-02 NOTE — DIETITIAN INITIAL EVALUATION ADULT. - PROBLEM SELECTOR PLAN 1
Unclear etiology. 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 Unclear at this time why and when prior nephrostomy tubes from 5/2018 were removed. CT abd/pelvis revealed stable bilateral hydronephrosis and perinephric fat stranding, but nonobstructing stone noted in R kidney may have superimposed infection  - obtain UA and urine cx  - will defer initiating abx at this time since pt afebrile and hemodynamically stable  - urology consult  - IR consult  - trend Cr. Renally dose meds.   - strict I/Os

## 2018-08-02 NOTE — PROGRESS NOTE ADULT - SUBJECTIVE AND OBJECTIVE BOX
Patient is a 78y old  Female who presents with a chief complaint of DEVON 2/2 obstructive uropathy with moderate b/l hydroureteronephrosis (30 Jul 2018 09:03)      SUBJECTIVE / OVERNIGHT EVENTS:  sleepy post IV pain meds.   the brother and sister at bedside.  s/p Loopogram. pending results.   remained in the MICU.  Cr trending up. low urine outpt.       Vital Signs Last 24 Hrs  T(C): 36.4 (02 Aug 2018 08:00), Max: 36.7 (02 Aug 2018 04:00)  T(F): 97.5 (02 Aug 2018 08:00), Max: 98 (02 Aug 2018 04:00)  HR: 109 (02 Aug 2018 10:20) (104 - 124)  BP: 106/61 (02 Aug 2018 10:20) (105/32 - 129/48)  BP(mean): 75 (02 Aug 2018 10:20) (51 - 92)  RR: 18 (02 Aug 2018 10:20) (10 - 31)  SpO2: 96% (02 Aug 2018 10:20) (94% - 100%)  I&O's Summary    01 Aug 2018 07:01  -  02 Aug 2018 07:00  --------------------------------------------------------  IN: 2415 mL / OUT: 390 mL / NET: 2025 mL    02 Aug 2018 07:01  -  02 Aug 2018 10:44  --------------------------------------------------------  IN: 0 mL / OUT: 10 mL / NET: -10 mL      PHYSICAL EXAM:  GENERAL: NAD, Comfortable, sleepy.   HEAD:  Atraumatic, Normocephalic  EYES: EOMI, PERRLA, conjunctiva and sclera clear  NECK: Supple, No JVD  CHEST/LUNG: mild dec breath sounds bilaterally; No wheeze  HEART: Regular rate and rhythm; No murmurs, rubs, or gallops  ABDOMEN: Soft, mild tenderness diffuse, distended; Bowel sounds present, mid ventral hernia, stoma bag with dark brown urine  Neuro: Awake, lethargic, open eyes and speak a few words, and fall back to sleep. no focal deficit  EXTREMITIES:  2+ Peripheral Pulses, No clubbing, cyanosis, or edema  SKIN: No rashes or lesions      LABS:                        8.0    4.57  )-----------( 77       ( 02 Aug 2018 05:00 )             24.6     08-02    130<L>  |  95<L>  |  100<H>  ----------------------------<  76  5.3   |  14<L>  |  5.81<H>    Ca    6.9<L>      02 Aug 2018 04:00  Phos  4.7     08-02  Mg     1.7     08-02    TPro  6.2  /  Alb  2.8<L>  /  TBili  0.3  /  DBili  x   /  AST  21  /  ALT  21  /  AlkPhos  97  08-02    PT/INR - ( 01 Aug 2018 03:00 )   PT: 13.1 SEC;   INR: 1.18          PTT - ( 01 Aug 2018 03:00 )  PTT:27.0 SEC  CAPILLARY BLOOD GLUCOSE      POCT Blood Glucose.: 129 mg/dL (02 Aug 2018 06:30)            RADIOLOGY & ADDITIONAL TESTS:    Imaging Personally Reviewed:  [x] YES  [ ] NO    Consultant(s) Notes Reviewed:  [x] YES  [ ] NO      MEDICATIONS  (STANDING):  acetaminophen   Tablet. 975 milliGRAM(s) Oral every 8 hours  buDESOnide 160 MICROgram(s)/formoterol 4.5 MICROgram(s) Inhaler 2 Puff(s) Inhalation two times a day  chlorhexidine 4% Liquid 1 Application(s) Topical <User Schedule>  docusate sodium 100 milliGRAM(s) Oral two times a day  DULoxetine 60 milliGRAM(s) Oral daily  heparin  Injectable 5000 Unit(s) SubCutaneous every 8 hours  metoprolol succinate ER 50 milliGRAM(s) Oral daily  montelukast 10 milliGRAM(s) Oral daily  multivitamin 1 Tablet(s) Oral daily  pantoprazole    Tablet 40 milliGRAM(s) Oral before breakfast  piperacillin/tazobactam IVPB. 3.375 Gram(s) IV Intermittent every 12 hours  polyethylene glycol 3350 17 Gram(s) Oral daily  pregabalin 75 milliGRAM(s) Oral two times a day  sodium bicarbonate 650 milliGRAM(s) Oral two times a day    MEDICATIONS  (PRN):  HYDROmorphone  Injectable 0.5 milliGRAM(s) IV Push every 3 hours PRN Severe Pain (7 - 10)  simethicone 40 milliGRAM(s) Chew every 6 hours PRN Indigestion      Care Discussed with Consultants/Other Providers [x] YES  [ ] NO    HEALTH ISSUES - PROBLEM Dx:  Hyperkalemia: Hyperkalemia  DEVON (acute kidney injury): DEVON (acute kidney injury)  Obstructive uropathy: Obstructive uropathy  Preventive measure: Preventive measure  Anemia, unspecified type: Anemia, unspecified type  Asthma, unspecified asthma severity, unspecified whether complicated, unspecified whether persistent: Asthma, unspecified asthma severity, unspecified whether complicated, unspecified whether persistent  Hypertension, unspecified type: Hypertension, unspecified type  Fibromyalgia: Fibromyalgia  Generalized abdominal pain: Generalized abdominal pain  Acute kidney injury superimposed on chronic kidney disease: Acute kidney injury superimposed on chronic kidney disease

## 2018-08-02 NOTE — PROGRESS NOTE ADULT - SUBJECTIVE AND OBJECTIVE BOX
Creatinine continues to increase.  FeNa 4.1%, suggestive of obstructive uropathy.  CT 7/29 w/hydro.  IR procedure 7/30 and U/s 8/1 demonstrated resolution of hydro.  UOP 70cc for shift.    T(F): 98, Max: 98 (08-02-18 @ 04:00)  HR: 104  BP: 110/59  SpO2: 96%    OUT:    Urostomy: 390 mL  Total OUT: 390 mL    Gen   Abd      08-02 @ 05:00  WBC 4.57  / Hct 24.6  / SCr --       08-02 @ 04:00  WBC 4.80  / Hct 24.5  / SCr 5.81     ILEAL CONDUIT  07-30--Klebsiella, E. Coli Creatinine continues to increase.  FeNa 4.1%, suggestive of obstructive uropathy.  UOP 70cc for shift.  CT 7/29 w/hydro.  IR procedure 7/30 and U/s 8/1 demonstrated resolution of hydro.  Stoma revised 7/6/18 at [place], as well as hernia repair as per family at bedside.    T(F): 98, Max: 98 (08-02-18 @ 04:00)  HR: 104  BP: 110/59  SpO2: 96%    OUT:    Urostomy: 390 mL  Total OUT: 390 mL    Gen Ill-appearing  Abd Stoma pink and patent, catheter in place, draining concentrated yellow urine    08-02 @ 05:00  WBC 4.57  / Hct 24.6  / SCr --       08-02 @ 04:00  WBC 4.80  / Hct 24.5  / SCr 5.81     ILEAL CONDUIT  07-30--Klebsiella, E. Coli

## 2018-08-02 NOTE — GOALS OF CARE CONVERSATION - PERSONAL ADVANCE DIRECTIVE - CONVERSATION DETAILS
Patient went into vtach this evening that resolved with amiodarone pushes and drips.  This morning, during rounds, the family stated that the patient told them she would not want life support or shocks, if it came to that.  During this episode, the family again agreed that she should be DNR/DNI.  They stated that she would not want dialysis or other life sustaining measures.  She only wanted medical management at this point.  Per Megan, the patient had told her prior to this admission that she knew the "end was near" and that she was ready to let go.  Patient is DNR/DNI, no shocks, no hemodialysis.  Medical management only.

## 2018-08-03 DIAGNOSIS — R41.82 ALTERED MENTAL STATUS, UNSPECIFIED: ICD-10-CM

## 2018-08-03 DIAGNOSIS — J96.01 ACUTE RESPIRATORY FAILURE WITH HYPOXIA: ICD-10-CM

## 2018-08-03 DIAGNOSIS — Z51.5 ENCOUNTER FOR PALLIATIVE CARE: ICD-10-CM

## 2018-08-03 DIAGNOSIS — C67.9 MALIGNANT NEOPLASM OF BLADDER, UNSPECIFIED: ICD-10-CM

## 2018-08-03 LAB
ALBUMIN SERPL ELPH-MCNC: 2.7 G/DL — LOW (ref 3.3–5)
ALP SERPL-CCNC: 100 U/L — SIGNIFICANT CHANGE UP (ref 40–120)
ALT FLD-CCNC: 21 U/L — SIGNIFICANT CHANGE UP (ref 4–33)
AMMONIA BLD-MCNC: 26 UMOL/L — SIGNIFICANT CHANGE UP (ref 11–55)
AST SERPL-CCNC: 19 U/L — SIGNIFICANT CHANGE UP (ref 4–32)
BASOPHILS # BLD AUTO: 0.01 K/UL — SIGNIFICANT CHANGE UP (ref 0–0.2)
BASOPHILS NFR BLD AUTO: 0.2 % — SIGNIFICANT CHANGE UP (ref 0–2)
BASOPHILS NFR SPEC: 0.9 % — SIGNIFICANT CHANGE UP (ref 0–2)
BILIRUB SERPL-MCNC: 0.3 MG/DL — SIGNIFICANT CHANGE UP (ref 0.2–1.2)
BLASTS # FLD: 0 % — SIGNIFICANT CHANGE UP (ref 0–0)
BUN SERPL-MCNC: 106 MG/DL — HIGH (ref 7–23)
CALCIUM SERPL-MCNC: 7.5 MG/DL — LOW (ref 8.4–10.5)
CHLORIDE SERPL-SCNC: 96 MMOL/L — LOW (ref 98–107)
CO2 SERPL-SCNC: 14 MMOL/L — LOW (ref 22–31)
CREAT SERPL-MCNC: 6.46 MG/DL — HIGH (ref 0.5–1.3)
EOSINOPHIL # BLD AUTO: 0.05 K/UL — SIGNIFICANT CHANGE UP (ref 0–0.5)
EOSINOPHIL NFR BLD AUTO: 0.9 % — SIGNIFICANT CHANGE UP (ref 0–6)
EOSINOPHIL NFR FLD: 0 % — SIGNIFICANT CHANGE UP (ref 0–6)
GIANT PLATELETS BLD QL SMEAR: PRESENT — SIGNIFICANT CHANGE UP
GLUCOSE SERPL-MCNC: 101 MG/DL — HIGH (ref 70–99)
HCT VFR BLD CALC: 24 % — LOW (ref 34.5–45)
HGB BLD-MCNC: 7.7 G/DL — LOW (ref 11.5–15.5)
IMM GRANULOCYTES # BLD AUTO: 0.29 # — SIGNIFICANT CHANGE UP
IMM GRANULOCYTES NFR BLD AUTO: 5.3 % — HIGH (ref 0–1.5)
LYMPHOCYTES # BLD AUTO: 0.39 K/UL — LOW (ref 1–3.3)
LYMPHOCYTES # BLD AUTO: 7.1 % — LOW (ref 13–44)
LYMPHOCYTES NFR SPEC AUTO: 18.4 % — SIGNIFICANT CHANGE UP (ref 13–44)
MAGNESIUM SERPL-MCNC: 1.8 MG/DL — SIGNIFICANT CHANGE UP (ref 1.6–2.6)
MCHC RBC-ENTMCNC: 30.1 PG — SIGNIFICANT CHANGE UP (ref 27–34)
MCHC RBC-ENTMCNC: 32.1 % — SIGNIFICANT CHANGE UP (ref 32–36)
MCV RBC AUTO: 93.8 FL — SIGNIFICANT CHANGE UP (ref 80–100)
METAMYELOCYTES # FLD: 2.6 % — HIGH (ref 0–1)
MONOCYTES # BLD AUTO: 0.52 K/UL — SIGNIFICANT CHANGE UP (ref 0–0.9)
MONOCYTES NFR BLD AUTO: 9.5 % — SIGNIFICANT CHANGE UP (ref 2–14)
MONOCYTES NFR BLD: 5.3 % — SIGNIFICANT CHANGE UP (ref 2–9)
MYELOCYTES NFR BLD: 0.9 % — HIGH (ref 0–0)
NEUTROPHIL AB SER-ACNC: 70.2 % — SIGNIFICANT CHANGE UP (ref 43–77)
NEUTROPHILS # BLD AUTO: 4.23 K/UL — SIGNIFICANT CHANGE UP (ref 1.8–7.4)
NEUTROPHILS NFR BLD AUTO: 77 % — SIGNIFICANT CHANGE UP (ref 43–77)
NEUTS BAND # BLD: 0 % — SIGNIFICANT CHANGE UP (ref 0–6)
NRBC # FLD: 0.02 — SIGNIFICANT CHANGE UP
OTHER - HEMATOLOGY %: 0 — SIGNIFICANT CHANGE UP
PHOSPHATE SERPL-MCNC: 5.1 MG/DL — HIGH (ref 2.5–4.5)
PLATELET # BLD AUTO: 72 K/UL — LOW (ref 150–400)
PLATELET COUNT - ESTIMATE: SIGNIFICANT CHANGE UP
PMV BLD: 12.5 FL — SIGNIFICANT CHANGE UP (ref 7–13)
POIKILOCYTOSIS BLD QL AUTO: SLIGHT — SIGNIFICANT CHANGE UP
POLYCHROMASIA BLD QL SMEAR: SLIGHT — SIGNIFICANT CHANGE UP
POTASSIUM SERPL-MCNC: 5.5 MMOL/L — HIGH (ref 3.5–5.3)
POTASSIUM SERPL-SCNC: 5.5 MMOL/L — HIGH (ref 3.5–5.3)
PROMYELOCYTES # FLD: 0 % — SIGNIFICANT CHANGE UP (ref 0–0)
PROT SERPL-MCNC: 6 G/DL — SIGNIFICANT CHANGE UP (ref 6–8.3)
RBC # BLD: 2.56 M/UL — LOW (ref 3.8–5.2)
RBC # FLD: 13.9 % — SIGNIFICANT CHANGE UP (ref 10.3–14.5)
SODIUM SERPL-SCNC: 130 MMOL/L — LOW (ref 135–145)
VARIANT LYMPHS # BLD: 1.7 % — SIGNIFICANT CHANGE UP
WBC # BLD: 5.49 K/UL — SIGNIFICANT CHANGE UP (ref 3.8–10.5)
WBC # FLD AUTO: 5.49 K/UL — SIGNIFICANT CHANGE UP (ref 3.8–10.5)

## 2018-08-03 PROCEDURE — 99223 1ST HOSP IP/OBS HIGH 75: CPT

## 2018-08-03 PROCEDURE — 99233 SBSQ HOSP IP/OBS HIGH 50: CPT | Mod: GC

## 2018-08-03 PROCEDURE — 99291 CRITICAL CARE FIRST HOUR: CPT

## 2018-08-03 RX ORDER — MORPHINE SULFATE 50 MG/1
2 CAPSULE, EXTENDED RELEASE ORAL ONCE
Qty: 0 | Refills: 0 | Status: DISCONTINUED | OUTPATIENT
Start: 2018-08-03 | End: 2018-08-03

## 2018-08-03 RX ORDER — DEXTROSE 50 % IN WATER 50 %
50 SYRINGE (ML) INTRAVENOUS ONCE
Qty: 0 | Refills: 0 | Status: COMPLETED | OUTPATIENT
Start: 2018-08-03 | End: 2018-08-03

## 2018-08-03 RX ORDER — HYDROMORPHONE HYDROCHLORIDE 2 MG/ML
0.5 INJECTION INTRAMUSCULAR; INTRAVENOUS; SUBCUTANEOUS
Qty: 0 | Refills: 0 | Status: DISCONTINUED | OUTPATIENT
Start: 2018-08-03 | End: 2018-08-05

## 2018-08-03 RX ORDER — ROBINUL 0.2 MG/ML
0.4 INJECTION INTRAMUSCULAR; INTRAVENOUS EVERY 6 HOURS
Qty: 0 | Refills: 0 | Status: DISCONTINUED | OUTPATIENT
Start: 2018-08-03 | End: 2018-08-06

## 2018-08-03 RX ORDER — AMIODARONE HYDROCHLORIDE 400 MG/1
150 TABLET ORAL ONCE
Qty: 0 | Refills: 0 | Status: COMPLETED | OUTPATIENT
Start: 2018-08-03 | End: 2018-08-03

## 2018-08-03 RX ORDER — INSULIN HUMAN 100 [IU]/ML
10 INJECTION, SOLUTION SUBCUTANEOUS ONCE
Qty: 0 | Refills: 0 | Status: COMPLETED | OUTPATIENT
Start: 2018-08-03 | End: 2018-08-03

## 2018-08-03 RX ORDER — HYDROMORPHONE HYDROCHLORIDE 2 MG/ML
0.5 INJECTION INTRAMUSCULAR; INTRAVENOUS; SUBCUTANEOUS ONCE
Qty: 0 | Refills: 0 | Status: DISCONTINUED | OUTPATIENT
Start: 2018-08-03 | End: 2018-08-03

## 2018-08-03 RX ADMIN — HYDROMORPHONE HYDROCHLORIDE 0.5 MILLIGRAM(S): 2 INJECTION INTRAMUSCULAR; INTRAVENOUS; SUBCUTANEOUS at 21:17

## 2018-08-03 RX ADMIN — PIPERACILLIN AND TAZOBACTAM 25 GRAM(S): 4; .5 INJECTION, POWDER, LYOPHILIZED, FOR SOLUTION INTRAVENOUS at 17:35

## 2018-08-03 RX ADMIN — AMIODARONE HYDROCHLORIDE 150 MILLIGRAM(S): 400 TABLET ORAL at 04:30

## 2018-08-03 RX ADMIN — HYDROMORPHONE HYDROCHLORIDE 0.5 MILLIGRAM(S): 2 INJECTION INTRAMUSCULAR; INTRAVENOUS; SUBCUTANEOUS at 16:30

## 2018-08-03 RX ADMIN — HYDROMORPHONE HYDROCHLORIDE 0.5 MILLIGRAM(S): 2 INJECTION INTRAMUSCULAR; INTRAVENOUS; SUBCUTANEOUS at 16:00

## 2018-08-03 RX ADMIN — AMIODARONE HYDROCHLORIDE 618 MILLIGRAM(S): 400 TABLET ORAL at 01:40

## 2018-08-03 RX ADMIN — MORPHINE SULFATE 2 MILLIGRAM(S): 50 CAPSULE, EXTENDED RELEASE ORAL at 15:14

## 2018-08-03 RX ADMIN — HYDROMORPHONE HYDROCHLORIDE 0.5 MILLIGRAM(S): 2 INJECTION INTRAMUSCULAR; INTRAVENOUS; SUBCUTANEOUS at 21:49

## 2018-08-03 RX ADMIN — CHLORHEXIDINE GLUCONATE 1 APPLICATION(S): 213 SOLUTION TOPICAL at 08:45

## 2018-08-03 RX ADMIN — MORPHINE SULFATE 2 MILLIGRAM(S): 50 CAPSULE, EXTENDED RELEASE ORAL at 15:50

## 2018-08-03 RX ADMIN — PIPERACILLIN AND TAZOBACTAM 25 GRAM(S): 4; .5 INJECTION, POWDER, LYOPHILIZED, FOR SOLUTION INTRAVENOUS at 06:12

## 2018-08-03 RX ADMIN — AMIODARONE HYDROCHLORIDE 618 MILLIGRAM(S): 400 TABLET ORAL at 01:35

## 2018-08-03 RX ADMIN — Medication 50 MILLILITER(S): at 07:06

## 2018-08-03 RX ADMIN — AMIODARONE HYDROCHLORIDE 16.67 MG/MIN: 400 TABLET ORAL at 01:23

## 2018-08-03 RX ADMIN — AMIODARONE HYDROCHLORIDE 16.67 MG/MIN: 400 TABLET ORAL at 07:39

## 2018-08-03 RX ADMIN — INSULIN HUMAN 10 UNIT(S): 100 INJECTION, SOLUTION SUBCUTANEOUS at 07:06

## 2018-08-03 RX ADMIN — HYDROMORPHONE HYDROCHLORIDE 0.5 MILLIGRAM(S): 2 INJECTION INTRAMUSCULAR; INTRAVENOUS; SUBCUTANEOUS at 04:35

## 2018-08-03 RX ADMIN — HEPARIN SODIUM 5000 UNIT(S): 5000 INJECTION INTRAVENOUS; SUBCUTANEOUS at 06:11

## 2018-08-03 RX ADMIN — HYDROMORPHONE HYDROCHLORIDE 0.5 MILLIGRAM(S): 2 INJECTION INTRAMUSCULAR; INTRAVENOUS; SUBCUTANEOUS at 04:50

## 2018-08-03 NOTE — PROGRESS NOTE ADULT - PROBLEM SELECTOR PLAN 1
Given history, highly suspicious for post obstructive uropathy vs. ATN  s/p IR, but did not get nephrostomy tubes   CT abd/pelvis revealed stable bilateral hydronephrosis and perinephric fat stranding, but nonobstructing stone noted in R kidney may have superimposed infection. repeat US renal shows resolution of hydronephrosis. Cr remained high.   urology on the case. obstructive vs. r/o pre-renal/ATN/AIN  nephrology on the case. Loopogram shows possibility of obstruction.   Cr continue to trend up with low urostomy urine outpt.   The sister at bedside. Expressed that pt wants to be focus on comfort.  no aggressive measures including hemodialysis and aggressive procedures. These interventions are not compatible with pt's wishes and her quality of life.  Palliative consulted. Made DNR/DNI.

## 2018-08-03 NOTE — PROGRESS NOTE ADULT - PROBLEM SELECTOR PLAN 1
Pt. with DEVON in setting of obstructive uropathy. Scr was 1.7 on 5/28/18, increased to 6.86 today (8/3/18). Pt. with bilateral hydronephrosis on CT scan done on 7/29/18. Pt. with history of obstructive uropathy in May 2018. Renal US showed Interval resolution of the bilateral hydronephrosis on 8/1/18. Pt. with ?ATN. Loopogram showed Ileal conduit fills with contrast, which refluxes through the right   ureter to the right renal collecting system. The left ureter and renal   collecting system are not visualized, indicating there could be a   possible obstruction preventing reflux on 8/2/18. Pt. on IV Fluids. Recommend Lasix IV. Monitor labs and urine output. Avoid any potential nephrotoxins.

## 2018-08-03 NOTE — CHART NOTE - NSCHARTNOTEFT_GEN_A_CORE
MICU Transfer Note    Transfer from: MICU  Transfer to:  ( x ) Medicine    (  ) Telemetry    (  ) RCU    (  ) Palliative    (  ) Stroke Unit    (  ) _______________  Accepting physican:      MICU COURSE:  7/31: Pt transferred to MICU for erick-procedure monitoring, pending b/l nephrostomy tubes by IR. Went to IR - reportedly no hydro seen, sent to MICU without procedure. On arrival to MICU noted to be hypotensive (initially 60s SBP, with cuff changed, 80s SBP). Dry on POCUS. 1L NS ordered. BP after liter  ON: K 5.4. Kayexylate ordered. Tachycardic with FSGs in the 90s. will hold off on albuterol and insulin.    8/1:  offical renal sono: to document +/-hydro   start NS 75cc/hr, revaluate in 12 hours  repeat BMP  c/w zosyn    8/2: O/n Hemoglobin continues to steadily drop. CXR performed w/ infiltrates, not read yet. Rising K, received temporizing cocktail.   decrease Dilaud . Loopogram left obstruction.  Update: 1800 had sustained VTACH got amio 150 x 2 with drip started. calc chloride given. returned to sinus rhythm. confirmed DNR/DNI and no dialysis.   IR consulted for possible bilateral nephrostomy tube     8/3: DNR/DNI Family refusing dialysis. Comfort care.       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     Neuro  Alert, awake, reactive- which is at baseline     Cardio  Holosystolic murmur on clinical exam, clinically euvolemic, hx of hypertrophic cardiomyopathy 5/18 ECHO w EF 75%  Cont home dose Toprol 50 mg QD  Will give add 1 L NS Bolus for hypotension & tachycardia likely volume depleted     Pulm  Not requiring supplemental O2, at baseline     Nephro/  sCr downtrending 5.28 -> 5.09 this AM, consider relieved obstruction as bedside US neg for b/l hydronephrosis, stable urine output from ostomy site.  Pt no longer requiring IR nephrostomy tube placement   Will f/u UC as pt w hx of multiple organism in urine prior, cont Zosyn     ID  Cont Zosyn give hx of UTI c.b bacteremia; f/u UC   Otherwise low suspicion for infection, afebrile w/o leukocytosis     Heme   Stable anemia, no clinically active bleed     Endocrine   Alc 4.9  TSH 0.77        For Follow-Up:          Vital Signs Last 24 Hrs  T(C): 36.6 (03 Aug 2018 20:00), Max: 36.6 (03 Aug 2018 00:00)  T(F): 97.8 (03 Aug 2018 20:00), Max: 97.8 (03 Aug 2018 00:00)  HR: 125 (03 Aug 2018 21:00) (81 - 165)  BP: 91/45 (03 Aug 2018 21:00) (80/52 - 112/82)  BP(mean): 61 (03 Aug 2018 21:00) (57 - 93)  RR: 15 (03 Aug 2018 21:00) (11 - 24)  SpO2: 99% (03 Aug 2018 21:00) (96% - 99%)  I&O's Summary    02 Aug 2018 07:01  -  03 Aug 2018 07:00  --------------------------------------------------------  IN: 1500.9 mL / OUT: 130 mL / NET: 1370.9 mL    03 Aug 2018 07:01  -  03 Aug 2018 21:20  --------------------------------------------------------  IN: 200.1 mL / OUT: 50 mL / NET: 150.1 mL          MEDICATIONS  (STANDING):  acetaminophen   Tablet. 975 milliGRAM(s) Oral every 8 hours  chlorhexidine 4% Liquid 1 Application(s) Topical <User Schedule>  DULoxetine 60 milliGRAM(s) Oral daily  metoprolol succinate ER 50 milliGRAM(s) Oral daily  sodium bicarbonate 650 milliGRAM(s) Oral two times a day    MEDICATIONS  (PRN):  glycopyrrolate Injectable 0.4 milliGRAM(s) IV Push every 6 hours PRN secretions  HYDROmorphone  Injectable 0.5 milliGRAM(s) IV Push every 2 hours PRN Moderate Pain (4 - 6)  simethicone 40 milliGRAM(s) Chew every 6 hours PRN Indigestion        LABS                                            7.7                   Neurophils% (auto):   77.0   (08-03 @ 03:00):    5.49 )-----------(72           Lymphocytes% (auto):  7.1                                           24.0                   Eosinphils% (auto):   0.9      Manual%: Neutrophils 70.2 ; Lymphocytes 18.4 ; Eosinophils 0.0  ; Bands%: 0    ; Blasts 0                                    130    |  96     |  106                 Calcium: 7.5   / iCa: x      (08-03 @ 03:00)    ----------------------------<  101       Magnesium: 1.8                              5.5     |  14     |  6.46             Phosphorous: 5.1      TPro  6.0    /  Alb  2.7    /  TBili  0.3    /  DBili  x      /  AST  19     /  ALT  21     /  AlkPhos  100    03 Aug 2018 03:00 MICU Transfer Note    Transfer from: MICU  Transfer to:  ( x ) Medicine    (  ) Telemetry    (  ) RCU    (  ) Palliative    (  ) Stroke Unit    (  ) _______________  Accepting physican:      MICU COURSE:  78 F with PMH significant for HCM, Bladder Ca s/p ileal conduit (Nov 2017), c/b obstructive uropathy in May 2018 with b/l nephrostomy tube placement  with subsequent tube removal and conduit revision 2 weeks ago presented with severe abdominal pain and lack of urine output from the conduit stoma. Pt. found to have worsening DEVON in setting of obstructive uropathy. Scr was 1.7 on 5/28/18, increased to 2.81 on (7/30/28). Urology consulted and performed ostomal catheterization on 7/30/18. Pt transfer to MICU for hypotension and hyperkalemia on 7/30/18. Renal US showed Interval resolution of the bilateral hydronephrosis on 8/1/18. Pt. with ?ATN. Loopogram showed Ileal conduit fills with contrast, which refluxes through the right ureter to the right renal collecting system. The left ureter and renal collecting system are not visualized, indicating there could be a   possible obstruction preventing reflux on 8/2/1. Pt offered HD however declined. Patient expressed her wishes to be DNR/DNI.       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     Neuro  Alert, awake, reactive- which is at baseline     Cardio  Patient had several episodes of sustained stable VTACH treated with amiodarone. Most recent episode on 8/2. Currently in NSR.     Holosystolic murmur on clinical exam, clinically euvolemic, hx of hypertrophic cardiomyopathy 5/18 ECHO w EF 75%  Cont home dose Toprol 50 mg QD    Pulm  Not requiring supplemental O2, at baseline     Nephro/  sCr downtrending 5.28 -> 5.09 this AM, consider relieved obstruction as bedside US neg for b/l hydronephrosis, stable urine output from ostomy site.  Pt no longer requiring IR nephrostomy tube placement   Will f/u UC as pt w hx of multiple organism in urine prior, cont Zosyn   Declining HD at this time. Renal following and recommending diuresis.     ID  Cont Zosyn give hx of UTI c.b bacteremia; f/u UC   Otherwise low suspicion for infection, afebrile w/o leukocytosis     Heme   Stable anemia, no clinically active bleed     Endocrine   Alc 4.9  TSH 0.77          Vital Signs Last 24 Hrs  T(C): 36.6 (03 Aug 2018 20:00), Max: 36.6 (03 Aug 2018 00:00)  T(F): 97.8 (03 Aug 2018 20:00), Max: 97.8 (03 Aug 2018 00:00)  HR: 125 (03 Aug 2018 21:00) (81 - 165)  BP: 91/45 (03 Aug 2018 21:00) (80/52 - 112/82)  BP(mean): 61 (03 Aug 2018 21:00) (57 - 93)  RR: 15 (03 Aug 2018 21:00) (11 - 24)  SpO2: 99% (03 Aug 2018 21:00) (96% - 99%)  I&O's Summary    02 Aug 2018 07:01  -  03 Aug 2018 07:00  --------------------------------------------------------  IN: 1500.9 mL / OUT: 130 mL / NET: 1370.9 mL    03 Aug 2018 07:01  -  03 Aug 2018 21:20  --------------------------------------------------------  IN: 200.1 mL / OUT: 50 mL / NET: 150.1 mL          MEDICATIONS  (STANDING):  acetaminophen   Tablet. 975 milliGRAM(s) Oral every 8 hours  chlorhexidine 4% Liquid 1 Application(s) Topical <User Schedule>  DULoxetine 60 milliGRAM(s) Oral daily  metoprolol succinate ER 50 milliGRAM(s) Oral daily  sodium bicarbonate 650 milliGRAM(s) Oral two times a day    MEDICATIONS  (PRN):  glycopyrrolate Injectable 0.4 milliGRAM(s) IV Push every 6 hours PRN secretions  HYDROmorphone  Injectable 0.5 milliGRAM(s) IV Push every 2 hours PRN Moderate Pain (4 - 6)  simethicone 40 milliGRAM(s) Chew every 6 hours PRN Indigestion        LABS                                            7.7                   Neurophils% (auto):   77.0   (08-03 @ 03:00):    5.49 )-----------(72           Lymphocytes% (auto):  7.1                                           24.0                   Eosinphils% (auto):   0.9      Manual%: Neutrophils 70.2 ; Lymphocytes 18.4 ; Eosinophils 0.0  ; Bands%: 0    ; Blasts 0                                    130    |  96     |  106                 Calcium: 7.5   / iCa: x      (08-03 @ 03:00)    ----------------------------<  101       Magnesium: 1.8                              5.5     |  14     |  6.46             Phosphorous: 5.1      TPro  6.0    /  Alb  2.7    /  TBili  0.3    /  DBili  x      /  AST  19     /  ALT  21     /  AlkPhos  100    03 Aug 2018 03:00    FOR FOLLOW UP:    Renal failure: utilize furosemide IV PRN as pt declining HD. F/u renal recs    GOC: comfort measures per patient and families request. f/u palliative recs.    Acute respiratory failure with hypoxia c/w oxygen therapy. c/w Dilaudid 0.5mg IV Q2hrs PRN for dyspnea/discomfort. C/w glycopyrrolate 0.4mg IV Q6hrs PRN for secretions MICU Transfer Note    Transfer from: MICU  Transfer to:  ( x ) Medicine    (  ) Telemetry    (  ) RCU    (  ) Palliative    (  ) Stroke Unit    (  ) _______________  Accepting physican:      MICU COURSE:  78 F with PMH significant for HCM, Bladder Ca s/p ileal conduit (Nov 2017), c/b obstructive uropathy in May 2018 with b/l nephrostomy tube placement  with subsequent tube removal and conduit revision 2 weeks ago presented with severe abdominal pain and lack of urine output from the conduit stoma. Pt. found to have worsening DEVON in setting of obstructive uropathy. Scr was 1.7 on 5/28/18, increased to 2.81 on (7/30/28). Urology consulted and performed ostomal catheterization on 7/30/18. Pt transfer to MICU for hypotension and hyperkalemia on 7/30/18. Renal US showed Interval resolution of the bilateral hydronephrosis on 8/1/18. Pt. with ?ATN. Loopogram showed Ileal conduit fills with contrast, which refluxes through the right ureter to the right renal collecting system. The left ureter and renal collecting system are not visualized, indicating there could be a possible obstruction preventing reflux on 8/2/1. Pt offered HD however declined. Patient expressed her wishes to be DNR/DNI.       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     Neuro  Alert, awake, reactive- which is at baseline     Cardio  Patient had several episodes of sustained stable VTACH treated with amiodarone. Most recent episode on 8/2. Currently in NSR.     Holosystolic murmur on clinical exam, clinically euvolemic, hx of hypertrophic cardiomyopathy 5/18 ECHO w EF 75%  Cont home dose Toprol 50 mg QD    Pulm  Not requiring supplemental O2, at baseline     Nephro/  sCr downtrending 5.28 -> 5.09 this AM, consider relieved obstruction as bedside US neg for b/l hydronephrosis, stable urine output from ostomy site.  Pt no longer requiring IR nephrostomy tube placement   Will f/u UC as pt w hx of multiple organism in urine prior  Declining HD at this time. Renal following and recommending diuresis.     ID  low suspicion for infection, afebrile w/o leukocytosis     Heme   Stable anemia, no clinically active bleed     Endocrine   Alc 4.9  TSH 0.77          Vital Signs Last 24 Hrs  T(C): 36.6 (03 Aug 2018 20:00), Max: 36.6 (03 Aug 2018 00:00)  T(F): 97.8 (03 Aug 2018 20:00), Max: 97.8 (03 Aug 2018 00:00)  HR: 125 (03 Aug 2018 21:00) (81 - 165)  BP: 91/45 (03 Aug 2018 21:00) (80/52 - 112/82)  BP(mean): 61 (03 Aug 2018 21:00) (57 - 93)  RR: 15 (03 Aug 2018 21:00) (11 - 24)  SpO2: 99% (03 Aug 2018 21:00) (96% - 99%)  I&O's Summary    02 Aug 2018 07:01  -  03 Aug 2018 07:00  --------------------------------------------------------  IN: 1500.9 mL / OUT: 130 mL / NET: 1370.9 mL    03 Aug 2018 07:01  -  03 Aug 2018 21:20  --------------------------------------------------------  IN: 200.1 mL / OUT: 50 mL / NET: 150.1 mL          MEDICATIONS  (STANDING):  acetaminophen   Tablet. 975 milliGRAM(s) Oral every 8 hours  chlorhexidine 4% Liquid 1 Application(s) Topical <User Schedule>  DULoxetine 60 milliGRAM(s) Oral daily  metoprolol succinate ER 50 milliGRAM(s) Oral daily  sodium bicarbonate 650 milliGRAM(s) Oral two times a day    MEDICATIONS  (PRN):  glycopyrrolate Injectable 0.4 milliGRAM(s) IV Push every 6 hours PRN secretions  HYDROmorphone  Injectable 0.5 milliGRAM(s) IV Push every 2 hours PRN Moderate Pain (4 - 6)  simethicone 40 milliGRAM(s) Chew every 6 hours PRN Indigestion        LABS                                            7.7                   Neurophils% (auto):   77.0   (08-03 @ 03:00):    5.49 )-----------(72           Lymphocytes% (auto):  7.1                                           24.0                   Eosinphils% (auto):   0.9      Manual%: Neutrophils 70.2 ; Lymphocytes 18.4 ; Eosinophils 0.0  ; Bands%: 0    ; Blasts 0                                    130    |  96     |  106                 Calcium: 7.5   / iCa: x      (08-03 @ 03:00)    ----------------------------<  101       Magnesium: 1.8                              5.5     |  14     |  6.46             Phosphorous: 5.1      TPro  6.0    /  Alb  2.7    /  TBili  0.3    /  DBili  x      /  AST  19     /  ALT  21     /  AlkPhos  100    03 Aug 2018 03:00    FOR FOLLOW UP:    Renal failure: utilize furosemide IV PRN as pt declining HD. F/u renal recs    GOC: comfort measures per patient and families request. f/u palliative recs.    Acute respiratory failure with hypoxia c/w oxygen therapy. c/w Dilaudid 0.5mg IV Q2hrs PRN for dyspnea/discomfort. C/w glycopyrrolate 0.4mg IV Q6hrs PRN for secretions MICU Transfer Note    Transfer from: MICU  Transfer to:  ( x ) Medicine    (  ) Telemetry    (  ) RCU    (  ) Palliative    (  ) Stroke Unit    (  ) _______________  Accepting physican:      MICU COURSE:  78 F with PMH significant for HCM, Bladder Ca s/p ileal conduit (Nov 2017), c/b obstructive uropathy in May 2018 with b/l nephrostomy tube placement  with subsequent tube removal and conduit revision 2 weeks ago presented with severe abdominal pain and lack of urine output from the conduit stoma. Pt. found to have worsening DEVON in setting of obstructive uropathy. Scr was 1.7 on 5/28/18, increased to 2.81 on (7/30/28). Urology consulted and performed ostomal catheterization on 7/30/18. Pt transfer to MICU for hypotension and hyperkalemia on 7/30/18. Renal US showed Interval resolution of the bilateral hydronephrosis on 8/1/18. Pt. with ?ATN. Loopogram showed Ileal conduit fills with contrast, which refluxes through the right ureter to the right renal collecting system. The left ureter and renal collecting system are not visualized, indicating there could be a possible obstruction preventing reflux on 8/2/1. Pt offered HD however declined. Patient expressed her wishes to be DNR/DNI.       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     Neuro  Alert, awake, reactive- which is at baseline     Cardio  Patient had several episodes of sustained stable VTACH treated with amiodarone. Most recent episode on 8/2. Currently in NSR.     Holosystolic murmur on clinical exam, clinically euvolemic, hx of hypertrophic cardiomyopathy 5/18 ECHO w EF 75%  Cont home dose Toprol 50 mg QD    Pulm  Not requiring supplemental O2, at baseline     Nephro/  sCr downtrending 5.28 -> 5.09 this AM, consider relieved obstruction as bedside US neg for b/l hydronephrosis, stable urine output from ostomy site.  Pt no longer requiring IR nephrostomy tube placement   Will f/u UC as pt w hx of multiple organism in urine prior  Declining HD at this time. Renal following and recommending diuresis.     ID  low suspicion for infection, afebrile w/o leukocytosis     Heme   Stable anemia, no clinically active bleed     Endocrine   Alc 4.9  TSH 0.77          Vital Signs Last 24 Hrs  T(C): 36.6 (03 Aug 2018 20:00), Max: 36.6 (03 Aug 2018 00:00)  T(F): 97.8 (03 Aug 2018 20:00), Max: 97.8 (03 Aug 2018 00:00)  HR: 125 (03 Aug 2018 21:00) (81 - 165)  BP: 91/45 (03 Aug 2018 21:00) (80/52 - 112/82)  BP(mean): 61 (03 Aug 2018 21:00) (57 - 93)  RR: 15 (03 Aug 2018 21:00) (11 - 24)  SpO2: 99% (03 Aug 2018 21:00) (96% - 99%)  I&O's Summary    02 Aug 2018 07:01  -  03 Aug 2018 07:00  --------------------------------------------------------  IN: 1500.9 mL / OUT: 130 mL / NET: 1370.9 mL    03 Aug 2018 07:01  -  03 Aug 2018 21:20  --------------------------------------------------------  IN: 200.1 mL / OUT: 50 mL / NET: 150.1 mL          MEDICATIONS  (STANDING):  acetaminophen   Tablet. 975 milliGRAM(s) Oral every 8 hours  chlorhexidine 4% Liquid 1 Application(s) Topical <User Schedule>  DULoxetine 60 milliGRAM(s) Oral daily  metoprolol succinate ER 50 milliGRAM(s) Oral daily  sodium bicarbonate 650 milliGRAM(s) Oral two times a day    MEDICATIONS  (PRN):  glycopyrrolate Injectable 0.4 milliGRAM(s) IV Push every 6 hours PRN secretions  HYDROmorphone  Injectable 0.5 milliGRAM(s) IV Push every 2 hours PRN Moderate Pain (4 - 6)  simethicone 40 milliGRAM(s) Chew every 6 hours PRN Indigestion        LABS                                            7.7                   Neurophils% (auto):   77.0   (08-03 @ 03:00):    5.49 )-----------(72           Lymphocytes% (auto):  7.1                                           24.0                   Eosinphils% (auto):   0.9      Manual%: Neutrophils 70.2 ; Lymphocytes 18.4 ; Eosinophils 0.0  ; Bands%: 0    ; Blasts 0                                    130    |  96     |  106                 Calcium: 7.5   / iCa: x      (08-03 @ 03:00)    ----------------------------<  101       Magnesium: 1.8                              5.5     |  14     |  6.46             Phosphorous: 5.1      TPro  6.0    /  Alb  2.7    /  TBili  0.3    /  DBili  x      /  AST  19     /  ALT  21     /  AlkPhos  100    03 Aug 2018 03:00    FOR FOLLOW UP:    Renal failure: utilize furosemide IV PRN as pt declining HD. F/u renal recs    GOC: comfort measures per patient and families request. f/u palliative recs.    Acute respiratory failure with hypoxia c/w oxygen therapy. c/w Dilaudid 0.5mg IV Q2hrs PRN for dyspnea/discomfort. C/w glycopyrrolate 0.4mg IV Q6hrs PRN for secretions    If has sustained Vtach, can use Amiodarone

## 2018-08-03 NOTE — CONSULT NOTE ADULT - PROBLEM SELECTOR RECOMMENDATION 9
Metastatic bladder cancer to the bone undergoing active chemotherapy (carboplatin/etoposide) s/p cystectomy with ileal conduit creation c/b prior obstructive uropathy requiring b/l percutaneous nephrostomy tubes on 5/20/18.  Patient now with DEVON, refusing HD. Main goal is comfort care.

## 2018-08-03 NOTE — CONSULT NOTE ADULT - PROBLEM SELECTOR RECOMMENDATION 4
s/p cystectomy s/p palliative chemotherapy with good response.   - Was due for surveillance scans mid August. CT abdomen does not show overt progression  - Consider imaging of the brain if mental status does not recover  - Will follow    Umer Blanco MD  [facility] - A Division of [facility]   [address] [place] [address] [address] [place] [id number]  (T) [phone]  (F) [phone]

## 2018-08-03 NOTE — CONSULT NOTE ADULT - ASSESSMENT
77 yo woman with PMHx most notable for metastatic bladder cancer to the bone undergoing active chemotherapy (carboplatin/etoposide) s/p cystectomy with ileal conduit creation c/b prior obstructive uropathy requiring b/l percutaneous nephrostomy tubes on 5/20/18, morbid obesity, hypertension, hypertrophic cardiomyopathy, and CKD 3. Admitted for abdominal pain, now with DEVON on CKD. Palliative Care consult placed for GOC and symptom management.

## 2018-08-03 NOTE — PROGRESS NOTE ADULT - SUBJECTIVE AND OBJECTIVE BOX
CHIEF COMPLAINT: Urinary obstruction c/b atn    Interval Events: Pt w loopogram suggestive of left ureter and collecting system obstruction. sCR continues to uptrend - > 6 on repeat BMP  afternoon. Episode of sustained vtach. Amiodarone loaded. Family at bedside- pt family discussion including pt, DNR/DNI refusing HD.     REVIEW OF SYSTEMS:  [x] All other systems negative unless stated in HPI      OBJECTIVE:  ICU Vital Signs Last 24 Hrs  T(C): 36.5 (03 Aug 2018 04:00), Max: 36.6 (02 Aug 2018 12:00)  T(F): 97.7 (03 Aug 2018 04:00), Max: 97.8 (02 Aug 2018 12:00)  HR: 117 (03 Aug 2018 06:00) (79 - 184)  BP: 112/82 (03 Aug 2018 06:00) (76/65 - 115/55)  BP(mean): 93 (03 Aug 2018 06:00) (60 - 93)  RR: 20 (03 Aug 2018 06:00) (9 - 26)  SpO2: 98% (03 Aug 2018 06:00) (94% - 99%)        - @ 07:01  -  -03 @ 07:00  --------------------------------------------------------  IN: 1500.9 mL / OUT: 130 mL / NET: 1370.9 mL      CAPILLARY BLOOD GLUCOSE  POCT Blood Glucose.: 183 mg/dL (02 Aug 2018 18:17)      PHYSICAL EXAM:  Gen: elderly woman Lethargic on NC   HEENT: PEERLA  Pulm: CTA B/L 2L NC  CV: RRR, S1S2 with holosystolic murmur loudest along L sternal border; no rub  Back: No spinal or CVA tenderness; no sacral edema  Abd: +BS, soft, mild tenderness RLQ at site of prev conduit stoma, nondistended, current ileal conduit with drainage bag attached and filling with yellow/red tinged fluid  LE: Warm, no edema  Psych: Lethargic   Skin: without rashes      LINES: [facility]    HOSPITAL MEDICATIONS:  heparin  Injectable 5000 Unit(s) SubCutaneous every 8 hours  piperacillin/tazobactam IVPB. 3.375 Gram(s) IV Intermittent every 12 hours  amiodarone Infusion 1 mG/Min IV Continuous <Continuous>  amiodarone Infusion 0.5 mG/Min IV Continuous <Continuous>  metoprolol succinate ER 50 milliGRAM(s) Oral daily  buDESOnide 160 MICROgram(s)/formoterol 4.5 MICROgram(s) Inhaler 2 Puff(s) Inhalation two times a day  montelukast 10 milliGRAM(s) Oral daily  acetaminophen   Tablet. 975 milliGRAM(s) Oral every 8 hours  DULoxetine 60 milliGRAM(s) Oral daily  docusate sodium 100 milliGRAM(s) Oral two times a day  pantoprazole    Tablet 40 milliGRAM(s) Oral before breakfast  polyethylene glycol 3350 17 Gram(s) Oral daily  simethicone 40 milliGRAM(s) Chew every 6 hours PRN  dextrose 5%. 1000 milliLiter(s) IV Continuous <Continuous>  multivitamin 1 Tablet(s) Oral daily  sodium bicarbonate 650 milliGRAM(s) Oral two times a day  chlorhexidine 4% Liquid 1 Application(s) Topical <User Schedule>        LABS:                        7.7    5.49  )-----------( 72       ( 03 Aug 2018 03:00 )             24.0     Hgb Trend: 7.7<--, 8.0<--, 7.8<--, 9.9<--, 10.6<--  08-03    130<L>  |  96<L>  |  106<H>  ----------------------------<  101<H>  5.5<H>   |  14<L>  |  6.46<H>    Ca    7.5<L>      03 Aug 2018 03:00  Phos  5.1     08-03  Mg     1.8     08-03    TPro  6.0  /  Alb  2.7<L>  /  TBili  0.3  /  DBili  x   /  AST  19  /  ALT  21  /  AlkPhos  100  08-03    Creatinine Trend: 6.46<--, 6.43<--, 6.34<--, 5.81<--, 5.54<--, 5.09<--    Urinalysis Basic - ( 02 Aug 2018 16:15 )    Color: PINK / Appearance: TURBID / S.022 / pH: 6.5  Gluc: NEGATIVE / Ketone: NEGATIVE  / Bili: NEGATIVE / Urobili: NORMAL mg/dL   Blood: LARGE / Protein: 500 mg/dL / Nitrite: NEGATIVE   Leuk Esterase: LARGE / RBC: >50 / WBC >50   Sq Epi: x / Non Sq Epi: x / Bacteria: FEW        Venous Blood Gas:   @ 18:20  7.15/46/31/14/51.7  VBG Lactate: 1.1      MICROBIOLOGY: Reviewed     RADIOLOGY:  [x] Reviewed and interpreted by me    EKG: Sinus

## 2018-08-03 NOTE — CONSULT NOTE ADULT - PROBLEM SELECTOR RECOMMENDATION 2
Secondary to chronic inflammation and chronic kidney disease  - Worsening anemia. Ensure that there is no bleeding  - Consider transfusion to maintain hemoglobin > 8g/dl.   - Can consider TAMIA, Procrit 40,000 units weekly.

## 2018-08-03 NOTE — CONSULT NOTE ADULT - SUBJECTIVE AND OBJECTIVE BOX
[facility] Hematology/Oncology - Obey Cruz / Onofre / Francis - [phone]  Primary Outpatient Hematologist/Oncologist: Dr. Manjinder Vaca    Chief Complaint:  Obstructive uropathy    HPI:  Ms. Moore is a 78 year old woman with PMHx of metastatic bladder cancer to the bone who has been treated with palliative chemotherapy (carboplatin/etoposide) s/p cystectomy with ileal conduit creation c/b prior obstructive uropathy requiring b/l percutaneous nephrostomy tubes on 5/20/18, hypertension, hypertrophic cardiomyopathy, and CKD 3 presenting with abdominal pain.    As per family, patient was home and developed severe abdominal pain. Pain not relieved with pain medications. Patient also noticed the lack of urine output from ostomy bag. No fevers or chillss. No nausea or vomiting. No diarrhea.      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 She was s/p cystectomy. Given palliative carboplatin/taxol x 6 cycles with good response. Was offered prophylactic RT to the brain but refused. Was pending surveillance scans in mid August    ROS:  General:  (-)Pain, (-)Decrease appetite, (-)Fevers, (-)Chills, (-)Weight Loss  Eyes: (-)Blurry Vision, (-)Double Vision, (-)Vision Loss  ENT: (-)Sinus Congestion, (-)Decreased Hearing, (-)Nosebleeds, (-)Sore Throat  Cardiac: (-)Chest Pain, (-)Palpitations, (-)Shortness of breathe on exertion  Respiratory: (-)Cough, (-)hemoptysis, (-)Shortness of breathe  GI: (-)Nausea, (-)Vomiting, (-)Diarrhea, (-)Constipation, (-)Melena, (-)BRBPR, (+)Abdominal pain  : (-)Hematuria, (-)Dysuria, (-)Polyuia  MSK: (-)Back pain, (-)Joint pain, (-)Stiffness  Dermatology: (-)Rash, (-)Itching  Neurology:  (-)Numbness, (-)Tingling, (-)Difficulty Walking, (-)Tremors, (+)Weakness  Psych: (-)Anxiety, (-)Depression, (-)Memory Loss  Hematology:  (-)Easy Bruising, (-)Easy Bleeding, (-)Night Sweats.     PAST MEDICAL & SURGICAL HISTORY:  Anemia, unspecified type  CKD (chronic kidney disease) stage 3, GFR 30-59 ml/min  Bladder cancer metastasized to bone  Radial fracture: left  Uterine Polyp  Gastric Ulcer  Migraine, Ophthalmoplegic  Fibromyalgia  Obese  Calcified Thoracic Aorta  Hypertrophic Cardiomyopathy  Paroxysmal Ventricular Tachycardia: 6/2010  Nasal Polyp: 1960  Arthritis  HTN (Hypertension)  Asthmatic Bronchitis  Acid Reflux  History of ileal conduit  S/P total knee arthroplasty, left  EPS study: with no intervention 6/2010  Nasal Polyp removal: 1960  History of Arthroscopy: 1997 Right  Left 2000  History of D&C: 1968    Social History  Tobacco: Denies current use  Alcohol: Denies current use  Drugs:  Denies current use    MEDICATIONS  (STANDING):  acetaminophen   Tablet. 975 milliGRAM(s) Oral every 8 hours  amiodarone Infusion 1 mG/Min (33.333 mL/Hr) IV Continuous <Continuous>  amiodarone Infusion 0.5 mG/Min (16.667 mL/Hr) IV Continuous <Continuous>  buDESOnide 160 MICROgram(s)/formoterol 4.5 MICROgram(s) Inhaler 2 Puff(s) Inhalation two times a day  chlorhexidine 4% Liquid 1 Application(s) Topical <User Schedule>  dextrose 5%. 1000 milliLiter(s) (50 mL/Hr) IV Continuous <Continuous>  docusate sodium 100 milliGRAM(s) Oral two times a day  DULoxetine 60 milliGRAM(s) Oral daily  heparin  Injectable 5000 Unit(s) SubCutaneous every 8 hours  metoprolol succinate ER 50 milliGRAM(s) Oral daily  montelukast 10 milliGRAM(s) Oral daily  multivitamin 1 Tablet(s) Oral daily  pantoprazole    Tablet 40 milliGRAM(s) Oral before breakfast  piperacillin/tazobactam IVPB. 3.375 Gram(s) IV Intermittent every 12 hours  polyethylene glycol 3350 17 Gram(s) Oral daily  sodium bicarbonate 650 milliGRAM(s) Oral two times a day    MEDICATIONS  (PRN):  simethicone 40 milliGRAM(s) Chew every 6 hours PRN Indigestion    Allergies  No Known Allergies    Vital Signs Last 24 Hrs  T(C): 36.6 (03 Aug 2018 08:00), Max: 36.6 (02 Aug 2018 12:00)  T(F): 97.8 (03 Aug 2018 08:00), Max: 97.8 (02 Aug 2018 12:00)  HR: 113 (03 Aug 2018 08:00) (79 - 184)  BP: 99/59 (03 Aug 2018 08:00) (76/65 - 115/55)  BP(mean): 71 (03 Aug 2018 08:00) (60 - 93)  RR: 12 (03 Aug 2018 08:00) (9 - 26)  SpO2: 97% (03 Aug 2018 08:00) (94% - 99%)    Physical Exam:  General: AAO x1. Confused. Does not respond to questioning.Lying in bed comfortably.  HEENT: clear oropharynx, anicteric sclera, pink conjunctivae, EOMi. PERRLA  Cardiac: S1, S2 present. No audible murmurs. RRR  Lungs: CTA B/L.   Abdomen: Soft, Non-Tender, Non-Distended. No palpable hepatosplenomegaly  Extremities: 2+ pulses. No edema  Skin: No Rashes. No petechiae  Neuro: No focal motor or sensory deficits.     Labs:  CBC Full  -  ( 03 Aug 2018 03:00 )  WBC Count : 5.49 K/uL  Hemoglobin : 7.7 g/dL  Hematocrit : 24.0 %  Platelet Count - Automated : 72 K/uL  Mean Cell Volume : 93.8 fL  Mean Cell Hemoglobin : 30.1 pg  Mean Cell Hemoglobin Concentration : 32.1 %  Auto Neutrophil # : 4.23 K/uL  Auto Lymphocyte # : 0.39 K/uL  Auto Monocyte # : 0.52 K/uL  Auto Eosinophil # : 0.05 K/uL  Auto Basophil # : 0.01 K/uL  Auto Neutrophil % : 77.0 %  Auto Lymphocyte % : 7.1 %  Auto Monocyte % : 9.5 %  Auto Eosinophil % : 0.9 %  Auto Basophil % : 0.2 %    08-03    130<L>  |  96<L>  |  106<H>  ----------------------------<  101<H>  5.5<H>   |  14<L>  |  6.46<H>    Ca    7.5<L>      03 Aug 2018 03:00  Phos  5.1     08-03  Mg     1.8     08-03  TPro  6.0  /  Alb  2.7<L>  /  TBili  0.3  /  DBili  x   /  AST  19  /  ALT  21  /  AlkPhos  100  08-03    Radiology:  CT Abdomen/Pelvis  IMPRESSION:  Patient is status post cystectomy and ileal conduit with right lower   quadrant ostomy.  The patient appears to be status post interval revision of the stoma   since the prior CT scan of 05/20/2018.  There has been interval development of a 7.8 x 3.3 cm postoperative fluid   in the ventral abdominal wall, at the site of previous ostomy.  This may   represent resolving hematoma or sterile seroma.  Superimposed infection   should be excluded clinically.    Bilateral hydronephrosis and perinephric fat stranding is grossly stable   since 05/20/2018.  A nonobstructing right upper pole renal stone measures   9 x 5 mm.  Superimposed genitourinary tract infection should be excluded   clinically.    Small nodular foci along the pleural surface/right hemidiaphragm   measuring up to 5 mm.  Special attention to this region on follow-up   imaging is recommended.    US of the bladder:  IMPRESSION:     Interval resolution of the bilateral hydronephrosis.

## 2018-08-03 NOTE — CONSULT NOTE ADULT - PROBLEM SELECTOR RECOMMENDATION 3
Unclear etiology. Likely due to worsening renal function  - Small cell bladder cancer etiology with mets to the bone. Responded to therapy but refused RT to the brain for prophylaxis  - If mental status does not improve, consider imaging of the brain to ensure no malignancy in the brain.   - Care as per MICU team.

## 2018-08-03 NOTE — CONSULT NOTE ADULT - PROBLEM SELECTOR RECOMMENDATION 4
Patient is DNR/DNI. Surrogate are patients sister and brother, both at bedside, main goal is comfort care. No measures to prolong her life. Aggressive symptom management. Discussed that depending on how she was doing/stabilization of her symptoms we could consider transfer to inpatient hospice if considered stable for transfer.

## 2018-08-03 NOTE — PROGRESS NOTE ADULT - SUBJECTIVE AND OBJECTIVE BOX
[facility] DIVISION OF KIDNEY DISEASES AND HYPERTENSION -- FOLLOW UP NOTE  --------------------------------------------------------------------------------  PI: 78 F with PMH significant for HCM, Bladder Ca s/p ileal conduit (Nov 2017), c/b obstructive uropathy in May 2018 with b/l nephrostomy tube placement  with subsequent tube removal and conduit revision 2 weeks ago presented with severe abdominal pain and lack of urine output from the conduit stoma. Pt. found to have worsening DEVON in setting of obstructive uropathy. Scr was 1.7 on 5/28/18, increased to 2.81 on (7/30/28). Urology consulted and performed ostomal catheterization on 7/30/18. Pt transfer to MICU for hypotension and hyperkalemia on 7/30/18.     Patient seen and examined at MICU. Pt. mildly agitated and confused. SCr increased to 6.46 today. Denies chest pain, SOB.    PAST HISTORY  --------------------------------------------------------------------------------  No significant changes to PMH, PSH, FHx, SHx, unless otherwise noted    ALLERGIES & MEDICATIONS  --------------------------------------------------------------------------------  Allergies    No Known Allergies    Intolerances    Standing Inpatient Medications  acetaminophen   Tablet. 975 milliGRAM(s) Oral every 8 hours  amiodarone Infusion 1 mG/Min IV Continuous <Continuous>  amiodarone Infusion 0.5 mG/Min IV Continuous <Continuous>  buDESOnide 160 MICROgram(s)/formoterol 4.5 MICROgram(s) Inhaler 2 Puff(s) Inhalation two times a day  chlorhexidine 4% Liquid 1 Application(s) Topical <User Schedule>  dextrose 5%. 1000 milliLiter(s) IV Continuous <Continuous>  dextrose 50% Injectable 50 milliLiter(s) IV Push once  docusate sodium 100 milliGRAM(s) Oral two times a day  DULoxetine 60 milliGRAM(s) Oral daily  heparin  Injectable 5000 Unit(s) SubCutaneous every 8 hours  insulin regular  human recombinant. 10 Unit(s) IV Push once  metoprolol succinate ER 50 milliGRAM(s) Oral daily  montelukast 10 milliGRAM(s) Oral daily  multivitamin 1 Tablet(s) Oral daily  pantoprazole    Tablet 40 milliGRAM(s) Oral before breakfast  piperacillin/tazobactam IVPB. 3.375 Gram(s) IV Intermittent every 12 hours  polyethylene glycol 3350 17 Gram(s) Oral daily  sodium bicarbonate 650 milliGRAM(s) Oral two times a day    PRN Inpatient Medications  simethicone 40 milliGRAM(s) Chew every 6 hours PRN      REVIEW OF SYSTEMS  --------------------------------------------------------------------------------  Limited ROS.   Respiratory: See HPI  CV: No chest pain,   GI: See HPI  : + drainage from ileal conduit    VITALS/PHYSICAL EXAM  --------------------------------------------------------------------------------  T(C): 36.5 (08-03-18 @ 04:00), Max: 36.6 (08-02-18 @ 12:00)  HR: 117 (08-03-18 @ 06:00) (79 - 184)  BP: 112/82 (08-03-18 @ 06:00) (76/65 - 115/55)  RR: 20 (08-03-18 @ 06:00) (9 - 26)  SpO2: 98% (08-03-18 @ 06:00) (94% - 99%)  Wt(kg): --        08-01-18 @ 07:01  -  08-02-18 @ 07:00  --------------------------------------------------------  IN: 2415 mL / OUT: 390 mL / NET: 2025 mL    08-02-18 @ 07:01  -  08-03-18 @ 06:51  --------------------------------------------------------  IN: 1500.9 mL / OUT: 130 mL / NET: 1370.9 mL      Physical Exam:  	Gen: confused  	Pulm: CTA B/L  	CV: RRR, S1S2 with holosystolic murmur loudest along L sternal border; no rub  	Back: No spinal or CVA tenderness; no sacral edema  	Abd: +BS, soft, nondistended, current ileal conduit with drainage bag attached and with minimal filling with yellow/red tinged fluid  	LE: Warm, no edema  	Psych: Normal affect and mood  	Skin: Warm, without rashes    LABS/STUDIES  --------------------------------------------------------------------------------              7.7    5.49  >-----------<  72       [08-03-18 @ 03:00]              24.0     130  |  96  |  106  ----------------------------<  101      [08-03-18 @ 03:00]  5.5   |  14  |  6.46        Ca     7.5     [08-03-18 @ 03:00]      Mg     1.8     [08-03-18 @ 03:00]      Phos  5.1     [08-03-18 @ 03:00]    TPro  6.0  /  Alb  2.7  /  TBili  0.3  /  DBili  x   /  AST  19  /  ALT  21  /  AlkPhos  100  [08-03-18 @ 03:00]              [08-02-18 @ 18:20]    Creatinine Trend:  SCr 6.46 [08-03 @ 03:00]  SCr 6.43 [08-02 @ 18:20]  SCr 6.34 [08-02 @ 16:15]  SCr 5.81 [08-02 @ 04:00]  SCr 5.54 [08-01 @ 14:43]

## 2018-08-03 NOTE — PROGRESS NOTE ADULT - SUBJECTIVE AND OBJECTIVE BOX
Patient is a 78y old  Female who presents with a chief complaint of DEVON 2/2 obstructive uropathy with moderate b/l hydroureteronephrosis (2018 09:03)      SUBJECTIVE / OVERNIGHT EVENTS:  lethargic. Still answering yes or no questions.  pt denied pain. comfortable.   Palliative consulted.       Vital Signs Last 24 Hrs  T(C): 36.6 (03 Aug 2018 12:00), Max: 36.6 (03 Aug 2018 00:00)  T(F): 97.8 (03 Aug 2018 12:00), Max: 97.8 (03 Aug 2018 00:00)  HR: 86 (03 Aug 2018 15:00) (79 - 184)  BP: 112/58 (03 Aug 2018 15:00) (76/65 - 115/55)  BP(mean): 71 (03 Aug 2018 15:) (57 - 93)  RR: 17 (03 Aug 2018 15:00) (9 - 26)  SpO2: 98% (03 Aug 2018 15:00) (94% - 99%)  I&O's Summary    02 Aug 2018 07:01  -  03 Aug 2018 07:00  --------------------------------------------------------  IN: 1500.9 mL / OUT: 130 mL / NET: 1370.9 mL    03 Aug 2018 07:01  -  03 Aug 2018 16:06  --------------------------------------------------------  IN: 100.1 mL / OUT: 10 mL / NET: 90.1 mL        PHYSICAL EXAM:  GENERAL: NAD, Comfortable, sleepy.   HEAD:  Atraumatic, Normocephalic  EYES: EOMI, PERRLA, conjunctiva and sclera clear  NECK: Supple, No JVD  CHEST/LUNG: mild dec breath sounds bilaterally; No wheeze  HEART: Regular rate and rhythm; No murmurs, rubs, or gallops  ABDOMEN: Soft, mild tenderness diffuse, distended; Bowel sounds present, mid ventral hernia, stoma bag with dark brown urine  Neuro: Awake, lethargic, open eyes and speak a few words, and fall back to sleep. no focal deficit  EXTREMITIES:  2+ Peripheral Pulses, No clubbing, cyanosis, or edema  SKIN: No rashes or lesions      LABS:                        7.7    5.49  )-----------( 72       ( 03 Aug 2018 03:00 )             24.0     08-03    130<L>  |  96<L>  |  106<H>  ----------------------------<  101<H>  5.5<H>   |  14<L>  |  6.46<H>    Ca    7.5<L>      03 Aug 2018 03:00  Phos  5.1     08-03  Mg     1.8     08-03    TPro  6.0  /  Alb  2.7<L>  /  TBili  0.3  /  DBili  x   /  AST  19  /  ALT  21  /  AlkPhos  100  08-03      CAPILLARY BLOOD GLUCOSE      POCT Blood Glucose.: 183 mg/dL (02 Aug 2018 18:17)    CARDIAC MARKERS ( 02 Aug 2018 18:20 )  x     / x     / 306 u/L / 11.98 ng/mL / x          Urinalysis Basic - ( 02 Aug 2018 16:15 )    Color: PINK / Appearance: TURBID / S.022 / pH: 6.5  Gluc: NEGATIVE / Ketone: NEGATIVE  / Bili: NEGATIVE / Urobili: NORMAL mg/dL   Blood: LARGE / Protein: 500 mg/dL / Nitrite: NEGATIVE   Leuk Esterase: LARGE / RBC: >50 / WBC >50   Sq Epi: x / Non Sq Epi: x / Bacteria: FEW        RADIOLOGY & ADDITIONAL TESTS:    Imaging Personally Reviewed:  [x] YES  [ ] NO    Consultant(s) Notes Reviewed:  [x] YES  [ ] NO      MEDICATIONS  (STANDING):  acetaminophen   Tablet. 975 milliGRAM(s) Oral every 8 hours  chlorhexidine 4% Liquid 1 Application(s) Topical <User Schedule>  DULoxetine 60 milliGRAM(s) Oral daily  metoprolol succinate ER 50 milliGRAM(s) Oral daily  piperacillin/tazobactam IVPB. 3.375 Gram(s) IV Intermittent every 12 hours  sodium bicarbonate 650 milliGRAM(s) Oral two times a day    MEDICATIONS  (PRN):  HYDROmorphone  Injectable 0.5 milliGRAM(s) IV Push every 2 hours PRN Moderate Pain (4 - 6)  simethicone 40 milliGRAM(s) Chew every 6 hours PRN Indigestion      Care Discussed with Consultants/Other Providers [x] YES  [ ] NO    HEALTH ISSUES - PROBLEM Dx:  Palliative care by specialist: Palliative care by specialist  Acute respiratory failure with hypoxia: Acute respiratory failure with hypoxia  Bladder cancer metastasized to bone: Bladder cancer metastasized to bone  Altered mental status: Altered mental status  Hyperkalemia: Hyperkalemia  DEVON (acute kidney injury): DEVON (acute kidney injury)  Obstructive uropathy: Obstructive uropathy  Preventive measure: Preventive measure  Anemia, unspecified type: Anemia, unspecified type  Asthma, unspecified asthma severity, unspecified whether complicated, unspecified whether persistent: Asthma, unspecified asthma severity, unspecified whether complicated, unspecified whether persistent  Hypertension, unspecified type: Hypertension, unspecified type  Fibromyalgia: Fibromyalgia  Generalized abdominal pain: Generalized abdominal pain  Acute kidney injury superimposed on chronic kidney disease: Acute kidney injury superimposed on chronic kidney disease

## 2018-08-03 NOTE — CONSULT NOTE ADULT - SUBJECTIVE AND OBJECTIVE BOX
HPI:  Ms. Moore is a 79 yo woman with PMHx most notable for metastatic bladder cancer to the bone undergoing active chemotherapy (carboplatin/etoposide) s/p cystectomy with ileal conduit creation c/b prior obstructive uropathy requiring b/l percutaneous nephrostomy tubes on 18, morbid obesity, hypertension, hypertrophic cardiomyopathy, and CKD 3 presenting with 1-day onset of diffuse abdominal pain.    Patient developed sever abdominal pain since yesterday afternoon around 4pm. She chronically take Tylenol 2 tabs about 3 times a day for her chronic pain. However, she says this pain is different. She also noticed that since yesterday afternoon, urine has not been coming out of her ostomy. She endorses episode of vomiting at home but no diarrhea. She says she recently had a procedure on in July where they closed her old ostomy site and created a new one more laterally on the right side of her abdomen. Patient denies any fevers or chills, but currently feels very fatigued.      Initial ED triage VS:  temp 98.3  /80  HR 74  RR 18 O2 sat 99% on RA  While in the ED, she received morphine 4mg IV x2, which she states helped a little with her pain and 2L normal saline IV. Pt started on D5 with sodium bicarb gtt. (2018 09:03)    Palliative Care consult placed for GOC and symptom management.     PERTINENT PM/SXH:   Anemia, unspecified type  CKD (chronic kidney disease) stage 3, GFR 30-59 ml/min  Bladder cancer metastasized to bone  Radial fracture  Uterine Polyp  Gastric Ulcer  Migraine, Ophthalmoplegic  Fibromyalgia  Obese  Calcified Thoracic Aorta  Hypertrophic Cardiomyopathy  Paroxysmal Ventricular Tachycardia  Sleep Apnea  Nasal Polyp  Arthritis  HTN (Hypertension)  Asthmatic Bronchitis  Acid Reflux    History of ileal conduit  S/P total knee arthroplasty, left  EPS study  Nasal Polyp removal  History of Arthroscopy  History of D&C    SOCIAL HISTORY:   Significant other/partner:  [ ] YES  [ x] NO               Children:  [ ] YES  [x ] NO                   Yazidi/Spirituality:   Substance hx:  [ ] YES   [ x] NO                   Tobacco hx:  [ ] YES  [x ] NO                       Alcohol hx: [ ] YES  [ x] NO         Home Opioid hx:  [ ] YES  [ x] NO   Living Situation: [x ] Home  [ ] Long term care  [ ] Rehab [ ] Other  Patient is a retired , also an artist enjoyed painting, very independent per family members.  Has 2 siblings, 1 sister and a brother.    FAMILY HISTORY:    [x ] Family history non-contributory     BASELINE (I)ADLs (prior to admission):  [place]: [ ] total  [ x] moderate [ ] dependent    ADVANCE DIRECTIVES:    DNR Yes    MOLST  [ ] YES [x ] NO                      [ ] Completed  Health Care Proxy [ ] YES  [x ] NO   [ ] Completed  Living Will  [ ] YES [x ] NO             [x ] Surrogate  [ ] HCP  [ ] Guardian:                                                                  Phone#:  Two adult siblings.  Megan Suarez #[phone]    Allergies    No Known Allergies    Intolerances        MEDICATIONS  (STANDING):  acetaminophen   Tablet. 975 milliGRAM(s) Oral every 8 hours  chlorhexidine 4% Liquid 1 Application(s) Topical <User Schedule>  DULoxetine 60 milliGRAM(s) Oral daily  metoprolol succinate ER 50 milliGRAM(s) Oral daily  piperacillin/tazobactam IVPB. 3.375 Gram(s) IV Intermittent every 12 hours  sodium bicarbonate 650 milliGRAM(s) Oral two times a day    MEDICATIONS  (PRN):  simethicone 40 milliGRAM(s) Chew every 6 hours PRN Indigestion      PRESENT SYMPTOMS:  Source: [x ] Patient   [ ] Family   [x ] Team     Pain:                        [x ] No [ ] Yes             [ ] Mild [ ] Moderate [ ] Severe    Onset -  Location -  Duration -  Character -  Alleviating/Aggravating -  Radiation -  Timing -    Dyspnea:                [ ] No [x ] Yes             [ ] Mild [x ] Moderate [ ] Severe    Anxiety:                  [x ] No [ ] Yes             [ ] Mild [ ] Moderate [ ] Severe    Fatigue:                  [ x] No [ ] Yes             [ ] Mild [ ] Moderate [ ] Severe    Nausea:                  [x ] No [ ] Yes             [ ] Mild [ ] Moderate [ ] Severe    Loss of appetite:   [ ] No [x ] Yes             [ ] Mild [x ] Moderate [ ] Severe    Constipation:        [ x] No [ ] Yes             [ ] Mild [ ] Moderate [ ] Severe    Other Symptoms:  [x ] All other review of systems negative   [ ] Unable to obtain due to poor mentation     Karnofsky Performance Score/Palliative Performance Status Version 2:  30  %    PHYSICAL EXAM:  Vital Signs Last 24 Hrs  T(C): 36.6 (03 Aug 2018 12:00), Max: 36.6 (03 Aug 2018 00:00)  T(F): 97.8 (03 Aug 2018 12:00), Max: 97.8 (03 Aug 2018 00:00)  HR: 84 (03 Aug 2018 14:00) (79 - 184)  BP: 103/44 (03 Aug 2018 14:00) (76/65 - 115/55)  BP(mean): 60 (03 Aug 2018 14:00) (57 - 93)  RR: 16 (03 Aug 2018 14:00) (9 - 26)  SpO2: 99% (03 Aug 2018 14:00) (94% - 99%) I&O's Summary    02 Aug 2018 07:01  -  03 Aug 2018 07:00  --------------------------------------------------------  IN: 1500.9 mL / OUT: 130 mL / NET: 1370.9 mL    03 Aug 2018 07:01  -  03 Aug 2018 15:10  --------------------------------------------------------  IN: 100.1 mL / OUT: 10 mL / NET: 90.1 mL    General:  [ ] Alert  [ ] Oriented x      [x ] Lethargic but arousable  [ ] Agitated   [ ] Cachexia   [ ] Unarousable  [ x] Verbal  [ ] Non-Verbal  Able to answer simple yes/no questions.    HEENT:  [ ] Normal   [x ] Dry mouth   [ ] ET Tube    [ ] Trach  [ ] Oral lesions    Lungs:   [ ] Clear [ ] Tachypnea  [ ] Audible excessive secretions   [x ] Rhonchi        [ ] Right [ ] Left [ ] Bilateral  [ ] Crackles        [ ] Right [ ] Left [ ] Bilateral  [ ] Wheezing     [ ] Right [ ] Left [ ] Bilateral    Cardiovascular:  [x ] Regular [ ] Irregular [ ] Tachycardia   [ ] Bradycardia  [ ] Murmur [ ] Other    Abdomen: [x ] Soft  [ ] Distended   [x ] +BS  [x ] Non tender [ ] Tender  [ ]PEG   [ ]OGT/ NGT   Last BM:       Genitourinary: [ ] Normal [ ] Incontinent   [ ] Oliguria/Anuria   [ ] [place] Torre  Urostomy tubes in place, output minimal.    Musculoskeletal:  [ ] Normal   [ x] Weakness  [ ] Bedbound/Wheelchair bound [ ] Edema    Neurological: [ ] No focal deficits  [x ] Cognitive impairment  [ ] Dysphagia [ ] Dysarthria [ ] Paresis [ ] Other     Skin: [x ] Normal   [ ] Pressure ulcer(s)                  [ ] Rash    LABS:                        7.7    5.49  )-----------( 72       ( 03 Aug 2018 03:00 )             24.0     08-03    130<L>  |  96<L>  |  106<H>  ----------------------------<  101<H>  5.5<H>   |  14<L>  |  6.46<H>    Ca    7.5<L>      03 Aug 2018 03:00  Phos  5.1     08-03  Mg     1.8     08-03    TPro  6.0  /  Alb  2.7<L>  /  TBili  0.3  /  DBili  x   /  AST  19  /  ALT  21  /  AlkPhos  100  08-03      Urinalysis Basic - ( 02 Aug 2018 16:15 )    Color: PINK / Appearance: TURBID / S.022 / pH: 6.5  Gluc: NEGATIVE / Ketone: NEGATIVE  / Bili: NEGATIVE / Urobili: NORMAL mg/dL   Blood: LARGE / Protein: 500 mg/dL / Nitrite: NEGATIVE   Leuk Esterase: LARGE / RBC: >50 / WBC >50   Sq Epi: x / Non Sq Epi: x / Bacteria: FEW    Shock: [ ] Septic [ ] Cardiogenic [ ] Neurologic [ ] Hypovolemic  Vasopressors x none  Inotrophs x none    Protein Calorie Malnutrition: [ ] Mild [ ] Moderate [ ] Severe    Oral Intake: [ ] Unable/mouth care only [x ] Minimal [ ] Moderate [ ] Full Capability  Diet: [ ] NPO [ ] Tube feeds [ ] TPN [ x] Other     RADIOLOGY & ADDITIONAL STUDIES:     < from: CT Abdomen and Pelvis No Cont (18 @ 22:37) >    IMPRESSION:  Patient is status post cystectomy and ileal conduit with right lower   quadrant ostomy.  The patient appears to be status post interval revision of the stoma   since the prior CT scan of 2018.  There has been interval development of a 7.8 x 3.3 cm postoperative fluid   in the ventral abdominal wall, at the site of previous ostomy.  This may   represent resolving hematoma or sterile seroma.  Superimposed infection   should be excluded clinically.    Bilateral hydronephrosis and perinephric fat stranding is grossly stable   since 2018.  A nonobstructing right upper pole renal stone measures   9 x 5 mm.  Superimposed genitourinary tract infection should be excluded   clinically.    Small nodular foci along the pleural surface/right hemidiaphragm   measuring up to 5 mm.  Special attention to this region on follow-up   imaging is recommended.    REFERRALS:   [ ] Chaplaincy  [ ] Hospice  [ ] Child Life  [ ] Social Work  [ ] Case management [ ] Holistic Therapy

## 2018-08-03 NOTE — CONSULT NOTE ADULT - PROBLEM SELECTOR RECOMMENDATION 2
, likely with uremic encephalopathy. Patient is still minimally verbal able to answer yes/no questions. Patient does not want HD or any other measures to prolong her life. Main goal is comfort care and aggressive symptom management.

## 2018-08-03 NOTE — CONSULT NOTE ADULT - PROBLEM SELECTOR RECOMMENDATION 9
Unclear etiology. Required nephrostomy tubes in the past but was removed.   - US of the bladder and kidneys shows that the hydronephrosis has resolved but creatinine continues to rise  -  and nephrology follow up

## 2018-08-03 NOTE — CONSULT NOTE ADULT - PROBLEM SELECTOR RECOMMENDATION 3
Oxygen dependent. +Dyspnea/tachypnea. Would recommend adding Dilaudid 0.5mg IV Q2-3hrs PRN for dyspnea/discomfort. Can have glycopyrrolate 0.4mg IV Q6hrs PRN for secretions, as patient is oliguric, fluid overload expected.

## 2018-08-03 NOTE — PROGRESS NOTE ADULT - ASSESSMENT
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 Sustained VTACH now Amio loaded, DNR/DNI refusing dialysis.     GOC- DNR/DNI Refusing Dialysis     Neuro  Lethargic this AM 2/2 uremia   Hold sedating pain meds    Cardio  Holosystolic murmur on clinical exam, clinically euvolemic, hx of hypertrophic cardiomyopathy 5/18 ECHO w EF 75%  Sustained Vtach now amio loaded     Pulm  Relative hypoxia to 93-94% likely 2/2 pain-med induced respiratory depression, now on NC 2L- CTM     Nephro/  sCr persistently elevated to 6.46 this AM, concern for post-obstructive ATN - pt refusing dialysis DNR/DNI  -Stable urine output from ostomy site.  - Loopogram Suggestive of left renal obstruction   -Ileal conduit clx w Klebsiella and E coli    ID  Cont Zosyn for Ileal conduit clx w Klebsiella and E coli    Heme   Stable anemia, no clinically active bleed     Endocrine   Alc 4.9  TSH 0.77

## 2018-08-03 NOTE — CONSULT NOTE ADULT - ASSESSMENT
78 year old woman with history of small cell bladder cancer s/p cystectomy s/p palliative chemotherapy with good response and stable disease. Mets to the bone which responded. Admitted with obstructive uropathy. Creatinine is worsening and mental function is worsening.

## 2018-08-04 PROCEDURE — 99232 SBSQ HOSP IP/OBS MODERATE 35: CPT | Mod: GC

## 2018-08-04 RX ADMIN — HYDROMORPHONE HYDROCHLORIDE 0.5 MILLIGRAM(S): 2 INJECTION INTRAMUSCULAR; INTRAVENOUS; SUBCUTANEOUS at 17:28

## 2018-08-04 RX ADMIN — HYDROMORPHONE HYDROCHLORIDE 0.5 MILLIGRAM(S): 2 INJECTION INTRAMUSCULAR; INTRAVENOUS; SUBCUTANEOUS at 21:46

## 2018-08-04 RX ADMIN — HYDROMORPHONE HYDROCHLORIDE 0.5 MILLIGRAM(S): 2 INJECTION INTRAMUSCULAR; INTRAVENOUS; SUBCUTANEOUS at 13:10

## 2018-08-04 RX ADMIN — HYDROMORPHONE HYDROCHLORIDE 0.5 MILLIGRAM(S): 2 INJECTION INTRAMUSCULAR; INTRAVENOUS; SUBCUTANEOUS at 05:48

## 2018-08-04 RX ADMIN — HYDROMORPHONE HYDROCHLORIDE 0.5 MILLIGRAM(S): 2 INJECTION INTRAMUSCULAR; INTRAVENOUS; SUBCUTANEOUS at 05:33

## 2018-08-04 RX ADMIN — HYDROMORPHONE HYDROCHLORIDE 0.5 MILLIGRAM(S): 2 INJECTION INTRAMUSCULAR; INTRAVENOUS; SUBCUTANEOUS at 17:45

## 2018-08-04 RX ADMIN — HYDROMORPHONE HYDROCHLORIDE 0.5 MILLIGRAM(S): 2 INJECTION INTRAMUSCULAR; INTRAVENOUS; SUBCUTANEOUS at 12:52

## 2018-08-04 RX ADMIN — HYDROMORPHONE HYDROCHLORIDE 0.5 MILLIGRAM(S): 2 INJECTION INTRAMUSCULAR; INTRAVENOUS; SUBCUTANEOUS at 22:16

## 2018-08-04 RX ADMIN — CHLORHEXIDINE GLUCONATE 1 APPLICATION(S): 213 SOLUTION TOPICAL at 05:33

## 2018-08-04 NOTE — PROGRESS NOTE ADULT - SUBJECTIVE AND OBJECTIVE BOX
[facility] DIVISION OF KIDNEY DISEASES AND HYPERTENSION -- FOLLOW UP NOTE  --------------------------------------------------------------------------------  Chief Complaint:  78yoF with PMH significant for HCM, Bladder Ca s/p ileal conduit (Nov 2017), c/b obstructive uropathy in May 2018 with b/l nephrostomy tube placement  with subsequent tube removal and conduit revision 2 weeks ago presented with severe abdominal pain and lack of urine output from the conduit stoma. Pt. found to have worsening DEVON in setting of obstructive uropathy. Scr was 1.7 on 5/28/18, increased to 2.81 on (7/30/28). Urology consulted and performed ostomal catheterization on 7/30/18. Pt transfer to MICU for hypotension and hyperkalemia on 7/30/18. Pt now opting for comfort care. Transferred out of MICU.    24 hour events/subjective:  Pt feeling okay. Denies any SOB or pain.       PAST HISTORY  --------------------------------------------------------------------------------  No significant changes to PMH, PSH, FHx, SHx, unless otherwise noted    ALLERGIES & MEDICATIONS  --------------------------------------------------------------------------------  Allergies    No Known Allergies    Intolerances      Standing Inpatient Medications  acetaminophen   Tablet. 975 milliGRAM(s) Oral every 8 hours  chlorhexidine 4% Liquid 1 Application(s) Topical <User Schedule>  DULoxetine 60 milliGRAM(s) Oral daily  metoprolol succinate ER 50 milliGRAM(s) Oral daily  sodium bicarbonate 650 milliGRAM(s) Oral two times a day    PRN Inpatient Medications  glycopyrrolate Injectable 0.4 milliGRAM(s) IV Push every 6 hours PRN  HYDROmorphone  Injectable 0.5 milliGRAM(s) IV Push every 2 hours PRN  simethicone 40 milliGRAM(s) Chew every 6 hours PRN      REVIEW OF SYSTEMS  --------------------------------------------------------------------------------  Gen: no fatigue  Respiratory: No dyspnea  CV: No chest pain  GI: No abdominal pain  MSK: No edema    VITALS/PHYSICAL EXAM  --------------------------------------------------------------------------------  T(C): 36.8 (08-04-18 @ 09:32), Max: 36.8 (08-04-18 @ 09:32)  HR: 94 (08-04-18 @ 09:32) (81 - 158)  BP: 100/52 (08-04-18 @ 09:32) (80/52 - 119/59)  RR: 18 (08-04-18 @ 09:32) (11 - 24)  SpO2: 95% (08-04-18 @ 09:32) (94% - 99%)  Wt(kg): --        08-03-18 @ 07:01  -  08-04-18 @ 07:00  --------------------------------------------------------  IN: 200.1 mL / OUT: 50 mL / NET: 150.1 mL      Physical Exam:  	Gen: NAD, elderly female  	HEENT: +NC  	Pulm: CTA B/L  	CV: RRR, S1S2; no rub  	Abd: +BS, soft, nontender/nondistended  	UE: Warm, no edema  	LE: Warm, no edema  	Neuro: awake, alert, follows commands  	Psych: Normal affect and mood  	Skin: Warm, without rashes    LABS/STUDIES  --------------------------------------------------------------------------------              7.7    5.49  >-----------<  72       [08-03-18 @ 03:00]              24.0     130  |  96  |  106  ----------------------------<  101      [08-03-18 @ 03:00]  5.5   |  14  |  6.46        Ca     7.5     [08-03-18 @ 03:00]      Mg     1.8     [08-03-18 @ 03:00]      Phos  5.1     [08-03-18 @ 03:00]    TPro  6.0  /  Alb  2.7  /  TBili  0.3  /  DBili  x   /  AST  19  /  ALT  21  /  AlkPhos  100  [08-03-18 @ 03:00]              [08-02-18 @ 18:20]    Creatinine Trend:  SCr 6.46 [08-03 @ 03:00]  SCr 6.43 [08-02 @ 18:20]  SCr 6.34 [08-02 @ 16:15]  SCr 5.81 [08-02 @ 04:00]  SCr 5.54 [08-01 @ 14:43]    Urinalysis - [08-02-18 @ 16:15]      Color PINK / Appearance TURBID / SG 1.022 / pH 6.5      Gluc NEGATIVE / Ketone NEGATIVE  / Bili NEGATIVE / Urobili NORMAL       Blood LARGE / Protein 500 / Leuk Est LARGE / Nitrite NEGATIVE      RBC >50 / WBC >50 / Hyaline  / Gran  / Sq Epi  / Non Sq Epi  / Bacteria FEW    Urine Creatinine 85.30      [08-01-18 @ 18:00]  Urine Sodium 78      [08-01-18 @ 18:00]  Urine Urea Nitrogen 211.4      [08-01-18 @ 18:00]  Urine Osmolality 366      [08-01-18 @ 18:00]    Iron 8, TIBC 222, %sat --      [07-31-18 @ 07:45]  Ferritin 480.9      [07-31-18 @ 07:45]  HbA1c 4.9      [05-21-18 @ 11:32]  TSH 0.77      [05-21-18 @ 11:28]

## 2018-08-04 NOTE — CHART NOTE - NSCHARTNOTEFT_GEN_A_CORE
MAR Transfer/Accept Note:    78 F with PMH significant for HCM, Bladder Ca s/p ileal conduit (Nov 2017), c/b obstructive uropathy in May 2018 with b/l nephrostomy tube placement  with subsequent tube removal and conduit revision 2 weeks ago presented with severe abdominal pain and lack of urine output from the conduit stoma. Pt. found to have worsening DEVON in setting of obstructive uropathy. Scr was 1.7 on 5/28/18, increased to 2.81 on (7/30/28). Urology consulted and performed ostomal catheterization on 7/30/18. Pt transferred to MICU for hypotension and hyperkalemia on 7/30/18. Renal US showed Interval resolution of the bilateral hydronephrosis on 8/1/18. Pt. with ?ATN. Loopogram showed Ileal conduit fills with contrast, which refluxes through the right ureter to the right renal collecting system. The left ureter and renal collecting system were not visualized on imaging, but there was suggestion that there could be a possible obstruction preventing reflux on 8/2/1. Pt was offered HD, but declined in full cognition, and discussed with family. Patient expressed her wishes to be DNR/DNI. Patient's course was also complicated by Vtach requiring Amiodarone.  On exam, patient was slightly tachypneic (RR 20), but in no acute distress, on O2 NC. Patient does note some mild abdominal pain, which I told her she can receive Dilaudid as needed.      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    FOR FOLLOW UP:  Renal failure: patient declining HD, not checking labs  GOC: comfort measures per patient and families request. f/u palliative recs.  Acute respiratory failure with hypoxia c/w oxygen therapy. c/w Dilaudid 0.5mg IV Q2hrs PRN for dyspnea/discomfort. C/w glycopyrrolate 0.4mg IV Q6hrs PRN for secretions. Patient is on O2, which can be titrated upward as needed.    Aryles Hedjar, PGY-3  MAR [id number]

## 2018-08-04 NOTE — PROGRESS NOTE ADULT - ASSESSMENT
77 yo woman with PMHx most notable for metastatic bladder cancer to the bone undergoing active chemotherapy (carboplatin/etoposide) s/p cystectomy with ileal conduit creation c/b prior obstructive uropathy requiring b/l percutaneous nephrostomy tubes on 5/20/18, morbid obesity, hypertension, hypertrophic cardiomyopathy, and CKD 3 in addition to other comorbidities presenting with 1-day onset of diffuse abdominal pain, found to have DEVON on CKD stage 3 c/b severe metabolic acidosis, stable anemia, and leukocytosis. Exam notable for erythema at prior ileostomy site with CT showing possible resolving hematoma or sterile seroma but cannot exclude superimposed infection at the site. Pt currently afebrile and hemodynamically stable.

## 2018-08-04 NOTE — PROGRESS NOTE ADULT - SUBJECTIVE AND OBJECTIVE BOX
[facility] Hematology/Oncology - Obey Cruz / Onofre / Francis - [phone]  Primary Outpatient Hematologist/Oncologist: Dr. Manjinder Vaca    Chief Complaint:  Obstructive uropathy    HPI:  Ms. Moore is a 78 year old woman with PMHx of metastatic bladder cancer to the bone who has been treated with palliative chemotherapy (carboplatin/etoposide) s/p cystectomy with ileal conduit creation c/b prior obstructive uropathy requiring b/l percutaneous nephrostomy tubes on 5/20/18, hypertension, hypertrophic cardiomyopathy, and CKD 3 presenting with abdominal pain.    As per family, patient was home and developed severe abdominal pain. Pain not relieved with pain medications. Patient also noticed the lack of urine output from ostomy bag. No fevers or chillss. No nausea or vomiting. No diarrhea.      The patient's renal function has deteriorated. She declined hemodialysis. Now confused.    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 She was s/p cystectomy. Given palliative carboplatin/taxol x 6 cycles with good response. Was offered prophylactic RT to the brain but refused. Was pending surveillance scans in mid August    ROS:  General:  (-)Pain, (-)Decrease appetite, (-)Fevers, (-)Chills, (-)Weight Loss  Eyes: (-)Blurry Vision, (-)Double Vision, (-)Vision Loss  ENT: (-)Sinus Congestion, (-)Decreased Hearing, (-)Nosebleeds, (-)Sore Throat  Cardiac: (-)Chest Pain, (-)Palpitations, (-)Shortness of breathe on exertion  Respiratory: (-)Cough, (-)hemoptysis, (-)Shortness of breathe  GI: (-)Nausea, (-)Vomiting, (-)Diarrhea, (-)Constipation, (-)Melena, (-)BRBPR, (+)Abdominal pain  : (-)Hematuria, (-)Dysuria, (-)Polyuia  MSK: (-)Back pain, (-)Joint pain, (-)Stiffness  Dermatology: (-)Rash, (-)Itching  Neurology:  (-)Numbness, (-)Tingling, (-)Difficulty Walking, (-)Tremors, (+)Weakness  Psych: (-)Anxiety, (-)Depression, (-)Memory Loss  Hematology:  (-)Easy Bruising, (-)Easy Bleeding, (-)Night Sweats.     PAST MEDICAL & SURGICAL HISTORY:  Anemia, unspecified type  CKD (chronic kidney disease) stage 3, GFR 30-59 ml/min  Bladder cancer metastasized to bone  Radial fracture: left  Uterine Polyp  Gastric Ulcer  Migraine, Ophthalmoplegic  Fibromyalgia  Obese  Calcified Thoracic Aorta  Hypertrophic Cardiomyopathy  Paroxysmal Ventricular Tachycardia: 6/2010  Nasal Polyp: 1960  Arthritis  HTN (Hypertension)  Asthmatic Bronchitis  Acid Reflux  History of ileal conduit  S/P total knee arthroplasty, left  EPS study: with no intervention 6/2010  Nasal Polyp removal: 1960  History of Arthroscopy: 1997 Right  Left 2000  History of D&C: 1968    Social History  Tobacco: Denies current use  Alcohol: Denies current use  Drugs:  Denies current use    MEDICATIONS  (STANDING):  acetaminophen   Tablet. 975 milliGRAM(s) Oral every 8 hours  chlorhexidine 4% Liquid 1 Application(s) Topical <User Schedule>  DULoxetine 60 milliGRAM(s) Oral daily  metoprolol succinate ER 50 milliGRAM(s) Oral daily  sodium bicarbonate 650 milliGRAM(s) Oral two times a day    MEDICATIONS  (PRN):  glycopyrrolate Injectable 0.4 milliGRAM(s) IV Push every 6 hours PRN secretions  HYDROmorphone  Injectable 0.5 milliGRAM(s) IV Push every 2 hours PRN Moderate Pain (4 - 6)  simethicone 40 milliGRAM(s) Chew every 6 hours PRN Indigestion      Allergies  No Known Allergies    Vital Signs Last 24 Hrs  T(C): 36.6 (04 Aug 2018 05:01), Max: 36.7 (04 Aug 2018 03:20)  T(F): 97.8 (04 Aug 2018 05:01), Max: 98 (04 Aug 2018 03:20)  HR: 90 (04 Aug 2018 05:33) (81 - 158)  BP: 101/50 (04 Aug 2018 05:33) (80/52 - 119/59)  BP(mean): 59 (03 Aug 2018 22:26) (57 - 74)  RR: 18 (04 Aug 2018 05:33) (11 - 24)  SpO2: 96% (04 Aug 2018 05:33) (94% - 99%)    Physical Exam:  General: Confused. Verbal.  HEENT: clear oropharynx, anicteric sclera, pink conjunctivae, EOMi. PERRLA  Cardiac: S1, S2 present. No audible murmurs. RRR  Lungs: CTA B/L.   Abdomen: Soft, Non-Tender, Non-Distended. No palpable hepatosplenomegaly  Extremities: 2+ pulses. No edema  Skin: No Rashes. No petechiae  Neuro: No focal motor or sensory deficits.                               7.7    5.49  )-----------( 72       ( 03 Aug 2018 03:00 )             24.0       08-03    130<L>  |  96<L>  |  106<H>  ----------------------------<  101<H>  5.5<H>   |  14<L>  |  6.46<H>    Ca    7.5<L>      03 Aug 2018 03:00  Phos  5.1     08-03  Mg     1.8     08-03    TPro  6.0  /  Alb  2.7<L>  /  TBili  0.3  /  DBili  x   /  AST  19  /  ALT  21  /  AlkPhos  100  08-03    Radiology:  CT Abdomen/Pelvis  IMPRESSION:  Patient is status post cystectomy and ileal conduit with right lower   quadrant ostomy.  The patient appears to be status post interval revision of the stoma   since the prior CT scan of 05/20/2018.  There has been interval development of a 7.8 x 3.3 cm postoperative fluid   in the ventral abdominal wall, at the site of previous ostomy.  This may   represent resolving hematoma or sterile seroma.  Superimposed infection   should be excluded clinically.    Bilateral hydronephrosis and perinephric fat stranding is grossly stable   since 05/20/2018.  A nonobstructing right upper pole renal stone measures   9 x 5 mm.  Superimposed genitourinary tract infection should be excluded   clinically.    Small nodular foci along the pleural surface/right hemidiaphragm   measuring up to 5 mm.  Special attention to this region on follow-up   imaging is recommended.    US of the bladder:  IMPRESSION:     Interval resolution of the bilateral hydronephrosis.

## 2018-08-04 NOTE — PROGRESS NOTE ADULT - ASSESSMENT
78 year old woman with history of small cell bladder cancer s/p cystectomy s/p palliative chemotherapy with good response and stable disease. Mets to the bone which responded. Admitted with obstructive uropathy. Creatinine is worsening and mental function is worsening. Refused hemodialysis. Comfort care. Patient is DNR.

## 2018-08-04 NOTE — PROGRESS NOTE ADULT - PROBLEM SELECTOR PLAN 1
Pt. with DEVON in setting of obstructive uropathy. Pt. with possible ATN as well. Scr was 1.7 on 5/28/18, increased to 6.86 8/3/18. Pt. with bilateral hydronephrosis on CT scan done on 7/29/18. Renal US showed Interval resolution of the bilateral hydronephrosis on 8/1/18. On loopogram, left ureter and renal collecting system are not visualized, indicating there could be a possible obstruction preventing reflux on 8/2/18. HCP refusing dialysis and blood draws. Would hold off IV Fluids. Recommend Lasix IV as needed for volume management. Monitor urine output. Avoid any potential nephrotoxins

## 2018-08-04 NOTE — PROGRESS NOTE ADULT - SUBJECTIVE AND OBJECTIVE BOX
Patient is a 78y old  Female who presents with a chief complaint of DEVON 2/2 obstructive uropathy with moderate b/l hydroureteronephrosis (2018 09:03)      SUBJECTIVE / OVERNIGHT EVENTS:  drowsy. lethargic. but open eyes.   family at bedside.  comfortable.  denied pain.   100 cc urine outpt overnight.     Vital Signs Last 24 Hrs  T(C): 36.8 (04 Aug 2018 09:32), Max: 36.8 (04 Aug 2018 09:32)  T(F): 98.3 (04 Aug 2018 09:32), Max: 98.3 (04 Aug 2018 09:32)  HR: 94 (04 Aug 2018 09:32) (81 - 158)  BP: 100/52 (04 Aug 2018 09:32) (80/52 - 119/59)  BP(mean): 59 (03 Aug 2018 22:26) (57 - 74)  RR: 18 (04 Aug 2018 09:32) (11 - 24)  SpO2: 95% (04 Aug 2018 09:32) (94% - 99%)  I&O's Summary    03 Aug 2018 07:01  -  04 Aug 2018 07:00  --------------------------------------------------------  IN: 200.1 mL / OUT: 50 mL / NET: 150.1 mL      PHYSICAL EXAM:  GENERAL: NAD, Comfortable, lethargic. arousable.  HEAD:  Atraumatic, Normocephalic  EYES: EOMI, PERRLA, conjunctiva and sclera clear  NECK: Supple, No JVD  CHEST/LUNG: mild dec breath sounds bilaterally; No wheeze  HEART: Regular rate and rhythm; No murmurs, rubs, or gallops  ABDOMEN: Soft, mild tenderness diffuse, distended; Bowel sounds present, mid ventral hernia, stoma bag with dark brown urine  Neuro: Awake, lethargic, open eyes and speak a few words, and fall back to sleep. no focal deficit  EXTREMITIES:  2+ Peripheral Pulses, No clubbing, cyanosis, +edema  SKIN: No rashes or lesions      LABS:                        7.7    5.49  )-----------( 72       ( 03 Aug 2018 03:00 )             24.0     08-03    130<L>  |  96<L>  |  106<H>  ----------------------------<  101<H>  5.5<H>   |  14<L>  |  6.46<H>    Ca    7.5<L>      03 Aug 2018 03:00  Phos  5.1     08-03  Mg     1.8     08-03    TPro  6.0  /  Alb  2.7<L>  /  TBili  0.3  /  DBili  x   /  AST  19  /  ALT  21  /  AlkPhos  100  08-      CAPILLARY BLOOD GLUCOSE        CARDIAC MARKERS ( 02 Aug 2018 18:20 )  x     / x     / 306 u/L / 11.98 ng/mL / x          Urinalysis Basic - ( 02 Aug 2018 16:15 )    Color: PINK / Appearance: TURBID / S.022 / pH: 6.5  Gluc: NEGATIVE / Ketone: NEGATIVE  / Bili: NEGATIVE / Urobili: NORMAL mg/dL   Blood: LARGE / Protein: 500 mg/dL / Nitrite: NEGATIVE   Leuk Esterase: LARGE / RBC: >50 / WBC >50   Sq Epi: x / Non Sq Epi: x / Bacteria: FEW        RADIOLOGY & ADDITIONAL TESTS:    Imaging Personally Reviewed:  [x] YES  [ ] NO    Consultant(s) Notes Reviewed:  [x] YES  [ ] NO      MEDICATIONS  (STANDING):  acetaminophen   Tablet. 975 milliGRAM(s) Oral every 8 hours  chlorhexidine 4% Liquid 1 Application(s) Topical <User Schedule>  DULoxetine 60 milliGRAM(s) Oral daily  metoprolol succinate ER 50 milliGRAM(s) Oral daily  sodium bicarbonate 650 milliGRAM(s) Oral two times a day    MEDICATIONS  (PRN):  glycopyrrolate Injectable 0.4 milliGRAM(s) IV Push every 6 hours PRN secretions  HYDROmorphone  Injectable 0.5 milliGRAM(s) IV Push every 2 hours PRN Moderate Pain (4 - 6)  simethicone 40 milliGRAM(s) Chew every 6 hours PRN Indigestion      Care Discussed with Consultants/Other Providers [x] YES  [ ] NO    HEALTH ISSUES - PROBLEM Dx:  Palliative care by specialist: Palliative care by specialist  Acute respiratory failure with hypoxia: Acute respiratory failure with hypoxia  Bladder cancer metastasized to bone: Bladder cancer metastasized to bone  Altered mental status: Altered mental status  Hyperkalemia: Hyperkalemia  DEVON (acute kidney injury): DEVON (acute kidney injury)  Obstructive uropathy: Obstructive uropathy  Preventive measure: Preventive measure  Anemia, unspecified type: Anemia, unspecified type  Asthma, unspecified asthma severity, unspecified whether complicated, unspecified whether persistent: Asthma, unspecified asthma severity, unspecified whether complicated, unspecified whether persistent  Hypertension, unspecified type: Hypertension, unspecified type  Fibromyalgia: Fibromyalgia  Generalized abdominal pain: Generalized abdominal pain  Acute kidney injury superimposed on chronic kidney disease: Acute kidney injury superimposed on chronic kidney disease

## 2018-08-04 NOTE — PROGRESS NOTE ADULT - PROBLEM SELECTOR PLAN 1
Given history, highly suspicious for post obstructive uropathy vs. ATN  s/p IR, but did not get nephrostomy tubes   CT abd/pelvis revealed stable bilateral hydronephrosis and perinephric fat stranding, but nonobstructing stone noted in R kidney may have superimposed infection. repeat US renal shows resolution of hydronephrosis. Cr remained high.   urology on the case. obstructive vs. r/o pre-renal/ATN/AIN  nephrology on the case. Loopogram shows possibility of obstruction.   Cr continue to trend up with low urostomy urine outpt.   The sister at bedside. Expressed that pt wants to be focus on comfort.  no aggressive measures including hemodialysis and aggressive procedures. These interventions are not compatible with pt's wishes and her quality of life.  Palliative consulted. Made DNR/DNI.  Comfort care. No further blood draws.

## 2018-08-05 RX ORDER — HYDROMORPHONE HYDROCHLORIDE 2 MG/ML
1 INJECTION INTRAMUSCULAR; INTRAVENOUS; SUBCUTANEOUS
Qty: 0 | Refills: 0 | Status: DISCONTINUED | OUTPATIENT
Start: 2018-08-05 | End: 2018-08-06

## 2018-08-05 RX ADMIN — HYDROMORPHONE HYDROCHLORIDE 1 MILLIGRAM(S): 2 INJECTION INTRAMUSCULAR; INTRAVENOUS; SUBCUTANEOUS at 14:20

## 2018-08-05 RX ADMIN — HYDROMORPHONE HYDROCHLORIDE 1 MILLIGRAM(S): 2 INJECTION INTRAMUSCULAR; INTRAVENOUS; SUBCUTANEOUS at 14:05

## 2018-08-05 RX ADMIN — HYDROMORPHONE HYDROCHLORIDE 0.5 MILLIGRAM(S): 2 INJECTION INTRAMUSCULAR; INTRAVENOUS; SUBCUTANEOUS at 04:11

## 2018-08-05 RX ADMIN — CHLORHEXIDINE GLUCONATE 1 APPLICATION(S): 213 SOLUTION TOPICAL at 06:00

## 2018-08-05 RX ADMIN — HYDROMORPHONE HYDROCHLORIDE 0.5 MILLIGRAM(S): 2 INJECTION INTRAMUSCULAR; INTRAVENOUS; SUBCUTANEOUS at 10:45

## 2018-08-05 RX ADMIN — HYDROMORPHONE HYDROCHLORIDE 0.5 MILLIGRAM(S): 2 INJECTION INTRAMUSCULAR; INTRAVENOUS; SUBCUTANEOUS at 11:00

## 2018-08-05 RX ADMIN — HYDROMORPHONE HYDROCHLORIDE 1 MILLIGRAM(S): 2 INJECTION INTRAMUSCULAR; INTRAVENOUS; SUBCUTANEOUS at 22:38

## 2018-08-05 RX ADMIN — HYDROMORPHONE HYDROCHLORIDE 0.5 MILLIGRAM(S): 2 INJECTION INTRAMUSCULAR; INTRAVENOUS; SUBCUTANEOUS at 04:41

## 2018-08-05 RX ADMIN — HYDROMORPHONE HYDROCHLORIDE 0.5 MILLIGRAM(S): 2 INJECTION INTRAMUSCULAR; INTRAVENOUS; SUBCUTANEOUS at 08:22

## 2018-08-05 RX ADMIN — HYDROMORPHONE HYDROCHLORIDE 0.5 MILLIGRAM(S): 2 INJECTION INTRAMUSCULAR; INTRAVENOUS; SUBCUTANEOUS at 08:07

## 2018-08-05 RX ADMIN — HYDROMORPHONE HYDROCHLORIDE 1 MILLIGRAM(S): 2 INJECTION INTRAMUSCULAR; INTRAVENOUS; SUBCUTANEOUS at 22:08

## 2018-08-05 NOTE — PROGRESS NOTE ADULT - PROBLEM SELECTOR PROBLEM 6
Anemia, unspecified type

## 2018-08-05 NOTE — PROGRESS NOTE ADULT - PROBLEM SELECTOR PROBLEM 4
Bladder cancer metastasized to bone
Hypertension, unspecified type

## 2018-08-05 NOTE — PROGRESS NOTE ADULT - PROBLEM SELECTOR PLAN 4
- hold home regimen of diltiazem 180mg daily for now  - continue home regimen of metoprolol succinate 50mg daily with BP hold parameters
No further diagnostic or therapeutic intervention at this time. Prognosis grim.
- hold home regimen of diltiazem 180mg daily for now  - continue home regimen of metoprolol succinate 50mg daily (unclear if taken for HTN or other indication)
- hold home regimen of diltiazem 180mg daily for now  - continue home regimen of metoprolol succinate 50mg daily with BP hold parameters

## 2018-08-05 NOTE — PROGRESS NOTE ADULT - PROBLEM SELECTOR PROBLEM 5
Asthma, unspecified asthma severity, unspecified whether complicated, unspecified whether persistent

## 2018-08-05 NOTE — PROGRESS NOTE ADULT - PROBLEM SELECTOR PLAN 7
- heparin SC for DVT ppx  - pantoprazole 40mg daily (pt on home regimen of omeprazole 40mg daily)
- heparin SC for DVT ppx  - pantoprazole 40mg daily (pt on home regimen of omeprazole 40mg daily)  - resume home regimen of multivitamin daily    IMPROVE VTE Individual Risk Assessment        RISK                                                          Points  [  ] Previous VTE                                               3  [  ] Thrombophilia                                            2  [  ] Lower limb paresis/paralysis                    2    [ x ] Active Cancer (in last 6 months)              2   [  ] Immobilization > 24 hrs                             1  [  ] ICU/CCU stay > 24 hours                            1  [ x ] Age > 60                                                        1                                            Total Score ___3______

## 2018-08-05 NOTE — PROGRESS NOTE ADULT - PROBLEM SELECTOR PLAN 6
Chronic and stable. Likely multifactorial - suspect anemia of chronic disease in addition to effects of chemotherapy. Will assess for nutritional deficiency as well  stable hgb  - stable iron studies, normal Vit B12, folic pending
Chronic and stable. Likely multifactorial - suspect anemia of chronic disease in addition to effects of chemotherapy.   - stable iron studies, normal Vit B12.
Chronic and stable. Likely multifactorial - suspect anemia of chronic disease in addition to effects of chemotherapy. Will assess for nutritional deficiency as well  - trend CBC  - check iron studies, including ferritin, TIBC, check Vit B12/folate
Chronic and stable. Likely multifactorial - suspect anemia of chronic disease in addition to effects of chemotherapy. Will assess for nutritional deficiency as well  stable hgb  - stable iron studies, normal Vit B12, folic pending

## 2018-08-05 NOTE — PROGRESS NOTE ADULT - PROVIDER SPECIALTY LIST ADULT
Heme/Onc
Internal Medicine
MICU
Nephrology
Urology
Nephrology
Nephrology
Internal Medicine

## 2018-08-05 NOTE — PROGRESS NOTE ADULT - PROBLEM SELECTOR PLAN 1
Given history, highly suspicious for post obstructive uropathy vs. ATN  s/p IR, but did not get nephrostomy tubes   CT abd/pelvis revealed stable bilateral hydronephrosis and perinephric fat stranding, but nonobstructing stone noted in R kidney may have superimposed infection. repeat US renal shows resolution of hydronephrosis. Cr remained high.   urology on the case. obstructive vs. r/o pre-renal/ATN/AIN  nephrology on the case. Loopogram shows possibility of obstruction.   Cr continue to trend up with low urostomy urine outpt.   The sister at bedside. Expressed that pt wants to be focus on comfort.  no aggressive measures including hemodialysis and aggressive procedures. These interventions are not compatible with pt's wishes and her quality of life.  Palliative consulted. Made DNR/DNI.  Comfort care. No further blood draws. no more vitals.  inc dilaudid to 1 mg q2hr PRN.

## 2018-08-05 NOTE — PROGRESS NOTE ADULT - SUBJECTIVE AND OBJECTIVE BOX
Patient is a 78y old  Female who presents with a chief complaint of DEVON 2/2 obstructive uropathy with moderate b/l hydroureteronephrosis (30 Jul 2018 09:03)      SUBJECTIVE / OVERNIGHT EVENTS:  lethargic.  periods of agitation, arm swinging.  the brother request not to check vitals anymore and to increase pain meds if possible.  pt open eyes but noncommunicative.       Vital Signs Last 24 Hrs  T(C): 36.1 (05 Aug 2018 12:13), Max: 36.4 (04 Aug 2018 21:45)  T(F): 97 (05 Aug 2018 12:13), Max: 97.6 (05 Aug 2018 04:10)  HR: 98 (05 Aug 2018 12:13) (96 - 100)  BP: 100/48 (05 Aug 2018 08:10) (100/48 - 104/48)  BP(mean): --  RR: 18 (05 Aug 2018 12:13) (16 - 18)  SpO2: 94% (05 Aug 2018 12:13) (94% - 99%)  I&O's Summary    04 Aug 2018 07:01  -  05 Aug 2018 07:00  --------------------------------------------------------  IN: 0 mL / OUT: 150 mL / NET: -150 mL        PHYSICAL EXAM:  GENERAL: NAD, Comfortable, lethargic.   HEAD:  Atraumatic, Normocephalic  EYES: EOMI, PERRLA, conjunctiva and sclera clear  NECK: Supple, No JVD  CHEST/LUNG: mild dec breath sounds bilaterally; No wheeze  HEART: Regular rate and rhythm; No murmurs, rubs, or gallops  ABDOMEN: Soft, mild tenderness diffuse, distended; Bowel sounds present, mid ventral hernia, stoma bag with dark brown urine  Neuro: Awake, lethargic, open eyes, unable to communicate, and fall back to sleep. no focal deficit  EXTREMITIES:  2+ Peripheral Pulses, No clubbing, cyanosis, +edema  SKIN: No rashes or lesions      LABS:            CAPILLARY BLOOD GLUCOSE                RADIOLOGY & ADDITIONAL TESTS:    Imaging Personally Reviewed:  [x] YES  [ ] NO    Consultant(s) Notes Reviewed:  [x] YES  [ ] NO      MEDICATIONS  (STANDING):  acetaminophen   Tablet. 975 milliGRAM(s) Oral every 8 hours  chlorhexidine 4% Liquid 1 Application(s) Topical <User Schedule>  DULoxetine 60 milliGRAM(s) Oral daily  metoprolol succinate ER 50 milliGRAM(s) Oral daily  sodium bicarbonate 650 milliGRAM(s) Oral two times a day    MEDICATIONS  (PRN):  glycopyrrolate Injectable 0.4 milliGRAM(s) IV Push every 6 hours PRN secretions  HYDROmorphone  Injectable 1 milliGRAM(s) IV Push every 2 hours PRN Moderate Pain (4 - 6)  simethicone 40 milliGRAM(s) Chew every 6 hours PRN Indigestion      Care Discussed with Consultants/Other Providers [x] YES  [ ] NO    HEALTH ISSUES - PROBLEM Dx:  Palliative care by specialist: Palliative care by specialist  Acute respiratory failure with hypoxia: Acute respiratory failure with hypoxia  Bladder cancer metastasized to bone: Bladder cancer metastasized to bone  Altered mental status: Altered mental status  Hyperkalemia: Hyperkalemia  DEVON (acute kidney injury): DEVON (acute kidney injury)  Obstructive uropathy: Obstructive uropathy  Preventive measure: Preventive measure  Anemia, unspecified type: Anemia, unspecified type  Asthma, unspecified asthma severity, unspecified whether complicated, unspecified whether persistent: Asthma, unspecified asthma severity, unspecified whether complicated, unspecified whether persistent  Hypertension, unspecified type: Hypertension, unspecified type  Fibromyalgia: Fibromyalgia  Generalized abdominal pain: Generalized abdominal pain  Acute kidney injury superimposed on chronic kidney disease: Acute kidney injury superimposed on chronic kidney disease

## 2018-08-05 NOTE — PROGRESS NOTE ADULT - ATTENDING COMMENTS
Agree with above. Bladder CA with ileal conduit. Now with DEVON and AMS. Family at bedside, all focus on comfort. DNR/DNI.
Agree with above. Seen and examined with residents. Worsening creatinine, likely pre renal as hydronephosis has improved. Renal following. Supportive care, antibiotics and loop o gram.
Agree with above. Seen and examined with residents. Hypotension with hydronephrosis and DEVON. Close follow up. IR following as well.
Patient examined and ROS reviewed. A case of DEVON with obstructive uropathy and mild hyperkalemia. Advised Low K diet. If serum potassium remains elevated may require potassium chelating resins.
Patient seen and examined today. Sister at bedside. Per patient's wishes no HD. Recommend high dose lasix IV prn.
Patient seen and examined today. Would repeat UA- patient may be developing ATN in the setting of sepsis and after hypotensive episode as obstruction was ruled out. Monitor daily for HD needs. Lasix prn.
Patient seen and examined today. Upset about having significant BMs after receiving kayexelate. Plan for loopogram today to r/o obstructive process regarding the bowel. Will monitor labs.
- Dr. EFRA Manning ([facility])  - [phone]
- Dr. EFRA Manning ([facility])  - [phone]
Acute renal failure, critically ill.  DNR/DNI.     - Dr. EFRA Manning ([facility])  - [phone]
Acute renal failure, critically ill.  Poor prognosis.  DNR/DNI.     - Dr. EFRA Manning ([facility])  - [phone]
Acute renal failure, critically ill.  Poor prognosis.  DNR/DNI.   Extensive discussion with the brother Forest.  Goals is comfort care. no further blood work, no further vital check.  pain meds IV PRN.     - Dr. EFRA Manning ([facility])  - [phone]

## 2018-08-05 NOTE — PROGRESS NOTE ADULT - NSHPATTENDINGPLANDISCUSS_GEN_ALL_CORE
MICU team
Team
team and family
ICU fellow
patient and medical team
pt, the brother and sister at bedside.
pt and MICU RN
pt and the brother at bedside. pt's RN
pt, the brother and the sister at bedside. ICU RN.
pt, the sister at bedside. ICU RN.

## 2018-08-05 NOTE — PROGRESS NOTE ADULT - PROBLEM SELECTOR PLAN 3
- continue home regimen of Lyrica 75mg BID and Cymbalta 60mg daily  - acetaminophen 975mg q8h (pt on standing Tylenol regimen at home)  - monitor for sedation.
Worsening. In part to to renal insufficiency. Possibility of brain mets exists but it would be academic at this point to investigate further.
- at home she was on Lyrica 75mg BID and Cymbalta 60mg daily  - acetaminophen 975mg q8h (pt on standing Tylenol regimen at home)
- at home she was on Lyrica 75mg BID and Cymbalta 60mg daily  - can hold if not taking PO
- at home she was on Lyrica 75mg BID and Cymbalta 60mg daily  - can hold if not taking PO  - acetaminophen 975mg q8h (pt on standing Tylenol regimen at home), here on IV pain meds.
- continue home regimen of Lyrica 75mg BID and Cymbalta 60mg daily  - acetaminophen 975mg q8h (pt on standing Tylenol regimen at home)
- continue home regimen of Lyrica 75mg BID and Cymbalta 60mg daily  - acetaminophen 975mg q8h (pt on standing Tylenol regimen at home)

## 2018-08-05 NOTE — PROGRESS NOTE ADULT - PROBLEM SELECTOR PROBLEM 2
Generalized abdominal pain
Hyperkalemia
Anemia, unspecified type
Generalized abdominal pain
Hyperkalemia

## 2018-08-05 NOTE — PROGRESS NOTE ADULT - PROBLEM SELECTOR PLAN 5
Pt on Advair 250/125 BID at home. Pt without acute exacerbation.  c/w Symbicort BID
Pt on Advair 250/125 BID at home. Pt without acute exacerbation.  - start Symbicort BID while pt is admitted
Pt on Advair 250/125 BID at home. Pt without acute exacerbation.  c/w Symbicort BID

## 2018-08-05 NOTE — PROGRESS NOTE ADULT - PROBLEM SELECTOR PROBLEM 1
DEVON (acute kidney injury)
Acute kidney injury superimposed on chronic kidney disease
DEOVN (acute kidney injury)
Acute kidney injury superimposed on chronic kidney disease
DEVON (acute kidney injury)
DEVON (acute kidney injury)
EDVON (acute kidney injury)
Obstructive uropathy

## 2018-08-06 VITALS
RESPIRATION RATE: 18 BRPM | TEMPERATURE: 97 F | SYSTOLIC BLOOD PRESSURE: 132 MMHG | HEART RATE: 55 BPM | OXYGEN SATURATION: 100 % | DIASTOLIC BLOOD PRESSURE: 44 MMHG

## 2018-08-06 RX ADMIN — HYDROMORPHONE HYDROCHLORIDE 1 MILLIGRAM(S): 2 INJECTION INTRAMUSCULAR; INTRAVENOUS; SUBCUTANEOUS at 04:01

## 2018-08-06 RX ADMIN — HYDROMORPHONE HYDROCHLORIDE 1 MILLIGRAM(S): 2 INJECTION INTRAMUSCULAR; INTRAVENOUS; SUBCUTANEOUS at 04:31

## 2018-08-06 NOTE — DISCHARGE NOTE FOR THE EXPIRED PATIENT - SECONDARY DIAGNOSIS.
Acute kidney injury superimposed on chronic kidney disease Asthma, unspecified asthma severity, unspecified whether complicated, unspecified whether persistent Bladder cancer metastasized to bone Acute respiratory failure with hypoxia Anemia, unspecified type Altered mental status

## 2018-08-06 NOTE — DISCHARGE NOTE FOR THE EXPIRED PATIENT - HOSPITAL COURSE
Dx: obstructive uropathy, renal failure, shock, uremic  77 yo woman with PMHx of metastatic bladder CA with ileal conduit in the RLQ, small bowel resection with anastomosis in the RLQ, CKD, hypertrophic cardiomyopathy, HTN, fibromyalgia, obesity admitted with acute on chronic renal failure likely postobstructive.   DNR     - patient  renal failure secondary to metastatic bladder cancer.

## 2018-08-06 NOTE — CHART NOTE - NSCHARTNOTEFT_GEN_A_CORE
Pt found with no pulse, no spontaneous breathing, no heart sounds. Pt pronounced at 6:18am. Pt found with no pulse, no spontaneous breathing, no heart sounds. Pt pronounced at 6:18am. Sister Isi notified. Pt found with no pulse, no spontaneous breathing, no heart sounds. Pt pronounced at 6:18am. Sister Isi notified.  Dr. Manning notified.

## 2019-12-13 NOTE — PROVIDER CONTACT NOTE (OTHER) - ACTION/TREATMENT ORDERED:
recheck pulse in 1 hour
13-Dec-2019 16:21
0,5L 0.9%NS bolus has been ordered. Will continue monitor
EKG obtained, vital signs repeated, NP in communication with MICU and IR for possible bilateral nephrostomy tubes. Toprolol XL to be given for heart rate. Will continue to monitor.
administer hyperkalemic management medication as patient potassium is elevated and obtain ekg
continue to monitor
continue to monitor

## 2020-12-09 NOTE — PHYSICAL THERAPY INITIAL EVALUATION ADULT - DISCHARGE PLANNER MADE AWARE
From: Concepción Morales  To: Allyssa Medina  Sent: 12/8/2020 9:58 PM CST  Subject: Lab Test or Test Related Question    I think that this is a mistake. I receive my flu shot every year.  
yes

## 2021-02-08 NOTE — PATIENT PROFILE ADULT. - PAIN, CHRONIC: FACTORS THAT AGGRAVATE, PROFILE
pt independent with ADLs and transfers at this time; pt not put on program for OT
movement/weather
good, to achieve stated therapy goals

## 2021-05-13 NOTE — ED PROVIDER NOTE - CONSTITUTIONAL, MLM
Progress Note      Patient Name: Nilda Julien   Patient ID: [id number]   Sex: Female   YOB: 1943    Primary Care Provider: Jevon Gonzalez MD   Referring Provider: Jevon Gonzalez MD    Visit Date: June 18, 2020    Provider: Jevon Gonzalez MD   Location: [facility]   Location Address: [address] [place] [address], [address] [place] [address]  [id number]   Location Phone: [phone]          Chief Complaint     3 month follow  Lab results       History Of Present Illness  Nilda Julien is a 76 year old /White female who presents for evaluation and treatment of:      hx of chronic arhritis and back pain.  She is on tramadol. She takes just prn to help with quality of life by reduction of pain.    Hx of HTN. Compliant with the carvediolol.   Hx of GERD. controlled. compliant with the omeprazole.   Hx of HLD. controlled.     Hx of allergic rhiniits. controlled.  compliant with the zyrtec.       HX of PVD..she sees vascular specialist for f/u in August.  she is on cilostazol.    Hx of loose stools. SHe hada  full workup last  year.. She is on flagyl and lomotil prn.      urinalysis showed signs of UTI..hx of microhematuria..hx of past.had a work in past.    hx of tobcco abuse.she needs low dose CT scan.       Past Medical History  Disease Name Date Onset Notes   Allergic rhinitis --  seasonal allergies   Aortic stenosis --  --    Arthritis --  --    Back pain --  --    Bone Density Screening 10/2/17 --    Coronary artery disease --  --    Diarrhea --  --    Diverticulitis --  --    Essential hypertension --  --    Fatigue --  --    Heart Attack 2001 --    Heart Disease --  --    Heart Murmur 2013 --    Hemorrhoids 2013 --    Hip Pain --  --    Hyperlipidemia --  --    Hypertension --  --    Irritable bowel syndrome --  --    Irritable bowel syndrome with diarrhea --  --    Memory loss/Forgetfulness --  --    Microhematuria 01/13/2019 --    Nausea --  --    Nephrolithiasis 01/13/2019 --    Pap  smear for cervical cancer screening 4/17/15 --    Screening Mammogram 18 --          Past Surgical History  Procedure Name Date Notes   cardiac stents  2 stents placed   Colonoscopy 16 --    EGD  --    Hernia Repair --  --    Open Heart Surgery 2013 4 way bypass         Medication List  Name Date Started Instructions   Aspir-81 81 mg Oral tablet,delayed release (DR/EC)  take 1 tablet (81 mg) by oral route once daily   carvedilol 6.25 mg oral tablet 2019 TAKE ONE TABLET BY MOUTH TWICE A DAY WITH FOOD   cilostazol 100 mg oral tablet  take 1 tablet by oral route 2 times a day   Lomotil 2.5-0.025 mg oral tablet 06/15/2020 take 2 tablets (5 mg) by oral route 2 times per day as needed for 30 days   omeprazole 20 mg oral capsule,delayed release(DR/EC) 2018 take 1 capsule (20 mg) by oral route once daily before a meal for 90 days   pravastatin 40 mg oral tablet 2019 TAKE ONE TABLET BY MOUTH EVERY NIGHT AT BEDTIME   Probiotic oral  --    Prolia 60 mg/mL subcutaneous syringe 2020 inject 1 milliliter (60 mg) by subcutaneous route every 6 months in the upper arm, upper thigh or abdomen   tramadol 50 mg oral tablet 2020 take 1 tablet by oral route 2 times a for 30 days   vitamin E 400 unit oral capsule  take 2 capsules by oral route daily   Zofran 4 mg oral tablet 2020 take 1 tablet by oral route 3 times a day as needed   Zoloft 50 mg oral tablet 2020 TAKE ONE TABLET BY MOUTH DAILY         Allergy List  Allergen Name Date Reaction Notes   NO KNOWN DRUG ALLERGIES --  --  --        Allergies Reconciled  Family Medical History  Disease Name Relative/Age Notes   Stroke Father/   Mini   Hypertension Father/  Mother/   --    Chronic Obstructive Pulmonary Disease Father/   black lung  age 88   Heart Attack (MI) Mother/   --    Family history of stroke Father/   Father   Family history of heart disease Mother/   Mother         Social History  Finding Status  Start/Stop Quantity Notes   Alcohol Never --/-- --  does not drink   lives alone --  --/-- --  --    Retired --  --/-- --  --    Tobacco Current every day --/-- 0.5 PPD current every day smoker, 0.5 pack per day, smoked 31 or more years    --  --/-- --  --          Immunizations  NameDate Admin Mfg Trade Name Lot Number Route Inj VIS Given VIS Publication   InfluenzaRefused 06/18/2020 NE Not Entered  NE NE     Comments:    InfluenzaRefused 04/30/2019 NE Not Entered  NE NE     Comments:    Vewilfpvp61/19/2015 [facility] Fluarix, quadrivalent, preservative free [id number] IM RD 10/19/2015 07/02/2012   Comments: pt left office in stable condition   Mrxygjvqr00/28/2013 [facility] Fluarix-PF > 3 Years [id number] IM LD 10/28/2013 07/02/2012   Comments:    Rlkuyxrytoqp77/17/2015 [facility] PREVNAR 13 [id number] IM RD 04/17/2015 02/27/2013   Comments:    Sgucuzcplaef50/17/2015 [facility] PREVNAR 13 [id number] IM RD 04/17/2015 02/27/2013   Comments:    Td04/17/2015 [facility] DECAVAC [id number] IM LD 04/17/2015 01/24/2012   Comments:          Review of Systems  · Constitutional  o Denies  o : night sweats  · Eyes  o Denies  o : double vision, blurred vision  · HENT  o Denies  o : vertigo, recent head injury  · Breasts  o Denies  o : abnormal changes in breast size, additional breast symptoms except as noted in the HPI  · Cardiovascular  o Denies  o : chest pain, irregular heart beats  · Respiratory  o Denies  o : shortness of breath, productive cough  · Gastrointestinal  o Denies  o : nausea, vomiting  · Genitourinary  o Denies  o : dysuria, urinary retention  · Integument  o Denies  o : hair growth change, new skin lesions  · Neurologic  o Denies  o : altered mental status, seizures  · Musculoskeletal  o Denies  o : joint swelling, limitation of motion  · Endocrine  o Denies  o : cold intolerance, heat intolerance  · Heme-Lymph  o Denies  o : petechiae, lymph node enlargement or tenderness  · Allergic-Immunologic  o Denies  o : frequent illnesses      Vitals  Date Time BP  Position Site L\R Cuff Size HR RR TEMP (F) WT  HT  BMI kg/m2 BSA m2 O2 Sat        06/18/2020 02:38 /58 Sitting    83 - R  98.5 77lbs 16oz 5'   15.23 1.22 96 %          Physical Examination  · Constitutional  o Appearance  o : alert, in no acute distress, well developed, well-nourished  · Head and Face  o Head  o : normocephalic, atraumatic, non tender, no palpable masses or nodules.  o Face  o : no facial lesions  · Eyes  o Vision  o : Acuity: grossly normal at distance, Conjuntivae: Normal, Sclerae white  · Respiratory  o Auscultation of Lungs  o : normal breath sounds throughout  · Cardiovascular  o Heart  o : Regular rate and rhythm, Normal S1,S2   · Psychiatric  o Mood and Affect  o : normal mood and affect          Assessment  · UTI (urinary tract infection)     599.0/N39.0  · Tobacco abuse     305.1/Z72.0  · Routine lab draw     V72.60/Z01.89  · Vitamin D deficiency     268.9/E55.9  · Vitamin B 12 deficiency     266.2/E53.8  · HTN (hypertension)     401.9/I10  · GERD (gastroesophageal reflux disease)     530.81/K21.9  · HLD (hyperlipidemia)     272.4/E78.5  · PVD (peripheral vascular disease)     443.9/I73.9       f/u as directed.  order urine culture.  low dose CT chest.       Plan  · Orders  o Urinalysis dipstick auto w micro (20784) - 599.0/N39.0 - 06/18/2020  o Urine Culture (Clean Catch) [facility] (15908) - 599.0/N39.0 - 06/18/2020  o Physical, Primary Care Panel (CBC, CMP, Lipid, TSH) [facility] ([id number], 07530, 30904, 00655) - V72.60/Z01.89, 268.9/E55.9, 266.2/E53.8 - 09/18/2020  o Vitamin D (25-Hydroxy) Level (38024) - V72.60/Z01.89, 268.9/E55.9 - 09/18/2020  o B12 Folate levels (B12FO) - 266.2/E53.8 - 09/18/2020  o Low dose CT scan (LDCT) for lung cancer screening [facility] () - 305.1/Z72.0 - 06/18/2020  o ACO-14: Influenza immunization was not administered for reasons documented () - - 06/19/2020  o ACO-39: Current medications updated and reviewed () - - 06/19/2020  · Medications  o Medications  have been Reconciled  o Transition of Care or Provider Policy  · Instructions  o Electronically Identified Patient Education Materials Provided Electronically  · Disposition  o Call or Return if symptoms worsen or persist.  o Care Transition            Electronically Signed by: Jevon Gonzalez MD -Author on June 19, 2020 10:14:26 AM   - - -

## 2023-06-13 NOTE — CONSULT NOTE ADULT - PROVIDER SPECIALTY LIST ADULT
DISPLAY PLAN FREE TEXT Heme/Onc DISPLAY PLAN FREE TEXT DISPLAY PLAN FREE TEXT DISPLAY PLAN FREE TEXT DISPLAY PLAN FREE TEXT DISPLAY PLAN FREE TEXT DISPLAY PLAN FREE TEXT DISPLAY PLAN FREE TEXT DISPLAY PLAN FREE TEXT DISPLAY PLAN FREE TEXT DISPLAY PLAN FREE TEXT DISPLAY PLAN FREE TEXT DISPLAY PLAN FREE TEXT DISPLAY PLAN FREE TEXT

## 2023-07-05 NOTE — H&P ADULT - NSHPPHYSICALEXAM_GEN_ALL_CORE
PHYSICAL EXAM:  GENERAL: NAD, well-groomed, well-developed  HEAD:  Atraumatic, Normocephalic  EYES: EOMI, PERRLA, conjunctiva and sclera clear  ENMT: No tonsillar erythema, exudates, or enlargement; Moist mucous membranes,   NECK: Supple, No JVD, Normal thyroid  HEART: 3/6 systolic mumur, Regular rate and rhythm  RESPIRATORY: CTA B/L, No W/R/R  ABDOMEN: Soft, Nontender, Nondistended; Bowel sounds present  NEUROLOGY: A&Ox3, nonfocal, CN II-XII grossly intact, sensation intact  EXTREMITIES:  2+ Peripheral Pulses, No clubbing, cyanosis, or edema  SKIN: warm, dry, normal color, no rash or abnormal lesions  MSK: No joint effusion  PSYCH: Flat affect Cigarettes

## 2023-08-21 NOTE — H&P ADULT - NSHPLABSRESULTS_GEN_ALL_CORE
Labs personally reviewed.                        8.5    11.1  )-----------( 119      ( 08 May 2018 20:14 )             25.4         139  |  108  |  25<H>  ----------------------------<  92  5.7<H>   |  19<L>  |  1.42<H>    Ca    8.5      08 May 2018 20:14    TPro  6.0  /  Alb  3.7  /  TBili  0.3  /  DBili  x   /  AST  12  /  ALT  14  /  AlkPhos  141<H>      LIVER FUNCTIONS - ( 08 May 2018 20:14 )  Alb: 3.7 g/dL / Pro: 6.0 g/dL / ALK PHOS: 141 U/L / ALT: 14 U/L / AST: 12 U/L / GGT: x         Urinalysis Basic - ( 08 May 2018 20:14 )    Color: PL Yellow / Appearance: SL Turbid / S.010 / pH: x  Gluc: x / Ketone: Negative  / Bili: Negative / Urobili: Negative   Blood: x / Protein: Trace / Nitrite: Negative   Leuk Esterase: Large / RBC: 5-10 /HPF / WBC 25-50 /HPF   Sq Epi: x / Non Sq Epi: Occasional /HPF / Bacteria: Few /HPF      Imaging personally reviewed.      Tracing personally reviewed.
Two to three times per week

## 2025-02-26 NOTE — PHYSICAL THERAPY INITIAL EVALUATION ADULT - GAIT DISTANCE, PT EVAL
Reviewed patient's chart and Dr. Walsh's note. Patient stated that she should stop Eliquis 5 days prior to procedure. Per Dr. Walsh's note, Eliquis should be stopped 24 to 48 hours prior to procedure. Called patient and requested she contact Dr. Walsh's office to determine when she should stop her Eliquis, She verbalized understanding to call Dr. Walsh's office.   200 feet

## 2025-05-18 NOTE — ED ADULT NURSE NOTE - GENITOURINARY WDL
Patient notified we will continue with current treatment plan    Bladder non-tender and non-distended. Urine clear yellow, ileal conduit with urostomy on right lower quadrant.